# Patient Record
Sex: FEMALE | Race: WHITE | Employment: OTHER | URBAN - METROPOLITAN AREA
[De-identification: names, ages, dates, MRNs, and addresses within clinical notes are randomized per-mention and may not be internally consistent; named-entity substitution may affect disease eponyms.]

---

## 2019-01-21 ENCOUNTER — HOSPITAL ENCOUNTER (OUTPATIENT)
Dept: SURGERY | Age: 79
Discharge: HOME | End: 2019-01-21
Payer: MEDICARE

## 2019-01-21 ENCOUNTER — HOSPITAL ENCOUNTER (OUTPATIENT)
Dept: PHYSICAL THERAPY | Age: 79
Discharge: HOME | End: 2019-01-21
Payer: MEDICARE

## 2019-01-21 VITALS
SYSTOLIC BLOOD PRESSURE: 151 MMHG | TEMPERATURE: 95.7 F | WEIGHT: 158.3 LBS | BODY MASS INDEX: 27.02 KG/M2 | HEART RATE: 73 BPM | DIASTOLIC BLOOD PRESSURE: 70 MMHG | OXYGEN SATURATION: 100 % | HEIGHT: 64 IN | RESPIRATION RATE: 16 BRPM

## 2019-01-21 LAB
ANION GAP SERPL CALC-SCNC: 4 MMOL/L
APPEARANCE UR: CLEAR
APTT PPP: 29.4 SEC (ref 24.7–39.8)
BACTERIA SPEC CULT: ABNORMAL
BACTERIA URNS QL MICRO: ABNORMAL /HPF
BASOPHILS # BLD: 0.1 K/UL (ref 0–0.2)
BASOPHILS NFR BLD: 1 % (ref 0–2)
BILIRUB UR QL: NEGATIVE
BUN SERPL-MCNC: 21 MG/DL (ref 8–23)
CALCIUM SERPL-MCNC: 9.6 MG/DL (ref 8.3–10.4)
CASTS URNS QL MICRO: ABNORMAL /LPF
CHLORIDE SERPL-SCNC: 103 MMOL/L (ref 98–107)
CO2 SERPL-SCNC: 29 MMOL/L (ref 21–32)
COLOR UR: YELLOW
CREAT SERPL-MCNC: 0.75 MG/DL (ref 0.6–1)
DIFFERENTIAL METHOD BLD: NORMAL
EOSINOPHIL # BLD: 0.2 K/UL (ref 0–0.8)
EOSINOPHIL NFR BLD: 2 % (ref 0.5–7.8)
EPI CELLS #/AREA URNS HPF: ABNORMAL /HPF
ERYTHROCYTE [DISTWIDTH] IN BLOOD BY AUTOMATED COUNT: 13.5 % (ref 11.9–14.6)
GLUCOSE SERPL-MCNC: 86 MG/DL (ref 65–100)
GLUCOSE UR STRIP.AUTO-MCNC: NEGATIVE MG/DL
HCT VFR BLD AUTO: 42.8 % (ref 35.8–46.3)
HGB BLD-MCNC: 13.8 G/DL (ref 11.7–15.4)
HGB UR QL STRIP: ABNORMAL
IMM GRANULOCYTES # BLD AUTO: 0 K/UL (ref 0–0.5)
IMM GRANULOCYTES NFR BLD AUTO: 0 % (ref 0–5)
INR PPP: 0.9
KETONES UR QL STRIP.AUTO: ABNORMAL MG/DL
LEUKOCYTE ESTERASE UR QL STRIP.AUTO: ABNORMAL
LYMPHOCYTES # BLD: 1.8 K/UL (ref 0.5–4.6)
LYMPHOCYTES NFR BLD: 19 % (ref 13–44)
MCH RBC QN AUTO: 31.2 PG (ref 26.1–32.9)
MCHC RBC AUTO-ENTMCNC: 32.2 G/DL (ref 31.4–35)
MCV RBC AUTO: 96.6 FL (ref 79.6–97.8)
MONOCYTES # BLD: 0.8 K/UL (ref 0.1–1.3)
MONOCYTES NFR BLD: 9 % (ref 4–12)
NEUTS SEG # BLD: 6.6 K/UL (ref 1.7–8.2)
NEUTS SEG NFR BLD: 70 % (ref 43–78)
NITRITE UR QL STRIP.AUTO: NEGATIVE
NRBC # BLD: 0 K/UL (ref 0–0.2)
OTHER OBSERVATIONS,UCOM: ABNORMAL
PH UR STRIP: 5.5 [PH] (ref 5–9)
PLATELET # BLD AUTO: 329 K/UL (ref 150–450)
PMV BLD AUTO: 10.1 FL (ref 9.4–12.3)
POTASSIUM SERPL-SCNC: 4.5 MMOL/L (ref 3.5–5.1)
PROT UR STRIP-MCNC: 30 MG/DL
PROTHROMBIN TIME: 12.6 SEC (ref 11.7–14.5)
RBC # BLD AUTO: 4.43 M/UL (ref 4.05–5.2)
SERVICE CMNT-IMP: ABNORMAL
SODIUM SERPL-SCNC: 136 MMOL/L (ref 136–145)
SP GR UR REFRACTOMETRY: 1.02 (ref 1–1.02)
UROBILINOGEN UR QL STRIP.AUTO: 0.2 EU/DL (ref 0.2–1)
WBC # BLD AUTO: 9.4 K/UL (ref 4.3–11.1)
WBC URNS QL MICRO: ABNORMAL /HPF

## 2019-01-21 PROCEDURE — 85610 PROTHROMBIN TIME: CPT

## 2019-01-21 PROCEDURE — 85025 COMPLETE CBC W/AUTO DIFF WBC: CPT

## 2019-01-21 PROCEDURE — 97161 PT EVAL LOW COMPLEX 20 MIN: CPT

## 2019-01-21 PROCEDURE — 85730 THROMBOPLASTIN TIME PARTIAL: CPT

## 2019-01-21 PROCEDURE — 36415 COLL VENOUS BLD VENIPUNCTURE: CPT

## 2019-01-21 PROCEDURE — 80048 BASIC METABOLIC PNL TOTAL CA: CPT

## 2019-01-21 PROCEDURE — 87641 MR-STAPH DNA AMP PROBE: CPT

## 2019-01-21 PROCEDURE — 81003 URINALYSIS AUTO W/O SCOPE: CPT

## 2019-01-21 PROCEDURE — 77030027138 HC INCENT SPIROMETER -A

## 2019-01-21 RX ORDER — IBUPROFEN 200 MG
400 TABLET ORAL 2 TIMES DAILY
COMMUNITY
End: 2019-02-09

## 2019-01-21 RX ORDER — GLUCOSAMINE/CHONDROITIN/C/MANG 500-400 MG
1 CAPSULE ORAL DAILY
COMMUNITY
End: 2019-02-09

## 2019-01-21 RX ORDER — BISMUTH SUBSALICYLATE 262 MG
1 TABLET,CHEWABLE ORAL DAILY
COMMUNITY
End: 2019-02-09

## 2019-01-21 RX ORDER — KETOCONAZOLE 20 MG/G
CREAM TOPICAL
COMMUNITY

## 2019-01-21 NOTE — PERIOP NOTES
Patient verified name and . Order for consent was found in EHR and matches case posting; patient verified. Left total knee arthroplasty    Type 3 surgery, PAT Joint assessment complete. Labs per surgeon: cbc, bmp, UA, PT, PTT, MRSA nasal swab; results pending. Labs per anesthesia protocol: no additional lab work needed. EKG:Donr 18- within anesthesia guidelines. MRSA/MSSA swab collected; pharmacy to review and dose antibiotic as appropriate. Hibiclens and instructions to return bottle on DOS given per hospital policy. Patient provided with handouts including Guide to Surgery, Pain Management, Hand Hygiene, Blood Transfusion Education, and [facility] Brochure. Patient answered medical/surgical history questions at their best of ability. All prior to admission medications documented in [facility]. Original medication prescription bottle was visualized during patient appointment. Patient instructed to hold all vitamins 7 days prior to surgery and NSAIDS 5 days prior to surgery. Prescription medications to hold: vitamins, supplements and ibuprofen. Patient instructed to continue previous medications as prescribed prior to surgery and to take the following medications the day of surgery according to anesthesia guidelines with a small sip of water: zyrtec, synthroid, pred forte eye drops and tramadol if needed. .      Patient teach back successful and patient demonstrates knowledge of instruction.

## 2019-01-21 NOTE — PROGRESS NOTES
Teo Hurtado  : [birth year](51 y.o.) [address] at [address], [place] [address].  [phone]       Physical Therapy Prehab Plan of Treatment and Evaluation Summary:2019    ICD-10: Treatment Diagnosis:   · Pain in Left Knee (M25.562)  · Stiffness of Left Knee, Not elsewhere classified (X13.827)  Precautions/Allergies:   Pollen extracts and Ace inhibitors  MEDICAL/REFERRING DIAGNOSIS:  Unilateral primary osteoarthritis, left knee [M17.12]  REFERRING PHYSICIAN: Rizwana Florez,*  DATE OF SURGERY: 18   Assessment:   Comments:  Scheduled for L TKA. Plans to later have R TKA. Reports pain is worse at night and with extended walking. [place] -uses B hearing aides. Plans to discharge home with support of spouse. PROBLEM LIST (Impacting functional limitations):  Ms. Keyana Lopez presents with the following left lower extremity(s) problems:  1. Transfers  2. Gait  3. Range of Motion  4. Home Exercise Program  5. Pain   INTERVENTIONS PLANNED:  1. Home Exercise Program  2. Educational Discussion     TREATMENT PLAN: Effective Dates: 2019 TO 2019. Frequency/Duration: Patient to continue to perform home exercise program at least twice per day up until her surgery. GOALS: (Goals have been discussed and agreed upon with patient.)  Discharge Goals: Time Frame: 1 Day  1. Patient will demonstrate independence with a home exercise program designed to increase functional technique and pain control to minimize functional deficits and optimize patient for total joint replacement. Rehabilitation Potential For Stated Goals: Good  Regarding Geovanna Somers's therapy, I certify that the treatment plan above will be carried out by a therapist or under their direction.   Thank you for this referral,  Cece Chavez, PT               HISTORY:   Present Symptoms:  Pain Intensity 1: 0(0 currently; high of 8)   History of Present Injury/Illness (Reason for Referral):  Medical/Referring Diagnosis: Unilateral primary osteoarthritis, left knee [M17.12]   Past Medical History/Comorbidities:   Ms. Nilda Zepeda  has a past medical history of Allergic rhinitis due to pollen, Arthritis, Asthma, Fibrocystic breast, GERD (gastroesophageal reflux disease), H/O colonoscopy (2009), Hashimoto's thyroiditis, Hypertension, Macular degeneration, Mixed hyperlipidemia, Osteoporosis (1/2013), Unspecified hypothyroidism, and Varicella. Ms. Nilda Zepeda  has a past surgical history that includes hx gyn (1973); hx appendectomy (1973); hx tonsillectomy (as a child); hx adenoidectomy (as a child); hx cataract removal (Bilateral, 2007); hx colonoscopy; and hx hernia repair (06/16/2018). Social History/Living Environment:   Home Environment: Private residence  # Steps to Enter: 1  Rails to Enter: No  One/Two Story Residence: One story  Living Alone: No  Support Systems: Spouse/Significant Other/Partner  Patient Expects to be Discharged to[de-identified] Private residence  Current DME Used/Available at Home: None(plans to borrow RW)  Tub or Shower Type: Shower  Work/Activity:  retired  Dominant Side:  LEFT  Current Medications:  See [address] note   Number of Personal Factors/Comorbidities that affect the Plan of Care: 0: LOW COMPLEXITY   EXAMINATION:   ADLs (Current Functional Status):   Ambulation:  [x] Independent  [] Walk Indoors Only  [] Walk Outdoors  [] Use Assistive Device  [] Use Wheelchair Only Dressing:  [x] Independent  Requires Assistance from Someone for:  [] Sock/Shoes  [] Pants  [] Everything   Bathing/Showering:   [x] Independent  [] Requires Assistance from Someone  [] Shaan Bhakta Dr:  [x] Routine house and yard work  [] Light Housework Only  [] None   Observation/Orthostatic Postural Assessment: Forward head, Rounded shoulders, Genu valgus left, Genu valgus right  ROM/Flexibility:   AROM: Generally decreased, functional(B knees 0-115)                           Strength:   Strength:  Within functional limits                  Functional Mobility:         Stand to Sit: Modified independent, Additional time  Sit to Stand: Additional time, Modified independent  Distance (ft): 450 Feet (ft)  Ambulation - Level of Assistance: Independent; Modified independent; Additional time(held onto 's arm)  Speed/Mishel: Slow  Gait Abnormalities: Antalgic;Trunk sway increased          Balance:    Sitting: Intact  Standing: Intact   Body Structures Involved:  1. Bones  2. Joints  3. Muscles Body Functions Affected:  1. Neuromusculoskeletal  2. Movement Related Activities and Participation Affected:  1. General Tasks and Demands  2. Mobility   Number of elements that affect the Plan of Care: 3: MODERATE COMPLEXITY   CLINICAL PRESENTATION:   Presentation: Stable and uncomplicated: LOW COMPLEXITY   CLINICAL DECISION MAKING:   Outcome Measure: Tool Used: Lower Extremity Functional Scale (LEFS)  Score:  Initial: 31/80 Most Recent: X/80 (Date: -- )   Interpretation of Score: 20 questions each scored on a 5 point scale with 0 representing \"extreme difficulty or unable to perform\" and 4 representing \"no difficulty\". The lower the score, the greater the functional disability. 80/80 represents no disability. Minimal detectable change is 9 points. Score 80 79-65 64-49 48-33 32-17 16-1 0   Modifier CH CI CJ CK CL CM CN     ? Mobility - Walking and Moving Around:     - CURRENT STATUS: CL - 60%-79% impaired, limited or restricted    - GOAL STATUS: CL - 60%-79% impaired, limited or restricted    - D/C STATUS:  CL - 60%-79% impaired, limited or restricted  Medical Necessity:   · Ms. Abigail Nolasco is expected to optimize her lower extremity strength and ROM in preparation for joint replacement surgery. Reason for Services/Other Comments:  · Achieve baseline assesment of musculoskeletal system, functional mobility and home environment. , educate in PT HEP in preparation for surgery, educate in hospital plan of care.    Use of outcome tool(s) and clinical judgement create a POC that gives a: Clear prediction of patient's progress: LOW COMPLEXITY   TREATMENT:   Treatment/Session Assessment:  Patient was instructed in PT- HEP to increase strength and ROM in LEs. Answered all questions. · Post session pain:  0  · Compliance with Program/Exercises: anticipate compliance.   Total Treatment Duration:  PT Patient Time In/Time Out  Time In: 1230  Time Out: [address]

## 2019-01-21 NOTE — PROGRESS NOTES
01/21/19 1200   Oxygen Therapy   O2 Sat (%) 98 %   Pulse via Oximetry 78 beats per minute   O2 Device Room air   Pre-Treatment   Breathing Pattern Regular   Breath Sounds Bilateral Clear   Pre FEV1 (liters) 2.6 liters   % Predicted 100   Incentive Spirometry Treatment   Actual Volume (ml) 1500 ml     Initial respiratory Assessment completed with pt. Pt was interviewed and evaluated in Joint camp prior to surgery. Patient ID:  Sharan Sommers  [id number]  51 y.o.  1940  Surgeon: Dr. Tova Vanessa  Date of Surgery: 2/7/2019  Procedure: Total Left Knee Arthroplasty  Primary Care Physician: Malka Ball -[phone]  Specialists:                                  Pt instructed in the use of Incentive Spirometry. Pt instructed to bring Incentive Spirometer back on date of surgery & to start using Is upon return to pt room.     Pt taught proper cough technique    History of smoking:   former                                                      Quit date:     15 yrs ago      Secondhand smoke: spouse      Past procedures with Oxygen desaturation:DENIES    Past Medical History:   Diagnosis Date    Allergic rhinitis due to pollen     Arthritis     Asthma     As a child     Fibrocystic breast     GERD (gastroesophageal reflux disease)     Managed with meds     H/O colonoscopy 2009    Normal ([facility])    Hashimoto's thyroiditis     Hypertension     Managed with meds     Macular degeneration     Mixed hyperlipidemia     Daily meds     Osteoporosis 1/2013    GYN Dr. Veronica Dial Unspecified hypothyroidism     Varicella                                                                                                                                                      Respiratory history:   Some SOB  ON EXERTION                                    Respiratory meds:                                         FAMILY PRESENT:            SPOUSE PAST SLEEP STUDY:                      DENIES  HX OF JENNYFER:                                      DENIES                                     JENNYFER assessment:                                               SLEEPS ON SIDE  and   BACK                                                                 PHYSICAL EXAM   Body mass index is 27.17 kg/m².    Visit Vitals  /70 (BP 1 Location: Right arm, BP Patient Position: At rest;Sitting)   Pulse 73   Temp 95.7 °F (35.4 °C)   Resp 16   Ht 5' 4\" (1.626 m)   Wt 71.8 kg (158 lb 4.8 oz)   SpO2 100%   BMI 27.17 kg/m²     Neck circumference:  37    cm    Loud snoring:        YES                                 Witnessed apnea or wakening gasping or choking:,             DENIES,                                                                                                    Awakens with headaches:                                                  DENIES    Morning or daytime tiredness/ sleepiness:                                                                                                            TIRED - some  Dry mouth or sore throat in morning:                YES                                                                            Freire stage:  4    SACS score: 16    STOP/BANG: 3                              CPAP:                       NONE                                           CONT SAT HS            Referrals:    Pt. Phone Number:

## 2019-01-21 NOTE — PERIOP NOTES
Lab results within anesthesia guidelines. Lab results sent to PCP per surgeon's request. Lab results faxed to Dr. Catarino Henry. Recent Results (from the past 12 hour(s))   CBC WITH AUTOMATED DIFF    Collection Time: 01/21/19 11:55 AM   Result Value Ref Range    WBC 9.4 4.3 - 11.1 K/uL    RBC 4.43 4.05 - 5.2 M/uL    HGB 13.8 11.7 - 15.4 g/dL    HCT 42.8 35.8 - 46.3 %    MCV 96.6 79.6 - 97.8 FL    MCH 31.2 26.1 - 32.9 PG    MCHC 32.2 31.4 - 35.0 g/dL    RDW 13.5 11.9 - 14.6 %    PLATELET 238 579 - 387 K/uL    MPV 10.1 9.4 - 12.3 FL    ABSOLUTE NRBC 0.00 0.0 - 0.2 K/uL    DF AUTOMATED      NEUTROPHILS 70 43 - 78 %    LYMPHOCYTES 19 13 - 44 %    MONOCYTES 9 4.0 - 12.0 %    EOSINOPHILS 2 0.5 - 7.8 %    BASOPHILS 1 0.0 - 2.0 %    IMMATURE GRANULOCYTES 0 0.0 - 5.0 %    ABS. NEUTROPHILS 6.6 1.7 - 8.2 K/UL    ABS. LYMPHOCYTES 1.8 0.5 - 4.6 K/UL    ABS. MONOCYTES 0.8 0.1 - 1.3 K/UL    ABS. EOSINOPHILS 0.2 0.0 - 0.8 K/UL    ABS. BASOPHILS 0.1 0.0 - 0.2 K/UL    ABS. IMM.  GRANS. 0.0 0.0 - 0.5 K/UL   METABOLIC PANEL, BASIC    Collection Time: 01/21/19 11:55 AM   Result Value Ref Range    Sodium 136 136 - 145 mmol/L    Potassium 4.5 3.5 - 5.1 mmol/L    Chloride 103 98 - 107 mmol/L    CO2 29 21 - 32 mmol/L    Anion gap 4 mmol/L    Glucose 86 65 - 100 mg/dL    BUN 21 8 - 23 MG/DL    Creatinine 0.75 0.6 - 1.0 MG/DL    GFR est AA >60 >60 ml/min/1.73m2    GFR est non-AA >60 ml/min/1.73m2    Calcium 9.6 8.3 - 10.4 MG/DL   PROTHROMBIN TIME + INR    Collection Time: 01/21/19 11:55 AM   Result Value Ref Range    Prothrombin time 12.6 11.7 - 14.5 sec    INR 0.9     PTT    Collection Time: 01/21/19 11:55 AM   Result Value Ref Range    aPTT 29.4 24.7 - 39.8 SEC   URINALYSIS W/ RFLX MICROSCOPIC    Collection Time: 01/21/19 11:55 AM   Result Value Ref Range    Color YELLOW      Appearance CLEAR      Specific gravity 1.020 1.001 - 1.023      pH (UA) 5.5 5.0 - 9.0      Protein 30 (A) NEG mg/dL    Glucose NEGATIVE  NEG mg/dL    Ketone TRACE (A) NEG mg/dL    Bilirubin NEGATIVE  NEG      Blood SMALL (A) NEG      Urobilinogen 0.2 0.2 - 1.0 EU/dL    Nitrites NEGATIVE  NEG      Leukocyte Esterase LARGE (A) NEG      WBC 20-50 0 /hpf    Epithelial cells 0-3 0 /hpf    Bacteria TRACE 0 /hpf    Casts 0-3 0 /lpf    Other observations RESULTS VERIFIED MANUALLY

## 2019-01-22 NOTE — ADVANCED PRACTICE NURSE
Total Joint Surgery Preoperative Chart Review      Patient ID:  Akila Silver  [id number]  67 y.o.  1940  Surgeon: Dr. Gabbi Madison  Date of Surgery: 2019  Procedure: Total Left Knee Arthroplasty  Primary Care Physician: Sanjuanita Piña -[phone]  Specialty Physician(s):      Subjective:   Akila Silver is a 70 y.o. WHITE OR  female who presents for preoperative evaluation for Total Left Knee arthroplasty. This is a preoperative chart review note based on data collected by the nurse at the surgical Pre-Assessment visit. Past Medical History:   Diagnosis Date    Allergic rhinitis due to pollen     Arthritis     Asthma     As a child     Fibrocystic breast     GERD (gastroesophageal reflux disease)     Managed with meds     H/O colonoscopy     Normal ([facility])    Hashimoto's thyroiditis     Hypertension     Managed with meds     Macular degeneration     Mixed hyperlipidemia     Daily meds     Osteoporosis 2013    GYN Dr. Juanpablo Alvarado Unspecified hypothyroidism     Varicella       Past Surgical History:   Procedure Laterality Date    HX ADENOIDECTOMY  as a child    HX APPENDECTOMY  1973    HX CATARACT REMOVAL Bilateral     cataract repair   [facility] COLONOSCOPY      Dr. Nathan Srivastava     HX GYN  1973    ovarian cyst    HX HERNIA REPAIR  2018    HX TONSILLECTOMY  as a child     Family History   Problem Relation Age of Onset    Breast Cancer Mother     Cancer Mother         breast, lung    Coronary Artery Disease Mother     Heart Disease Mother     COPD Father     Other Father         smoker      Social History     Tobacco Use    Smoking status: Former Smoker     Packs/day: 0.50     Years: 12.00     Pack years: 6.00     Last attempt to quit: 1975     Years since quittin.0    Smokeless tobacco: Never Used    Tobacco comment: quit age of 30   Substance Use Topics    Alcohol use:  Yes     Alcohol/week: 0.6 oz     Types: 1 Glasses of wine per week     Comment: socially       Prior to Admission medications    Medication Sig Start Date End Date Taking? Authorizing Provider   ketoconazole (NIZORAL) 2 % topical cream Apply  to affected area two (2) times daily as needed for Skin Irritation. Yes Provider, Historical   glucosamine-chondroit-vit c-mn (GLUCOSAMINE 1500 COMPLEX) 500-400 mg capsule Take 1 Cap by mouth daily. Yes Provider, Historical   ibuprofen (MOTRIN) 200 mg tablet Take 400 mg by mouth two (2) times a day. Yes Provider, Historical   multivitamin (ONE A DAY) tablet Take 1 Tab by mouth daily. Yes Provider, Historical   traMADol (ULTRAM) 50 mg tablet Take 1 Tab by mouth every six (6) hours as needed for Pain. Max Daily Amount: 200 mg. 1/10/19  Yes Rona Pacheco MD   varicella-zoster recombinant, PF, (SHINGRIX, PF,) 50 mcg/0.5 mL susr injection 0.5mL by IntraMUSCular route once now and then repeat in 2-6 months 8/8/18  Yes Rona Pacheco MD   hydroCHLOROthiazide (HYDRODIURIL) 12.5 mg tablet Take 1 Tab by mouth daily. 5/9/18  Yes Rona Pacheco MD   levothyroxine (SYNTHROID) 75 mcg tablet TAKE 1 TABLET BY MOUTH  DAILY BEFORE BREAKFAST 4/24/18  Yes Rona Pacheco MD   irbesartan (AVAPRO) 300 mg tablet TAKE 1 TABLET BY MOUTH  NIGHTLY 4/24/18  Yes Rona Pacheco MD   carbidopa-levodopa (SINEMET)  mg per tablet Take 1 Tab by mouth nightly. Patient taking differently: Take 1 Tab by mouth nightly. 4/24/18  Yes Rona Pacheco MD   pantoprazole (PROTONIX) 40 mg tablet Take 1 Tab by mouth daily. Patient taking differently: Take 40 mg by mouth Daily (before dinner). 4/24/18  Yes Rona Pacheco MD   pravastatin (PRAVACHOL) 40 mg tablet Take 1 Tab by mouth daily. Patient taking differently: Take 40 mg by mouth nightly. 4/24/18  Yes Rona Pacheco MD   triamcinolone acetonide (KENALOG) 0.1 % topical cream Apply  to affected area two (2) times daily as needed.  2/9/18  Yes Provider, Historical   clotrimazole-betamethasone (Zaira Leonard) topical cream Apply  to affected area two (2) times a day. Patient taking differently: Apply  to affected area two (2) times daily as needed. 3/28/18  Yes Reji Tobias MD   clobetasol (TEMOVATE) 0.05 % topical cream Apply  to affected area two (2) times daily as needed. 8/25/16  Yes Provider, Historical   cetirizine (ZYRTEC) 10 mg tablet Take 10 mg by mouth daily. Yes Provider, Historical   VIT C/MADALYN AC/LUT/COPPER/ZNOX (PRESERVISION LUTEIN PO) Take 1 Tab by mouth daily. Yes Provider, Historical   Omega-3-DHA-EPA-Fish Oil 1,200 (144-216) mg cap Take 1 Tab by mouth daily. Yes Provider, Historical   coenzyme q10 (CO Q-10) 10 mg cap Take 1 Tab by mouth daily. Yes Provider, Historical   calcium carbonate-vitamin d2 1,200-400 mg-unit cap Take 1 Tab by mouth two (2) times a day. Yes Provider, Historical   PSYLLIUM SEED, WITH DEXTROSE, (FIBER PO) Take 1 Tab by mouth daily. Yes Provider, Historical   prednisoLONE acetate (PRED FORTE) 1 % ophthalmic suspension Administer 1 Drop to both eyes two (2) times a week. Monday and Thursday   Yes Provider, Historical   aspirin 81 mg tablet Take 81 mg by mouth nightly. Yes Provider, Historical   ciprofloxacin HCl (CIPRO) 500 mg tablet Take 1 Tab by mouth two (2) times a day. 1/21/19   Demario Samano MD   cyclobenzaprine (FLEXERIL) 10 mg tablet Take 1 Tab by mouth three (3) times daily as needed for Muscle Spasm(s). 10/8/18   Demario Samano MD   fluticasone (FLONASE) 50 mcg/actuation nasal spray 2 Sprays by Both Nostrils route daily. 4/24/18   Demario Samano MD   MULTIVITAMINS WITH FLUORIDE (MULTI-VITAMIN PO) Take  by mouth.       Provider, Historical     Allergies   Allergen Reactions    Pollen Extracts Runny Nose    Ace Inhibitors Swelling and Angioedema     Pt stated \"ace blockers\" can't remember name for htn  Facial swelling          Objective:     Physical Exam:   Patient Vitals for the past 24 hrs:   Temp Pulse Resp BP SpO2   01/21/19 1333 95.7 °F (35.4 °C) 73 16 151/70 100 %   01/21/19 1200 -- -- -- -- 98 %       ECG:    EKG Results     None          Data Review:   Labs:   Recent Results (from the past 24 hour(s))   CBC WITH AUTOMATED DIFF    Collection Time: 01/21/19 11:55 AM   Result Value Ref Range    WBC 9.4 4.3 - 11.1 K/uL    RBC 4.43 4.05 - 5.2 M/uL    HGB 13.8 11.7 - 15.4 g/dL    HCT 42.8 35.8 - 46.3 %    MCV 96.6 79.6 - 97.8 FL    MCH 31.2 26.1 - 32.9 PG    MCHC 32.2 31.4 - 35.0 g/dL    RDW 13.5 11.9 - 14.6 %    PLATELET 419 822 - 747 K/uL    MPV 10.1 9.4 - 12.3 FL    ABSOLUTE NRBC 0.00 0.0 - 0.2 K/uL    DF AUTOMATED      NEUTROPHILS 70 43 - 78 %    LYMPHOCYTES 19 13 - 44 %    MONOCYTES 9 4.0 - 12.0 %    EOSINOPHILS 2 0.5 - 7.8 %    BASOPHILS 1 0.0 - 2.0 %    IMMATURE GRANULOCYTES 0 0.0 - 5.0 %    ABS. NEUTROPHILS 6.6 1.7 - 8.2 K/UL    ABS. LYMPHOCYTES 1.8 0.5 - 4.6 K/UL    ABS. MONOCYTES 0.8 0.1 - 1.3 K/UL    ABS. EOSINOPHILS 0.2 0.0 - 0.8 K/UL    ABS. BASOPHILS 0.1 0.0 - 0.2 K/UL    ABS. IMM.  GRANS. 0.0 0.0 - 0.5 K/UL   METABOLIC PANEL, BASIC    Collection Time: 01/21/19 11:55 AM   Result Value Ref Range    Sodium 136 136 - 145 mmol/L    Potassium 4.5 3.5 - 5.1 mmol/L    Chloride 103 98 - 107 mmol/L    CO2 29 21 - 32 mmol/L    Anion gap 4 mmol/L    Glucose 86 65 - 100 mg/dL    BUN 21 8 - 23 MG/DL    Creatinine 0.75 0.6 - 1.0 MG/DL    GFR est AA >60 >60 ml/min/1.73m2    GFR est non-AA >60 ml/min/1.73m2    Calcium 9.6 8.3 - 10.4 MG/DL   PROTHROMBIN TIME + INR    Collection Time: 01/21/19 11:55 AM   Result Value Ref Range    Prothrombin time 12.6 11.7 - 14.5 sec    INR 0.9     PTT    Collection Time: 01/21/19 11:55 AM   Result Value Ref Range    aPTT 29.4 24.7 - 39.8 SEC   URINALYSIS W/ RFLX MICROSCOPIC    Collection Time: 01/21/19 11:55 AM   Result Value Ref Range    Color YELLOW      Appearance CLEAR      Specific gravity 1.020 1.001 - 1.023      pH (UA) 5.5 5.0 - 9.0      Protein 30 (A) NEG mg/dL    Glucose NEGATIVE  NEG mg/dL    Ketone TRACE (A) NEG mg/dL    Bilirubin NEGATIVE  NEG      Blood SMALL (A) NEG      Urobilinogen 0.2 0.2 - 1.0 EU/dL    Nitrites NEGATIVE  NEG      Leukocyte Esterase LARGE (A) NEG      WBC 20-50 0 /hpf    Epithelial cells 0-3 0 /hpf    Bacteria TRACE 0 /hpf    Casts 0-3 0 /lpf    Other observations RESULTS VERIFIED MANUALLY     MSSA/MRSA SC BY PCR, NASAL SWAB    Collection Time: 01/21/19  2:10 PM   Result Value Ref Range    Special Requests: NO SPECIAL REQUESTS      Culture result: (A)       MRSA target DNA not detected, SA target DNA detected. A MRSA negative, SA positive test result does not preclude MRSA nasal colonization. Problem List:  )  Patient Active Problem List   Diagnosis Code    Abdominal pain R10.9    Sigmoid diverticulitis K57.32    Hypercholesterolemia E78.00    Thyroid disease E07.9    Allergic rhinitis due to pollen J30.1    Essential hypertension, benign I10    GERD (gastroesophageal reflux disease) K21.9    Acquired hypothyroidism E03.9    Gastroesophageal reflux disease with esophagitis K21.0       Total Joint Surgery Pre-Assessment Recommendations:           Recommend continuous saturation monitoring hours of sleep, during hospitalization.       Signed By: MYRA Ross    January 22, 2019

## 2019-02-01 NOTE — H&P
[facility] [employer] History and Physical Exam 
 
Patient ID: 
Yue Barnes [id number] 
 
27 y.o. 
1940 Today: February 1, 2019 Vitals Signs: Reviewed as noted in medical record. Allergies: Allergies Allergen Reactions  Pollen Extracts Runny Nose  Ace Inhibitors Swelling and Angioedema Pt stated \"ace blockers\" can't remember name for htn Facial swelling CC: Left knee pain HPI:  Pt complains of left knee pain and difficulty ambulating. Relevant PMH:  
Past Medical History:  
Diagnosis Date  Allergic rhinitis due to pollen  Arthritis  Asthma As a child  Fibrocystic breast   
 GERD (gastroesophageal reflux disease) Managed with meds  H/O colonoscopy 2009 Normal ([facility])  Hashimoto's thyroiditis  Hypertension Managed with meds  Macular degeneration  Mixed hyperlipidemia Daily meds  Osteoporosis 1/2013 GYN Dr. Florentin Martin  Unspecified hypothyroidism  Varicella Objective:  
                 HEENT: NC/AT Lungs:  clear Heart:   rrr Abdomen: soft Extremities:  Pain with rom of the left knee joint Radiographs: reveal osteoarthritis with loss of joint space and bone spurs. Assessment: Primary osteoarthritis of left knee [M17.12] Plan:  Proceed with scheduled Procedure(s) (LRB): LEFT KNEE ARTHROPLASTY TOTAL / YULIANA (Left) . The patient has failed conservative treatment including NSAIDS, and injections. Due to the amount of pain the patient is experiencing we will proceed with scheduled procedure. It is also felt that the patient is high risk for postoperative complications due to her advanced age and history of multiple chronic medical problems. Signed By: KELLIE Tinsley February 1, 2019

## 2019-02-06 ENCOUNTER — ANESTHESIA EVENT (OUTPATIENT)
Dept: SURGERY | Age: 79
DRG: 470 | End: 2019-02-06
Payer: MEDICARE

## 2019-02-07 ENCOUNTER — HOSPITAL ENCOUNTER (INPATIENT)
Age: 79
LOS: 2 days | Discharge: HOME HEALTH | DRG: 470 | End: 2019-02-09
Attending: ORTHOPAEDIC SURGERY | Admitting: ORTHOPAEDIC SURGERY
Payer: MEDICARE

## 2019-02-07 ENCOUNTER — HOME HEALTH ADMISSION (OUTPATIENT)
Dept: HOME HEALTH SERVICES | Facility: HOME HEALTH | Age: 79
End: 2019-02-07
Payer: MEDICARE

## 2019-02-07 ENCOUNTER — ANESTHESIA (OUTPATIENT)
Dept: SURGERY | Age: 79
DRG: 470 | End: 2019-02-07
Payer: MEDICARE

## 2019-02-07 DIAGNOSIS — M17.12 ARTHRITIS OF LEFT KNEE: Primary | ICD-10-CM

## 2019-02-07 LAB
ABO + RH BLD: NORMAL
BLOOD GROUP ANTIBODIES SERPL: NORMAL
GLUCOSE BLD STRIP.AUTO-MCNC: 85 MG/DL (ref 65–100)
HGB BLD-MCNC: 10.9 G/DL (ref 11.7–15.4)
SPECIMEN EXP DATE BLD: NORMAL

## 2019-02-07 PROCEDURE — 74011000258 HC RX REV CODE- 258: Performed by: ORTHOPAEDIC SURGERY

## 2019-02-07 PROCEDURE — 77030020782 HC GWN BAIR PAWS FLX 3M -B: Performed by: ANESTHESIOLOGY

## 2019-02-07 PROCEDURE — 85018 HEMOGLOBIN: CPT

## 2019-02-07 PROCEDURE — 74011250636 HC RX REV CODE- 250/636: Performed by: ANESTHESIOLOGY

## 2019-02-07 PROCEDURE — 74011000302 HC RX REV CODE- 302: Performed by: ORTHOPAEDIC SURGERY

## 2019-02-07 PROCEDURE — 77030031139 HC SUT VCRL2 J&J -A: Performed by: ORTHOPAEDIC SURGERY

## 2019-02-07 PROCEDURE — 77030011208: Performed by: ORTHOPAEDIC SURGERY

## 2019-02-07 PROCEDURE — 0SRD0J9 REPLACEMENT OF LEFT KNEE JOINT WITH SYNTHETIC SUBSTITUTE, CEMENTED, OPEN APPROACH: ICD-10-PCS | Performed by: ORTHOPAEDIC SURGERY

## 2019-02-07 PROCEDURE — 77030019557 HC ELECTRD VES SEAL MEDT -F: Performed by: ORTHOPAEDIC SURGERY

## 2019-02-07 PROCEDURE — 74011250636 HC RX REV CODE- 250/636

## 2019-02-07 PROCEDURE — 74011000250 HC RX REV CODE- 250: Performed by: ORTHOPAEDIC SURGERY

## 2019-02-07 PROCEDURE — 77030007880 HC KT SPN EPDRL BBMI -B: Performed by: ANESTHESIOLOGY

## 2019-02-07 PROCEDURE — 65270000029 HC RM PRIVATE

## 2019-02-07 PROCEDURE — 74011250637 HC RX REV CODE- 250/637: Performed by: PHYSICIAN ASSISTANT

## 2019-02-07 PROCEDURE — 77030003665 HC NDL SPN BBMI -A: Performed by: ANESTHESIOLOGY

## 2019-02-07 PROCEDURE — 97165 OT EVAL LOW COMPLEX 30 MIN: CPT

## 2019-02-07 PROCEDURE — 77030012935 HC DRSG AQUACEL BMS -B: Performed by: ORTHOPAEDIC SURGERY

## 2019-02-07 PROCEDURE — 74011250636 HC RX REV CODE- 250/636: Performed by: ORTHOPAEDIC SURGERY

## 2019-02-07 PROCEDURE — 77030002933 HC SUT MCRYL J&J -A: Performed by: ORTHOPAEDIC SURGERY

## 2019-02-07 PROCEDURE — 77030006835 HC BLD SAW SAG STRY -B: Performed by: ORTHOPAEDIC SURGERY

## 2019-02-07 PROCEDURE — 74011250637 HC RX REV CODE- 250/637: Performed by: ANESTHESIOLOGY

## 2019-02-07 PROCEDURE — 97110 THERAPEUTIC EXERCISES: CPT

## 2019-02-07 PROCEDURE — 94760 N-INVAS EAR/PLS OXIMETRY 1: CPT

## 2019-02-07 PROCEDURE — C1713 ANCHOR/SCREW BN/BN,TIS/BN: HCPCS | Performed by: ORTHOPAEDIC SURGERY

## 2019-02-07 PROCEDURE — 76942 ECHO GUIDE FOR BIOPSY: CPT | Performed by: ORTHOPAEDIC SURGERY

## 2019-02-07 PROCEDURE — 74011250636 HC RX REV CODE- 250/636: Performed by: PHYSICIAN ASSISTANT

## 2019-02-07 PROCEDURE — 36415 COLL VENOUS BLD VENIPUNCTURE: CPT

## 2019-02-07 PROCEDURE — 76010000171 HC OR TIME 2 TO 2.5 HR INTENSV-TIER 1: Performed by: ORTHOPAEDIC SURGERY

## 2019-02-07 PROCEDURE — 77030008467 HC STPLR SKN COVD -B: Performed by: ORTHOPAEDIC SURGERY

## 2019-02-07 PROCEDURE — 74011250637 HC RX REV CODE- 250/637: Performed by: ORTHOPAEDIC SURGERY

## 2019-02-07 PROCEDURE — 77030002966 HC SUT PDS J&J -A: Performed by: ORTHOPAEDIC SURGERY

## 2019-02-07 PROCEDURE — 86900 BLOOD TYPING SEROLOGIC ABO: CPT

## 2019-02-07 PROCEDURE — 77030013708 HC HNDPC SUC IRR PULS STRY –B: Performed by: ORTHOPAEDIC SURGERY

## 2019-02-07 PROCEDURE — 77030003602 HC NDL NRV BLK BBMI -B: Performed by: ANESTHESIOLOGY

## 2019-02-07 PROCEDURE — 77030020256 HC SOL INJ NACL 0.9%  500ML: Performed by: ORTHOPAEDIC SURGERY

## 2019-02-07 PROCEDURE — 76010010054 HC POST OP PAIN BLOCK: Performed by: ORTHOPAEDIC SURGERY

## 2019-02-07 PROCEDURE — 97161 PT EVAL LOW COMPLEX 20 MIN: CPT

## 2019-02-07 PROCEDURE — 76060000035 HC ANESTHESIA 2 TO 2.5 HR: Performed by: ORTHOPAEDIC SURGERY

## 2019-02-07 PROCEDURE — 77030034849: Performed by: ORTHOPAEDIC SURGERY

## 2019-02-07 PROCEDURE — 86580 TB INTRADERMAL TEST: CPT | Performed by: ORTHOPAEDIC SURGERY

## 2019-02-07 PROCEDURE — 74011000250 HC RX REV CODE- 250

## 2019-02-07 PROCEDURE — 87086 URINE CULTURE/COLONY COUNT: CPT

## 2019-02-07 PROCEDURE — 76210000016 HC OR PH I REC 1 TO 1.5 HR: Performed by: ORTHOPAEDIC SURGERY

## 2019-02-07 PROCEDURE — 77030018836 HC SOL IRR NACL ICUM -A: Performed by: ORTHOPAEDIC SURGERY

## 2019-02-07 PROCEDURE — C1776 JOINT DEVICE (IMPLANTABLE): HCPCS | Performed by: ORTHOPAEDIC SURGERY

## 2019-02-07 PROCEDURE — 82962 GLUCOSE BLOOD TEST: CPT

## 2019-02-07 DEVICE — INSERT TIB SZ 3 THK13MM UNIV KNEE CONVENTIONAL POLYETH POST: Type: IMPLANTABLE DEVICE | Site: KNEE | Status: FUNCTIONAL

## 2019-02-07 DEVICE — COMPONENT PAT DIA27MM THK8MM KNEE SYMMETRIC NP PRI CEM W/O: Type: IMPLANTABLE DEVICE | Site: KNEE | Status: FUNCTIONAL

## 2019-02-07 DEVICE — (D)CEMENT BNE HV R 40GM -- DUPE USE ITEM 353850: Type: IMPLANTABLE DEVICE | Site: KNEE | Status: FUNCTIONAL

## 2019-02-07 DEVICE — COMPNT FEM PS CEM TRIATHLN 3 L -- TRIATHLON: Type: IMPLANTABLE DEVICE | Site: KNEE | Status: FUNCTIONAL

## 2019-02-07 DEVICE — BASEPLT TIB UNIV TRIATHLN 3 --: Type: IMPLANTABLE DEVICE | Site: KNEE | Status: FUNCTIONAL

## 2019-02-07 RX ORDER — ACETAMINOPHEN 10 MG/ML
1000 INJECTION, SOLUTION INTRAVENOUS ONCE
Status: COMPLETED | OUTPATIENT
Start: 2019-02-07 | End: 2019-02-07

## 2019-02-07 RX ORDER — PANTOPRAZOLE SODIUM 40 MG/1
40 TABLET, DELAYED RELEASE ORAL
Status: DISCONTINUED | OUTPATIENT
Start: 2019-02-07 | End: 2019-02-09 | Stop reason: HOSPADM

## 2019-02-07 RX ORDER — LEVOTHYROXINE SODIUM 75 UG/1
75 TABLET ORAL
Status: DISCONTINUED | OUTPATIENT
Start: 2019-02-08 | End: 2019-02-09 | Stop reason: HOSPADM

## 2019-02-07 RX ORDER — CEFAZOLIN SODIUM/WATER 2 G/20 ML
2 SYRINGE (ML) INTRAVENOUS EVERY 8 HOURS
Status: COMPLETED | OUTPATIENT
Start: 2019-02-07 | End: 2019-02-08

## 2019-02-07 RX ORDER — SODIUM CHLORIDE 0.9 % (FLUSH) 0.9 %
5-40 SYRINGE (ML) INJECTION EVERY 8 HOURS
Status: CANCELLED | OUTPATIENT
Start: 2019-02-07

## 2019-02-07 RX ORDER — HYDROMORPHONE HYDROCHLORIDE 2 MG/ML
1 INJECTION, SOLUTION INTRAMUSCULAR; INTRAVENOUS; SUBCUTANEOUS
Status: DISCONTINUED | OUTPATIENT
Start: 2019-02-07 | End: 2019-02-09 | Stop reason: HOSPADM

## 2019-02-07 RX ORDER — AMOXICILLIN 250 MG
2 CAPSULE ORAL DAILY
Status: DISCONTINUED | OUTPATIENT
Start: 2019-02-08 | End: 2019-02-09 | Stop reason: HOSPADM

## 2019-02-07 RX ORDER — OXYCODONE HYDROCHLORIDE 5 MG/1
10 TABLET ORAL
Status: DISCONTINUED | OUTPATIENT
Start: 2019-02-07 | End: 2019-02-09 | Stop reason: HOSPADM

## 2019-02-07 RX ORDER — FLUMAZENIL 0.1 MG/ML
0.2 INJECTION INTRAVENOUS AS NEEDED
Status: DISCONTINUED | OUTPATIENT
Start: 2019-02-07 | End: 2019-02-07 | Stop reason: HOSPADM

## 2019-02-07 RX ORDER — PROPOFOL 10 MG/ML
INJECTION, EMULSION INTRAVENOUS
Status: DISCONTINUED | OUTPATIENT
Start: 2019-02-07 | End: 2019-02-07 | Stop reason: HOSPADM

## 2019-02-07 RX ORDER — CELECOXIB 200 MG/1
200 CAPSULE ORAL EVERY 12 HOURS
Status: DISCONTINUED | OUTPATIENT
Start: 2019-02-07 | End: 2019-02-09 | Stop reason: HOSPADM

## 2019-02-07 RX ORDER — CEFAZOLIN SODIUM/WATER 2 G/20 ML
2 SYRINGE (ML) INTRAVENOUS ONCE
Status: COMPLETED | OUTPATIENT
Start: 2019-02-07 | End: 2019-02-07

## 2019-02-07 RX ORDER — CELECOXIB 200 MG/1
200 CAPSULE ORAL ONCE
Status: COMPLETED | OUTPATIENT
Start: 2019-02-07 | End: 2019-02-07

## 2019-02-07 RX ORDER — LIDOCAINE HYDROCHLORIDE 10 MG/ML
0.1 INJECTION INFILTRATION; PERINEURAL AS NEEDED
Status: DISCONTINUED | OUTPATIENT
Start: 2019-02-07 | End: 2019-02-07 | Stop reason: HOSPADM

## 2019-02-07 RX ORDER — NEOMYCIN AND POLYMYXIN B SULFATES 40; 200000 MG/ML; [USP'U]/ML
SOLUTION IRRIGATION AS NEEDED
Status: DISCONTINUED | OUTPATIENT
Start: 2019-02-07 | End: 2019-02-07 | Stop reason: HOSPADM

## 2019-02-07 RX ORDER — OXYCODONE HYDROCHLORIDE 5 MG/1
10 TABLET ORAL
Status: DISCONTINUED | OUTPATIENT
Start: 2019-02-07 | End: 2019-02-07 | Stop reason: HOSPADM

## 2019-02-07 RX ORDER — DEXAMETHASONE SODIUM PHOSPHATE 4 MG/ML
INJECTION, SOLUTION INTRA-ARTICULAR; INTRALESIONAL; INTRAMUSCULAR; INTRAVENOUS; SOFT TISSUE AS NEEDED
Status: DISCONTINUED | OUTPATIENT
Start: 2019-02-07 | End: 2019-02-07 | Stop reason: HOSPADM

## 2019-02-07 RX ORDER — MIDAZOLAM HYDROCHLORIDE 1 MG/ML
INJECTION, SOLUTION INTRAMUSCULAR; INTRAVENOUS AS NEEDED
Status: DISCONTINUED | OUTPATIENT
Start: 2019-02-07 | End: 2019-02-07 | Stop reason: HOSPADM

## 2019-02-07 RX ORDER — VALSARTAN 320 MG/1
320 TABLET ORAL DAILY
Status: DISCONTINUED | OUTPATIENT
Start: 2019-02-08 | End: 2019-02-09 | Stop reason: HOSPADM

## 2019-02-07 RX ORDER — VANCOMYCIN HYDROCHLORIDE 1 G/20ML
INJECTION, POWDER, LYOPHILIZED, FOR SOLUTION INTRAVENOUS AS NEEDED
Status: DISCONTINUED | OUTPATIENT
Start: 2019-02-07 | End: 2019-02-07 | Stop reason: HOSPADM

## 2019-02-07 RX ORDER — ZOLPIDEM TARTRATE 5 MG/1
5 TABLET ORAL
Status: DISCONTINUED | OUTPATIENT
Start: 2019-02-07 | End: 2019-02-09 | Stop reason: HOSPADM

## 2019-02-07 RX ORDER — HYDROMORPHONE HYDROCHLORIDE 2 MG/ML
0.5 INJECTION, SOLUTION INTRAMUSCULAR; INTRAVENOUS; SUBCUTANEOUS
Status: DISCONTINUED | OUTPATIENT
Start: 2019-02-07 | End: 2019-02-07 | Stop reason: HOSPADM

## 2019-02-07 RX ORDER — CARBIDOPA AND LEVODOPA 25; 100 MG/1; MG/1
1 TABLET ORAL
Status: DISCONTINUED | OUTPATIENT
Start: 2019-02-07 | End: 2019-02-09 | Stop reason: HOSPADM

## 2019-02-07 RX ORDER — ACETAMINOPHEN 500 MG
1000 TABLET ORAL EVERY 6 HOURS
Status: DISCONTINUED | OUTPATIENT
Start: 2019-02-08 | End: 2019-02-09 | Stop reason: HOSPADM

## 2019-02-07 RX ORDER — DEXAMETHASONE SODIUM PHOSPHATE 100 MG/10ML
10 INJECTION INTRAMUSCULAR; INTRAVENOUS ONCE
Status: ACTIVE | OUTPATIENT
Start: 2019-02-08 | End: 2019-02-09

## 2019-02-07 RX ORDER — NALOXONE HYDROCHLORIDE 0.4 MG/ML
0.1 INJECTION, SOLUTION INTRAMUSCULAR; INTRAVENOUS; SUBCUTANEOUS
Status: DISCONTINUED | OUTPATIENT
Start: 2019-02-07 | End: 2019-02-07 | Stop reason: HOSPADM

## 2019-02-07 RX ORDER — SODIUM CHLORIDE 9 MG/ML
100 INJECTION, SOLUTION INTRAVENOUS CONTINUOUS
Status: DISPENSED | OUTPATIENT
Start: 2019-02-07 | End: 2019-02-09

## 2019-02-07 RX ORDER — ROPIVACAINE HYDROCHLORIDE 2 MG/ML
INJECTION, SOLUTION EPIDURAL; INFILTRATION; PERINEURAL AS NEEDED
Status: DISCONTINUED | OUTPATIENT
Start: 2019-02-07 | End: 2019-02-07 | Stop reason: HOSPADM

## 2019-02-07 RX ORDER — HYDROCHLOROTHIAZIDE 25 MG/1
12.5 TABLET ORAL DAILY
Status: DISCONTINUED | OUTPATIENT
Start: 2019-02-08 | End: 2019-02-09 | Stop reason: HOSPADM

## 2019-02-07 RX ORDER — TRANEXAMIC ACID 100 MG/ML
INJECTION, SOLUTION INTRAVENOUS AS NEEDED
Status: DISCONTINUED | OUTPATIENT
Start: 2019-02-07 | End: 2019-02-07 | Stop reason: HOSPADM

## 2019-02-07 RX ORDER — FENTANYL CITRATE 50 UG/ML
100 INJECTION, SOLUTION INTRAMUSCULAR; INTRAVENOUS ONCE
Status: COMPLETED | OUTPATIENT
Start: 2019-02-07 | End: 2019-02-07

## 2019-02-07 RX ORDER — NALOXONE HYDROCHLORIDE 0.4 MG/ML
.2-.4 INJECTION, SOLUTION INTRAMUSCULAR; INTRAVENOUS; SUBCUTANEOUS
Status: DISCONTINUED | OUTPATIENT
Start: 2019-02-07 | End: 2019-02-09 | Stop reason: HOSPADM

## 2019-02-07 RX ORDER — SODIUM CHLORIDE 0.9 % (FLUSH) 0.9 %
5-40 SYRINGE (ML) INJECTION AS NEEDED
Status: CANCELLED | OUTPATIENT
Start: 2019-02-07

## 2019-02-07 RX ORDER — KETOROLAC TROMETHAMINE 30 MG/ML
INJECTION, SOLUTION INTRAMUSCULAR; INTRAVENOUS AS NEEDED
Status: DISCONTINUED | OUTPATIENT
Start: 2019-02-07 | End: 2019-02-07 | Stop reason: HOSPADM

## 2019-02-07 RX ORDER — SODIUM CHLORIDE, SODIUM LACTATE, POTASSIUM CHLORIDE, CALCIUM CHLORIDE 600; 310; 30; 20 MG/100ML; MG/100ML; MG/100ML; MG/100ML
75 INJECTION, SOLUTION INTRAVENOUS CONTINUOUS
Status: DISCONTINUED | OUTPATIENT
Start: 2019-02-07 | End: 2019-02-07 | Stop reason: HOSPADM

## 2019-02-07 RX ORDER — ASPIRIN 81 MG/1
81 TABLET ORAL EVERY 12 HOURS
Status: DISCONTINUED | OUTPATIENT
Start: 2019-02-07 | End: 2019-02-09 | Stop reason: HOSPADM

## 2019-02-07 RX ORDER — ONDANSETRON 2 MG/ML
INJECTION INTRAMUSCULAR; INTRAVENOUS AS NEEDED
Status: DISCONTINUED | OUTPATIENT
Start: 2019-02-07 | End: 2019-02-07 | Stop reason: HOSPADM

## 2019-02-07 RX ORDER — ONDANSETRON 4 MG/1
4 TABLET, ORALLY DISINTEGRATING ORAL
Status: DISCONTINUED | OUTPATIENT
Start: 2019-02-07 | End: 2019-02-09 | Stop reason: HOSPADM

## 2019-02-07 RX ORDER — BUPIVACAINE HYDROCHLORIDE 7.5 MG/ML
INJECTION, SOLUTION INTRASPINAL
Status: COMPLETED | OUTPATIENT
Start: 2019-02-07 | End: 2019-02-07

## 2019-02-07 RX ORDER — DIPHENHYDRAMINE HYDROCHLORIDE 50 MG/ML
12.5 INJECTION, SOLUTION INTRAMUSCULAR; INTRAVENOUS
Status: DISCONTINUED | OUTPATIENT
Start: 2019-02-07 | End: 2019-02-07 | Stop reason: HOSPADM

## 2019-02-07 RX ORDER — DIPHENHYDRAMINE HCL 25 MG
25 CAPSULE ORAL
Status: DISCONTINUED | OUTPATIENT
Start: 2019-02-07 | End: 2019-02-09 | Stop reason: HOSPADM

## 2019-02-07 RX ORDER — MIDAZOLAM HYDROCHLORIDE 1 MG/ML
2 INJECTION, SOLUTION INTRAMUSCULAR; INTRAVENOUS ONCE
Status: COMPLETED | OUTPATIENT
Start: 2019-02-07 | End: 2019-02-07

## 2019-02-07 RX ORDER — OXYCODONE HYDROCHLORIDE 5 MG/1
5 TABLET ORAL
Status: DISCONTINUED | OUTPATIENT
Start: 2019-02-07 | End: 2019-02-07 | Stop reason: HOSPADM

## 2019-02-07 RX ORDER — MIDAZOLAM HYDROCHLORIDE 1 MG/ML
2 INJECTION, SOLUTION INTRAMUSCULAR; INTRAVENOUS
Status: DISCONTINUED | OUTPATIENT
Start: 2019-02-07 | End: 2019-02-07 | Stop reason: HOSPADM

## 2019-02-07 RX ADMIN — Medication 3 AMPULE: at 06:56

## 2019-02-07 RX ADMIN — SODIUM CHLORIDE 100 ML/HR: 900 INJECTION, SOLUTION INTRAVENOUS at 17:00

## 2019-02-07 RX ADMIN — CARBIDOPA AND LEVODOPA 1 TABLET: 25; 100 TABLET ORAL at 21:25

## 2019-02-07 RX ADMIN — CELECOXIB 200 MG: 200 CAPSULE ORAL at 21:25

## 2019-02-07 RX ADMIN — SODIUM CHLORIDE, SODIUM LACTATE, POTASSIUM CHLORIDE, AND CALCIUM CHLORIDE: 600; 310; 30; 20 INJECTION, SOLUTION INTRAVENOUS at 09:31

## 2019-02-07 RX ADMIN — SODIUM CHLORIDE, SODIUM LACTATE, POTASSIUM CHLORIDE, AND CALCIUM CHLORIDE 1000 ML: 600; 310; 30; 20 INJECTION, SOLUTION INTRAVENOUS at 06:55

## 2019-02-07 RX ADMIN — MIDAZOLAM 1 MG: 1 INJECTION INTRAMUSCULAR; INTRAVENOUS at 08:27

## 2019-02-07 RX ADMIN — ASPIRIN 81 MG: 81 TABLET, COATED ORAL at 21:25

## 2019-02-07 RX ADMIN — OXYCODONE HYDROCHLORIDE 10 MG: 5 TABLET ORAL at 21:37

## 2019-02-07 RX ADMIN — TRANEXAMIC ACID 1 G: 100 INJECTION, SOLUTION INTRAVENOUS at 09:46

## 2019-02-07 RX ADMIN — OXYCODONE HYDROCHLORIDE 10 MG: 5 TABLET ORAL at 16:59

## 2019-02-07 RX ADMIN — Medication 1 AMPULE: at 21:24

## 2019-02-07 RX ADMIN — ACETAMINOPHEN 1000 MG: 10 INJECTION, SOLUTION INTRAVENOUS at 17:00

## 2019-02-07 RX ADMIN — PANTOPRAZOLE SODIUM 40 MG: 40 TABLET, DELAYED RELEASE ORAL at 16:59

## 2019-02-07 RX ADMIN — Medication 2 G: at 17:00

## 2019-02-07 RX ADMIN — BUPIVACAINE HYDROCHLORIDE 14.25 MG: 7.5 INJECTION, SOLUTION INTRASPINAL at 09:49

## 2019-02-07 RX ADMIN — PROPOFOL 50 MCG/KG/MIN: 10 INJECTION, EMULSION INTRAVENOUS at 09:52

## 2019-02-07 RX ADMIN — DEXAMETHASONE SODIUM PHOSPHATE 10 MG: 4 INJECTION, SOLUTION INTRA-ARTICULAR; INTRALESIONAL; INTRAMUSCULAR; INTRAVENOUS; SOFT TISSUE at 09:59

## 2019-02-07 RX ADMIN — SODIUM CHLORIDE, SODIUM LACTATE, POTASSIUM CHLORIDE, AND CALCIUM CHLORIDE: 600; 310; 30; 20 INJECTION, SOLUTION INTRAVENOUS at 10:05

## 2019-02-07 RX ADMIN — MIDAZOLAM HYDROCHLORIDE 1 MG: 1 INJECTION, SOLUTION INTRAMUSCULAR; INTRAVENOUS at 09:45

## 2019-02-07 RX ADMIN — ONDANSETRON 4 MG: 2 INJECTION INTRAMUSCULAR; INTRAVENOUS at 09:46

## 2019-02-07 RX ADMIN — TUBERCULIN PURIFIED PROTEIN DERIVATIVE 5 UNITS: 5 INJECTION, SOLUTION INTRADERMAL at 06:58

## 2019-02-07 RX ADMIN — FENTANYL CITRATE 50 MCG: 50 INJECTION INTRAMUSCULAR; INTRAVENOUS at 08:27

## 2019-02-07 RX ADMIN — CELECOXIB 200 MG: 200 CAPSULE ORAL at 06:57

## 2019-02-07 RX ADMIN — Medication 2 G: at 09:46

## 2019-02-07 NOTE — PERIOP NOTES
TRANSFER - OUT REPORT: 
 
Verbal report given to Yg Augustine RN (name) on Scooter Luz  being transferred to room 318 (unit) for routine post - op Report consisted of patients Situation, Background, Assessment and  
Recommendations(SBAR). Information from the following report(s) SBAR, Kardex, OR Summary, Intake/Output and MAR was reviewed with the receiving nurse. Lines:  
Peripheral IV 02/07/19 (Active) Site Assessment Clean, dry, & intact 2/7/2019 12:42 PM  
Phlebitis Assessment 0 2/7/2019 12:42 PM  
Infiltration Assessment 0 2/7/2019 12:42 PM  
Dressing Status Clean, dry, & intact 2/7/2019 12:42 PM  
Dressing Type Tape;Transparent 2/7/2019 12:42 PM  
Hub Color/Line Status Infusing;Patent 2/7/2019 12:42 PM  
Action Taken Blood drawn 2/7/2019  6:53 AM  
  
 
Opportunity for questions and clarification was provided. Patient transported with: 
 O2 @ 2 liters Tech

## 2019-02-07 NOTE — PROGRESS NOTES
Problem: Self Care Deficits Care Plan (Adult) Goal: *Acute Goals and Plan of Care (Insert Text) GOALS:  
DISCHARGE GOALS (in preparation for going home/rehab):  3 days 1. Ms. Kirstin Arias will perform one lower body dressing activity with minimal assistance required to demonstrate improved functional mobility and safety. 2.  Ms. Kirstin Arias will perform one lower body bathing activity with minimal assistance required to demonstrate improved functional mobility and safety. 3.  Ms. Kirstin Arias will perform toileting/toilet transfer with contact guard assistance to demonstrate improved functional mobility and safety. 4.  Ms. Kirstin Arias will perform shower transfer with contact guard assistance to demonstrate improved functional mobility and safety. JOINT CAMP OCCUPATIONAL THERAPY TKA: Initial Assessment 2/7/2019INPATIENT: Hospital Day: 1 Payor: Quan Sahu / Plan: [address] / Product Type: [employer] Medicare /  
  
NAME/AGE/GENDER: Carson Lewis is a 70 y.o. female PRIMARY DIAGNOSIS:  Primary osteoarthritis of left knee [M17.12] Procedure(s) and Anesthesia Type: 
   * LEFT KNEE ARTHROPLASTY TOTAL - Spinal (Left) ICD-10: Treatment Diagnosis:  
 · Pain in Left Knee (M25.562) · Stiffness of Left Knee, Not elsewhere classified (M25.662) ASSESSMENT:  
Ms. Kirstin Arias is s/p left TKA and presents with decreased weight bearing on left LE and decreased independence with functional mobility and activities of daily living as compared to baseline level of function and safety. Patient would benefit from skilled Occupational Therapy to maximize independence and safety with self-care task and functional mobility. Pt would also benefit from education on adaptive equipment and safety precautions in preparation for going home. This section established at most recent assessment PROBLEM LIST (Impairments causing functional limitations): 1. Decreased Strength 2. Decreased ADL/Functional Activities 3. Decreased Transfer Abilities 4. Increased Pain 5. Increased Fatigue 6. Decreased Flexibility/Joint Mobility 7. Decreased Knowledge of Precautions INTERVENTIONS PLANNED: (Benefits and precautions of occupational therapy have been discussed with the patient.) 1. Activities of daily living training 2. Adaptive equipment training 3. Balance training 4. Clothing management 5. Donning&doffing training 6. Theraputic activity TREATMENT PLAN: Frequency/Duration: Follow patient 1-2 times to address above goals. Rehabilitation Potential For Stated Goals: Good RECOMMENDED REHABILITATION/EQUIPMENT: (at time of discharge pending progress): Continue Skilled Therapy and Home Health: Physical Therapy. OCCUPATIONAL PROFILE AND HISTORY:  
History of Present Injury/Illness (Reason for Referral): Pt presents this date s/p (left) TKA. Past Medical History/Comorbidities: Ms. Kourtney Baird  has a past medical history of Allergic rhinitis due to pollen, Arthritis, Asthma, Fibrocystic breast, GERD (gastroesophageal reflux disease), H/O colonoscopy (2009), Hashimoto's thyroiditis, Hypertension, Macular degeneration, Mixed hyperlipidemia, Osteoporosis (1/2013), Unspecified hypothyroidism, and Varicella. Ms. Kourtney Baird  has a past surgical history that includes hx gyn (1973); hx appendectomy (1973); hx tonsillectomy (as a child); hx adenoidectomy (as a child); hx cataract removal (Bilateral, 2007); hx colonoscopy; and hx hernia repair (06/16/2018). Social History/Living Environment:  
Home Environment: Private residence One/Two Story Residence: One story Living Alone: No 
Support Systems: Spouse/Significant Other/Partner Patient Expects to be Discharged to[de-identified] Other (comment)(to surg) Current DME Used/Available at Home: Francine Velarde Prior Level of Function/Work/Activity: 
Independent Number of Personal Factors/Comorbidities that affect the Plan of Care: Brief history (0):  LOW COMPLEXITY ASSESSMENT OF OCCUPATIONAL PERFORMANCE[de-identified]  
Most Recent Physical Functioning:  
Balance Sitting: Intact Standing: Pull to stand; With support Gross Assessment AROM: Within functional limits(right LE) Strength: Generally decreased, functional(right LE) LLE Assessment LLE Assessment (WDL): Exception to WDL 
LLE AROM 
L Knee Flexion: 60 L Knee Extension: 10 Coordination Fine Motor Skills-Upper: Left Intact; Right Intact Gross Motor Skills-Upper: Left Intact; Right Intact Mental Status Neurologic State: Alert Orientation Level: Oriented X4 Cognition: Appropriate decision making Perception: Appears intact Perseveration: No perseveration noted Safety/Judgement: Awareness of environment Basic ADLs (From Assessment) Complex ADLs (From Assessment) Basic ADL Feeding: Independent Oral Facial Hygiene/Grooming: Supervision Bathing: Moderate assistance Upper Body Dressing: Supervision Lower Body Dressing: Moderate assistance Toileting: Moderate assistance Grooming/Bathing/Dressing Activities of Daily Living Cognitive Retraining Safety/Judgement: Awareness of environment Functional Transfers Bathroom Mobility: Minimum assistance Toilet Transfer : Minimum assistance Shower Transfer: Minimum assistance Bed/Mat Mobility Supine to Sit: Minimum assistance Sit to Stand: Minimum assistance Bed to Chair: Minimum assistance Physical Skills Involved: 1. Range of Motion 2. Balance 3. Strength Cognitive Skills Affected (resulting in the inability to perform in a timely and safe manner): 1. none Psychosocial Skills Affected: 1. Environmental Adaptation Number of elements that affect the Plan of Care: 3-5:  MODERATE COMPLEXITY CLINICAL DECISION MAKING:  
[employer] AM-PAC 6 Clicks Daily Activity Inpatient Short Form How much help from another person does the patient currently need. ..  Total A Lot A Little None 1. Putting on and taking off regular lower body clothing? [] 1   [x] 2   [] 3   [] 4  
2. Bathing (including washing, rinsing, drying)? [] 1   [] 2   [x] 3   [] 4  
3. Toileting, which includes using toilet, bedpan or urinal?   [] 1   [] 2   [x] 3   [] 4  
4. Putting on and taking off regular upper body clothing? [] 1   [] 2   [] 3   [x] 4  
5. Taking care of personal grooming such as brushing teeth? [] 1   [] 2   [] 3   [x] 4  
6. Eating meals? [] 1   [] 2   [] 3   [x] 4  
© 2007, Trustees of [facility] [employer], under license to [employer]. All rights reserved Score:  Initial: 20 Most Recent: X (Date: -- ) Interpretation of Tool:  Represents activities that are increasingly more difficult (i.e. Bed mobility, Transfers, Gait). Medical Necessity:    
· Patient is expected to demonstrate progress in range of motion to increase independence with self care. Reason for Services/Other Comments: 
· Patient  would benefit from skilled Occupational Therapy to maximize independence and safety with self-care task and functional mobility. .  
Use of outcome tool(s) and clinical judgement create a POC that gives a: LOW COMPLEXITY  
  
 
 
 
TREATMENT:  
(In addition to Assessment/Re-Assessment sessions the following treatments were rendered) Pre-treatment Symptoms/Complaints:  Pt without complaint of pain 
Pain: Initial:  
  0 Post Session:  0 Assessment/Reassessment only, no treatment provided today Treatment/Session Assessment:   
 Response to Treatment:  Pt up to edge bed tolerated well. Education: 
[] Home Exercises [x] Fall Precautions [] Hip Precautions [] Going Home Video [x] Knee/Hip Prosthesis Review [x] Walker Management/Safety [x] Adaptive Equipment as Needed Interdisciplinary Collaboration:  
o Physical Therapist 
o Occupational Therapist 
o Registered Nurse After treatment position/precautions:  
o Up in chair 
o Bed/Chair-wheels locked o Call light within reach 
o RN notified Compliance with Program/Exercises: Compliant all of the time, Will assess as treatment progresses. Recommendations/Intent for next treatment session:  Treatment next visit will focus on increasing Ms. Somers's independence with bed mobility, transfers, self care, functional mobility, modalities for pain, and patient education. Total Treatment Duration: OT Patient Time In/Time Out Time In: 0144 Time Out: 1540 Oumar Portillo OT

## 2019-02-07 NOTE — ANESTHESIA PROCEDURE NOTES
Spinal Block Start time: 2/7/2019 9:45 AM 
End time: 2/7/2019 9:49 AM 
Performed by: Igor Acosta MD 
Authorized by: Igor Acosta MD  
 
Pre-procedure: Indications: primary anesthetic  Preanesthetic Checklist: patient identified, risks and benefits discussed, anesthesia consent, site marked, patient being monitored and timeout performed Timeout Time: 09:44 Spinal Block:  
Patient Position:  Seated Prep Region:  Lumbar Prep: chlorhexidine Location:  L3-4 Technique:  Single shot Local Dose (mL):  3 Needle:  
Needle Type:  Pencil-tip Needle Gauge:  25 G Attempts:  1 Events: CSF confirmed, no blood with aspiration and no paresthesia Assessment: 
Insertion:  Uncomplicated Patient tolerance:  Patient tolerated the procedure well with no immediate complications

## 2019-02-07 NOTE — ANESTHESIA PROCEDURE NOTES
Peripheral Block Start time: 2/7/2019 8:28 AM 
End time: 2/7/2019 8:30 AM 
Performed by: Olya Lin MD 
Authorized by: Olya Lin MD  
 
 
Pre-procedure: Indications: at surgeon's request and post-op pain management Preanesthetic Checklist: patient identified, risks and benefits discussed, site marked, timeout performed, anesthesia consent given and patient being monitored Timeout Time: 08:27 Block Type:  
Block Type: Adductor canal 
Laterality:  Left Monitoring:  Standard ASA monitoring, continuous pulse ox, frequent vital sign checks, heart rate, oxygen and responsive to questions Injection Technique:  Single shot Procedures: ultrasound guided Patient Position: supine Prep: chlorhexidine Location:  Mid thigh Needle Type:  Stimuplex Needle Gauge:  22 G Needle Localization:  Ultrasound guidance Assessment: 
 
Injection Assessment:  Incremental injection every 5 mL, local visualized surrounding nerve on ultrasound, negative aspiration for blood, no intravascular symptoms, no paresthesia and ultrasound image on chart Patient tolerance:  Patient tolerated the procedure well with no immediate complications

## 2019-02-07 NOTE — ANESTHESIA POSTPROCEDURE EVALUATION
Procedure(s): LEFT KNEE ARTHROPLASTY TOTAL. Anesthesia Post Evaluation Multimodal analgesia: multimodal analgesia used between 6 hours prior to anesthesia start to PACU discharge Patient location during evaluation: PACU Patient participation: complete - patient participated Level of consciousness: awake Pain management: adequate Airway patency: patent Anesthetic complications: no 
Cardiovascular status: acceptable and hemodynamically stable Respiratory status: acceptable Hydration status: acceptable Comments: Acceptable for discharge from PACU. Visit Vitals BP 97/50 Pulse 71 Temp 37.1 °C (98.8 °F) Resp 16 Ht 5' 4\" (1.626 m) Wt 71.8 kg (158 lb 4.8 oz) SpO2 99% BMI 27.17 kg/m²

## 2019-02-07 NOTE — PERIOP NOTES
Betadine lavage:  17.5cc of betadine lot # [id number]  , exp. Date 04/2020  , 
in 500cc of . 9NS Lot # [id number] , exp. Date : 04-

## 2019-02-07 NOTE — OP NOTES
[address] [place] [address]  Total Knee Arthroplasty  Patient:Geovanna Vargas   : 1940  Medical Record Number:[id number]      Pre-operative Diagnosis:  Primary osteoarthritis of left knee [M17.12]  Post-operative Diagnosis: Primary osteoarthritis of left knee [M17.12]  Location: [place] [address]    Date of Procedure: 2019  Surgeon: Andreea Martinez MD  Assistant:  KELLIE Newsome    Anesthesia: Spinal and  nerve block    Procedure:  Left Posterior Stabilized Total Knee Arthroplasty with use of Bone Cement    Tourniquet Time: none    EBL:  300 cc     The complexity of the total joint surgery requires the use of a first assistant for positioning, retraction and assistance in closure. Jose Baez was brought to the operating room, positioned on the operating room table, and after appropriate identification  was anethestized. A palomares catheter was placed preoperatively and IV antibiotics were administered, along with IV transexamic acid. The limb was prepped and draped in the usual sterile fashion. Prior to the incision being made a timeout was called identifying the patient, procedure ,operative side and surgeon. An anterior longitudinal incision was accomplished just medial to the tibial tubercle and extending approximal 6 centimeters proximal to the superior pole of the patella. A medial parapatellar capsular incision was performed. The medial capsular flap was elevated around to the insertion of the semimembranous tendon. The patella was everted and the knee flexed and externally rotated. The articular surface revealed cartilage loss with exposed bone and bone spurs throughout all three compartments. The medial and lateral menisci were excised. The lateral half of the fat pad excised and the patella femoral ligament was released. The anterior cruciate ligament and the posterior cruciate ligament were resected.   Using extramedullary instrumentation, the tibial cut was accomplished with appropriate posterior slope. Approximately 6 mm of bone was removed from the high side of the tibia. The distal femur was addressed next. A drill hole was made above the intracondylar notch. Using appropriate intramedullary instrumentation, an appropriate valgus distal cut was accomplished. The femur was sized to a 3 component. The anterior and posterior cuts and anterior and posterior chamfer cuts were then made about the distal femur. Osteophytes were removed from the tibial and femoral surfaces. The appropriate cutting blocks was then utilized to perform the notch cut, with appropriate lateral translation accomplished for the patellofemoral groove. The tibia was sized to a 3 component. The tibial base plate was pinned into place with  appropriate external rotation and the stem site prepared. A preliminary range of motion was accomplished with the above size trial components. A 13 millimeter polyethylene insert allowed the patient to obtain full extension as well as appropriate flexion. Additional surgical releases were none. .  The patient's ligaments were stable in flexion and extension to medial and lateral stressing and alignment was through the appropriate mechanical axis. The patella was then everted. The bone was resected to accomodate a size 27 patella button. A trial reduction revealed appropriate tracking through the patellofemoral groove with no lateral retinacular release accomplished. All trial components were removed and the surfaces were prepared for cementing with irrigation and debridement of the bone interstices. The posterior capsule and periosteum and soft tissues were injected for postop pain management. One package of cement  was mixed and the permanent components cemented into place. The femoral and tibial components were pressurized in full extension as well as 70 degrees of flexion. The patella component was pressurized using the patella clamp.   Excess cement was removed using a curette. Once the cement was hardened, the patella clamp was removed and the knee was copiously irrigated. A lavage of diluted betadine solution of 17.5 ml Betadine in 500 of 0.9% Normal Saline was allowed to soak in the wound for 3 minutes after implanting of the prosthesis. The wound was irrigated with Saline again before closure. Prior to the final skin closure, full strength betadine was applied to the skin surrounding the skin incision. After fascial closure the knee was injected with a solution of 1 gram of TXA and 1 gram of Vancomycin powder. Uzma Somers's knee was placed through a range of motion and noted to be stable as mentioned above with the trial components. The operative knee was injected prior to closure for post op pain management. The capsular layer was closed using a #1 PDS suture, while the subcutaneous layers were closed using a 2-0 Monocryl interrupted suture. The skin was closed using staples and a sterile bandage was applied. A cryo pad was applied on the operative leg. The sponge count and needle counts were correct. Implants:   Implant Name Type Inv.  Item Serial No.  Lot No. LRB No. Used   CEMENT BNE HV R 40GM -- PALACOS R [id number] - [id number]  CEMENT BNE HV R 40GM -- PALACOS R [id number] [facility] [id number] Left 1   COMPNT FEM PS CHAPIN TRIATHLN 3 L -- TRIATHLON - [id number]  COMPNT FEM PS CHAPIN TRIATHLN 3 L -- TRIATHLON [id number] [employer] HOW [id number] Left 1   BASEPLT TIB UNIV TRIATHLN 3 --  - [id number]  BASEPLT TIB UNIV TRIATHLN 3 --  [id number] [employer] HOW [id number] Left 1   INSERT TIB PS TRIATHLN 3 13MM --  - [id number]  INSERT TIB PS TRIATHLN 3 13MM --  [id number] [employer] HOW [id number] Left 1   COMPNT PAT SYM TRIATHLN 27X8MM --  - [id number]  COMPNT PAT SYM TRIATHLN 27X8MM --  [id number] [employer] HOW [id number] Left 1     Signed By: Julisa Delgado MD

## 2019-02-07 NOTE — PERIOP NOTES
TRANSFER - IN REPORT: 
 
Verbal report received from Jonathon Mock RN (name) on [facility] Payer  being received from [facility] (unit) for routine progression of care Report consisted of patients Situation, Background, Assessment and  
Recommendations(SBAR). Information from the following report(s) SBAR, Kardex, Intake/Output, MAR and Recent Results was reviewed with the receiving nurse. Opportunity for questions and clarification was provided.

## 2019-02-07 NOTE — PERIOP NOTES
TRANSFER - OUT REPORT: 
 
Verbal report given to TIARRA MENDOZA(name) on Shayan Ugarte  being transferred to PRE-OP(unit) for routine progression of care Report consisted of patients Situation, Background, Assessment and  
Recommendations(SBAR). Information from the following report(s) SBAR, MAR and Recent Results was reviewed with the receiving nurse. Lines:  
Peripheral IV 02/07/19 (Active) Site Assessment Clean, dry, & intact 2/7/2019  6:53 AM  
Phlebitis Assessment 0 2/7/2019  6:53 AM  
Infiltration Assessment 0 2/7/2019  6:53 AM  
Dressing Status Clean, dry, & intact 2/7/2019  6:53 AM  
Dressing Type Tape;Transparent 2/7/2019  6:53 AM  
Hub Color/Line Status Pink; Infusing 2/7/2019  6:53 AM  
Action Taken Blood drawn 2/7/2019  6:53 AM  
  
 
Opportunity for questions and clarification was provided. Patient transported with: 
 [employer]

## 2019-02-07 NOTE — PROGRESS NOTES
[facility] Face to Face Encounter Patients Name: Magnolia Hagen    YOB: 1940 Ordering Physician: Guillermo Jung Primary Diagnosis: Primary osteoarthritis of left knee [M17.12] Date of Face to Face:   2/7/2019 Face to Face Encounter findings are related to primary reason for home care:   yes. 1. I certify that the patient needs intermittent care as follows: physical therapy: stretching/ROM 2. I certify that this patient is homebound, that is: 1) patient requires the use of a walker device, special transportation, or assistance of another to leave the home; or 2) patient's condition makes leaving the home medically contraindicated; and 3) patient has a normal inability to leave the home and leaving the home requires considerable and taxing effort. Patient may leave the home for infrequent and short duration for medical reasons, and occasional absences for non-medical reasons. Homebound status is due to the following functional limitations: Patient currently under activity restrictions secondary to recent surgical procedure, this hinders their ability to safely leave the home. 3. I certify that this patient is under my care and that I, or a nurse practitioner or  [id number], or clinical nurse specialist, or certified nurse midwife, working with me, had a Face-to-Face Encounter that meets the physician Face-to-Face Encounter requirements. The following are the clinical findings from the [address] [place] [address] encounter that support the need for skilled services and is a summary of the encounter: progress notes See attached progess note Willie Vickers, BSW 
2/7/2019 THE FOLLOWING TO BE COMPLETED BY THE COMMUNITY PHYSICIAN: 
 
I concur with the findings described above from the [facility] encounter that this patient is homebound and in need of a skilled service. Certifying Physician: _____________________________________ Printed Certifying Physician Name: _____________________________________ Date: _________________

## 2019-02-07 NOTE — PROGRESS NOTES
Care Management Interventions Transition of Care Consult (CM Consult): Home Health [address]: Yes Physical Therapy Consult: Yes Current Support Network: Lives with Spouse Confirm Follow Up Transport: Family Plan discussed with Pt/Family/Caregiver: Yes Discharge Location Discharge Placement: Home with home health Patient is a 70y.o. year old female admitted for Left TKA . Patient lives with Her spouse and plans to return home on discharge. Order received to arrange home health. Patient without preference towards agency. Referral sent to [facility]. Patient denies any equipment needs as she has a walker and bedside commode. Will follow until discharge. No additional needs identified at this time. China Allen

## 2019-02-07 NOTE — ANESTHESIA PREPROCEDURE EVALUATION
Anesthetic History No history of anesthetic complications Review of Systems / Medical History Patient summary reviewed and pertinent labs reviewed Pulmonary Asthma : well controlled Neuro/Psych Comments: Sinemet for restless legs Cardiovascular Hypertension: well controlled Hyperlipidemia Exercise tolerance: >4 METS 
  
GI/Hepatic/Renal 
  
GERD: well controlled Endo/Other Hypothyroidism: well controlled Arthritis Other Findings Physical Exam 
 
Airway Mallampati: II 
TM Distance: 4 - 6 cm Neck ROM: normal range of motion Mouth opening: Normal 
 
 Cardiovascular Rhythm: regular Rate: normal 
 
 
 
 Dental 
 
Dentition: Full upper dentures Pulmonary Breath sounds clear to auscultation Abdominal 
 
 
 
 Other Findings Anesthetic Plan ASA: 2 Anesthesia type: spinal 
 
 
Post-op pain plan if not by surgeon: peripheral nerve block single Anesthetic plan and risks discussed with: Patient

## 2019-02-07 NOTE — PROGRESS NOTES
Problem: Mobility Impaired (Adult and Pediatric) Goal: *Acute Goals and Plan of Care (Insert Text) GOALS (1-4 days): 
(1.)Ms. Giselle Swann will move from supine to sit and sit to supine  in bed with STAND BY ASSIST. 
(2.)Ms. Giselle Swann will transfer from bed to chair and chair to bed with STAND BY ASSIST using the least restrictive device. (3.)Ms. Giselle Swann will ambulate with STAND BY ASSIST for 150 feet with the least restrictive device. (4.)Ms. Giselle Swann will ambulate up/down 1 steps with left railing with MINIMAL ASSIST or with device as needed. (5.)Ms. Giselle Swann will increase left knee ROM to 5°-80°. 
________________________________________________________________________________________________ PHYSICAL THERAPY [facility] tKa: Initial Assessment, PM 2/7/2019INPATIENT: Hospital Day: 1 Payor: Shantell Simon / Plan: [address] / Product Type: Managed Care Medicare /  
  
NAME/AGE/GENDER: Adrien Gannon is a 70 y.o. female PRIMARY DIAGNOSIS:  Primary osteoarthritis of left knee [M17.12] Procedure(s) and Anesthesia Type: 
   * LEFT KNEE ARTHROPLASTY TOTAL - Spinal (Left) ICD-10: Treatment Diagnosis:  
 · Pain in Left Knee (M25.562) · Stiffness of Left Knee, Not elsewhere classified (M25.662) · Difficulty in walking, Not elsewhere classified (R26.2) ASSESSMENT:  
Ms. Giselle Swann presents with decreased functional mobility and gait as well as decreased rom and strength of left LE s/p left tka. She plans to go home with HHPT. This section established at most recent assessment PROBLEM LIST (Impairments causing functional limitations): 1. Decreased Strength 2. Decreased ADL/Functional Activities 3. Decreased Transfer Abilities 4. Decreased Ambulation Ability/Technique 5. Decreased Balance 6. Increased Pain 7. Decreased Activity Tolerance 8. Decreased Flexibility/Joint Mobility 9.  Decreased [place] with Home Exercise Program 
 INTERVENTIONS PLANNED: (Benefits and precautions of physical therapy have been discussed with the patient.) 1. Bed Mobility 2. Gait Training 3. Home Exercise Program (HEP) 4. Therapeutic Exercise/Strengthening 5. Transfer Training 6. Range of Motion: active/assisted/passive 7. Therapeutic Activities 8. Group Therapy TREATMENT PLAN: Frequency/Duration: Follow patient BID for duration of hospital stay to address above goals. Rehabilitation Potential For Stated Goals: Good RECOMMENDED REHABILITATION/EQUIPMENT: (at time of discharge pending progress): Continue Skilled Therapy and Home Health: Physical Therapy. HISTORY:  
History of Present Injury/Illness (Reason for Referral): S/p left tka Past Medical History/Comorbidities: Ms. Maria Elena Espinoza  has a past medical history of Allergic rhinitis due to pollen, Arthritis, Asthma, Fibrocystic breast, GERD (gastroesophageal reflux disease), H/O colonoscopy (2009), Hashimoto's thyroiditis, Hypertension, Macular degeneration, Mixed hyperlipidemia, Osteoporosis (1/2013), Unspecified hypothyroidism, and Varicella. Ms. Maria Elena Espinoza  has a past surgical history that includes hx gyn (1973); hx appendectomy (1973); hx tonsillectomy (as a child); hx adenoidectomy (as a child); hx cataract removal (Bilateral, 2007); hx colonoscopy; and hx hernia repair (06/16/2018). Social History/Living Environment:  
Home Environment: Private residence One/Two Story Residence: One story Living Alone: No 
Support Systems: Spouse/Significant Other/Partner Patient Expects to be Discharged to[de-identified] Other (comment)(to surg) Current DME Used/Available at Home: Davy Cullen Prior Level of Function/Work/Activity: 
independent Number of Personal Factors/Comorbidities that affect the Plan of Care: 1-2: MODERATE COMPLEXITY EXAMINATION:  
Most Recent Physical Functioning:  
  
Gross Assessment AROM: Within functional limits(right LE) Strength: Generally decreased, functional(right LE) LLE AROM 
L Knee Flexion: 60 L Knee Extension: 10 Bed Mobility Supine to Sit: Minimum assistance Transfers Sit to Stand: Minimum assistance Stand to Sit: Minimum assistance Bed to Chair: Minimum assistance Balance Sitting: Intact Standing: Pull to stand; With support Weight Bearing Status Left Side Weight Bearing: As tolerated Distance (ft): 3 Feet (ft) Ambulation - Level of Assistance: Minimal assistance Assistive Device: Walker, rolling Speed/Mishel: Delayed Step Length: Left shortened;Right shortened Stance: Left decreased Gait Abnormalities: Antalgic;Decreased step clearance Interventions: Safety awareness training;Verbal cues Braces/Orthotics:  
 
Left Knee Cold Type: [place] Body Structures Involved: 1. Bones 2. Joints 3. Muscles 4. Ligaments Body Functions Affected: 1. Movement Related Activities and Participation Affected: 1. Mobility Number of elements that affect the Plan of Care: 3: MODERATE COMPLEXITY CLINICAL PRESENTATION:  
Presentation: Stable and uncomplicated: LOW COMPLEXITY CLINICAL DECISION MAKIN [facility] [place] [id number] AM-PAC 6 Clicks Basic Mobility Inpatient Short Form How much difficulty does the patient currently have. .. Unable A Lot A Little None 1. Turning over in bed (including adjusting bedclothes, sheets and blankets)? [] 1   [] 2   [x] 3   [] 4  
2. Sitting down on and standing up from a chair with arms ( e.g., wheelchair, bedside commode, etc.)   [] 1   [] 2   [x] 3   [] 4  
3. Moving from lying on back to sitting on the side of the bed? [] 1   [] 2   [x] 3   [] 4 How much help from another person does the patient currently need. .. Total A Lot A Little None 4. Moving to and from a bed to a chair (including a wheelchair)? [] 1   [] 2   [x] 3   [] 4  
5. Need to walk in hospital room? [] 1   [] 2   [x] 3   [] 4  
6. Climbing 3-5 steps with a railing?    [] 1   [x] 2   [] 3   [] 4  
 © 2007, Trustees of [address] [place] [address] [address] [address], under license to [employer]. All rights reserved Score:  Initial: 17 Most Recent: X (Date: -- ) Interpretation of Tool:  Represents activities that are increasingly more difficult (i.e. Bed mobility, Transfers, Gait). Medical Necessity:    
· Patient is expected to demonstrate progress in strength, range of motion and balance to decrease assistance required with theraputic exercises and functional mobility. Reason for Services/Other Comments: 
· Patient continues to require present interventions due to patient's inability to perform theraputic exercises and functional mobility independently. Use of outcome tool(s) and clinical judgement create a POC that gives a: Clear prediction of patient's progress: LOW COMPLEXITY  
  
 
 
 
TREATMENT:  
(In addition to Assessment/Re-Assessment sessions the following treatments were rendered) Pre-treatment Symptoms/Complaints:  knee Pain: Initial:  
   Post Session:  No complaints Therapeutic Exercise: (10 Minutes):  Exercises per grid below to improve mobility and strength. Required minimal visual, verbal and manual cues to promote proper body alignment, promote proper body posture and promote proper body mechanics. Progressed range and repetitions as indicated. Date: 
2/7 Date: 
 Date: 
  
ACTIVITY/EXERCISE AM PM AM PM AM PM  
GROUP THERAPY  []  []  []  []  []  [] Ankle Pumps  10a Quad Sets  10a Gluteal Sets  10a Hip ABd/ADduction  10a Straight Leg Raises Knee Slides  10a Short Arc The [address] Chair Slides B = bilateral; AA = active assistive; A = active; P = passive Treatment/Session Assessment:   
 Response to Treatment:  Pt. Did well. Education: 
[x] Home Exercises [x] Fall Precautions [] Hip Precautions [] D/C Instruction Review [x] Knee/Hip Prosthesis Review [x] Walker Management/Safety [] Adaptive Equipment as Needed Interdisciplinary Collaboration:  
o Occupational Therapist 
o Registered Nurse After treatment position/precautions:  
o Up in chair 
o Bed/Chair-wheels locked 
o Bed in low position 
o Caregiver at bedside 
o Call light within reach 
o Family at bedside Compliance with Program/Exercises: Will assess as treatment progresses. No questions. Recommendations/Intent for next treatment session:  Treatment next visit will focus on increasing Ms. Somers's independence with bed mobility, transfers, gait training, strength/ROM exercises, modalities for pain, and patient education. Total Treatment Duration: PT Patient Time In/Time Out Time In: 1540 Time Out: 1600 Pretty Soares PT

## 2019-02-07 NOTE — H&P
The patient has end stage arthritis of the left knee. The patient was seen and examined and there are no changes to the patient's orthopedic condition. They have tried conservative treatment for this condition; including antiinflammatories and lifestyle modifications and have failed. The necessity for the joint replacement is still present, and the H&P from the office is still current. The patient will be admitted today forProcedure(s) (LRB): LEFT KNEE ARTHROPLASTY TOTAL / YULIANA (Left) .

## 2019-02-07 NOTE — PERIOP NOTES
BEFORE SCHWARTZ INSERTION , IT WAS NOTED THAT PATIENTS GENITAL AREA WAS REDDENED AND THAT LABIA WAS NOT ABLE TO BE  . SMALL AMOUNT OF BLEEDING NOTICED SUPERFICIALLY AFTER INSERTION. SCHWARTZ WAS INSERTED WITHOUT DIFFICULTY.

## 2019-02-07 NOTE — PROGRESS NOTES
02/07/19 1528 Oxygen Therapy O2 Sat (%) 96 % Pulse via Oximetry 66 beats per minute O2 Device Nasal cannula O2 Flow Rate (L/min) 1 l/min Pre-Treatment Breath Sounds Bilateral Clear Incentive Spirometry Treatment Actual Volume (ml) 2000 ml Number of Attempts Greater than 3 Patient achieved    2000  Ml/sec on IS. Patient encouraged to do 10 breaths every hour while awake-patient agreed and demonstrated. No shortness of breath or distress noted. BS are clear b/l. [facility] notes reviewed- Sat monitor #14 placed at bedside.

## 2019-02-08 LAB — HGB BLD-MCNC: 9.9 G/DL (ref 11.7–15.4)

## 2019-02-08 PROCEDURE — 77030032490 HC SLV COMPR SCD KNE COVD -B

## 2019-02-08 PROCEDURE — 85018 HEMOGLOBIN: CPT

## 2019-02-08 PROCEDURE — 97116 GAIT TRAINING THERAPY: CPT

## 2019-02-08 PROCEDURE — 97535 SELF CARE MNGMENT TRAINING: CPT

## 2019-02-08 PROCEDURE — 74011250637 HC RX REV CODE- 250/637: Performed by: ORTHOPAEDIC SURGERY

## 2019-02-08 PROCEDURE — 77030020263 HC SOL INJ SOD CL0.9% LFCR 1000ML

## 2019-02-08 PROCEDURE — 94762 N-INVAS EAR/PLS OXIMTRY CONT: CPT

## 2019-02-08 PROCEDURE — 97110 THERAPEUTIC EXERCISES: CPT

## 2019-02-08 PROCEDURE — 74011250637 HC RX REV CODE- 250/637: Performed by: PHYSICIAN ASSISTANT

## 2019-02-08 PROCEDURE — 77030036688 HC BLNKT CLD THER S2SG -B

## 2019-02-08 PROCEDURE — 74011250636 HC RX REV CODE- 250/636: Performed by: PHYSICIAN ASSISTANT

## 2019-02-08 PROCEDURE — 36415 COLL VENOUS BLD VENIPUNCTURE: CPT

## 2019-02-08 PROCEDURE — 97150 GROUP THERAPEUTIC PROCEDURES: CPT

## 2019-02-08 PROCEDURE — 65270000029 HC RM PRIVATE

## 2019-02-08 RX ORDER — ASPIRIN 81 MG/1
81 TABLET ORAL EVERY 12 HOURS
Qty: 60 TAB | Refills: 0 | Status: SHIPPED | OUTPATIENT
Start: 2019-02-08 | End: 2019-12-11

## 2019-02-08 RX ORDER — OXYCODONE HYDROCHLORIDE 10 MG/1
10 TABLET ORAL
Qty: 50 TAB | Refills: 0 | Status: SHIPPED | OUTPATIENT
Start: 2019-02-08 | End: 2019-03-22

## 2019-02-08 RX ADMIN — Medication 2 G: at 01:11

## 2019-02-08 RX ADMIN — OXYCODONE HYDROCHLORIDE 10 MG: 5 TABLET ORAL at 20:21

## 2019-02-08 RX ADMIN — OXYCODONE HYDROCHLORIDE 10 MG: 5 TABLET ORAL at 07:17

## 2019-02-08 RX ADMIN — LEVOTHYROXINE SODIUM 75 MCG: 75 TABLET ORAL at 05:02

## 2019-02-08 RX ADMIN — ACETAMINOPHEN 1000 MG: 500 TABLET, FILM COATED ORAL at 12:20

## 2019-02-08 RX ADMIN — PANTOPRAZOLE SODIUM 40 MG: 40 TABLET, DELAYED RELEASE ORAL at 16:01

## 2019-02-08 RX ADMIN — Medication 1 AMPULE: at 20:23

## 2019-02-08 RX ADMIN — ACETAMINOPHEN 1000 MG: 500 TABLET, FILM COATED ORAL at 17:36

## 2019-02-08 RX ADMIN — CELECOXIB 200 MG: 200 CAPSULE ORAL at 07:17

## 2019-02-08 RX ADMIN — ACETAMINOPHEN 1000 MG: 500 TABLET, FILM COATED ORAL at 05:02

## 2019-02-08 RX ADMIN — HYDROMORPHONE HYDROCHLORIDE 1 MG: 2 INJECTION, SOLUTION INTRAMUSCULAR; INTRAVENOUS; SUBCUTANEOUS at 23:44

## 2019-02-08 RX ADMIN — CELECOXIB 200 MG: 200 CAPSULE ORAL at 20:21

## 2019-02-08 RX ADMIN — OXYCODONE HYDROCHLORIDE 10 MG: 5 TABLET ORAL at 12:20

## 2019-02-08 RX ADMIN — Medication 1 AMPULE: at 07:17

## 2019-02-08 RX ADMIN — SENNOSIDES AND DOCUSATE SODIUM 2 TABLET: 8.6; 5 TABLET ORAL at 07:17

## 2019-02-08 RX ADMIN — CARBIDOPA AND LEVODOPA 1 TABLET: 25; 100 TABLET ORAL at 20:21

## 2019-02-08 RX ADMIN — ASPIRIN 81 MG: 81 TABLET, COATED ORAL at 07:17

## 2019-02-08 RX ADMIN — OXYCODONE HYDROCHLORIDE 10 MG: 5 TABLET ORAL at 16:01

## 2019-02-08 RX ADMIN — ACETAMINOPHEN 1000 MG: 500 TABLET, FILM COATED ORAL at 23:44

## 2019-02-08 RX ADMIN — ASPIRIN 81 MG: 81 TABLET, COATED ORAL at 20:21

## 2019-02-08 NOTE — PROGRESS NOTES
Problem: Self Care Deficits Care Plan (Adult) Goal: *Acute Goals and Plan of Care (Insert Text) GOALS: GOAL MET 2/8/2019 DISCHARGE GOALS (in preparation for going home/rehab):  3 days 1. Ms. Kirstin Arias will perform one lower body dressing activity with minimal assistance required to demonstrate improved functional mobility and safety. 2.  Ms. Kirstin Arias will perform one lower body bathing activity with minimal assistance required to demonstrate improved functional mobility and safety. 3.  Ms. Kirstin Arias will perform toileting/toilet transfer with contact guard assistance to demonstrate improved functional mobility and safety. 4.  Ms. Kirstin Arias will perform shower transfer with contact guard assistance to demonstrate improved functional mobility and safety. [facility] OCCUPATIONAL THERAPY TKA: Daily Note, Discharge, Treatment Day: 1st and AM 2/8/2019INPATIENT: Hospital Day: 2 Payor: Quan Sahu / Plan: [employer] / Product Type: Managed Care Medicare /  
  
NAME/AGE/GENDER: Carson Lewis is a 70 y.o. female PRIMARY DIAGNOSIS:  Primary osteoarthritis of left knee [M17.12] Procedure(s) and Anesthesia Type: 
   * LEFT KNEE ARTHROPLASTY TOTAL - Spinal (Left) ICD-10: Treatment Diagnosis:  
 · Pain in Left Knee (M25.562) · Stiffness of Left Knee, Not elsewhere classified (M25.662) ASSESSMENT:  
Ms. Kirstin Arias is s/p left TKA and presents with decreased weight bearing on left LE and decreased independence with functional mobility and activities of daily living as compared to baseline level of function and safety. Patient would benefit from skilled Occupational Therapy to maximize independence and safety with self-care task and functional mobility. Pt would also benefit from education on adaptive equipment and safety precautions in preparation for going home. 2/8/2019 Ms. Kirstin Arias is s/p left TKA and presents with decreased weight bearing on left LE and decreased independence with functional mobility and activities of daily living. Patient completed shower and dressing as charter below in ADL grid and is ambulating with rolling walker and stand by assist.  Patient has met 4/4 goals and plans to return home with good family support. Family able to provide patient with appropriate level of assistance at this time. OT reviewed safety precautions throughout session and therapy schedule for the remainder of today. Patient instructed to call for assistance when needing to get up from recliner and all needs in reach. Patient verbalized understanding of call light. This section established at most recent assessment PROBLEM LIST (Impairments causing functional limitations): 1. Decreased Strength 2. Decreased ADL/Functional Activities 3. Decreased Transfer Abilities 4. Increased Pain 5. Increased Fatigue 6. Decreased Flexibility/Joint Mobility 7. Decreased Knowledge of Precautions INTERVENTIONS PLANNED: (Benefits and precautions of occupational therapy have been discussed with the patient.) 1. Activities of daily living training 2. Adaptive equipment training 3. Balance training 4. Clothing management 5. Donning&doffing training 6. Theraputic activity TREATMENT PLAN: Frequency/Duration: Follow patient 1-2 times to address above goals. Rehabilitation Potential For Stated Goals: Good RECOMMENDED REHABILITATION/EQUIPMENT: (at time of discharge pending progress): Continue Skilled Therapy and Home Health: Physical Therapy. OCCUPATIONAL PROFILE AND HISTORY:  
History of Present Injury/Illness (Reason for Referral): Pt presents this date s/p (left) TKA. Past Medical History/Comorbidities: Ms. Pat Boyd  has a past medical history of Allergic rhinitis due to pollen, Arthritis, Asthma, Fibrocystic breast, GERD (gastroesophageal reflux disease), H/O colonoscopy (2009), Hashimoto's thyroiditis, Hypertension, Macular degeneration, Mixed hyperlipidemia, Osteoporosis (1/2013), Unspecified hypothyroidism, and Varicella. Ms. Katlin Campbell  has a past surgical history that includes hx gyn (1973); hx appendectomy (1973); hx tonsillectomy (as a child); hx adenoidectomy (as a child); hx cataract removal (Bilateral, 2007); hx colonoscopy; and hx hernia repair (06/16/2018). Social History/Living Environment:  
Home Environment: Private residence One/Two Story Residence: One story Living Alone: No 
Support Systems: Spouse/Significant Other/Partner Patient Expects to be Discharged to[de-identified] Other (comment)(to surg) Current DME Used/Available at Home: Meng Pollard Prior Level of Function/Work/Activity: 
Independent Number of Personal Factors/Comorbidities that affect the Plan of Care: Brief history (0):  LOW COMPLEXITY ASSESSMENT OF OCCUPATIONAL PERFORMANCE[de-identified]  
Most Recent Physical Functioning:  
Balance Sitting: Intact Standing: Intact LLE AROM 
L Knee Flexion: 87 L Knee Extension: 6 Mental Status Neurologic State: Alert Orientation Level: Oriented X4 Basic ADLs (From Assessment) Complex ADLs (From Assessment) Basic ADL Feeding: Independent Oral Facial Hygiene/Grooming: Supervision Bathing: Moderate assistance Type of Bath: Chlorhexidine (CHG), Full, Shower Upper Body Dressing: Supervision Lower Body Dressing: Moderate assistance Toileting: Moderate assistance Grooming/Bathing/Dressing Activities of Daily Living Grooming Grooming Assistance: Supervision/set up(standing at sink) Upper Body Bathing Bathing Assistance: Supervision/set-up(seated on shower chair) Lower Body Bathing Bathing Assistance: Supervision/set-up(seated on shower chair standing as needed with grab bars) Toileting Toileting Assistance: Supervision/set up(elevated seat and walker) Upper Body Dressing Assistance Dressing Assistance: Supervision/set-up Functional Transfers Bathroom Mobility: Supervision/set up Toilet Transfer : Stand-by assistance Shower Transfer: Stand-by assistance Cues: Verbal cues provided Adaptive Equipment: Shower chair with back;Grab bars; [address] (comment) Lower Body Dressing Assistance Dressing Assistance: Minimum assistance(pants and underpants verbal cues) Bed/Mat Mobility Supine to Sit: Stand-by assistance Sit to Stand: Stand-by assistance Bed to Chair: Stand-by assistance Physical Skills Involved: 1. Range of Motion 2. Balance 3. Strength Cognitive Skills Affected (resulting in the inability to perform in a timely and safe manner): 1. none Psychosocial Skills Affected: 1. Environmental Adaptation Number of elements that affect the Plan of Care: 3-5:  MODERATE COMPLEXITY CLINICAL DECISION MAKIN[address] [place] [address] AM-PAC 6 Clicks Daily Activity Inpatient Short Form How much help from another person does the patient currently need. .. Total A Lot A Little None 1. Putting on and taking off regular lower body clothing? [] 1   [x] 2   [] 3   [] 4  
2. Bathing (including washing, rinsing, drying)? [] 1   [] 2   [x] 3   [] 4  
3. Toileting, which includes using toilet, bedpan or urinal?   [] 1   [] 2   [x] 3   [] 4  
4. Putting on and taking off regular upper body clothing? [] 1   [] 2   [] 3   [x] 4  
5. Taking care of personal grooming such as brushing teeth? [] 1   [] 2   [] 3   [x] 4  
6. Eating meals? [] 1   [] 2   [] 3   [x] 4  
© , Trustees of [address] [place], [place] [id number], under license to [employer]. All rights reserved Score:  Initial: 20 Most Recent: X (Date: -- ) Interpretation of Tool:  Represents activities that are increasingly more difficult (i.e. Bed mobility, Transfers, Gait). Medical Necessity:    
· Patient is expected to demonstrate progress in range of motion to increase independence with self care. Reason for Services/Other Comments: · Patient  would benefit from skilled Occupational Therapy to maximize independence and safety with self-care task and functional mobility. .  
Use of outcome tool(s) and clinical judgement create a POC that gives a: LOW COMPLEXITY  
  
 
 
 
TREATMENT:  
(In addition to Assessment/Re-Assessment sessions the following treatments were rendered) Pre-treatment Symptoms/Complaints:  Pt without complaint of pain during self care Pain: Initial:  
  0 Post Session:  0 Self Care: (40min): Procedure(s) (per grid) utilized to improve and/or restore self-care/home management as related to dressing, bathing, toileting and grooming. Required minimal verbal and   cueing to facilitate activities of daily living skills and compensatory activities. Treatment/Session Assessment:   
 Response to Treatment:  Pt up to shower tolerated well. Education: 
[] Home Exercises [x] Fall Precautions [] Hip Precautions [] Going Home Video [x] Knee/Hip Prosthesis Review [x] Walker Management/Safety [x] Adaptive Equipment as Needed Interdisciplinary Collaboration:  
o Physical Therapist 
o Occupational Therapist 
o Registered Nurse After treatment position/precautions:  
o Up in chair 
o Bed/Chair-wheels locked 
o Call light within reach 
o RN notified Compliance with Program/Exercises: Compliant all of the time, Will assess as treatment progresses. Recommendations/Intent for next treatment session: Pt doing well all goals met and will do well at home with support from . Patient will be discharged home with home health PT. No further Occupational Therapy warranted, will discharge Occupational Therapy services. Total Treatment Duration: OT Patient Time In/Time Out Time In: 1120 Time Out: 1200 Lewis Blizzard, OT

## 2019-02-08 NOTE — PROGRESS NOTES
Medicated for pain with oxycodone 10 mg po again. Dressed and in recliner. Has been up to BR voiding. NV checks WDL.

## 2019-02-08 NOTE — PROGRESS NOTES
Problem: Mobility Impaired (Adult and Pediatric) Goal: *Acute Goals and Plan of Care (Insert Text) GOALS (1-4 days): 
(1.)Ms. Andre Strong will move from supine to sit and sit to supine  in bed with STAND BY ASSIST. 
(2.)Ms. Andre Strong will transfer from bed to chair and chair to bed with STAND BY ASSIST using the least restrictive device. (3.)Ms. Andre Strong will ambulate with STAND BY ASSIST for 150 feet with the least restrictive device. (4.)Ms. Andre Strong will ambulate up/down 1 steps with left railing with MINIMAL ASSIST or with device as needed. (5.)Ms. Andre Strong will increase left knee ROM to 5°-80°. 
________________________________________________________________________________________________ PHYSICAL THERAPY Joint camp tKa: Daily Note, AM 2/8/2019INPATIENT: Hospital Day: 2 Payor: Tonya Rodríguez / Plan: [address] / Product Type: Managed Care Medicare /  
  
NAME/AGE/GENDER: Channing Irizarry is a 70 y.o. female PRIMARY DIAGNOSIS:  Primary osteoarthritis of left knee [M17.12] Procedure(s) and Anesthesia Type: 
   * LEFT KNEE ARTHROPLASTY TOTAL - Spinal (Left) ICD-10: Treatment Diagnosis:  
 · Pain in Left Knee (M25.562) · Stiffness of Left Knee, Not elsewhere classified (M25.662) · Difficulty in walking, Not elsewhere classified (R26.2) ASSESSMENT:  
Ms. Andre Strong is supine in bed on arrival.  She is agreeable to PT and plans to go home today or tomorrow with HHPT. She is doing well with gait and mobility. Pt left sitting up in chair with needs in reach. This section established at most recent assessment PROBLEM LIST (Impairments causing functional limitations): 1. Decreased Strength 2. Decreased ADL/Functional Activities 3. Decreased Transfer Abilities 4. Decreased Ambulation Ability/Technique 5. Decreased Balance 6. Increased Pain 7. Decreased Activity Tolerance 8. Decreased Flexibility/Joint Mobility 9.  Decreased [place] with Home Exercise Program 
 INTERVENTIONS PLANNED: (Benefits and precautions of physical therapy have been discussed with the patient.) 1. Bed Mobility 2. Gait Training 3. Home Exercise Program (HEP) 4. Therapeutic Exercise/Strengthening 5. Transfer Training 6. Range of Motion: active/assisted/passive 7. Therapeutic Activities 8. Group Therapy TREATMENT PLAN: Frequency/Duration: Follow patient BID for duration of hospital stay to address above goals. Rehabilitation Potential For Stated Goals: Good RECOMMENDED REHABILITATION/EQUIPMENT: (at time of discharge pending progress): Continue Skilled Therapy and Home Health: Physical Therapy. HISTORY:  
History of Present Injury/Illness (Reason for Referral): S/p left tka Past Medical History/Comorbidities: Ms. Jessica Varela  has a past medical history of Allergic rhinitis due to pollen, Arthritis, Asthma, Fibrocystic breast, GERD (gastroesophageal reflux disease), H/O colonoscopy (2009), Hashimoto's thyroiditis, Hypertension, Macular degeneration, Mixed hyperlipidemia, Osteoporosis (1/2013), Unspecified hypothyroidism, and Varicella. Ms. Jessica Varela  has a past surgical history that includes hx gyn (1973); hx appendectomy (1973); hx tonsillectomy (as a child); hx adenoidectomy (as a child); hx cataract removal (Bilateral, 2007); hx colonoscopy; and hx hernia repair (06/16/2018). Social History/Living Environment:  
Home Environment: Private residence One/Two Story Residence: One story Living Alone: No 
Support Systems: Spouse/Significant Other/Partner Patient Expects to be Discharged to[de-identified] Other (comment)(to surg) Current DME Used/Available at Home: Yolanda Huitron Prior Level of Function/Work/Activity: 
independent Number of Personal Factors/Comorbidities that affect the Plan of Care: 1-2: MODERATE COMPLEXITY EXAMINATION:  
Most Recent Physical Functioning:  
  
  
 
         
 
  
 
Bed Mobility Supine to Sit: Stand-by assistance Transfers Sit to Stand: Contact guard assistance Stand to Sit: Contact guard assistance Bed to Chair: Contact guard assistance Weight Bearing Status Left Side Weight Bearing: As tolerated Distance (ft): 50 Feet (ft) Ambulation - Level of Assistance: Contact guard assistance Assistive Device: Walker, rolling Speed/Mishel: Delayed;Pace decreased (<100 feet/min) Step Length: Left shortened;Right shortened Stance: Left decreased Gait Abnormalities: Antalgic;Decreased step clearance Interventions: Safety awareness training;Verbal cues Braces/Orthotics:  
 
Left Knee Cold Type: Cryocuff Body Structures Involved: 1. Bones 2. Joints 3. Muscles 4. Ligaments Body Functions Affected: 1. Movement Related Activities and Participation Affected: 1. Mobility Number of elements that affect the Plan of Care: 3: MODERATE COMPLEXITY CLINICAL PRESENTATION:  
Presentation: Stable and uncomplicated: LOW COMPLEXITY CLINICAL DECISION MAKIN[address] [place] [id number] AM-PAC 6 Clicks Basic Mobility Inpatient Short Form How much difficulty does the patient currently have. .. Unable A Lot A Little None 1. Turning over in bed (including adjusting bedclothes, sheets and blankets)? [] 1   [] 2   [x] 3   [] 4  
2. Sitting down on and standing up from a chair with arms ( e.g., wheelchair, bedside commode, etc.)   [] 1   [] 2   [x] 3   [] 4  
3. Moving from lying on back to sitting on the side of the bed? [] 1   [] 2   [x] 3   [] 4 How much help from another person does the patient currently need. .. Total A Lot A Little None 4. Moving to and from a bed to a chair (including a wheelchair)? [] 1   [] 2   [x] 3   [] 4  
5. Need to walk in hospital room? [] 1   [] 2   [x] 3   [] 4  
6. Climbing 3-5 steps with a railing? [] 1   [x] 2   [] 3   [] 4  
© , Trustees of [address] [place] [id number], under license to [employer]. All rights reserved Score:  Initial: 17 Most Recent: X (Date: -- ) Interpretation of Tool:  Represents activities that are increasingly more difficult (i.e. Bed mobility, Transfers, Gait). Medical Necessity:    
· Patient is expected to demonstrate progress in strength, range of motion and balance to decrease assistance required with theraputic exercises and functional mobility. Reason for Services/Other Comments: 
· Patient continues to require present interventions due to patient's inability to perform theraputic exercises and functional mobility independently. Use of outcome tool(s) and clinical judgement create a POC that gives a: Clear prediction of patient's progress: LOW COMPLEXITY  
  
 
 
 
TREATMENT:  
(In addition to Assessment/Re-Assessment sessions the following treatments were rendered) Pre-treatment Symptoms/Complaints:  Minimal complaints of pain 
Pain: Initial:  
   Post Session:  Not rated Therapeutic Exercise: (15 Minutes):  Exercises per grid below to improve mobility and strength. Required minimal visual, verbal and manual cues to promote proper body alignment, promote proper body posture and promote proper body mechanics. Progressed range and repetitions as indicated. Gait Training (10 Minutes):  Gait training to improve and/or restore physical functioning as related to mobility, strength and balance. Ambulated 50 Feet (ft) with Contact guard assistance using a Walker, rolling and minimal Safety awareness training;Verbal cues related to their stance phase and stride length to promote proper body alignment, promote proper body posture and promote proper body mechanics. Date: 
2/7 Date: 
2/8/19 Date: 
  
ACTIVITY/EXERCISE AM PM AM PM AM PM  
GROUP THERAPY  []  []  [x]  []  []  [] Ankle Pumps  10a 10 Quad Sets  10a 10 Gluteal Sets  10a 10 Hip ABd/ADduction  10a 10 Straight Leg Raises   10 Knee Slides  10a 10 Short Arc The [address] Chair Slides B = bilateral; AA = active assistive; A = active; P = passive Treatment/Session Assessment:   
 Response to Treatment:  Continues to do well. Education: 
[x] Home Exercises [x] Fall Precautions [] Hip Precautions [] D/C Instruction Review [x] Knee/Hip Prosthesis Review [x] Walker Management/Safety [] Adaptive Equipment as Needed Interdisciplinary Collaboration:  
o Registered Nurse After treatment position/precautions:  
o Up in chair 
o Bed/Chair-wheels locked 
o Bed in low position 
o Caregiver at bedside 
o Call light within reach Compliance with Program/Exercises: Will assess as treatment progresses. No questions. Recommendations/Intent for next treatment session:  Treatment next visit will focus on increasing Ms. Somers's independence with bed mobility, transfers, gait training, strength/ROM exercises, modalities for pain, and patient education. Total Treatment Duration: PT Patient Time In/Time Out Time In: 3266 Time Out: 1040 Imelda Segura, PTA

## 2019-02-08 NOTE — PROGRESS NOTES
Problem: Mobility Impaired (Adult and Pediatric) Goal: *Acute Goals and Plan of Care (Insert Text) GOALS (1-4 days): 
(1.)Ms. Tony Briseno will move from supine to sit and sit to supine  in bed with STAND BY ASSIST. 
(2.)Ms. Tony Briseno will transfer from bed to chair and chair to bed with STAND BY ASSIST using the least restrictive device. (3.)Ms. Tony Briseno will ambulate with STAND BY ASSIST for 150 feet with the least restrictive device. (4.)Ms. Tony Briseno will ambulate up/down 1 steps with left railing with MINIMAL ASSIST or with device as needed. (5.)Ms. Tony Briseno will increase left knee ROM to 5°-80°. 
________________________________________________________________________________________________ PHYSICAL THERAPY Joint camp tKa: Daily Note, PM 2/8/2019INPATIENT: Hospital Day: 2 Payor: [address] [place] [address],9D / Plan: [address] / Product Type: [employer] Medicare /  
  
NAME/AGE/GENDER: Iris Dumont is a 70 y.o. female PRIMARY DIAGNOSIS:  Primary osteoarthritis of left knee [M17.12] Procedure(s) and Anesthesia Type: 
   * LEFT KNEE ARTHROPLASTY TOTAL - Spinal (Left) ICD-10: Treatment Diagnosis:  
 · Pain in Left Knee (M25.562) · Stiffness of Left Knee, Not elsewhere classified (M25.662) · Difficulty in walking, Not elsewhere classified (R26.2) ASSESSMENT:  
Ms. Tony Briseno is sitting up in chair on arrival.  She looks a little flush in her face and she states that she took pain medicine earlier. Pt ambulated to gym and about half way back from gym with SBA. She has a slow, steady gait with no LOB. Pt left supine in bed with needs in reach. This section established at most recent assessment PROBLEM LIST (Impairments causing functional limitations): 1. Decreased Strength 2. Decreased ADL/Functional Activities 3. Decreased Transfer Abilities 4. Decreased Ambulation Ability/Technique 5. Decreased Balance 6. Increased Pain 7. Decreased Activity Tolerance 8. Decreased Flexibility/Joint Mobility 9. Decreased [place] with Home Exercise Program 
 INTERVENTIONS PLANNED: (Benefits and precautions of physical therapy have been discussed with the patient.) 1. Bed Mobility 2. Gait Training 3. Home Exercise Program (HEP) 4. Therapeutic Exercise/Strengthening 5. Transfer Training 6. Range of Motion: active/assisted/passive 7. Therapeutic Activities 8. Group Therapy TREATMENT PLAN: Frequency/Duration: Follow patient BID for duration of hospital stay to address above goals. Rehabilitation Potential For Stated Goals: Good RECOMMENDED REHABILITATION/EQUIPMENT: (at time of discharge pending progress): Continue Skilled Therapy and Home Health: Physical Therapy. HISTORY:  
History of Present Injury/Illness (Reason for Referral): S/p left tka Past Medical History/Comorbidities: Ms. Maria Elena Espinoza  has a past medical history of Allergic rhinitis due to pollen, Arthritis, Asthma, Fibrocystic breast, GERD (gastroesophageal reflux disease), H/O colonoscopy (2009), Hashimoto's thyroiditis, Hypertension, Macular degeneration, Mixed hyperlipidemia, Osteoporosis (1/2013), Unspecified hypothyroidism, and Varicella. Ms. Maria Elena Espinoza  has a past surgical history that includes hx gyn (1973); hx appendectomy (1973); hx tonsillectomy (as a child); hx adenoidectomy (as a child); hx cataract removal (Bilateral, 2007); hx colonoscopy; and hx hernia repair (06/16/2018). Social History/Living Environment:  
Home Environment: Private residence One/Two Story Residence: One story Living Alone: No 
Support Systems: Spouse/Significant Other/Partner Patient Expects to be Discharged to[de-identified] Other (comment)(to surg) Current DME Used/Available at Home: Davy Cullen Prior Level of Function/Work/Activity: 
independent Number of Personal Factors/Comorbidities that affect the Plan of Care: 1-2: MODERATE COMPLEXITY EXAMINATION:  
Most Recent Physical Functioning: LLE AROM L Knee Flexion: 87 L Knee Extension: 6 Bed Mobility Supine to Sit: Stand-by assistance Transfers Sit to Stand: Stand-by assistance Stand to Sit: Stand-by assistance Bed to Chair: Stand-by assistance Weight Bearing Status Left Side Weight Bearing: As tolerated Distance (ft): 240 Feet (ft)(additional 120') Ambulation - Level of Assistance: Stand-by assistance Assistive Device: Walker, rolling Speed/Mishel: Delayed;Pace decreased (<100 feet/min) Step Length: Left shortened;Right shortened Stance: Left decreased Gait Abnormalities: Antalgic;Decreased step clearance Interventions: Safety awareness training;Verbal cues Braces/Orthotics:  
 
Left Knee Cold Type: Cryocuff Body Structures Involved: 1. Bones 2. Joints 3. Muscles 4. Ligaments Body Functions Affected: 1. Movement Related Activities and Participation Affected: 1. Mobility Number of elements that affect the Plan of Care: 3: MODERATE COMPLEXITY CLINICAL PRESENTATION:  
Presentation: Stable and uncomplicated: LOW COMPLEXITY CLINICAL DECISION MAKING:  
[employer] AM-PAC 6 Clicks Basic Mobility Inpatient Short Form How much difficulty does the patient currently have. .. Unable A Lot A Little None 1. Turning over in bed (including adjusting bedclothes, sheets and blankets)? [] 1   [] 2   [x] 3   [] 4  
2. Sitting down on and standing up from a chair with arms ( e.g., wheelchair, bedside commode, etc.)   [] 1   [] 2   [x] 3   [] 4  
3. Moving from lying on back to sitting on the side of the bed? [] 1   [] 2   [x] 3   [] 4 How much help from another person does the patient currently need. .. Total A Lot A Little None 4. Moving to and from a bed to a chair (including a wheelchair)? [] 1   [] 2   [x] 3   [] 4  
5. Need to walk in hospital room? [] 1   [] 2   [x] 3   [] 4  
6. Climbing 3-5 steps with a railing?    [] 1   [x] 2   [] 3   [] 4  
 © 2007, Trustees of [facility] [employer], under license to [employer]. All rights reserved Score:  Initial: 17 Most Recent: X (Date: -- ) Interpretation of Tool:  Represents activities that are increasingly more difficult (i.e. Bed mobility, Transfers, Gait). Medical Necessity:    
· Patient is expected to demonstrate progress in strength, range of motion and balance to decrease assistance required with theraputic exercises and functional mobility. Reason for Services/Other Comments: 
· Patient continues to require present interventions due to patient's inability to perform theraputic exercises and functional mobility independently. Use of outcome tool(s) and clinical judgement create a POC that gives a: Clear prediction of patient's progress: LOW COMPLEXITY  
  
 
 
 
TREATMENT:  
(In addition to Assessment/Re-Assessment sessions the following treatments were rendered) Pre-treatment Symptoms/Complaints:  Minimal complaints of pain 
Pain: Initial:  
   Post Session:  Not rated Therapeutic Exercise: (45 Minutes(group)):  Exercises per grid below to improve mobility and strength. Required minimal visual, verbal and manual cues to promote proper body alignment, promote proper body posture and promote proper body mechanics. Progressed range and repetitions as indicated. Gait Training (15 Minutes):  Gait training to improve and/or restore physical functioning as related to mobility, strength and balance. Ambulated 240 Feet (ft)(additional 120') with Stand-by assistance using a Walker, rolling and minimal Safety awareness training;Verbal cues related to their stance phase and stride length to promote proper body alignment, promote proper body posture and promote proper body mechanics. Date: 
2/7 Date: 
2/8/19 Date: 
  
ACTIVITY/EXERCISE AM PM AM PM AM PM  
GROUP THERAPY  []  []  []  [x]  []  [] Ankle Pumps  10a 10 15 Quad Sets  10a 10 15 Gluteal Sets  10a 10 15    
 Hip ABd/ADduction  10a 10 15 Straight Leg Raises   10 15 Knee Slides  10a 10 15 Short Arc Quads    15 [facility] [facility] Chair Slides    15    
        
B = bilateral; AA = active assistive; A = active; P = passive Treatment/Session Assessment:   
 Response to Treatment:  Pt moving well this afternoon and able to increased gait distance. Education: 
[x] Home Exercises [x] Fall Precautions [] Hip Precautions [] D/C Instruction Review [x] Knee/Hip Prosthesis Review [x] Walker Management/Safety [] Adaptive Equipment as Needed Interdisciplinary Collaboration:  
o Registered Nurse After treatment position/precautions:  
o Supine in bed 
o Bed/Chair-wheels locked 
o Bed in low position 
o Caregiver at bedside 
o Call light within reach Compliance with Program/Exercises: Will assess as treatment progresses. No questions. Recommendations/Intent for next treatment session:  Treatment next visit will focus on increasing Ms. Somers's independence with bed mobility, transfers, gait training, strength/ROM exercises, modalities for pain, and patient education. Total Treatment Duration: PT Patient Time In/Time Out Time In: 1300 Time Out: 1400 Michaela Tubbs PTA

## 2019-02-08 NOTE — PROGRESS NOTES
2019 Post Op day: 1 Day Post-Op Admit Diagnosis: Primary osteoarthritis of left knee [M17.12] LAB:   
Recent Results (from the past 24 hour(s)) HEMOGLOBIN Collection Time: 19  7:32 PM  
Result Value Ref Range HGB 10.9 (L) 11.7 - 15.4 g/dL HEMOGLOBIN Collection Time: 19  5:09 AM  
Result Value Ref Range HGB 9.9 (L) 11.7 - 15.4 g/dL Vital Signs:   
Patient Vitals for the past 8 hrs: 
 BP Temp Pulse Resp SpO2  
19 0710 107/68 97.8 °F (36.6 °C) 71 20 99 % 19 0327 103/64 97.8 °F (36.6 °C) 67 16 97 % Temp (24hrs), Av.9 °F (36.6 °C), Min:97.4 °F (36.3 °C), Max:98.8 °F (37.1 °C) Pain Control:  
Pain Assessment Pain Scale 1: Numeric (0 - 10) Pain Intensity 1: 4 Subjective: Doing well, no complaints Objective:  No Acute Distress, Alert and Oriented, Neurovascular exam is normal 
  
 
Assessment:  
Patient Active Problem List  
Diagnosis Code  Abdominal pain R10.9  Sigmoid diverticulitis K57.32  
 Hypercholesterolemia E78.00  Thyroid disease E07.9  Allergic rhinitis due to pollen J30.1  Essential hypertension, benign I10  
 GERD (gastroesophageal reflux disease) K21.9  Acquired hypothyroidism E03.9  Gastroesophageal reflux disease with esophagitis K21.0  Arthritis of left knee M17.12 Status Post Procedure(s) (LRB): LEFT KNEE ARTHROPLASTY TOTAL (Left) Plan: Continue Physical Therapy, Home today if stable with PT.   
Signed By: Gloria Grimes MD

## 2019-02-08 NOTE — PROGRESS NOTES
Back in bed. Medicated for pain with oxycodone 10 mg again. NV checks remain WDL. Iceman to knee.  in room.

## 2019-02-08 NOTE — PROGRESS NOTES
Pt is alert and oriented x3. Pt in bed resting quietly No NV deficits noted. Pt is able to dorsi/ plantar flex and has +2 pedal pulses. Dressing to surgical incision is clean, dry, and intact. Ice man ice pack on left knee Pain is controlled at this time. Bed is low and locked, call light in reach. IS at bedside and pt demonstrated use. No needs stated.

## 2019-02-08 NOTE — PROGRESS NOTES
Medicated for pain with oxycodone 10 mg. Sitting up in bed. NV checks WDL. Aquacel dry and intact to L knee with iceman. SCDs on.

## 2019-02-09 VITALS
SYSTOLIC BLOOD PRESSURE: 113 MMHG | BODY MASS INDEX: 27.02 KG/M2 | TEMPERATURE: 97.7 F | HEIGHT: 64 IN | OXYGEN SATURATION: 99 % | WEIGHT: 158.3 LBS | DIASTOLIC BLOOD PRESSURE: 73 MMHG | HEART RATE: 75 BPM | RESPIRATION RATE: 17 BRPM

## 2019-02-09 LAB — HGB BLD-MCNC: 9.3 G/DL (ref 11.7–15.4)

## 2019-02-09 PROCEDURE — 74011250637 HC RX REV CODE- 250/637: Performed by: PHYSICIAN ASSISTANT

## 2019-02-09 PROCEDURE — 36415 COLL VENOUS BLD VENIPUNCTURE: CPT

## 2019-02-09 PROCEDURE — 85018 HEMOGLOBIN: CPT

## 2019-02-09 PROCEDURE — 97116 GAIT TRAINING THERAPY: CPT

## 2019-02-09 PROCEDURE — 97150 GROUP THERAPEUTIC PROCEDURES: CPT

## 2019-02-09 PROCEDURE — 74011250637 HC RX REV CODE- 250/637: Performed by: ORTHOPAEDIC SURGERY

## 2019-02-09 RX ORDER — PROMETHAZINE HYDROCHLORIDE 25 MG/1
25 TABLET ORAL
Qty: 40 TAB | Refills: 1 | Status: SHIPPED | OUTPATIENT
Start: 2019-02-09 | End: 2019-03-22

## 2019-02-09 RX ORDER — PROMETHAZINE HYDROCHLORIDE 25 MG/1
25 TABLET ORAL
Status: DISCONTINUED | OUTPATIENT
Start: 2019-02-09 | End: 2019-02-09 | Stop reason: HOSPADM

## 2019-02-09 RX ADMIN — ASPIRIN 81 MG: 81 TABLET, COATED ORAL at 09:00

## 2019-02-09 RX ADMIN — OXYCODONE HYDROCHLORIDE 10 MG: 5 TABLET ORAL at 02:34

## 2019-02-09 RX ADMIN — ONDANSETRON 4 MG: 4 TABLET, ORALLY DISINTEGRATING ORAL at 06:55

## 2019-02-09 RX ADMIN — ACETAMINOPHEN 1000 MG: 500 TABLET, FILM COATED ORAL at 06:49

## 2019-02-09 RX ADMIN — VALSARTAN 320 MG: 320 TABLET, FILM COATED ORAL at 09:00

## 2019-02-09 RX ADMIN — OXYCODONE HYDROCHLORIDE 10 MG: 5 TABLET ORAL at 06:49

## 2019-02-09 RX ADMIN — CELECOXIB 200 MG: 200 CAPSULE ORAL at 09:00

## 2019-02-09 RX ADMIN — Medication 1 AMPULE: at 09:01

## 2019-02-09 RX ADMIN — LEVOTHYROXINE SODIUM 75 MCG: 75 TABLET ORAL at 06:49

## 2019-02-09 RX ADMIN — OXYCODONE HYDROCHLORIDE 10 MG: 5 TABLET ORAL at 10:45

## 2019-02-09 RX ADMIN — SENNOSIDES AND DOCUSATE SODIUM 2 TABLET: 8.6; 5 TABLET ORAL at 08:59

## 2019-02-09 RX ADMIN — HYDROCHLOROTHIAZIDE 12.5 MG: 25 TABLET ORAL at 08:59

## 2019-02-09 NOTE — PROGRESS NOTES
Shift Assessment Complete. Pt is post op day 2 from [facility] [address]. Pt is A&Ox 3.  +2 pedal pulses with purposeful movement in all four extremities. Dressing is clean, dry and intact. PIV capped. Pt denies any pain or need at this time. Bed low and locked. Side rails x3. Call light with in reach. Pt verbalizes understanding of call light.

## 2019-02-09 NOTE — PROGRESS NOTES
2019 Post Op day: 2 Days Post-Op Admit Date: 2019 Admit Diagnosis: Primary osteoarthritis of left knee [M17.12] Subjective: having some nausea. otherwsie Doing well, No complaints, No SOB, No Chest Pain,   
 
Objective:  
Vital Signs are Stable, No Acute Distress, Alert and Oriented, Dressing is Dry,  Neurovascular exam is normal.  
 
Assessment / Plan : 
Patient Active Problem List  
Diagnosis Code  Abdominal pain R10.9  Sigmoid diverticulitis K57.32  
 Hypercholesterolemia E78.00  Thyroid disease E07.9  Allergic rhinitis due to pollen J30.1  Essential hypertension, benign I10  
 GERD (gastroesophageal reflux disease) K21.9  Acquired hypothyroidism E03.9  Gastroesophageal reflux disease with esophagitis K21.0  Arthritis of left knee M17.12 Patient Vitals for the past 8 hrs: 
 BP Temp Pulse Resp SpO2  
19 0324 101/62 97.4 °F (36.3 °C) 72 16 99 % Temp (24hrs), Av.6 °F (36.4 °C), Min:97.4 °F (36.3 °C), Max:98 °F (36.7 °C) Body mass index is 27.17 kg/m². Lab Results Component Value Date/Time HGB 9.3 (L) 2019 04:43 AM  
  
Pt seen by and discussed with Supervising Physician Continue PT, current pain meds Home today Signed By: KELLIE Madden

## 2019-02-09 NOTE — PROGRESS NOTES
Problem: Mobility Impaired (Adult and Pediatric) Goal: *Acute Goals and Plan of Care (Insert Text) GOALS (1-4 days): 
(1.)Ms. Steve Godinez will move from supine to sit and sit to supine  in bed with STAND BY ASSIST. Met 2/9 
(2.)Ms. Steve Godinez will transfer from bed to chair and chair to bed with STAND BY ASSIST using the least restrictive device. (3.)Ms. Steve Godinez will ambulate with STAND BY ASSIST for 150 feet with the least restrictive device. Met 2/9 
(4.)Ms. Steve Godinez will ambulate up/down 1 steps with left railing with MINIMAL ASSIST or with device as needed. - met 2/9 
(5.)Ms. Steve Godinez will increase left knee ROM to 5°-80°. Progressing 2/9 
________________________________________________________________________________________________ PHYSICAL THERAPY [facility]: Daily Note, AM 2/9/2019INPATIENT: Hospital Day: 3 Payor: Ellie Vides / Plan: [address] / Product Type: [employer] Medicare /  
  
NAME/AGE/GENDER: Shayan Ugarte is a 70 y.o. female PRIMARY DIAGNOSIS:  Primary osteoarthritis of left knee [M17.12] Procedure(s) and Anesthesia Type: 
   * LEFT KNEE ARTHROPLASTY TOTAL - Spinal (Left) ICD-10: Treatment Diagnosis:  
 · Pain in Left Knee (M25.562) · Stiffness of Left Knee, Not elsewhere classified (M25.662) · Difficulty in walking, Not elsewhere classified (R26.2) ASSESSMENT:  
Ms. Steve Godinez presents sitting on recliner. She states knee is more sore today. Ambulates slowly but safely and does require cueing for improved form. Reviewed stepping over lip to get inside home at length with pt and her ; they demonstrate understanding. Discussed getting into shower as they still had questions about safety - due to their complex shower setup, I recommended they get HHPT to review this and practice at home. They indicated agreement. This section established at most recent assessment PROBLEM LIST (Impairments causing functional limitations): 1. Decreased Strength 2. Decreased ADL/Functional Activities 3. Decreased Transfer Abilities 4. Decreased Ambulation Ability/Technique 5. Decreased Balance 6. Increased Pain 7. Decreased Activity Tolerance 8. Decreased Flexibility/Joint Mobility 9. Decreased [place] with Home Exercise Program 
 INTERVENTIONS PLANNED: (Benefits and precautions of physical therapy have been discussed with the patient.) 1. Bed Mobility 2. Gait Training 3. Home Exercise Program (HEP) 4. Therapeutic Exercise/Strengthening 5. Transfer Training 6. Range of Motion: active/assisted/passive 7. Therapeutic Activities 8. Group Therapy TREATMENT PLAN: Frequency/Duration: Follow patient BID for duration of hospital stay to address above goals. Rehabilitation Potential For Stated Goals: Good RECOMMENDED REHABILITATION/EQUIPMENT: (at time of discharge pending progress): Continue Skilled Therapy and Home Health: Physical Therapy. HISTORY:  
History of Present Injury/Illness (Reason for Referral): S/p left tka Past Medical History/Comorbidities: Ms. Giselle Swann  has a past medical history of Allergic rhinitis due to pollen, Arthritis, Asthma, Fibrocystic breast, GERD (gastroesophageal reflux disease), H/O colonoscopy (2009), Hashimoto's thyroiditis, Hypertension, Macular degeneration, Mixed hyperlipidemia, Osteoporosis (1/2013), Unspecified hypothyroidism, and Varicella. Ms. Giselle Swann  has a past surgical history that includes hx gyn (1973); hx appendectomy (1973); hx tonsillectomy (as a child); hx adenoidectomy (as a child); hx cataract removal (Bilateral, 2007); hx colonoscopy; and hx hernia repair (06/16/2018). Social History/Living Environment:  
Home Environment: Private residence One/Two Story Residence: One story Living Alone: No 
Support Systems: Spouse/Significant Other/Partner Patient Expects to be Discharged to[de-identified] Other (comment)(to surg) Current DME Used/Available at Home: Ana Rosa Mccormick Prior Level of Function/Work/Activity: independent Number of Personal Factors/Comorbidities that affect the Plan of Care: 1-2: MODERATE COMPLEXITY EXAMINATION:  
Most Recent Physical Functioning:  
  
Gross Assessment AROM: Within functional limits(B UEs and R LE) Strength: Generally decreased, functional(B UEs and R LE) LLE AROM 
L Knee Flexion: 75 L Knee Extension: 3 Bed Mobility Rolling: Stand-by assistance Supine to Sit: Stand-by assistance Scooting: Stand-by assistance Transfers Sit to Stand: Contact guard assistance Stand to Sit: Stand-by assistance Balance Sitting: Intact Standing: Intact;Pull to stand; With support Weight Bearing Status Left Side Weight Bearing: As tolerated Distance (ft): 240 Feet (ft)(+164) Ambulation - Level of Assistance: Supervision Assistive Device: Walker, rolling Speed/Mishel: Slow;Pace decreased (<100 feet/min) Step Length: Left shortened;Right shortened Stance: Left decreased Gait Abnormalities: Antalgic Interventions: Safety awareness training;Verbal cues; Tactile cues; Visual/Demos Braces/Orthotics:  
 
Left Knee Cold Type: Cryocuff Body Structures Involved: 1. Bones 2. Joints 3. Muscles 4. Ligaments Body Functions Affected: 1. Movement Related Activities and Participation Affected: 1. Mobility Number of elements that affect the Plan of Care: 3: MODERATE COMPLEXITY CLINICAL PRESENTATION:  
Presentation: Stable and uncomplicated: LOW COMPLEXITY CLINICAL DECISION MAKIN [facility] [address] [id number] AM-PAC 6 Clicks Basic Mobility Inpatient Short Form How much difficulty does the patient currently have. .. Unable A Lot A Little None 1. Turning over in bed (including adjusting bedclothes, sheets and blankets)? [] 1   [] 2   [x] 3   [] 4  
2. Sitting down on and standing up from a chair with arms ( e.g., wheelchair, bedside commode, etc.)   [] 1   [] 2   [x] 3   [] 4  
3. Moving from lying on back to sitting on the side of the bed?    [] 1 [] 2   [x] 3   [] 4 How much help from another person does the patient currently need. .. Total A Lot A Little None 4. Moving to and from a bed to a chair (including a wheelchair)? [] 1   [] 2   [x] 3   [] 4  
5. Need to walk in hospital room? [] 1   [] 2   [x] 3   [] 4  
6. Climbing 3-5 steps with a railing? [] 1   [x] 2   [] 3   [] 4  
© 2007, Trustees of [facility] [employer], under license to [employer]. All rights reserved Score:  Initial: 17 Most Recent: X (Date: -- ) Interpretation of Tool:  Represents activities that are increasingly more difficult (i.e. Bed mobility, Transfers, Gait). Medical Necessity:    
· Patient is expected to demonstrate progress in strength, range of motion and balance to decrease assistance required with theraputic exercises and functional mobility. Reason for Services/Other Comments: 
· Patient continues to require present interventions due to patient's inability to perform theraputic exercises and functional mobility independently. Use of outcome tool(s) and clinical judgement create a POC that gives a: Clear prediction of patient's progress: LOW COMPLEXITY  
  
 
 
 
TREATMENT:  
(In addition to Assessment/Re-Assessment sessions the following treatments were rendered) Pre-treatment Symptoms/Complaints:  Very stiff and sore this AM 
Pain: Initial:  
Pain Intensity 1: 5  Post Session:  5 Therapeutic Exercise: (45 Minutes(group)):  Exercises per grid below to improve mobility and strength. Required minimal visual, verbal and manual cues to promote proper body alignment, promote proper body posture and promote proper body mechanics. Progressed range and repetitions as indicated. Gait Training (15 Minutes):  Gait training to improve and/or restore physical functioning as related to mobility, strength and balance.   Ambulated 240 Feet (ft)(+164) with Supervision using a Walker, rolling and minimal Safety awareness training;Verbal cues; Tactile cues; Visual/Demos related to their stance phase and stride length to promote proper body alignment, promote proper body posture and promote proper body mechanics. Date: 
2/7 Date: 
2/8/19 Date: 
2/9 ACTIVITY/EXERCISE AM PM AM PM AM PM  
GROUP THERAPY  []  []  []  [x]  [x]  [] Ankle Pumps  10a 10 15 20 Quad Sets  10a 10 15 20 Gluteal Sets  10a 10 15 20 Hip ABd/ADduction  10a 10 15 20 Straight Leg Raises   10 15 20 Knee Slides  10a 10 15 20 Short Arc Quads    15 20 [address]12 [facility] Chair Slides    15 20 B = bilateral; AA = active assistive; A = active; P = passive Treatment/Session Assessment:   
 Response to Treatment:  Improving gait, mild decreases in flexion ROM Education: 
[x] Home Exercises [x] Fall Precautions [] Hip Precautions [] D/C Instruction Review [x] Knee/Hip Prosthesis Review [x] Walker Management/Safety [] Adaptive Equipment as Needed Interdisciplinary Collaboration:  
o Physical Therapist 
o Physical Therapy Assistant 
o Rehabilitation Attendant After treatment position/precautions:  
o Up in chair 
o Bed/Chair-wheels locked 
o Caregiver at bedside 
o Call light within reach Compliance with Program/Exercises: Will assess as treatment progresses. No questions. Recommendations/Intent for next treatment session:  Treatment next visit will focus on increasing Ms. Somers's independence with bed mobility, transfers, gait training, strength/ROM exercises, modalities for pain, and patient education. Total Treatment Duration: PT Patient Time In/Time Out Time In: 0930 Time Out: 1030 Cayden Sep, PT

## 2019-02-09 NOTE — DISCHARGE INSTRUCTIONS
[address] [facility]   Patient Discharge Instructions    Nataliia Bourgeois / [id number] : 1940    Admitted 2019 Discharged: 2019     IF YOU HAVE ANY PROBLEMS ONCE YOU ARE AT HOME CALL THE FOLLOWING NUMBERS:   Main office number: [phone]    Take Home Medications     {Medication reconciliation information is now added to the patient's AVS automatically when it is printed. There is no need to use this SmartLink in discharge instructions. Highlight this text and delete it to clear this message}    · It is important that you take the medication exactly as they are prescribed. · Keep your medication in the bottles provided by the pharmacist and keep a list of the medication names, dosages, and times to be taken in your wallet. · Do not take other medications without consulting your doctor. What to do at [address] [place] [address] Ave your prehospital diet. If you have excessive nausea or vomitting call your doctor's office     Home Physical Therapy is arranged. Use rolling walker when walking. Patients who have had a joint replacement should not drive until you are seen for your follow up appointment by Dr. Ashly Santana. When to Call    - Call if you have a temperature greater then 101  - Unable to keep food down  - Loose control of your bladder or bowel function  - Are unable to bear any weight   - Need a pain medication refill     Information obtained by :  I understand that if any problems occur once I am at home I am to contact my physician. I understand and acknowledge receipt of the instructions indicated above.                                                                                                                                            Physician's or R.N.'s Signature                                                                  Date/Time Patient or Representative Signature                                                          Date/Time    Patient Education     DISCHARGE SUMMARY from Nurse    PATIENT INSTRUCTIONS:    After general anesthesia or intravenous sedation, for 24 hours or while taking prescription Narcotics:  · Limit your activities  · Do not drive and operate hazardous machinery  · Do not make important personal or business decisions  · Do  not drink alcoholic beverages  · If you have not urinated within 8 hours after discharge, please contact your surgeon on call. Report the following to your surgeon:  · Excessive pain, swelling, redness or odor of or around the surgical area  · Temperature over 100.5  · Nausea and vomiting lasting longer than 4 hours or if unable to take medications  · Any signs of decreased circulation or nerve impairment to extremity: change in color, persistent  numbness, tingling, coldness or increase pain  · Any questions    What to do at Home:  Recommended activity: {discharge activity:44471}, ***    If you experience any of the following symptoms ***, please follow up with ***. *  Please give a list of your current medications to your Primary Care Provider. *  Please update this list whenever your medications are discontinued, doses are      changed, or new medications (including over-the-counter products) are added. *  Please carry medication information at all times in case of emergency situations. These are general instructions for a healthy lifestyle:    No smoking/ No tobacco products/ Avoid exposure to second hand smoke  Surgeon General's Warning:  Quitting smoking now greatly reduces serious risk to your health.     Obesity, smoking, and sedentary lifestyle greatly increases your risk for illness    A healthy diet, regular physical exercise & weight monitoring are important for maintaining a healthy lifestyle    You may be retaining fluid if you have a history of heart failure or if you experience any of the following symptoms:  Weight gain of 3 pounds or more overnight or 5 pounds in a week, increased swelling in our hands or feet or shortness of breath while lying flat in bed. Please call your doctor as soon as you notice any of these symptoms; do not wait until your next office visit. Recognize signs and symptoms of STROKE:    F-face looks uneven    A-arms unable to move or move unevenly    S-speech slurred or non-existent    T-time-call 911 as soon as signs and symptoms begin-DO NOT go       Back to bed or wait to see if you get better-TIME IS BRAIN. Warning Signs of HEART ATTACK     Call 911 if you have these symptoms:   Chest discomfort. Most heart attacks involve discomfort in the center of the chest that lasts more than a few minutes, or that goes away and comes back. It can feel like uncomfortable pressure, squeezing, fullness, or pain.  Discomfort in other areas of the upper body. Symptoms can include pain or discomfort in one or both arms, the back, neck, jaw, or stomach.  Shortness of breath with or without chest discomfort.  Other signs may include breaking out in a cold sweat, nausea, or lightheadedness. Don't wait more than five minutes to call 911 - MINUTES MATTER! Fast action can save your life. Calling 911 is almost always the fastest way to get lifesaving treatment. Emergency Medical Services staff can begin treatment when they arrive -- up to an hour sooner than if someone gets to the hospital by car. The discharge information has been reviewed with the {PATIENT PARENT GUARDIAN:38227}. The {PATIENT PARENT GUARDIAN:30993} verbalized understanding. Discharge medications reviewed with the {Dishcarge meds reviewed [facility]:70914} and appropriate educational materials and side effects teaching were provided.   ___________________________________________________________________________________________________________________________________     Total Knee Replacement: What to Expect at Home  Your Recovery    When you leave the hospital, you should be able to move around with a walker or crutches. But you will need someone to help you at home for the next few weeks or until you have more energy and can move around better. If there is no one to help you at home, you may go to a rehabilitation center. You will go home with a bandage and stitches, staples, tissue glue, or tape strips. Change the bandage as your doctor tells you to. If you have stitches or staples, your doctor will remove them 10 to 21 days after your surgery. Glue or tape strips will fall off on their own over time. You may still have some mild pain, and the area may be swollen for 3 to 6 months after surgery. Your knee will continue to improve for 6 to 12 months. You will probably use a walker for 1 to 3 weeks and then use crutches. When you are ready, you can use a cane. You will probably be able to walk on your own in 4 to 8 weeks. You will need to do months of physical rehabilitation (rehab) after a knee replacement. Rehab will help you strengthen the muscles of the knee and help you regain movement. After you recover, your artificial knee will allow you to do normal daily activities with less pain or no pain at all. You may be able to hike, dance, ride a bike, and play golf. Talk to your doctor about whether you can do more strenuous activities. Always tell your caregivers that you have an artificial knee. How long it will take to walk on your own, return to normal activities, and go back to work depends on your health and how well your rehabilitation (rehab) program goes. The better you do with your rehab exercises, the quicker you will get your strength and movement back. This care sheet gives you a general idea about how long it will take for you to recover. But each person recovers at a different pace. Follow the steps below to get better as quickly as possible.   How can you care for yourself at home?  Activity    · Rest when you feel tired. You may take a nap, but do not stay in bed all day. When you sit, use a chair with arms. You can use the arms to help you stand up.     · Work with your physical therapist to find the best way to exercise. You may be able to take frequent, short walks using crutches or a walker. What you can do as your knee heals will depend on whether your new knee is cemented or uncemented. You may not be able to do certain things for a while if your new knee is uncemented.     · After your knee has healed enough, you can do more strenuous activities with caution. ? You can golf, but use a golf cart, and do not wear shoes with spikes. ? You can bike on a flat road or on a stationary bike. Avoid biking up hills. ? Your doctor may suggest that you stay away from activities that put stress on your knee. These include tennis or badminton, squash or racquetball, contact sports like football, jumping (such as in basketball), jogging, or running. ? Avoid activities where you might fall. These include horseback riding, skiing, and mountain biking.     · Do not sit for more than 1 hour at a time. Get up and walk around for a while before you sit again. If you must sit for a long time, prop up your leg with a chair or footstool. This will help you avoid swelling.     · Ask your doctor when you can drive again. It may take up to 8 weeks after knee replacement surgery before it is safe for you to drive.     · When you get into a car, sit on the edge of the seat. Then pull in your legs, and turn to face the front.     · You should be able to do many everyday activities 3 to 6 weeks after your surgery. You will probably need to take 4 to 16 weeks off from work. When you can go back to work depends on the type of work you do and how you feel.     · Ask your doctor when it is okay for you to have sex.     · Do not lift anything heavier than 10 pounds and do not lift weights for 12 weeks. Diet    · By the time you leave the hospital, you should be eating your normal diet. If your stomach is upset, try bland, low-fat foods like plain rice, broiled chicken, toast, and yogurt. Your doctor may suggest that you take iron and vitamin supplements.     · Drink plenty of fluids (unless your doctor tells you not to).   · Eat healthy foods, and watch your portion sizes. Try to stay at your ideal weight. Too much weight puts more stress on your new knee.     · You may notice that your bowel movements are not regular right after your surgery. This is common. Try to avoid constipation and straining with bowel movements. You may want to take a fiber supplement every day. If you have not had a bowel movement after a couple of days, ask your doctor about taking a mild laxative. Medicines    · Your doctor will tell you if and when you can restart your medicines. He or she will also give you instructions about taking any new medicines.     · If you take blood thinners, such as warfarin (Coumadin), clopidogrel (Plavix), or aspirin, be sure to talk to your doctor. He or she will tell you if and when to start taking those medicines again. Make sure that you understand exactly what your doctor wants you to do.     · Your doctor may give you a blood-thinning medicine to prevent blood clots. If you take a blood thinner, be sure you get instructions about how to take your medicine safely. Blood thinners can cause serious bleeding problems. This medicine could be in pill form or as a shot (injection). If a shot is necessary, your doctor will tell you how to do this.     · Be safe with medicines. Take pain medicines exactly as directed. ? If the doctor gave you a prescription medicine for pain, take it as prescribed. ? If you are not taking a prescription pain medicine, ask your doctor if you can take an over-the-counter medicine. ? Plan to take your pain medicine 30 minutes before exercises.  It is easier to prevent pain before it starts than to stop it once it has started.     · If you think your pain medicine is making you sick to your stomach:  ? Take your medicine after meals (unless your doctor has told you not to). ? Ask your doctor for a different pain medicine.     · If your doctor prescribed antibiotics, take them as directed. Do not stop taking them just because you feel better. You need to take the full course of antibiotics. Incision care    · If your doctor told you how to care for your cut (incision), follow your doctor's instructions. You will have a dressing over the cut. A dressing helps the incision heal and protects it. Your doctor will tell you how to take care of this.     · If you did not get instructions, follow this general advice:  ? If you have strips of tape on the cut the doctor made, leave the tape on for a week or until it falls off.  ? If you have stitches or staples, your doctor will tell you when to come back to have them removed. ? If you have skin adhesive on the cut, leave it on until it falls off. Skin adhesive is also called glue or liquid stitches. ? Change the bandage every day. ? Wash the area daily with warm water, and pat it dry. Don't use hydrogen peroxide or alcohol. They can slow healing. ? You may cover the area with a gauze bandage if it oozes fluid or rubs against clothing. ? You may shower 24 to 48 hours after surgery. Pat the incision dry. Don't swim or take a bath for the first 2 weeks, or until your doctor tells you it is okay. Exercise    · Your rehab program will give you a number of exercises to do to help you get back your knee's range of motion and strength. Always do them as your therapist tells you. Ice and elevation    · For pain and swelling, put ice or a cold pack on the area for 10 to 20 minutes at a time. Put a thin cloth between the ice and your skin. Other instructions    · Continue to wear your support stockings as your doctor says.  These help to prevent blood clots. The length of time that you will have to wear them depends on your activity level and the amount of swelling.     · You have metal pieces in your knee. These may set off some airport metal detectors. Carry a medical alert card that says you have an artificial joint, just in case. Follow-up care is a key part of your treatment and safety. Be sure to make and go to all appointments, and call your doctor if you are having problems. It's also a good idea to know your test results and keep a list of the medicines you take. When should you call for help? Call 911 anytime you think you may need emergency care. For example, call if:    · You passed out (lost consciousness).     · You have severe trouble breathing.     · You have sudden chest pain and shortness of breath, or you cough up blood.    Call your doctor now or seek immediate medical care if:    · You have signs of infection, such as:  ? Increased pain, swelling, warmth, or redness. ? Red streaks leading from the incision. ? Pus draining from the incision. ? A fever.     · You have signs of a blood clot, such as:  ? Pain in your calf, back of the knee, thigh, or groin. ? Redness and swelling in your leg or groin.     · Your incision comes open and begins to bleed, or the bleeding increases.     · You have pain that does not get better after you take pain medicine.    Watch closely for changes in your health, and be sure to contact your doctor if:    · You do not have a bowel movement after taking a laxative. Where can you learn more? Go to http://bill-bishnu.info/. Enter L994 in the search box to learn more about \"Total Knee Replacement: What to Expect at Home. \"  Current as of: September 20, 2018  Content Version: 11.9  © 6847-0093 [employer]. Care instructions adapted under license by [employer] (which disclaims liability or warranty for this information).  If you have questions about a medical condition or this instruction, always ask your healthcare professional. [place] [address] any warranty or liability for your use of this information.

## 2019-02-09 NOTE — PROGRESS NOTES
Discharge instructions given to pt and . Voiced understanding. No questions or concerns at this time

## 2019-02-10 LAB
BACTERIA SPEC CULT: NORMAL
SERVICE CMNT-IMP: NORMAL

## 2019-02-11 ENCOUNTER — HOME CARE VISIT (OUTPATIENT)
Dept: SCHEDULING | Facility: HOME HEALTH | Age: 79
End: 2019-02-11
Payer: MEDICARE

## 2019-02-11 VITALS
SYSTOLIC BLOOD PRESSURE: 140 MMHG | RESPIRATION RATE: 18 BRPM | HEART RATE: 90 BPM | DIASTOLIC BLOOD PRESSURE: 80 MMHG | TEMPERATURE: 97.6 F

## 2019-02-11 PROCEDURE — 400013 HH SOC

## 2019-02-11 PROCEDURE — G0151 HHCP-SERV OF PT,EA 15 MIN: HCPCS

## 2019-02-12 ENCOUNTER — HOME CARE VISIT (OUTPATIENT)
Dept: SCHEDULING | Facility: HOME HEALTH | Age: 79
End: 2019-02-12
Payer: MEDICARE

## 2019-02-12 VITALS
HEART RATE: 74 BPM | TEMPERATURE: 98.1 F | DIASTOLIC BLOOD PRESSURE: 68 MMHG | SYSTOLIC BLOOD PRESSURE: 122 MMHG | RESPIRATION RATE: 16 BRPM

## 2019-02-12 PROCEDURE — G0157 HHC PT ASSISTANT EA 15: HCPCS

## 2019-02-15 ENCOUNTER — HOME CARE VISIT (OUTPATIENT)
Dept: SCHEDULING | Facility: HOME HEALTH | Age: 79
End: 2019-02-15
Payer: MEDICARE

## 2019-02-15 VITALS
RESPIRATION RATE: 16 BRPM | SYSTOLIC BLOOD PRESSURE: 128 MMHG | HEART RATE: 62 BPM | DIASTOLIC BLOOD PRESSURE: 70 MMHG | TEMPERATURE: 98.3 F

## 2019-02-15 PROCEDURE — G0157 HHC PT ASSISTANT EA 15: HCPCS

## 2019-02-18 ENCOUNTER — HOME CARE VISIT (OUTPATIENT)
Dept: SCHEDULING | Facility: HOME HEALTH | Age: 79
End: 2019-02-18
Payer: MEDICARE

## 2019-02-18 VITALS
DIASTOLIC BLOOD PRESSURE: 80 MMHG | TEMPERATURE: 98.5 F | HEART RATE: 80 BPM | SYSTOLIC BLOOD PRESSURE: 130 MMHG | RESPIRATION RATE: 16 BRPM

## 2019-02-18 PROCEDURE — G0157 HHC PT ASSISTANT EA 15: HCPCS

## 2019-02-20 ENCOUNTER — HOME CARE VISIT (OUTPATIENT)
Dept: SCHEDULING | Facility: HOME HEALTH | Age: 79
End: 2019-02-20
Payer: MEDICARE

## 2019-02-20 VITALS
SYSTOLIC BLOOD PRESSURE: 138 MMHG | TEMPERATURE: 98.2 F | DIASTOLIC BLOOD PRESSURE: 70 MMHG | HEART RATE: 80 BPM | RESPIRATION RATE: 16 BRPM

## 2019-02-20 PROCEDURE — A4649 SURGICAL SUPPLIES: HCPCS

## 2019-02-20 PROCEDURE — G0157 HHC PT ASSISTANT EA 15: HCPCS

## 2019-02-22 ENCOUNTER — HOME CARE VISIT (OUTPATIENT)
Dept: HOME HEALTH SERVICES | Facility: HOME HEALTH | Age: 79
End: 2019-02-22
Payer: MEDICARE

## 2019-02-22 ENCOUNTER — HOME CARE VISIT (OUTPATIENT)
Dept: SCHEDULING | Facility: HOME HEALTH | Age: 79
End: 2019-02-22
Payer: MEDICARE

## 2019-02-22 VITALS
RESPIRATION RATE: 16 BRPM | DIASTOLIC BLOOD PRESSURE: 70 MMHG | TEMPERATURE: 98.7 F | HEART RATE: 80 BPM | SYSTOLIC BLOOD PRESSURE: 122 MMHG

## 2019-02-22 PROCEDURE — G0157 HHC PT ASSISTANT EA 15: HCPCS

## 2019-02-25 ENCOUNTER — HOME CARE VISIT (OUTPATIENT)
Dept: SCHEDULING | Facility: HOME HEALTH | Age: 79
End: 2019-02-25
Payer: MEDICARE

## 2019-02-25 VITALS
TEMPERATURE: 98.3 F | HEART RATE: 100 BPM | DIASTOLIC BLOOD PRESSURE: 68 MMHG | RESPIRATION RATE: 16 BRPM | SYSTOLIC BLOOD PRESSURE: 140 MMHG

## 2019-02-25 PROCEDURE — G0157 HHC PT ASSISTANT EA 15: HCPCS

## 2019-03-01 ENCOUNTER — HOME CARE VISIT (OUTPATIENT)
Dept: SCHEDULING | Facility: HOME HEALTH | Age: 79
End: 2019-03-01
Payer: MEDICARE

## 2019-03-01 PROCEDURE — G0151 HHCP-SERV OF PT,EA 15 MIN: HCPCS

## 2019-03-02 VITALS
RESPIRATION RATE: 18 BRPM | SYSTOLIC BLOOD PRESSURE: 128 MMHG | DIASTOLIC BLOOD PRESSURE: 84 MMHG | TEMPERATURE: 98.9 F | HEART RATE: 81 BPM

## 2019-03-04 ENCOUNTER — APPOINTMENT (OUTPATIENT)
Dept: PHYSICAL THERAPY | Age: 79
End: 2019-03-04

## 2019-03-05 ENCOUNTER — HOSPITAL ENCOUNTER (OUTPATIENT)
Dept: PHYSICAL THERAPY | Age: 79
Discharge: HOME | End: 2019-03-05
Payer: MEDICARE

## 2019-03-05 PROCEDURE — 97016 VASOPNEUMATIC DEVICE THERAPY: CPT

## 2019-03-05 PROCEDURE — 97110 THERAPEUTIC EXERCISES: CPT

## 2019-03-05 PROCEDURE — 97162 PT EVAL MOD COMPLEX 30 MIN: CPT

## 2019-03-05 NOTE — PROGRESS NOTES
Epi Mayer  : 1940  Primary: [facility]  Secondary:  [address] at [address], [address]  [phone]   [phone]      OUTPATIENT PHYSICAL THERAPY: Daily Treatment Note 3/5/2019    Pre-treatment Symptoms/Complaints:  Patient reports increased sensitivity in her entire L knee. Trying to perform her exercises at home. Pain: Initial: Pain Intensity 1: 2  Pain Location 1: Knee  Pain Orientation 1: Left  Post Session:  1/10   Medications Last Reviewed:  3/5/2019  Precautions: hypertension, hard of hearing, macular degeneration  Date of Onset: patient underwent L posterior stabilized TKA with bone cement on 19  Updated Objective Findings:  See evaluation note from today   TREATMENT:   GAIT TRAINING: (0 minutes):  Gait training to improve and/or restore physical functioning as related to mobility, strength, balance and coordination. Ambulated  with rolling walker and moderate visual cueing and verbal cueing related to their stance phase, stride length, heel strike and knee position and motion to promote proper body alignment, promote proper body posture and promote proper body mechanics. .   THERAPEUTIC EXERCISE: (15 minutes):  Exercises per grid below to improve mobility, strength, balance and coordination. Required moderate visual, verbal and manual cues to promote proper body alignment, promote proper body posture and promote proper body mechanics. Progressed resistance, range, repetitions and complexity of movement as indicated. Date:  3/5/19 Date:   Date:     Activity/Exercise Parameters Parameters Parameters   Heel prop 2 x 30 seconds     Quad set 10 x 5 seconds     SAQ X 10     Hamstring stretch Strap, x 2     Calf stretch Strap, x 2     Heel slides Active x 10  Strap x 5             Time spent with patient reviewing proper muscle recruitment and technique with exercises.     MANUAL THERAPY: (0 minutes): Joint mobilization, Soft tissue mobilization and Manipulation was utilized and necessary because of the patient's restricted joint motion, painful spasm, loss of articular motion and restricted motion of soft tissue  ·     MODALITIES: (15 minutes):      Vasopneumatic compresssion to L knee with patient in supine to improve pain and edema     Treatment/Session Summary:    · Response to Treatment:  Patient with good performance of exercises and expressed understanding of HEP. Improved L knee flexion ROM to 87 degrees. Decreased edema by 1 cm after vassopneumatic compression. .  · Baseline vitals (3/5/2019): BP: 120/78, HR: 85, SpO: 97%  · Communication/Consultation:  Spoke at length with patient regarding importance of HEP and proper intensity of exercises  · Equipment provided today:  None today  · Recommendations/Intent for next treatment session: Next visit will focus on decreasing pain, edema, and adhesions, improving ROM and flexibility, and progressing strength/ functional activities.   Treatment Plan of Care Effective Dates: 3/5/19 to 4/5/19  Total Treatment Billable Duration:  60 minutes: 30 evaluation, 15 therapeutic exercise, 15 vasopneumatic compression  PT Patient Time In/Time Out  Time In: 1330  Time Out: [address], PT

## 2019-03-05 NOTE — THERAPY EVALUATION
Sae Vázquez  : 1940  Primary: [facility]  Secondary:  [facility] at [address], [place], [address] [place] [address] Street  [phone]   [phone]       OUTPATIENT PHYSICAL THERAPY:Initial Assessment 3/5/2019    ICD-10: Treatment Diagnosis: presence of left artificial knee joint (W34.511)                Treatment Diagnosis 2: pain in left knee (M25.562)                Treatment Diagnosis 3: other abnormalities of gait and mobility (R26.89)  Precautions: hypertension, hard of hearing, macular degeneration  Allergies: Pollen extracts and Ace inhibitors   MD Orders: evaluate and treat  MEDICAL/REFERRING DIAGNOSIS:  Presence of left artificial knee joint [Z96.652]   DATE OF ONSET: patient underwent L posterior stabilized TKA with bone cement on 19  REFERRING PHYSICIAN: Jorge Lazcano,*  RETURN PHYSICIAN APPOINTMENT: 19     INITIAL ASSESSMENT:  Ms. Pratibha Matthews is a 70 y.o. female presenting to physical therapy with complaints of L knee pain and stiffness s/p posterior stabilized TKA with use of bone cemenr on 19. She reports having some digestive issues with her medications which she feels set her back a little, but she is feeling better now and eager to progress her mobility. Patient ambulating with rolling walker. She reports her L knee pain ranging from 0/10 at best with ice and good positioning, 2/10 currently, and up to 6/10 with quick or wrong movements. She reports some difficulty sleeping or getting comfortable due to pain, wishing to walk without an assistive device, and being ready to get back to dancing with her . Spoke at length with patient regarding importance of full knee flexion and extension ROM, scar tissue/ adhesions, importance of HEP, and progression of therapy.  Patient presents with increased pain, decreased strength, decreased ROM, decreased flexibility, impaired gait, impaired transfer ability, decreased activity tolerance, and overall impaired functional mobility. Patient is a good candidate for skilled physical therapy interventions to include manual therapy, therapeutic exercise, balance training, gait training, transfer training, postural re-education, body mechanics training, and pain modalities as needed. PROBLEM LIST (Impacting functional limitations):  1. Decreased Strength  2. Decreased ADL/Functional Activities  3. Decreased Transfer Abilities  4. Decreased Ambulation Ability/Technique  5. Decreased Balance  6. Increased Pain  7. Decreased Activity Tolerance  8. Decreased Pacing Skills  9. Decreased Work Simplification/Energy Conservation Techniques  10. Increased Fatigue  11. Decreased Flexibility/Joint Mobility  12. Edema/Girth INTERVENTIONS PLANNED:  1. Balance Exercise  2. Bed Mobility  3. Cold  4. Family Education  5. Gait Training  6. Heat  7. Home Exercise Program (HEP)  8. Manual Therapy  9. Neuromuscular Re-education/Strengthening  10. Range of Motion (ROM)  11. Therapeutic Activites  12. Therapeutic Exercise/Strengthening  13. Transfer Training   TREATMENT PLAN:  Effective Dates: 3/5/2019 to 4/5/2019 (30 days). Frequency/Duration: 2 times a week for 30 Days  GOALS: (Goals have been discussed and agreed upon with patient.)  Short-Term Functional Goals: Time Frame: 3/5/19 to 3/21/19  1. Patient will be independent with HEP to improve LE ROM, strength, flexibility, and balance. 2. Patient will report no more than 2/10 L knee pain at rest in order to demonstrate improved self pain control and tolerance. 3. Patient will be educated in and demonstrate proper squat lift technique in order to reduce stress on bilateral LE, improve safety, and reduce risk of injury. 4. Patient will improve L knee ROM to 5-110 degrees in order to demonstrate progression towards full ROM. Discharge Goals: Time Frame: 3/5/19 to 4/5/19  1.  Patient will improve gross L LE strength, except hip abduction, to at least 4+/5 in order to improve safety with ambulation and transfers. 2. Patient will improve L LE hip abduction strength to 4/5 in order to improve gait and balance. 3. Patient will improve L knee ROM to 0-120 degrees in order to demonstrate full functional ROM. 4. Patient will be able to sleep through the night without waking due to L knee pain in order to return to prior sleeping pattern. 5. Patient will be able to go dancing with her  in order to return to prior recreational activities/ hobbies. 6. Patient will ambulation 250 ft with no assistive device, no loss of balance, and no need for supervision in order to return to prior independence level. 7. Patient will exhibit no more than 2 cm difference in circumferential knee joint line measurements in order to demonstrate improved edema. 8. Patient will improve Lower Extremity Functional Scale score to 42/80 from 18/80. Rehabilitation Potential For Stated Goals: Good  Regarding Geovanna Somers's therapy, I certify that the treatment plan above will be carried out by a therapist or under their direction. Thank you for this referral,  Tess Sharp, PT, DPT   Referring Physician Signature: Meliza Wright,*              Date                    The information in this section was collected on 3/5/19 (except where otherwise noted). HISTORY:   History of Present Injury/Illness (Reason for Referral):  Ms. Nilda Zepeda is a 70 y.o. female presenting to physical therapy with complaints of L knee pain and stiffness s/p posterior stabilized TKA with use of bone cemenr on 2/7/19. She reports having some digestive issues with her medications which she feels set her back a little, but she is feeling better now and eager to progress her mobility. Patient ambulating with rolling walker. She reports her L knee pain ranging from 0/10 at best with ice and good positioning, 2/10 currently, and up to 6/10 with quick or wrong movements.  She reports some difficulty sleeping or getting comfortable due to pain, wishing to walk without an assistive device, and being ready to get back to dancing with her . Spoke at length with patient regarding importance of full knee flexion and extension ROM, scar tissue/ adhesions, importance of HEP, and progression of therapy. Patient presents with increased pain, decreased strength, decreased ROM, decreased flexibility, impaired gait, impaired transfer ability, decreased activity tolerance, and overall impaired functional mobility. Past Medical History/Comorbidities:   Ms. Abigail Nolasco  has a past medical history of Allergic rhinitis due to pollen, Arthritis, Asthma, Fibrocystic breast, GERD (gastroesophageal reflux disease), H/O colonoscopy (2009), Hashimoto's thyroiditis, Hypertension, Macular degeneration, Mixed hyperlipidemia, Osteoporosis (1/2013), Unspecified hypothyroidism, and Varicella. Ms. Abigail Nolasco  has a past surgical history that includes hx gyn (1973); hx appendectomy (1973); hx tonsillectomy (as a child); hx adenoidectomy (as a child); hx cataract removal (Bilateral, 2007); hx colonoscopy; and hx hernia repair (06/16/2018). Social History/Living Environment:     Patient lives in a private, single story residence with her  who is very supportive and helpful at home. Prior Level of Function/Work/Activity:  Not working. Dominant Side:         RIGHT  Personal Factors:          Sex:  female        Age:  70 y.o. Ambulatory/Rehab Services H2 Model Falls Risk Assessment    Risk Factors:       No Risk Factors Identified Ability to Rise from Chair:       (1)  Pushes up, successful in one attempt    Falls Prevention Plan: Mobility Assistance Device (specify):  patient ambulating with rolling walker since L TKA   Total: (5 or greater = High Risk): 1    ©2010 [facility] [place] [address] [place] [address] Patent #[id number].  Federal Law prohibits the replication, distribution or use without written permission from [facility] [employer]       Current Medications:       Current Outpatient Medications:     hydrocortisone (ANUSOL-HC) 2.5 % rectal cream, Insert  into rectum four (4) times daily. , Disp: 30 g, Rfl: 0    promethazine (PHENERGAN) 25 mg tablet, Take 1 Tab by mouth every six (6) hours as needed. (Patient taking differently: Take 1 Tab by mouth every six (6) hours as needed for Nausea.), Disp: 40 Tab, Rfl: 1    aspirin delayed-release 81 mg tablet, Take 1 Tab by mouth every twelve (12) hours every twelve (12) hours. , Disp: 60 Tab, Rfl: 0    oxyCODONE IR (ROXICODONE) 10 mg tab immediate release tablet, Take 1 Tab by mouth every three (3) hours as needed. Max Daily Amount: 80 mg. (Patient taking differently: Take 10 mg by mouth every three (3) hours as needed for Pain.), Disp: 50 Tab, Rfl: 0    ketoconazole (NIZORAL) 2 % topical cream, Apply  to affected area two (2) times daily as needed for Skin Irritation. , Disp: , Rfl:     varicella-zoster recombinant, PF, (SHINGRIX, PF,) 50 mcg/0.5 mL susr injection, 0.5mL by IntraMUSCular route once now and then repeat in 2-6 months, Disp: 0.5 mL, Rfl: 1    hydroCHLOROthiazide (HYDRODIURIL) 12.5 mg tablet, Take 1 Tab by mouth daily. , Disp: 90 Tab, Rfl: 3    levothyroxine (SYNTHROID) 75 mcg tablet, TAKE 1 TABLET BY MOUTH  DAILY BEFORE BREAKFAST, Disp: 90 Tab, Rfl: 3    irbesartan (AVAPRO) 300 mg tablet, TAKE 1 TABLET BY MOUTH  NIGHTLY, Disp: 90 Tab, Rfl: 3    carbidopa-levodopa (SINEMET)  mg per tablet, Take 1 Tab by mouth nightly. (Patient taking differently: Take 1 Tab by mouth nightly.), Disp: 90 Tab, Rfl: 3    pantoprazole (PROTONIX) 40 mg tablet, Take 1 Tab by mouth daily. (Patient taking differently: Take 40 mg by mouth Daily (before dinner). ), Disp: 90 Tab, Rfl: 4    pravastatin (PRAVACHOL) 40 mg tablet, Take 1 Tab by mouth daily.  (Patient taking differently: Take 40 mg by mouth nightly.), Disp: 90 Tab, Rfl: 4    triamcinolone acetonide (KENALOG) 0.1 % topical cream, Apply  to affected area two (2) times daily as needed. , Disp: , Rfl:     clotrimazole-betamethasone (LOTRISONE) topical cream, Apply  to affected area two (2) times a day. (Patient taking differently: Apply 1 g to affected area two (2) times daily as needed for Skin Irritation (in skin folds). ), Disp: 45 g, Rfl: 0    clobetasol (TEMOVATE) 0.05 % topical cream, Apply  to affected area two (2) times daily as needed. , Disp: , Rfl:     cetirizine (ZYRTEC) 10 mg tablet, Take 10 mg by mouth daily. , Disp: , Rfl:     calcium carbonate-vitamin d2 1,200-400 mg-unit cap, Take 1 Tab by mouth two (2) times a day., Disp: , Rfl:     PSYLLIUM SEED, WITH DEXTROSE, (FIBER PO), Take 1 Tab by mouth daily. , Disp: , Rfl:     prednisoLONE acetate (PRED FORTE) 1 % ophthalmic suspension, Administer 1 Drop to both eyes two (2) times a week. Monday and Thursday, Disp: , Rfl:    Date Last Reviewed:  3/5/2019   Number of Personal Factors/Comorbidities that affect the Plan of Care:  (based on past medical history and co-morbidities) 3+: HIGH COMPLEXITY   EXAMINATION:   Observation/Orthostatic Postural Assessment:          Patient with forward flexed trunk posture , flattened lumbar lordosis, mild scoliosis, R knee valgus, decreased weight shift to L LE. Incision site healing well. No signs/ symptoms of infection or DVT noted. Palpation:          Significant tenderness to palpation of gross L knee. Spoke with patient regarding desensitization at home. Moderate to significant adhesions noted under incision site. Moderate warmth noted over L knee. Anthropometric Measurements (cm) Left Right   Knee joint line 43.5 40     ROM:          Moderate tightness noted bilateral hamstrings. AROM/ PROM Left (degrees) Right (degrees)   Knee Flexion 80 125   Knee Extension Lacking 12 2     Strength:     Motion Tested Left   (*/5) Right  (*/5)   Knee Extension 4- 4+   Hip Flexion 4 4+   Hip Abduction 3+ 4   Ankle DF 5 5     Special Tests:          Cesia's sign (deep vein thrombosis): negative bilaterally  Passive Accessory Motion:         Moderate limitations with patellar mobility L knee. Neurological Screen:              Myotomes: Key muscle strength testing through bilateral LE is [place]. Dermatomes: Sensation to light touch for bilateral LE is intact from L1 to S2, increased sensitivity noted over L knee. Reflexes: Patellar (L3/ L4): 1+ on R, not tested on L                 Achilles (S1/ S2): unable to elicit bilaterally          Functional Mobility:         Gait/Ambulation: Patient using rolling walker with tray, short step length bilaterally, decreased heel strike L LE, decreased knee extension during stance on L LE, decreased knee flexion during swing L LE, forward trunk posture, antalgic pattern. Transfers: Moderate use of bilateral UEs for sit to stand transfer with minimal weight shift to L LE. Use of UEs to move L LE for supine to/ from sit. Patient unable to return to dancing for hobby with her . Balance:          Sitting balance intact. Standing balance limited s/p L TKA. Body Structures Involved:  1. Bones  2. Joints  3. Muscles  4. Ligaments Body Functions Affected:  1. Sensory/Pain  2. Neuromusculoskeletal  3. Movement Related Activities and Participation Affected:  1. General Tasks and Demands  2. Mobility  3. Self Care  4. Domestic Life  5. Interpersonal Interactions and Relationships  6. Community, Social and [place]   Number of elements (examined above) that affect the Plan of Care: 3: MODERATE COMPLEXITY   CLINICAL PRESENTATION:   Presentation: Evolving clinical presentation with changing clinical characteristics: MODERATE COMPLEXITY   CLINICAL DECISION MAKING:   Outcome Measure:    Tool Used: Lower Extremity Functional Scale (LEFS)  Score:  Initial: 18/80 Most Recent: X/80 (Date: -- )   Interpretation of Score: 20 questions each scored on a 5 point scale with 0 representing \"extreme difficulty or unable to perform\" and 4 representing \"no difficulty\". The lower the score, the greater the functional disability. 80/80 represents no disability. Minimal detectable change is 9 points. Medical Necessity:   · Patient is expected to demonstrate progress in strength, range of motion, balance, coordination and functional technique to increase independence with ambulation, transfers, and ADLs and improve safety during ambulation, transfers, ADLs, and returning to prior activity level. · Skilled intervention continues to be required due to decreased ROM, strength, mobility, and balance. Reason for Services/Other Comments:  · Patient continues to require skilled intervention due to decreased strength, ROM, balance, and functional activities s/p L TKA.    Use of outcome tool(s) and clinical judgement create a POC that gives a:  (based on presentation, motivation, and function) Questionable prediction of patient's progress: MODERATE COMPLEXITY

## 2019-03-07 ENCOUNTER — HOSPITAL ENCOUNTER (OUTPATIENT)
Dept: PHYSICAL THERAPY | Age: 79
Discharge: HOME | End: 2019-03-07
Payer: MEDICARE

## 2019-03-07 PROCEDURE — 97140 MANUAL THERAPY 1/> REGIONS: CPT

## 2019-03-07 PROCEDURE — 97110 THERAPEUTIC EXERCISES: CPT

## 2019-03-07 NOTE — PROGRESS NOTES
Sharan Sommers  : 1940  Primary: [facility]  Secondary:  [address] at [address], [address]  [phone]   [phone]      OUTPATIENT PHYSICAL THERAPY: Daily Treatment Note 3/7/2019    Pre-treatment Symptoms/Complaints:  Patient reports increased sensitivity in her entire L knee. Trying to perform her exercises at home. Pain: Initial: Pain Intensity 1: 2  Pain Location 1: Knee  Pain Orientation 1: Left  Post Session:  1/10   Medications Last Reviewed:  3/7/2019  Precautions: hypertension, hard of hearing, macular degeneration  Date of Onset: patient underwent L posterior stabilized TKA with bone cement on 19  Updated Objective Findings:  None Today   TREATMENT:   GAIT TRAINING: (0 minutes):  Gait training to improve and/or restore physical functioning as related to mobility, strength, balance and coordination. Ambulated  with rolling walker and moderate visual cueing and verbal cueing related to their stance phase, stride length, heel strike and knee position and motion to promote proper body alignment, promote proper body posture and promote proper body mechanics. .   THERAPEUTIC EXERCISE: (40 minutes):  Exercises per grid below to improve mobility, strength, balance and coordination. Required moderate visual, verbal and manual cues to promote proper body alignment, promote proper body posture and promote proper body mechanics. Progressed resistance, range, repetitions and complexity of movement as indicated.      Date:  3/5/19 Date:  3-7-19 Date:     Activity/Exercise Parameters Parameters Parameters   Heel prop 2 x 30 seconds 5 minutes    Quad set 10 x 5 seconds 2 x 10    SAQ X 10 2 x 10    Hamstring stretch Strap, x 2 Strap x 3    Calf stretch Strap, x 2 Strap x 3    Heel slides Active x 10  Strap x 5 Active 2 x 10  Strap 1 x 10    SLR  2 x 10    LAQ  2 x 10    Hamstring curls  Red 2 x 10    Sit to stand  3 x 5 Time spent with patient reviewing proper muscle recruitment and technique with exercises. MANUAL THERAPY: (15 minutes): Joint mobilization, Soft tissue mobilization and Manipulation was utilized and necessary because of the patient's restricted joint motion, painful spasm, loss of articular motion and restricted motion of soft tissue  ·     MODALITIES: (10 minutes): Moist heat to left knee in supine for pain and tightness    Treatment/Session Summary:    · Response to Treatment:Improved ROM and decreased pain after session  · Baseline vitals (3/5/2019): BP: 120/78, HR: 85, SpO: 97%  · Communication/Consultation:  None today  · Equipment provided today:  None today  · Recommendations/Intent for next treatment session: Next visit will focus on decreasing pain, edema, and adhesions, improving ROM and flexibility, and progressing strength/ functional activities.   Treatment Plan of Care Effective Dates: 3/5/19 to 4/5/19  Total Treatment Billable Duration:  65 minutes  PT Patient Time In/Time Out  Time In: 1330  Time Out: 1440  Caitlin Clarity, PTA

## 2019-03-11 ENCOUNTER — HOSPITAL ENCOUNTER (OUTPATIENT)
Dept: PHYSICAL THERAPY | Age: 79
Discharge: HOME | End: 2019-03-11
Payer: MEDICARE

## 2019-03-11 PROCEDURE — 97110 THERAPEUTIC EXERCISES: CPT

## 2019-03-11 PROCEDURE — 97140 MANUAL THERAPY 1/> REGIONS: CPT

## 2019-03-11 NOTE — PROGRESS NOTES
Mikael Shi  : 1940  [facility]: [facility]  Secondary:  [address] at [address], [address]  [phone]   [phone]      OUTPATIENT PHYSICAL THERAPY: Daily Treatment Note 3/11/2019    Pre-treatment Symptoms/Complaints:  Patient reports doing her exercises at home with the help of her . States her knee feels like she is getting better with walking and motion  Pain: Initial: Pain Intensity 1: 3  Pain Location 1: Knee  Pain Orientation 1: Left  Post Session:  1/10   Medications Last Reviewed:  3/11/2019  Precautions: hypertension, hard of hearing, macular degeneration  Date of Onset: patient underwent L posterior stabilized TKA with bone cement on 19  Updated Objective Findings:  None Today   TREATMENT:   GAIT TRAINING: (0 minutes):  Gait training to improve and/or restore physical functioning as related to mobility, strength, balance and coordination. Ambulated  with rolling walker and moderate visual cueing and verbal cueing related to their stance phase, stride length, heel strike and knee position and motion to promote proper body alignment, promote proper body posture and promote proper body mechanics. .   THERAPEUTIC EXERCISE: (40 minutes):  Exercises per grid below to improve mobility, strength, balance and coordination. Required moderate visual, verbal and manual cues to promote proper body alignment, promote proper body posture and promote proper body mechanics. Progressed resistance, range, repetitions and complexity of movement as indicated.      Date:  3/5/19 Date:  3/11/19 Date:     Activity/Exercise Parameters Parameters Parameters   Heel prop 2 x 30 seconds Off and on x 8 minutes with heat    Quad set 10 x 5 seconds 10 x 10 seconds    SAQ X 10 2 x 10    Hamstring stretch Strap, x 2 Strap, x 2    Calf stretch Strap, x 2 ---    Heel slides Active x 10  Strap x 5 Strap, 2 x 10    LAQ --- 2 x 10    Ambulation --- Straight cane with cuing for proper sequencing and technique x 5 minutes            Time spent with patient reviewing proper muscle recruitment and technique with exercises. MANUAL THERAPY: (15 minutes): Joint mobilization, Soft tissue mobilization and Manipulation was utilized and necessary because of the patient's restricted joint motion, painful spasm, loss of articular motion and restricted motion of soft tissue  · Soft tissue mobilization with deep pressure to anterior and posterior L knee to decrease pain, tightness, and adhesions. · Suction cup massage over incision site to decreased scar tissues and adhesions    MODALITIES: (0 minutes): None today    Treatment/Session Summary:    · Response to Treatment: Patient tolerated all exercises and manual therapy well with decrease pain and improved ROM noted at end of session. Good technique with gait with straight cane today. Advised patient to bring her cane next session for adjustment and practice. · Baseline vitals (3/5/2019): BP: 120/78, HR: 85, SpO: 97%  · Communication/Consultation:  Spoke with patient regarding proper intensity of HEP for knee flexion and extension and safety at home  · Equipment provided today:  None today  · Recommendations/Intent for next treatment session: Next visit will focus on decreasing pain, edema, and adhesions, improving ROM and flexibility, and progressing strength/ functional activities.   Treatment Plan of Care Effective Dates: 3/5/19 to 4/5/19  Total Treatment Billable Duration:  55 minutes  PT Patient Time In/Time Out  Time In: 1005  Time Out: 5564 Sabrina Lopez, PT

## 2019-03-14 ENCOUNTER — HOSPITAL ENCOUNTER (OUTPATIENT)
Dept: PHYSICAL THERAPY | Age: 79
Discharge: HOME | End: 2019-03-14
Payer: MEDICARE

## 2019-03-14 PROCEDURE — 97110 THERAPEUTIC EXERCISES: CPT

## 2019-03-14 PROCEDURE — 97140 MANUAL THERAPY 1/> REGIONS: CPT

## 2019-03-14 NOTE — PROGRESS NOTES
Alison Burkett  : 1940  Primary: [facility]  Secondary:  [address] at [facility]  [address] [place] [address], [place], [address] [place] [address]  [phone]   [phone]      OUTPATIENT PHYSICAL THERAPY: Daily Treatment Note 3/14/2019    Pre-treatment Symptoms/Complaints:  Patient reports feeling better overall and thinking she is moving around easier. Pain: Initial: Pain Intensity 1: 3  Pain Location 1: Knee  Pain Orientation 1: Left  Post Session:  1/10   Medications Last Reviewed:  3/14/2019  Precautions: hypertension, hard of hearing, macular degeneration  Date of Onset: patient underwent L posterior stabilized TKA with bone cement on 19  Updated Objective Findings:  None Today   TREATMENT:   GAIT TRAINING: (0 minutes):  Gait training to improve and/or restore physical functioning as related to mobility, strength, balance and coordination. Ambulated  with rolling walker and moderate visual cueing and verbal cueing related to their stance phase, stride length, heel strike and knee position and motion to promote proper body alignment, promote proper body posture and promote proper body mechanics. .   THERAPEUTIC EXERCISE: (40 minutes):  Exercises per grid below to improve mobility, strength, balance and coordination. Required moderate visual, verbal and manual cues to promote proper body alignment, promote proper body posture and promote proper body mechanics. Progressed resistance, range, repetitions and complexity of movement as indicated.      Date:  3/5/19 Date:  3/11/19 Date:  3/14/19   Activity/Exercise Parameters Parameters Parameters   Heel prop 2 x 30 seconds Off and on x 8 minutes with heat Off and on x 8 minutes with heat   Quad set 10 x 5 seconds 10 x 10 seconds ---   SAQ X 10 2 x 10 2 x 10   Hamstring stretch Strap, x 2 Strap, x 2 Strap, x 3   Calf stretch Strap, x 2 --- Slant board, x 3   Heel slides Active x 10  Strap x 5 Strap, 2 x 10 Strap, x 10   LAQ --- 2 x 10 2 x 10   Hamstring curls --- --- Red, 2 x 10   Ambulation --- Straight cane with cuing for proper sequencing and technique x 5 minutes Straight cane with cuing for proper sequencing and technique x 5 minutes   NU step --- --- Level 1, x 7 minutes           Time spent with patient reviewing proper muscle recruitment and technique with exercises. MANUAL THERAPY: (15 minutes): Joint mobilization, Soft tissue mobilization and Manipulation was utilized and necessary because of the patient's restricted joint motion, painful spasm, loss of articular motion and restricted motion of soft tissue  · Soft tissue mobilization with deep pressure to anterior and posterior L knee to decrease pain, tightness, and adhesions. · Suction cup massage over incision site to decreased scar tissues and adhesions    MODALITIES: (0 minutes): None today    Treatment/Session Summary:    · Response to Treatment: Patient with improving ROM, strength, and mobility. Patient's cane she brought today was too high, but educated her and her  in the correct height in order to get a different cane. · Baseline vitals (3/5/2019): BP: 120/78, HR: 85, SpO: 97%  · Communication/Consultation:  Patient educated in proper cane height, senquencing, and safety  · Equipment provided today:  None today  · Recommendations/Intent for next treatment session: Next visit will focus on decreasing pain, edema, and adhesions, improving ROM and flexibility, and progressing strength/ functional activities.   Treatment Plan of Care Effective Dates: 3/5/19 to 4/5/19  Total Treatment Billable Duration:  55 minutes  PT Patient Time In/Time Out  Time In: 1005  Time Out: 2600 Sabrina Lopez, PT

## 2019-03-19 ENCOUNTER — HOSPITAL ENCOUNTER (OUTPATIENT)
Dept: PHYSICAL THERAPY | Age: 79
Discharge: HOME | End: 2019-03-19
Payer: MEDICARE

## 2019-03-19 PROCEDURE — 97110 THERAPEUTIC EXERCISES: CPT

## 2019-03-19 PROCEDURE — 97140 MANUAL THERAPY 1/> REGIONS: CPT

## 2019-03-19 NOTE — PROGRESS NOTES
Alka Flight  : 1940  Primary: [facility]  Secondary:  [facility] at [address], [address]  [phone]   [phone]      OUTPATIENT PHYSICAL THERAPY: Daily Treatment Note 3/19/2019    Pre-treatment Symptoms/Complaints:  Patient reports steady improvement   Pain: Initial: Pain Intensity 1: 2  Pain Location 1: Knee  Pain Orientation 1: Left  Post Session:  1/10   Medications Last Reviewed:  3/19/2019  Precautions: hypertension, hard of hearing, macular degeneration  Date of Onset: patient underwent L posterior stabilized TKA with bone cement on 19  Updated Objective Findings:  None Today   TREATMENT:   GAIT TRAINING: (0 minutes):  Gait training to improve and/or restore physical functioning as related to mobility, strength, balance and coordination. Ambulated  with rolling walker and moderate visual cueing and verbal cueing related to their stance phase, stride length, heel strike and knee position and motion to promote proper body alignment, promote proper body posture and promote proper body mechanics. .   THERAPEUTIC EXERCISE: (40 minutes):  Exercises per grid below to improve mobility, strength, balance and coordination. Required moderate visual, verbal and manual cues to promote proper body alignment, promote proper body posture and promote proper body mechanics. Progressed resistance, range, repetitions and complexity of movement as indicated.      Date:  3/19/19 Date:  3/11/19 Date:  3/14/19   Activity/Exercise Parameters Parameters Parameters   Heel prop  Off and on x 8 minutes with heat Off and on x 8 minutes with heat   Quad set 3 x 10 with heat 10 x 10 seconds ---   SAQ 2 x 10 2 x 10 2 x 10   Hamstring stretch Strap x 3 Strap, x 2 Strap, x 3   Calf stretch Incline x 3 --- Slant board, x 3   Heel slides Active 2 x 10  Strap 1 x 10 Strap, 2 x 10 Strap, x 10   LAQ 1.5 lbs 2 x 10 2 x 10 2 x 10   Hamstring curls Red 2 x 10 --- Red, 2 x 10   Ambulation  Straight cane with cuing for proper sequencing and technique x 5 minutes Straight cane with cuing for proper sequencing and technique x 5 minutes   NU step Level 3 10 minutes --- Level 1, x 7 minutes   SLR 2 x 10     Sit to stand 2 x 5                               Time spent with patient reviewing proper muscle recruitment and technique with exercises. MANUAL THERAPY: (15 minutes): Joint mobilization, Soft tissue mobilization and Manipulation was utilized and necessary because of the patient's restricted joint motion, painful spasm, loss of articular motion and restricted motion of soft tissue  · Soft tissue mobilization with deep pressure to anterior and posterior L knee to decrease pain, tightness, and adhesions. · Suction cup massage over incision site to decreased scar tissues and adhesions    MODALITIES: (0 minutes): None today    Treatment/Session Summary:    · Response to Treatment:improving ROM and mobility. Decreased tightness after session  · Baseline vitals (3/5/2019): BP: 120/78, HR: 85, SpO: 97%  · Communication/Consultation:  Patient educated in proper cane height, senquencing, and safety  · Equipment provided today:  None today  · Recommendations/Intent for next treatment session: Next visit will focus on decreasing pain, edema, and adhesions, improving ROM and flexibility, and progressing strength/ functional activities.   Treatment Plan of Care Effective Dates: 3/5/19 to 4/5/19  Total Treatment Billable Duration:  55 minutes  PT Patient Time In/Time Out  Time In: 1000  Time Out: [id number] Karolina Smith, PTA

## 2019-03-21 ENCOUNTER — HOSPITAL ENCOUNTER (OUTPATIENT)
Dept: PHYSICAL THERAPY | Age: 79
Discharge: HOME | End: 2019-03-21
Payer: MEDICARE

## 2019-03-21 PROCEDURE — 97110 THERAPEUTIC EXERCISES: CPT

## 2019-03-21 PROCEDURE — 97140 MANUAL THERAPY 1/> REGIONS: CPT

## 2019-03-21 NOTE — PROGRESS NOTES
Chiara Latham  : 1940  Primary: [facility]  Secondary:  [address] at [address], [address]  [phone]   [phone]      OUTPATIENT PHYSICAL THERAPY: Daily Treatment Note 3/21/2019    Pre-treatment Symptoms/Complaints:  Patient reports some stiffness in the mornings, but feeling like she is doing well overall. Pain: Initial: Pain Intensity 1: 2  Pain Location 1: Knee  Pain Orientation 1: Left  Post Session:  1/10   Medications Last Reviewed:  3/21/2019  Precautions: hypertension, hard of hearing, macular degeneration  Date of Onset: patient underwent L posterior stabilized TKA with bone cement on 19  Updated Objective Findings:  None Today   TREATMENT:   GAIT TRAINING: (0 minutes):  Gait training to improve and/or restore physical functioning as related to mobility, strength, balance and coordination. Ambulated  with rolling walker and moderate visual cueing and verbal cueing related to their stance phase, stride length, heel strike and knee position and motion to promote proper body alignment, promote proper body posture and promote proper body mechanics. .   THERAPEUTIC EXERCISE: (40 minutes):  Exercises per grid below to improve mobility, strength, balance and coordination. Required moderate visual, verbal and manual cues to promote proper body alignment, promote proper body posture and promote proper body mechanics. Progressed resistance, range, repetitions and complexity of movement as indicated.      Date:  3/19/19 Date:  3/21/19 Date:  3/14/19   Activity/Exercise Parameters Parameters Parameters   Heel prop  Off and on x 8 minutes with heat Off and on x 8 minutes with heat   Quad set 3 x 10 with heat --- ---   SAQ 2 x 10 --- 2 x 10   Hamstring stretch Strap x 3 Strap, x 4 Strap, x 3   Calf stretch Incline x 3 Slant board, x 3 Slant board, x 3   Heel slides Active 2 x 10  Strap 1 x 10 Strap, 2 x 10 Strap, x 10   LAQ 1.5 lbs 2 x 10 2 lbs, 2 x 10 2 x 10   Hamstring curls Red 2 x 10 --- Red, 2 x 10   Ambulation  --- Straight cane with cuing for proper sequencing and technique x 5 minutes   NU step Level 3 10 minutes Level 3, x 10 minutes Level 1, x 7 minutes   SLR 2 x 10 ---    Sit to stand 2 x 5 Chair + airex, x 10    Standing marching --- 2 lbs, 2 x 10    Calf raises --- 2 lbs, 2 x 10    Step ups --- 6 inch, forward and lateral x 10            Time spent with patient reviewing proper muscle recruitment and technique with exercises. MANUAL THERAPY: (15 minutes): Joint mobilization, Soft tissue mobilization and Manipulation was utilized and necessary because of the patient's restricted joint motion, painful spasm, loss of articular motion and restricted motion of soft tissue  · Soft tissue mobilization with deep pressure to anterior and posterior L knee to decrease pain, tightness, and adhesions. · Suction cup massage over incision site to decreased scar tissues and adhesions    MODALITIES: (0 minutes): Patient stated she would ice at home. Treatment/Session Summary:    · Response to Treatment: Patient progressing well with ROM and activity tolerance. 105 degrees of flexion today. Continue to emphasize full ROM and progress functional strengthening. · Baseline vitals (3/5/2019): BP: 120/78, HR: 85, SpO: 97%  · Communication/Consultation:  Spoke with patient regarding ROM and continuing with stretches at home  · Equipment provided today:  None today  · Recommendations/Intent for next treatment session: Next visit will focus on decreasing pain, edema, and adhesions, improving ROM and flexibility, and progressing strength/ functional activities.   Treatment Plan of Care Effective Dates: 3/5/19 to 4/5/19  Total Treatment Billable Duration:  55 minutes  PT Patient Time In/Time Out  Time In: 1000  Time Out: 201 [facility], PT

## 2019-03-25 ENCOUNTER — HOSPITAL ENCOUNTER (OUTPATIENT)
Dept: PHYSICAL THERAPY | Age: 79
Discharge: HOME | End: 2019-03-25
Payer: MEDICARE

## 2019-03-25 PROCEDURE — 97110 THERAPEUTIC EXERCISES: CPT

## 2019-03-25 PROCEDURE — 97140 MANUAL THERAPY 1/> REGIONS: CPT

## 2019-03-25 NOTE — PROGRESS NOTES
Cy Shall  : 1940  Primary: [facility]  Secondary:  [address] at [address], [address] [place] [address] Street  [phone]   [phone]      OUTPATIENT PHYSICAL THERAPY: Daily Treatment Note 3/25/2019    Pre-treatment Symptoms/Complaints:  Patient reports starting to feel more like herself over the last week. Pain: Initial: Pain Intensity 1: 2  Pain Location 1: Knee  Pain Orientation 1: Left  Post Session:  1/10   Medications Last Reviewed:  3/25/2019  Precautions: hypertension, hard of hearing, macular degeneration  Date of Onset: patient underwent L posterior stabilized TKA with bone cement on 19  Updated Objective Findings:  None Today   TREATMENT:   GAIT TRAINING: (0 minutes):  Gait training to improve and/or restore physical functioning as related to mobility, strength, balance and coordination. Ambulated  with rolling walker and moderate visual cueing and verbal cueing related to their stance phase, stride length, heel strike and knee position and motion to promote proper body alignment, promote proper body posture and promote proper body mechanics. .   THERAPEUTIC EXERCISE: (40 minutes):  Exercises per grid below to improve mobility, strength, balance and coordination. Required moderate visual, verbal and manual cues to promote proper body alignment, promote proper body posture and promote proper body mechanics. Progressed resistance, range, repetitions and complexity of movement as indicated.      Date:  3/19/19 Date:  3/21/19 Date:  3/25/19   Activity/Exercise Parameters Parameters Parameters   Heel prop  Off and on x 8 minutes with heat Off and on x 8 minutes with heat   Quad set 3 x 10 with heat --- ---   SAQ 2 x 10 --- ---   Hamstring stretch Strap x 3 Strap, x 4 Strap, x 3   Calf stretch Incline x 3 Slant board, x 3 Slant board, x 4   Heel slides Active 2 x 10  Strap 1 x 10 Strap, 2 x 10 Strap, x 10   LAQ 1.5 lbs 2 x 10 2 lbs, 2 x 10 2.5 lbs, 2 x 10   Hamstring curls Red 2 x 10 --- 2.5 lbs, 2 x 10   NU step Level 3 10 minutes Level 3, x 10 minutes Level 3, x 10 minutes   SLR 2 x 10 --- ---   Sit to stand 2 x 5 Chair + airex, x 10 Chair + airex, 2 x 10   Standing marching --- 2 lbs, 2 x 10 2.5 lbs, 2 x 10   Calf raises --- 2 lbs, 2 x 10 2 x 10   Step ups --- 6 inch, forward and lateral x 10 6 inch, forward, 2 x 10                             Time spent with patient reviewing proper muscle recruitment and technique with exercises. MANUAL THERAPY: (15 minutes): Joint mobilization, Soft tissue mobilization and Manipulation was utilized and necessary because of the patient's restricted joint motion, painful spasm, loss of articular motion and restricted motion of soft tissue  · Soft tissue mobilization with deep pressure to anterior and posterior L knee to decrease pain, tightness, and adhesions. · Suction cup massage over incision site to decreased scar tissues and adhesions    MODALITIES: (0 minutes): Patient stated she would ice at home. Treatment/Session Summary:    · Response to Treatment: Good progress with activity tolerance and gait. · Baseline vitals (3/5/2019): BP: 120/78, HR: 85, SpO: 97%  · Communication/Consultation:  Advised patient about continuing exercises even after being done with therapy session  · Equipment provided today:  None today  · Recommendations/Intent for next treatment session: Next visit will focus on decreasing pain, edema, and adhesions, improving ROM and flexibility, and progressing strength/ functional activities.   Treatment Plan of Care Effective Dates: 3/5/19 to 4/5/19  Total Treatment Billable Duration:  55 minutes  PT Patient Time In/Time Out  Time In: 1000  Time Out: 201 [facility], PT

## 2019-03-28 ENCOUNTER — HOSPITAL ENCOUNTER (OUTPATIENT)
Dept: PHYSICAL THERAPY | Age: 79
Discharge: HOME | End: 2019-03-28
Payer: MEDICARE

## 2019-03-28 PROCEDURE — 97110 THERAPEUTIC EXERCISES: CPT

## 2019-03-28 PROCEDURE — 97140 MANUAL THERAPY 1/> REGIONS: CPT

## 2019-03-28 NOTE — PROGRESS NOTES
Ruby Toya  : 1940  Primary: [facility]  Secondary:  [address] at [address], [place], [address] [place] [address]  [phone]   [phone]      OUTPATIENT PHYSICAL THERAPY: Daily Treatment Note 3/28/2019    Pre-treatment Symptoms/Complaints:  Patient reports feeling like she is progressing well and doing much better. Some aching in her knee, but not much this morning. Pain: Initial: Pain Intensity 1: 2  Pain Location 1: Knee  Pain Orientation 1: Left  Post Session:  1/10   Medications Last Reviewed:  3/28/2019  Precautions: hypertension, hard of hearing, macular degeneration  Date of Onset: patient underwent L posterior stabilized TKA with bone cement on 19  Updated Objective Findings:  Improvements noted in gait mechanics and ROM. TREATMENT:   GAIT TRAINING: (0 minutes):  Gait training to improve and/or restore physical functioning as related to mobility, strength, balance and coordination. Ambulated  with rolling walker and moderate visual cueing and verbal cueing related to their stance phase, stride length, heel strike and knee position and motion to promote proper body alignment, promote proper body posture and promote proper body mechanics. .   THERAPEUTIC EXERCISE: (40 minutes):  Exercises per grid below to improve mobility, strength, balance and coordination. Required moderate visual, verbal and manual cues to promote proper body alignment, promote proper body posture and promote proper body mechanics. Progressed resistance, range, repetitions and complexity of movement as indicated.      Date:  3/28/19 Date:  3/21/19 Date:  3/25/19   Activity/Exercise Parameters Parameters Parameters   Heel prop X 8 minutes with heat Off and on x 8 minutes with heat Off and on x 8 minutes with heat   Hamstring stretch Strap x 3 Strap, x 4 Strap, x 3   Calf stretch Slant board, x 3 Slant board, x 3 Slant board, x 4   Heel slides Strap, x 15 Strap, 2 x 10 Strap, x 10   LAQ --- 2 lbs, 2 x 10 2.5 lbs, 2 x 10   Hamstring curls --- --- 2.5 lbs, 2 x 10   NU step Level 3 x 5 minutes Level 3, x 10 minutes Level 3, x 10 minutes   Sit to stand Chair + airex, 2 x 10 Chair + airex, x 10 Chair + airex, 2 x 10   Standing marching --- 2 lbs, 2 x 10 2.5 lbs, 2 x 10   Calf raises --- 2 lbs, 2 x 10 2 x 10   Step ups 6 inch, forward and lateral 2 x 10 6 inch, forward and lateral x 10 6 inch, forward, 2 x 10   Airdyne Seat 3 x 5 minutes                         Time spent with patient reviewing proper muscle recruitment and technique with exercises. MANUAL THERAPY: (15 minutes): Joint mobilization, Soft tissue mobilization and Manipulation was utilized and necessary because of the patient's restricted joint motion, painful spasm, loss of articular motion and restricted motion of soft tissue  · Soft tissue mobilization with deep pressure to anterior and posterior L knee to decrease pain, tightness, and adhesions. · Suction cup massage over incision site to decreased scar tissues and adhesions    MODALITIES: (0 minutes): Patient stated she would ice at home. Treatment/Session Summary:    · Response to Treatment: Patient tolerated airdyne bike well today with good revolutions made. May be able to lower seat next session. · Baseline vitals (3/5/2019): BP: 120/78, HR: 85, SpO: 97%  · Communication/Consultation:  Spoke with patient regarding activities and gradual progression back to her prior level of activity  · Equipment provided today:  None today  · Recommendations/Intent for next treatment session: Next visit will focus on decreasing pain, edema, and adhesions, improving ROM and flexibility, and progressing strength/ functional activities.   Treatment Plan of Care Effective Dates: 3/5/19 to 4/5/19  Total Treatment Billable Duration:  55 minutes  PT Patient Time In/Time Out  Time In: 1005  Time Out: 0250 Sabrina Lopez, PT

## 2019-04-01 ENCOUNTER — HOSPITAL ENCOUNTER (OUTPATIENT)
Dept: PHYSICAL THERAPY | Age: 79
Discharge: HOME | End: 2019-04-01
Payer: MEDICARE

## 2019-04-01 PROCEDURE — 97110 THERAPEUTIC EXERCISES: CPT

## 2019-04-01 PROCEDURE — 97140 MANUAL THERAPY 1/> REGIONS: CPT

## 2019-04-01 NOTE — PROGRESS NOTES
Trisha Jenkins  : 1940  Primary: [facility]  Secondary:  [address] at [facility]  [address] [place] [address], [place], [address] [place] [address]  [phone]   [phone]      OUTPATIENT PHYSICAL THERAPY: Daily Treatment Note 2019    Pre-treatment Symptoms/Complaints:  Patient reports being a little sore yesterday, but being able to go to [employer] and walk around stores like she did before. Pain: Initial: Pain Intensity 1: 1  Pain Location 1: Knee  Pain Orientation 1: Left  Post Session:  0/10   Medications Last Reviewed:  2019  Precautions: hypertension, hard of hearing, macular degeneration  Date of Onset: patient underwent L posterior stabilized TKA with bone cement on 19  Updated Objective Findings:  Improved gait mechanics with no assistive device. TREATMENT:   GAIT TRAINING: (0 minutes):  Gait training to improve and/or restore physical functioning as related to mobility, strength, balance and coordination. Ambulated  with rolling walker and moderate visual cueing and verbal cueing related to their stance phase, stride length, heel strike and knee position and motion to promote proper body alignment, promote proper body posture and promote proper body mechanics. .   THERAPEUTIC EXERCISE: (40 minutes):  Exercises per grid below to improve mobility, strength, balance and coordination. Required moderate visual, verbal and manual cues to promote proper body alignment, promote proper body posture and promote proper body mechanics. Progressed resistance, range, repetitions and complexity of movement as indicated.      Date:  3/28/19 Date:  19 Date:  3/25/19   Activity/Exercise Parameters Parameters Parameters   Heel prop X 8 minutes with heat X 8 minutes with heat Off and on x 8 minutes with heat   Hamstring stretch Strap x 3 Strap, x 4 Strap, x 3   Calf stretch Slant board, x 3 Slant board, x 3 Slant board, x 4   Heel slides Strap, x 15 Strap, 2 x 10 Strap, x 10   LAQ --- 3 lbs, 2 x 10 2.5 lbs, 2 x 10   Hamstring curls --- --- 2.5 lbs, 2 x 10   NU step Level 3 x 5 minutes Level 4, x 10 minutes Level 3, x 10 minutes   Sit to stand Chair + airex, 2 x 10 Chair + airex, 2 x 10 Chair + airex, 2 x 10   Standing marching --- 3 lbs, 2 x 10 2.5 lbs, 2 x 10   Calf raises --- 3 lbs, 2 x 10 2 x 10   Step ups 6 inch, forward and lateral 2 x 10 6 inch, forward and lateral x 10 6 inch, forward, 2 x 10   Airdyne Seat 3 x 5 minutes ---                        Time spent with patient reviewing proper muscle recruitment and technique with exercises. MANUAL THERAPY: (15 minutes): Joint mobilization, Soft tissue mobilization and Manipulation was utilized and necessary because of the patient's restricted joint motion, painful spasm, loss of articular motion and restricted motion of soft tissue  · Soft tissue mobilization with deep pressure to anterior and posterior L knee to decrease pain, tightness, and adhesions. · Suction cup massage over incision site to decreased scar tissues and adhesions    MODALITIES: (0 minutes): Patient stated she would ice at home. Treatment/Session Summary:    · Response to Treatment: Patient tolerated all exercises well with no complaints of pain. Plan tomorrow to discuss continuation of therapy after her trip. · Baseline vitals (3/5/2019): BP: 120/78, HR: 85, SpO: 97%  · Communication/Consultation:  Advised patient to ambulate without assistive device as able, but OK to take cane for safety if going long distances  · Equipment provided today:  None today  · Recommendations/Intent for next treatment session: Next visit will focus on decreasing pain, edema, and adhesions, improving ROM and flexibility, and progressing strength/ functional activities.   Treatment Plan of Care Effective Dates: 3/5/19 to 4/5/19  Total Treatment Billable Duration:  55 minutes  PT Patient Time In/Time Out  Time In: 1000  Time Out: 201 [facility], PT

## 2019-04-02 ENCOUNTER — HOSPITAL ENCOUNTER (OUTPATIENT)
Dept: PHYSICAL THERAPY | Age: 79
Discharge: HOME | End: 2019-04-02
Payer: MEDICARE

## 2019-04-02 PROCEDURE — 97110 THERAPEUTIC EXERCISES: CPT

## 2019-04-02 NOTE — PROGRESS NOTES
Peg Tello  : 1940  Primary: [facility]  Secondary:  [address] at [address], [place], [address] [place] [address]  [phone]   [phone]      OUTPATIENT PHYSICAL THERAPY: Daily Treatment Note 2019    Pre-treatment Symptoms/Complaints:  Patient reports being a little tired from doing a lot yesterday with her friends. Minimal swelling and soreness. Pain: Initial: Pain Intensity 1: 1  Pain Location 1: Knee  Pain Orientation 1: Left  Post Session:  0/10   Medications Last Reviewed:  2019  Precautions: hypertension, hard of hearing, macular degeneration  Date of Onset: patient underwent L posterior stabilized TKA with bone cement on 19  Updated Objective Findings:  See Progress Note from today   TREATMENT:   THERAPEUTIC EXERCISE: (55 minutes):  Exercises per grid below to improve mobility, strength, balance and coordination. Required moderate visual, verbal and manual cues to promote proper body alignment, promote proper body posture and promote proper body mechanics. Progressed resistance, range, repetitions and complexity of movement as indicated.      Date:  3/28/19 Date:  19 Date:  19   Activity/Exercise Parameters Parameters Parameters   Heel prop X 8 minutes with heat X 8 minutes with heat X 10 minutes with heat   Hamstring stretch Strap x 3 Strap, x 4 Strap, x 4   Calf stretch Slant board, x 3 Slant board, x 3 Slant board, x 4   Heel slides Strap, x 15 Strap, 2 x 10 Strap, x 115   LAQ --- 3 lbs, 2 x 10 2.5 lbs, 2 x 10   Hamstring curls --- --- ---   NU step Level 3 x 5 minutes Level 4, x 10 minutes Level 5 x 5 minutes, level 4 x 5 minutes   Sit to stand Chair + airex, 2 x 10 Chair + airex, 2 x 10 Chair + airex, 2 x 10   Standing marching --- 3 lbs, 2 x 10 ---   Calf raises --- 3 lbs, 2 x 10 2 x 10   Step ups 6 inch, forward and lateral 2 x 10 6 inch, forward and lateral x 10 ---   Airdyne Seat 3 x 5 minutes --- ---   Standing hip abduction --- --- Yellow loop, 2 x 10   Lateral walks --- --- Yellow loop, hand support, 2 x 10 ft each side           Time spent with patient reviewing proper muscle recruitment and technique with exercises. MANUAL THERAPY: (0 minutes): Joint mobilization, Soft tissue mobilization and Manipulation was utilized and necessary because of the patient's restricted joint motion, painful spasm, loss of articular motion and restricted motion of soft tissue  · Soft tissue mobilization with deep pressure to anterior and posterior L knee to decrease pain, tightness, and adhesions. · Suction cup massage over incision site to decreased scar tissues and adhesions    MODALITIES: (0 minutes): Patient stated she would ice at home. Treatment/Session Summary:    · Response to Treatment: Patient with significant improvements in ROM and activity tolerance. Good for 2 week hold to ensure confidence with HEP. · Baseline vitals (3/5/2019): BP: 120/78, HR: 85, SpO: 97%  · Communication/Consultation:  Advised patient to call by 4/16/19 if wishing to continue with more therapy appointments or let us know she is comfortable with her home program  · Equipment provided today:  Patient issued yellow theraband and new HEP printout  · Recommendations/Intent for next treatment session: Next visit will focus on decreasing pain, edema, and adhesions, improving ROM and flexibility, and progressing strength/ functional activities.   Treatment Plan of Care Effective Dates: 3/5/19 to 4/5/19  Total Treatment Billable Duration:  55 minutes  PT Patient Time In/Time Out  Time In: 1005  Time Out: [address], PT

## 2019-04-02 NOTE — PROGRESS NOTES
Demario Gonzalez  : 1940  Primary: [facility]  Secondary:  [facility] at [address], [place], [address] [place] [address] Street  [phone]   [phone]       OUTPATIENT PHYSICAL THERAPY:Progress Report and 2 Week Hold 2019    ICD-10: Treatment Diagnosis: presence of left artificial knee joint (U87.316)                Treatment Diagnosis 2: pain in left knee (M25.562)                Treatment Diagnosis 3: other abnormalities of gait and mobility (R26.89)  Precautions: hypertension, hard of hearing, macular degeneration  Allergies: Pollen extracts and Ace inhibitors   MD Orders: evaluate and treat  MEDICAL/REFERRING DIAGNOSIS:  Presence of left artificial knee joint [Z96.652]   DATE OF ONSET: patient underwent L posterior stabilized TKA with bone cement on 19  REFERRING PHYSICIAN: Min Bal,*  RETURN PHYSICIAN APPOINTMENT: 19     INITIAL ASSESSMENT:  Ms. Gio Shepard is a 70 y.o. female presenting to physical therapy with complaints of L knee pain and stiffness s/p posterior stabilized TKA with use of bone cemenr on 19. She reports having some digestive issues with her medications which she feels set her back a little, but she is feeling better now and eager to progress her mobility. Patient ambulating with rolling walker. She reports her L knee pain ranging from 0/10 at best with ice and good positioning, 2/10 currently, and up to 6/10 with quick or wrong movements. She reports some difficulty sleeping or getting comfortable due to pain, wishing to walk without an assistive device, and being ready to get back to dancing with her . Spoke at length with patient regarding importance of full knee flexion and extension ROM, scar tissue/ adhesions, importance of HEP, and progression of therapy.  Patient presents with increased pain, decreased strength, decreased ROM, decreased flexibility, impaired gait, impaired transfer ability, decreased activity tolerance, and overall impaired functional mobility. Patient is a good candidate for skilled physical therapy interventions to include manual therapy, therapeutic exercise, balance training, gait training, transfer training, postural re-education, body mechanics training, and pain modalities as needed. PROGRESS NOTE/ 2 WEEK HOLD 4/2/19: Patient has been seen for 10 sessions of physical therapy from 3/5/19 to 4/2/19. She reports feeling much better and starting to get back to her normal routine. Patient needs to continue with strengthening, gait, and overall activity tolerance. Her ROM was good today 0-125 degrees. She has met some of her goals and is progressing well. Patient going on vacation next week and would like to be on hold in order to ensure confidence with HEP and overall progress. Patient to call by 4/16/19 if wishing to continue with more therapy. PROBLEM LIST (Impacting functional limitations):  1. Decreased Strength  2. Decreased ADL/Functional Activities  3. Decreased Transfer Abilities  4. Decreased Ambulation Ability/Technique  5. Decreased Balance  6. Increased Pain  7. Decreased Activity Tolerance  8. Decreased Pacing Skills  9. Decreased Work Simplification/Energy Conservation Techniques  10. Increased Fatigue  11. Decreased Flexibility/Joint Mobility  12. Edema/Girth INTERVENTIONS PLANNED:  1. Balance Exercise  2. Bed Mobility  3. Cold  4. Family Education  5. Gait Training  6. Heat  7. Home Exercise Program (HEP)  8. Manual Therapy  9. Neuromuscular Re-education/Strengthening  10. Range of Motion (ROM)  11. Therapeutic Activites  12. Therapeutic Exercise/Strengthening  13. Transfer Training   TREATMENT PLAN:  Effective Dates: 3/5/2019 to 4/5/2019 (30 days). Frequency/Duration: 2 week hold  GOALS: (Goals have been discussed and agreed upon with patient.)  Short-Term Functional Goals: Time Frame: 3/5/19 to 3/21/19  1.  Patient will be independent with HEP to improve LE ROM, strength, flexibility, and balance. -GOAL MET   2. Patient will report no more than 2/10 L knee pain at rest in order to demonstrate improved self pain control and tolerance. -GOAL MET  3. Patient will be educated in and demonstrate proper squat lift technique in order to reduce stress on bilateral LE, improve safety, and reduce risk of injury. -GOAL MET  4. Patient will improve L knee ROM to 5-110 degrees in order to demonstrate progression towards full ROM. -GOAL MET  Discharge Goals: Time Frame: 3/5/19 to 4/5/19  1. Patient will improve gross L LE strength, except hip abduction, to at least 4+/5 in order to improve safety with ambulation and transfers. -ONGOING  2. Patient will improve L LE hip abduction strength to 4/5 in order to improve gait and balance. -ONGOING  3. Patient will improve L knee ROM to 0-120 degrees in order to demonstrate full functional ROM. -GOAL MET  4. Patient will be able to sleep through the night without waking due to L knee pain in order to return to prior sleeping pattern. -GOAL MET  5. Patient will be able to go dancing with her  in order to return to prior recreational activities/ hobbies. -ONGOING  6. Patient will ambulation 250 ft with no assistive device, no loss of balance, and no need for supervision in order to return to prior independence level. -GOAL MET  7. Patient will exhibit no more than 2 cm difference in circumferential knee joint line measurements in order to demonstrate improved edema. -GOAL MET  8. Patient will improve Lower Extremity Functional Scale score to 42/80 from 18/80. -GOAL MET (scored 46/80 on 4/2/19)    Outcome Measure: Tool Used: Lower Extremity Functional Scale (LEFS)  Score:  Initial: 18/80 Most Recent: 46/80 (Date: 4/2/19)   Interpretation of Score: 20 questions each scored on a 5 point scale with 0 representing \"extreme difficulty or unable to perform\" and 4 representing \"no difficulty\". The lower the score, the greater the functional disability. 80/80 represents no disability. Minimal detectable change is 9 points. UPDATED OBJECTIVE FINDINGS:    Palpation:          Minimal to moderate (from significant) tenderness to palpation of gross L knee. Spoke with patient regarding desensitization at home. Minimal (from moderate to significant) adhesions noted under incision site. Minimal (from moderate )warmth noted over L knee. Anthropometric Measurements (cm) Left Right   Knee joint line 42 (from 43.5) 40     ROM:          Moderate tightness noted bilateral hamstrings. AROM/ PROM Left (degrees) Right (degrees)   Knee Flexion 125 (from 80) 125   Knee Extension 0  (from lacking 12) 2     Strength: Motion Tested Left   (*/5) Right  (*/5)   Knee Extension 4+ (from 4-) 4+   Hip Flexion 4+ (from 4) 5 (from 4+)   Hip Abduction 4- (from 3+) 4   Ankle DF 5 5          Functional Mobility:         Gait/Ambulation: Patient using rolling walker with tray, short step length bilaterally, decreased heel strike L LE, decreased knee extension during stance on L LE, decreased knee flexion during swing L LE, forward trunk posture, antalgic pattern. 4/2/19: patient ambulating with no assistive device and minimal gait deviation. Using cane for longer distances. Transfers:  Minimal (from moderate) use of bilateral UEs for sit to stand transfer with minimal weight shift to L LE. Use of UEs to move L LE for supine to/ from sit. Medical Necessity:   · Patient is expected to demonstrate progress in strength, range of motion, balance, coordination and functional technique to increase independence with ambulation, transfers, and ADLs and improve safety during ambulation, transfers, ADLs, and returning to prior activity level. · Skilled intervention continues to be required due to decreased ROM, strength, mobility, and balance.   Reason for Services/Other Comments:  · Patient continues to require skilled intervention due to decreased strength, ROM, balance, and functional activities s/p L TKA. Rehabilitation Potential For Stated Goals: Good  Regarding Geovanna Somers's therapy, I certify that the treatment plan above will be carried out by a therapist or under their direction.   Thank you for this referral,  Court Ebonie, PT, DPT   Referring Physician Signature: Jennifer Gibbs,*              Date

## 2019-04-19 NOTE — THERAPY DISCHARGE
Trisha Jenkins  : 1940  Primary: [facility]  Secondary:  [facility] at [address], [place], [address] [place] [address]  [phone]   [phone]       OUTPATIENT PHYSICAL THERAPY:Discontinuation Summary 2019    ICD-10: Treatment Diagnosis: presence of left artificial knee joint (O63.471)                Treatment Diagnosis 2: pain in left knee (M25.562)                Treatment Diagnosis 3: other abnormalities of gait and mobility (R26.89)  Precautions: hypertension, hard of hearing, macular degeneration  Allergies: Pollen extracts and Ace inhibitors   MD Orders: evaluate and treat  MEDICAL/REFERRING DIAGNOSIS:  Presence of left artificial knee joint [Z96.652]   DATE OF ONSET: patient underwent L posterior stabilized TKA with bone cement on 19  REFERRING PHYSICIAN: Robert Martínez,*  RETURN PHYSICIAN APPOINTMENT: 19     INITIAL ASSESSMENT:  Ms. Francis Peters is a 70 y.o. female presenting to physical therapy with complaints of L knee pain and stiffness s/p posterior stabilized TKA with use of bone cemenr on 19. She reports having some digestive issues with her medications which she feels set her back a little, but she is feeling better now and eager to progress her mobility. Patient ambulating with rolling walker. She reports her L knee pain ranging from 0/10 at best with ice and good positioning, 2/10 currently, and up to 6/10 with quick or wrong movements. She reports some difficulty sleeping or getting comfortable due to pain, wishing to walk without an assistive device, and being ready to get back to dancing with her . Spoke at length with patient regarding importance of full knee flexion and extension ROM, scar tissue/ adhesions, importance of HEP, and progression of therapy.  Patient presents with increased pain, decreased strength, decreased ROM, decreased flexibility, impaired gait, impaired transfer ability, decreased activity tolerance, and overall impaired functional mobility. Patient is a good candidate for skilled physical therapy interventions to include manual therapy, therapeutic exercise, balance training, gait training, transfer training, postural re-education, body mechanics training, and pain modalities as needed. DISCONTINUATION 4/19/19: Patient was seen for 10 sessions of physical therapy from 3/5/19 to 4/2/19. She reported feeling much better and starting to get back to her normal routine. Patient needed to continue with strengthening, gait, and overall activity tolerance. Her ROM was measured 0-125 degrees. She met some of her goals and was progressing well. Patient went on vacation and wished to be on hold in order to ensure confidence with HEP and overall progress. Patient called after her trip to state she was feeling good and was ready for discharge. Patient discharged at this time. PROBLEM LIST (Impacting functional limitations):  1. Decreased Strength  2. Decreased ADL/Functional Activities  3. Decreased Transfer Abilities  4. Decreased Ambulation Ability/Technique  5. Decreased Balance  6. Increased Pain  7. Decreased Activity Tolerance  8. Decreased Pacing Skills  9. Decreased Work Simplification/Energy Conservation Techniques  10. Increased Fatigue  11. Decreased Flexibility/Joint Mobility  12. Edema/Girth INTERVENTIONS PLANNED:  1. Balance Exercise  2. Bed Mobility  3. Cold  4. Family Education  5. Gait Training  6. Heat  7. Home Exercise Program (HEP)  8. Manual Therapy  9. Neuromuscular Re-education/Strengthening  10. Range of Motion (ROM)  11. Therapeutic Activites  12. Therapeutic Exercise/Strengthening  13. Transfer Training   TREATMENT PLAN:  Effective Dates: 3/5/2019 to 4/5/2019 (30 days). Frequency/Duration: Patient discharged. GOALS: (Goals have been discussed and agreed upon with patient.)  Short-Term Functional Goals: Time Frame: 3/5/19 to 3/21/19  1.  Patient will be independent with HEP to improve LE ROM, strength, flexibility, and balance. -GOAL MET   2. Patient will report no more than 2/10 L knee pain at rest in order to demonstrate improved self pain control and tolerance. -GOAL MET  3. Patient will be educated in and demonstrate proper squat lift technique in order to reduce stress on bilateral LE, improve safety, and reduce risk of injury. -GOAL MET  4. Patient will improve L knee ROM to 5-110 degrees in order to demonstrate progression towards full ROM. -GOAL MET  Discharge Goals: Time Frame: 3/5/19 to 4/5/19  1. Patient will improve gross L LE strength, except hip abduction, to at least 4+/5 in order to improve safety with ambulation and transfers. -NOT MET  2. Patient will improve L LE hip abduction strength to 4/5 in order to improve gait and balance. -NOT MET  3. Patient will improve L knee ROM to 0-120 degrees in order to demonstrate full functional ROM. -GOAL MET  4. Patient will be able to sleep through the night without waking due to L knee pain in order to return to prior sleeping pattern. -GOAL MET  5. Patient will be able to go dancing with her  in order to return to prior recreational activities/ hobbies. -NOT MET  6. Patient will ambulation 250 ft with no assistive device, no loss of balance, and no need for supervision in order to return to prior independence level. -GOAL MET  7. Patient will exhibit no more than 2 cm difference in circumferential knee joint line measurements in order to demonstrate improved edema. -GOAL MET  8. Patient will improve Lower Extremity Functional Scale score to 42/80 from 18/80. -GOAL MET (scored 46/80 on 4/2/19)    Outcome Measure: Tool Used: Lower Extremity Functional Scale (LEFS)  Score:  Initial: 18/80 Most Recent: 46/80 (Date: 4/2/19)   Interpretation of Score: 20 questions each scored on a 5 point scale with 0 representing \"extreme difficulty or unable to perform\" and 4 representing \"no difficulty\".   The lower the score, the greater the functional disability. 80/80 represents no disability. Minimal detectable change is 9 points. UPDATED OBJECTIVE FINDINGS:    Palpation:          Minimal to moderate (from significant) tenderness to palpation of gross L knee. Spoke with patient regarding desensitization at home. Minimal (from moderate to significant) adhesions noted under incision site. Minimal (from moderate )warmth noted over L knee. Anthropometric Measurements (cm) Left Right   Knee joint line 42 (from 43.5) 40     ROM:          Moderate tightness noted bilateral hamstrings. AROM/ PROM Left (degrees) Right (degrees)   Knee Flexion 125 (from 80) 125   Knee Extension 0  (from lacking 12) 2     Strength: Motion Tested Left   (*/5) Right  (*/5)   Knee Extension 4+ (from 4-) 4+   Hip Flexion 4+ (from 4) 5 (from 4+)   Hip Abduction 4- (from 3+) 4   Ankle DF 5 5          Functional Mobility:         Gait/Ambulation: Patient using rolling walker with tray, short step length bilaterally, decreased heel strike L LE, decreased knee extension during stance on L LE, decreased knee flexion during swing L LE, forward trunk posture, antalgic pattern. 4/2/19: patient ambulating with no assistive device and minimal gait deviation. Using cane for longer distances. Transfers:  Minimal (from moderate) use of bilateral UEs for sit to stand transfer with minimal weight shift to L LE. Use of UEs to move L LE for supine to/ from sit. Reason for Services/Other Comments:  · Patient discharged to [facility]    Rehabilitation Potential For Stated Goals: Catarino Somers's therapy, I certify that the treatment plan above will be carried out by a therapist or under their direction.   Thank you for this referral,  Sol Gonzalez, PT, DPT

## 2019-10-01 PROBLEM — M19.90 OSTEOARTHRITIS: Status: ACTIVE | Noted: 2019-10-01

## 2019-11-19 ENCOUNTER — HOSPITAL ENCOUNTER (OUTPATIENT)
Dept: SURGERY | Age: 79
Discharge: HOME | End: 2019-11-19
Payer: MEDICARE

## 2019-11-19 ENCOUNTER — HOME HEALTH ADMISSION (OUTPATIENT)
Dept: HOME HEALTH SERVICES | Facility: HOME HEALTH | Age: 79
End: 2019-11-19
Payer: MEDICARE

## 2019-11-19 ENCOUNTER — HOSPITAL ENCOUNTER (OUTPATIENT)
Dept: PHYSICAL THERAPY | Age: 79
Discharge: HOME | End: 2019-11-19
Payer: MEDICARE

## 2019-11-19 LAB
ANION GAP SERPL CALC-SCNC: 7 MMOL/L (ref 7–16)
APTT PPP: 30.8 SEC (ref 24.7–39.8)
ATRIAL RATE: 70 BPM
BACTERIA SPEC CULT: NORMAL
BASOPHILS # BLD: 0 K/UL (ref 0–0.2)
BASOPHILS NFR BLD: 0 % (ref 0–2)
BUN SERPL-MCNC: 25 MG/DL (ref 8–23)
CALCIUM SERPL-MCNC: 9.4 MG/DL (ref 8.3–10.4)
CALCULATED P AXIS, ECG09: 34 DEGREES
CALCULATED R AXIS, ECG10: 66 DEGREES
CALCULATED T AXIS, ECG11: 40 DEGREES
CHLORIDE SERPL-SCNC: 99 MMOL/L (ref 98–107)
CO2 SERPL-SCNC: 28 MMOL/L (ref 21–32)
CREAT SERPL-MCNC: 0.75 MG/DL (ref 0.6–1)
DIAGNOSIS, 93000: NORMAL
DIFFERENTIAL METHOD BLD: NORMAL
EOSINOPHIL # BLD: 0.2 K/UL (ref 0–0.8)
EOSINOPHIL NFR BLD: 2 % (ref 0.5–7.8)
ERYTHROCYTE [DISTWIDTH] IN BLOOD BY AUTOMATED COUNT: 13.4 % (ref 11.9–14.6)
EST. AVERAGE GLUCOSE BLD GHB EST-MCNC: 100 MG/DL
GLUCOSE SERPL-MCNC: 69 MG/DL (ref 65–100)
HBA1C MFR BLD: 5.1 %
HCT VFR BLD AUTO: 43 % (ref 35.8–46.3)
HGB BLD-MCNC: 13.8 G/DL (ref 11.7–15.4)
IMM GRANULOCYTES # BLD AUTO: 0 K/UL (ref 0–0.5)
IMM GRANULOCYTES NFR BLD AUTO: 0 % (ref 0–5)
INR PPP: 0.9
LYMPHOCYTES # BLD: 1.5 K/UL (ref 0.5–4.6)
LYMPHOCYTES NFR BLD: 20 % (ref 13–44)
MCH RBC QN AUTO: 31.1 PG (ref 26.1–32.9)
MCHC RBC AUTO-ENTMCNC: 32.1 G/DL (ref 31.4–35)
MCV RBC AUTO: 96.8 FL (ref 79.6–97.8)
MONOCYTES # BLD: 0.8 K/UL (ref 0.1–1.3)
MONOCYTES NFR BLD: 11 % (ref 4–12)
NEUTS SEG # BLD: 4.8 K/UL (ref 1.7–8.2)
NEUTS SEG NFR BLD: 66 % (ref 43–78)
NRBC # BLD: 0 K/UL (ref 0–0.2)
P-R INTERVAL, ECG05: 182 MS
PLATELET # BLD AUTO: 326 K/UL (ref 150–450)
PMV BLD AUTO: 10 FL (ref 9.4–12.3)
POTASSIUM SERPL-SCNC: 4.1 MMOL/L (ref 3.5–5.1)
PROTHROMBIN TIME: 12.6 SEC (ref 11.7–14.5)
Q-T INTERVAL, ECG07: 384 MS
QRS DURATION, ECG06: 84 MS
QTC CALCULATION (BEZET), ECG08: 414 MS
RBC # BLD AUTO: 4.44 M/UL (ref 4.05–5.2)
SERVICE CMNT-IMP: NORMAL
SODIUM SERPL-SCNC: 134 MMOL/L (ref 136–145)
VENTRICULAR RATE, ECG03: 70 BPM
WBC # BLD AUTO: 7.3 K/UL (ref 4.3–11.1)

## 2019-11-19 PROCEDURE — 83036 HEMOGLOBIN GLYCOSYLATED A1C: CPT

## 2019-11-19 PROCEDURE — 85730 THROMBOPLASTIN TIME PARTIAL: CPT

## 2019-11-19 PROCEDURE — 97161 PT EVAL LOW COMPLEX 20 MIN: CPT

## 2019-11-19 PROCEDURE — 80048 BASIC METABOLIC PNL TOTAL CA: CPT

## 2019-11-19 PROCEDURE — 85610 PROTHROMBIN TIME: CPT

## 2019-11-19 PROCEDURE — 36415 COLL VENOUS BLD VENIPUNCTURE: CPT

## 2019-11-19 PROCEDURE — 85025 COMPLETE CBC W/AUTO DIFF WBC: CPT

## 2019-11-19 PROCEDURE — 87641 MR-STAPH DNA AMP PROBE: CPT

## 2019-11-19 PROCEDURE — 77030027138 HC INCENT SPIROMETER -A

## 2019-11-19 RX ORDER — TRAMADOL HYDROCHLORIDE 50 MG/1
50 TABLET ORAL
COMMUNITY
End: 2019-12-11

## 2019-11-19 RX ORDER — PETROLATUM,WHITE/LANOLIN
500 OINTMENT (GRAM) TOPICAL DAILY
COMMUNITY

## 2019-11-19 RX ORDER — BISMUTH SUBSALICYLATE 262 MG
1 TABLET,CHEWABLE ORAL DAILY
COMMUNITY
End: 2019-12-11

## 2019-11-19 RX ORDER — ACETAMINOPHEN 325 MG/1
650 TABLET ORAL
COMMUNITY

## 2019-11-19 NOTE — PERIOP NOTES
PLEASE CONTINUE TAKING ALL PRESCRIPTION MEDICATIONS UP TO THE DAY OF SURGERY. DISCONTINUE all vitamins and supplements 21 days prior to surgery. Home Medications to take  the day of surgery    tylenol, zyrtec, levothyroxine, tramadol            Home Medications   to Hold   Vitamins, Supplements, and Herbals x 3 weeks prior to surgery. Non-Steriodal Anti-Inflammatory (NSAIDS) such as Advil and Aleve- ok to remain taking low-dose aspirin and tylenol. Comments   Bring: zyrtec, topical creams, eye drops, Irbesartan in original Rx bottles             Please do not bring home medications with you on the day of surgery unless otherwise directed by your nurse. If you are instructed to bring home medications, please give them to your nurse as they will be administered by the nursing staff. If you have any questions, please call [facility] [phone] or [facility] [phone].

## 2019-11-19 NOTE — PROGRESS NOTES
Ana Arana  : [birth year](67 y.o.) [address] at [place] [address], [address] [facility]  [phone]       Physical Therapy Prehab Plan of Treatment and Evaluation Summary:2019    ICD-10: Treatment Diagnosis:   · Pain in Right Knee (M25.561)  · Stiffness of Right Knee, Not elsewhere classified (M25.661)  · Difficulty in walking, Not elsewhere classified (R26.2)  · Other abnormalities of gait and mobility (R26.89)  Precautions/Allergies:   Pollen extracts and Ace inhibitors  MEDICAL/REFERRING DIAGNOSIS:  Unilateral primary osteoarthritis, right knee [M17.11]  REFERRING PHYSICIAN: Marion Haney,*  DATE OF SURGERY: 12/10/19    Assessment:   Comments:  Pian in right knee joint with decreased independence with functional mobility. Pt plans to return home following hospital stay. Pt motivated and prepared for surgery. Pt had left tka. Pt's  present and supportive. PROBLEM LIST (Impacting functional limitations):  Ms. Ayaka Holloway presents with the following right lower extremity(s) problems:  1. Transfers  2. Gait  3. Strength  4. Range of Motion  5. Pain   INTERVENTIONS PLANNED:  1. Home Exercise Program  2. Educational Discussion      TREATMENT PLAN: Effective Dates: 2019 TO 2019. Frequency/Duration: Patient to continue to perform home exercise program at least twice per day up until her surgery. GOALS: (Goals have been discussed and agreed upon with patient.)  Discharge Goals: Time Frame: 1 Day  1. Patient will demonstrate independence with a home exercise program designed to increase strength, range of motion and pain control to minimize functional deficits and optimize patient for total joint replacement. Rehabilitation Potential For Stated Goals: Good  Regarding Geovanna Somers's therapy, I certify that the treatment plan above will be carried out by a therapist or under their direction.   Thank you for this referral,  Arturo Gibbs, PT HISTORY:   Present Symptoms:  Pain Intensity 1: 9  Pain Location 1: Knee  Pain Orientation 1: Right   History of Present Injury/Illness (Reason for Referral):  Medical/Referring Diagnosis: Unilateral primary osteoarthritis, right knee [M17.11]   Past Medical History/Comorbidities:   Ms. Efren Spear  has a past medical history of Allergic rhinitis due to pollen, Arthritis, Asthma (as a child), Constipation (02/2019), Fibrocystic breast, GERD (gastroesophageal reflux disease), H/O colonoscopy (2009), Hashimoto's thyroiditis, Hypertension, Macular degeneration, Mixed hyperlipidemia, Osteoporosis (1/2013), Restless leg, Unspecified hypothyroidism, and Varicella. Ms. Efren Spear  has a past surgical history that includes hx appendectomy (1973); hx tonsillectomy (as a child); hx adenoidectomy (as a child); hx cataract removal (Bilateral, 2007); hx colonoscopy; hx hernia repair (06/16/2018); hx knee replacement (Left, 02/07/2019); and hx ovarian cyst removal (1973).   Social History/Living Environment:   Home Environment: Private residence  # Steps to Enter: 0  One/Two Story Residence: One story  Living Alone: No  Support Systems: Spouse/Significant Other/Partner  Patient Expects to be Discharged to[de-identified] Private residence  Current DME Used/Available at Home: Daylene Iker, rolling, Commode, bedside, Cane, straight, Shower chair  Tub or Shower Type: Shower  Work/Activity:  retired  Dominant Side:  RIGHT  Current Medications:  See Pre-assessment nursing note   Number of Personal Factors/Comorbidities that affect the Plan of Care: 0: LOW COMPLEXITY   EXAMINATION:   ADLs (Current Functional Status):   Ambulation:  [x] Independent  [] Walk Indoors Only  [] Walk Outdoors  [] Use Assistive Device  [] Use Wheelchair Only Dressing:  [x] Independent  Requires Assistance from Someone for:  [] Sock/Shoes  [] Pants  [] Everything   Bathing/Showering:   [x] Independent  [] Requires Assistance from Someone  [] [address]:  [x] Routine house and yard work  [] Light Housework Only  [] None   Observation/Orthostatic Postural Assessment:   Within defined limitsGenu valgus right  ROM/Flexibility:   AROM: Generally decreased, functional                       RLE Assessment  RLE Assessment (WDL): Within defined limits  RLE AROM  R Knee Flexion: 110  R Knee Extension: 10   Strength:   Strength: Generally decreased, functional                  Functional Mobility:    Coordination: Generally decreased, functional    Gait Description (WDL): Within defined limits  Stand to Sit: Independent  Sit to Stand: Independent  Distance (ft): 1000 Feet (ft)  Ambulation - Level of Assistance: Independent  Gait Abnormalities: Antalgic          Balance:    Sitting: Intact  Standing: Intact   Body Structures Involved:  1. Bones  2. Joints  3. Muscles  4. Ligaments Body Functions Affected:  1. Neuromusculoskeletal  2. Movement Related Activities and Participation Affected:  1. Mobility   Number of elements that affect the Plan of Care: 4+: HIGH COMPLEXITY   CLINICAL PRESENTATION:   Presentation: Stable and uncomplicated: LOW COMPLEXITY   CLINICAL DECISION MAKING:   Outcome Measure: Tool Used: Lower Extremity Functional Scale (LEFS)  Score:  Initial: 29/80 Most Recent: X/80 (Date: -- )   Interpretation of Score: 20 questions each scored on a 5 point scale with 0 representing \"extreme difficulty or unable to perform\" and 4 representing \"no difficulty\". The lower the score, the greater the functional disability. 80/80 represents no disability. Minimal detectable change is 9 points. Medical Necessity:   · Ms. Marisela Butts is expected to optimize her lower extremity strength and ROM in preparation for joint replacement surgery. Reason for Services/Other Comments:  · Achieve baseline assesment of musculoskeletal system, functional mobility and home environment. , educate in PT HEP in preparation for surgery, educate in hospital plan of care.    Use of outcome tool(s) and clinical judgement create a POC that gives a: Clear prediction of patient's progress: LOW COMPLEXITY   TREATMENT:   Treatment/Session Assessment:  Patient was instructed in PT- HEP to increase strength and ROM in LEs. Answered all questions. · Post session pain:  9  · Compliance with Program/Exercises: compliant all of the time.   Total Treatment Duration:  PT Patient Time In/Time Out  Time In: 0930  Time Out: 63 Viviana Moser PT

## 2019-11-19 NOTE — PERIOP NOTES
Lab results within anesthesia guidelines. MRSA swab result pending. All results routed/faxed to pt's PCP, Leonid Castorena MD, per surgeon's order. Recent Results (from the past 12 hour(s))   CBC WITH AUTOMATED DIFF    Collection Time: 11/19/19  8:55 AM   Result Value Ref Range    WBC 7.3 4.3 - 11.1 K/uL    RBC 4.44 4.05 - 5.2 M/uL    HGB 13.8 11.7 - 15.4 g/dL    HCT 43.0 35.8 - 46.3 %    MCV 96.8 79.6 - 97.8 FL    MCH 31.1 26.1 - 32.9 PG    MCHC 32.1 31.4 - 35.0 g/dL    RDW 13.4 11.9 - 14.6 %    PLATELET 149 345 - 771 K/uL    MPV 10.0 9.4 - 12.3 FL    ABSOLUTE NRBC 0.00 0.0 - 0.2 K/uL    DF AUTOMATED      NEUTROPHILS 66 43 - 78 %    LYMPHOCYTES 20 13 - 44 %    MONOCYTES 11 4.0 - 12.0 %    EOSINOPHILS 2 0.5 - 7.8 %    BASOPHILS 0 0.0 - 2.0 %    IMMATURE GRANULOCYTES 0 0.0 - 5.0 %    ABS. NEUTROPHILS 4.8 1.7 - 8.2 K/UL    ABS. LYMPHOCYTES 1.5 0.5 - 4.6 K/UL    ABS. MONOCYTES 0.8 0.1 - 1.3 K/UL    ABS. EOSINOPHILS 0.2 0.0 - 0.8 K/UL    ABS. BASOPHILS 0.0 0.0 - 0.2 K/UL    ABS. IMM.  GRANS. 0.0 0.0 - 0.5 K/UL   HEMOGLOBIN A1C WITH EAG    Collection Time: 11/19/19  8:55 AM   Result Value Ref Range    Hemoglobin A1c 5.1 %    Est. average glucose 432 mg/dL   METABOLIC PANEL, BASIC    Collection Time: 11/19/19  8:55 AM   Result Value Ref Range    Sodium 134 (L) 136 - 145 mmol/L    Potassium 4.1 3.5 - 5.1 mmol/L    Chloride 99 98 - 107 mmol/L    CO2 28 21 - 32 mmol/L    Anion gap 7 7 - 16 mmol/L    Glucose 69 65 - 100 mg/dL    BUN 25 (H) 8 - 23 MG/DL    Creatinine 0.75 0.6 - 1.0 MG/DL    GFR est AA >60 >60 ml/min/1.73m2    GFR est non-AA >60 >60 ml/min/1.73m2    Calcium 9.4 8.3 - 10.4 MG/DL   PROTHROMBIN TIME + INR    Collection Time: 11/19/19  8:55 AM   Result Value Ref Range    Prothrombin time 12.6 11.7 - 14.5 sec    INR 0.9     PTT    Collection Time: 11/19/19  8:55 AM   Result Value Ref Range    aPTT 30.8 24.7 - 39.8 SEC   EKG, 12 LEAD, INITIAL    Collection Time: 11/19/19 10:53 AM   Result Value Ref Range Ventricular Rate 70 BPM    Atrial Rate 70 BPM    P-R Interval 182 ms    QRS Duration 84 ms    Q-T Interval 384 ms    QTC Calculation (Bezet) 414 ms    Calculated P Axis 34 degrees    Calculated R Axis 66 degrees    Calculated T Axis 40 degrees    Diagnosis       Normal sinus rhythm  Normal ECG  No previous ECGs available  Confirmed by LEXUS FAN (), Kiana Gaston ([id number]) on 11/19/2019 12:41:19 PM

## 2019-11-19 NOTE — PERIOP NOTES
Patient verified name and . Order for consent found in EHR and matches case posting; patient verified. Type 3 surgery, joint PAT assessment complete. Labs per surgeon: CBC, BMP, PT/PTT, Hgba1c, MRSA swab collected- results pending  Labs per anesthesia protocol: no additional labs needed  EKG: done today- result NSR and within anesthesia guidelines    Pt had stress test 18- normal result- found in Care Everywhere for anesthesia reference. MRSA/MSSA swab collected; pharmacy to review and dose antibiotic as appropriate. Hospital approved surgical skin cleanser and instructions to return bottle on DOS given per hospital policy. Patient provided with handouts including Guide to Surgery, Pain Management, Hand Hygiene, Blood Transfusion Education, and [facility] Brochure. Patient answered medical/surgical history questions at their best of ability. All prior to admission medications documented in [facility]. Original medication prescription bottles NOT visualized during patient appointment. Patient instructed to hold all vitamins 21 days prior to surgery and NSAIDS 5 days prior to surgery. Verbal and written medication instructions given to pt per anesthesia guidelines. Patient teach back successful and patient demonstrates knowledge of instruction.

## 2019-11-20 VITALS
OXYGEN SATURATION: 95 % | DIASTOLIC BLOOD PRESSURE: 85 MMHG | TEMPERATURE: 95.3 F | SYSTOLIC BLOOD PRESSURE: 153 MMHG | HEIGHT: 64 IN | RESPIRATION RATE: 12 BRPM | WEIGHT: 153 LBS | BODY MASS INDEX: 26.12 KG/M2 | HEART RATE: 75 BPM

## 2019-11-20 NOTE — PROGRESS NOTES
11/19/19 0900   Oxygen Therapy   O2 Sat (%) 100 %   Pulse via Oximetry 77 beats per minute   O2 Device Room air   Pre-Treatment   Breath Sounds Bilateral Clear   Pre FEV1 (liters) 2.6 liters   % Predicted 126   Incentive Spirometry Treatment   Actual Volume (ml) 2500 ml     Initial respiratory Assessment completed with pt. Pt was interviewed and evaluated in Joint camp prior to surgery. Patient ID:  Rajwinder Villareal  [id number]  14 y.o.  1940  Surgeon: Dr. Latanya Camilo  Date of Surgery: 12/10/2019  Procedure: Total Right Knee Arthroplasty  Primary Care Physician: Cristina Sewell -[phone]  Specialists:                                  Pt instructed in the use of Incentive Spirometry. Pt instructed to bring Incentive Spirometer back on date of surgery & to start using Is upon return to pt room.     Pt taught proper cough technique    History of smoking:   FORMER  1 PACK/2.5 DAYS FOR 12-14 YEARS                                                      Quit date:       13 YEARS AGO    Secondhand smoke:PARENTS & SPOUSE      Past procedures with Oxygen desaturation:DENIES    Past Medical History:   Diagnosis Date    Allergic rhinitis due to pollen     Arthritis     Asthma as a child    Constipation 02/2019    post-op after TKA    Fibrocystic breast     GERD (gastroesophageal reflux disease)     Managed with meds     H/O colonoscopy 2009    Normal (Muna)    Hashimoto's thyroiditis     Hypertension     Managed with meds     Macular degeneration     Mixed hyperlipidemia     Daily meds     Osteoporosis 1/2013    GYN Dr. Ivania Barber    Restless leg     managed with medication    Unspecified hypothyroidism     Varicella                                                           ASTHMA AS A CHILD   - NO ISSUES NOW                                                                                        Respiratory history:DENIES SOB                                                                 Respiratory meds: DENIES                                       FAMILY PRESENT:                                                              NO                                        PAST SLEEP STUDY:                          DENIES  HX OF JENNYFER:                                       DENIES                                     JENNYFER assessment:                                               SLEEPS ON SIDE       &      BACK                                                         PHYSICAL EXAM   Body mass index is 26.26 kg/m².    Visit Vitals  /85 (BP 1 Location: Left arm, BP Patient Position: At rest;Sitting)   Pulse 75   Temp 95.3 °F (35.2 °C)   Resp 12   Ht 5' 4\" (1.626 m)   Wt 69.4 kg (153 lb)   SpO2 95%   BMI 26.26 kg/m²     Neck circumference:   35   cm    Loud snoring:        YES                                 Witnessed apnea or wakening gasping or choking:,             DENIES,                                                                                                   Awakens with headaches:                                                  DENIES    Morning or daytime tiredness/ sleepiness:                   SOME                                                                                        TIRED   Dry mouth or sore throat in morning:                YES                                                                           Freire stage:  4    SACS score:16      Stop Bang   STOP-BANG  Does the patient snore loudly (louder than talking or loud enough to be heard through closed doors)?: Yes  Does the patient often feel tired, fatigued, or sleepy during the daytime, even after a \"good\" night's sleep?: Yes  Has anyone ever observed the patient stop breathing during their sleep? : No  Does the patient have or are they being treated for high blood pressure?: Yes  Is the patient's BMI greater than 35?: No  Is your neck circumference greater than 17 inches (Male) or 16 inches (Female)?: No  Is the patient older than 48?: Yes  Is the patient male?: No  JENNYFER Score: 4  Has the patient been referred to Sleep Medicine?: No  Has the patient previously been diagnosed with Obstructive Sleep Apnea?: No                            CPAP:                       NONE                                                 CONT SAT HS            Referrals:    Pt. Phone Number:

## 2019-12-03 NOTE — ADVANCED PRACTICE NURSE
Total Joint Surgery Preoperative Chart Review      Patient ID:  Chanel Wright  [id number]  43 y.o.  1940  Surgeon: Dr. Joel Osorio  Date of Surgery: 12/10/2019  Procedure: Total Right Knee Arthroplasty  Primary Care Physician: Awilda Goodrich [phone]  Specialty Physician(s):      Subjective:   Chanel Wright is a 70 y.o. WHITE OR  female who presents for preoperative evaluation for Total Right Knee arthroplasty. This is a preoperative chart review note based on data collected by the nurse at the surgical Pre-Assessment visit.     Past Medical History:   Diagnosis Date    Allergic rhinitis due to pollen     Arthritis     Asthma as a child    Constipation 2019    post-op after TKA    Fibrocystic breast     GERD (gastroesophageal reflux disease)     Managed with meds     H/O colonoscopy     Normal (Muna)    Hashimoto's thyroiditis     Hypertension     Managed with meds     Macular degeneration     Mixed hyperlipidemia     Daily meds     Osteoporosis 2013    GYN Dr. Parris Land Restless leg     managed with medication    Unspecified hypothyroidism     Varicella       Past Surgical History:   Procedure Laterality Date    HX ADENOIDECTOMY  as a child    HX APPENDECTOMY  1973    [facility] B[facility] Jose Alejandro Ren[place]     HX HERNIA REPAIR  2018    HX KNEE REPLACEMENT Left 2019    HX OVARIAN CYST REMOVAL  82    HX TONSILLECTOMY  as a child     Family History   Problem Relation Age of Onset    Breast Cancer Mother     Cancer Mother         breast, lung    Coronary Artery Disease Mother     Heart Disease Mother     COPD Father     Other Father         smoker      Social History     Tobacco Use    Smoking status: Former Smoker     Packs/day: 0.50     Years: 12.00     Pack years: 6.00     Last attempt to quit: 1975     Years since quittin.8    Smokeless tobacco: Never Used    Tobacco comment: quit age of 30   Substance Use Topics    Alcohol use: Not Currently     Alcohol/week: 1.0 standard drinks     Types: 1 Glasses of wine per week       Prior to Admission medications    Medication Sig Start Date End Date Taking? Authorizing Provider   traMADol (ULTRAM) 50 mg tablet Take 50 mg by mouth every six (6) hours as needed for Pain. Yes Provider, Historical   ubidecarenone/vitamin E mixed (COQ10  PO) Take  by mouth. Yes Provider, Historical   Omega-3 Fatty Acids (FISH OIL) 500 mg cap Take  by mouth. Yes Provider, Historical   glucosamine sulfate 500 mg capsule Take  by mouth daily. Yes Provider, Historical   multivitamin (ONE A DAY) tablet Take 1 Tab by mouth daily. Yes Provider, Historical   vit A/vit C/vit E/zinc/copper (PRESERVISION AREDS PO) Take  by mouth. Yes Provider, Historical   acetaminophen (TYLENOL) 325 mg tablet Take  by mouth every four (4) hours as needed for Pain. Yes Provider, Historical   levothyroxine (SYNTHROID) 75 mcg tablet Take 1 Tab by mouth Daily (before breakfast). 4/15/19  Yes Stann Schwab, MD   carbidopa-levodopa (SINEMET)  mg per tablet TAKE 1 TABLET BY MOUTH  NIGHTLY 4/15/19  Yes Stann Schwab, MD   irbesartan (AVAPRO) 300 mg tablet Take 1 Tab by mouth daily. 4/15/19  Yes Stann Schwab, MD   pravastatin (PRAVACHOL) 40 mg tablet Take 1 Tab by mouth nightly. 3/22/19  Yes Stann Schwab, MD   pantoprazole (PROTONIX) 40 mg tablet Take 1 Tab by mouth Daily (before dinner). 3/22/19  Yes Stann Schwab, MD   hydroCHLOROthiazide (HYDRODIURIL) 12.5 mg tablet Take 1 Tab by mouth daily. 3/22/19  Yes Stann Schwab, MD   aspirin delayed-release 81 mg tablet Take 1 Tab by mouth every twelve (12) hours every twelve (12) hours. Patient taking differently: Take 81 mg by mouth daily. 2/8/19  Yes Ivelisse Coker PA   ketoconazole (NIZORAL) 2 % topical cream Apply  to affected area two (2) times daily as needed for Skin Irritation.    Yes Provider, Historical   triamcinolone acetonide (KENALOG) 0.1 % topical cream Apply  to affected area two (2) times daily as needed. 2/9/18  Yes Provider, Historical   cetirizine (ZYRTEC) 10 mg tablet Take 10 mg by mouth daily. Yes Provider, Historical   calcium carbonate-vitamin d2 1,200-400 mg-unit cap Take 1 Tab by mouth two (2) times a day. Yes Provider, Historical   PSYLLIUM SEED, WITH DEXTROSE, (FIBER PO) Take 1 Tab by mouth daily. Yes Provider, Historical   prednisoLONE acetate (PRED FORTE) 1 % ophthalmic suspension Administer 1 Drop to both eyes two (2) times a week. Monday and Thursday   Yes Provider, Historical   varicella-zoster recombinant, PF, (SHINGRIX, PF,) 50 mcg/0.5 mL susr injection 0.5mL by IntraMUSCular route once now and then repeat in 2-6 months 10/1/19   Elsi Frazier MD   hydrocortisone (ANUSOL-HC) 2.5 % rectal cream Insert  into rectum four (4) times daily. 2/15/19   Elsi Frazier MD   clotrimazole-betamethasone (LOTRISONE) topical cream Apply  to affected area two (2) times a day. Patient taking differently: Apply 1 g to affected area two (2) times daily as needed for Skin Irritation (in skin folds). 3/28/18   Elsi Frazier MD   clobetasol (TEMOVATE) 0.05 % topical cream Apply  to affected area two (2) times daily as needed. 8/25/16   Provider, Historical     Allergies   Allergen Reactions    Pollen Extracts Runny Nose    Ace Inhibitors Swelling and Angioedema     Pt stated \"ace blockers\" can't remember name for htn  Facial swelling          Objective:     Physical Exam:   No data found.     ECG:    EKG Results     Procedure 720 Value Units Date/Time    EKG, 12 LEAD, INITIAL [[id number]] Collected:  11/19/19 1053    Order Status:  Completed Updated:  11/19/19 1241     Ventricular Rate 70 BPM      Atrial Rate 70 BPM      P-R Interval 182 ms      QRS Duration 84 ms      Q-T Interval 384 ms      QTC Calculation (Bezet) 414 ms      Calculated P Axis 34 degrees      Calculated R Axis 66 degrees      Calculated T Axis 40 degrees      Diagnosis --     Normal sinus rhythm  Normal ECG  No previous ECGs available  Confirmed by LEXUS FAN (), Jewelssa Toth ([id number]) on 11/19/2019 12:41:19 PM            Data Review:   Labs:   Results for Dayo Lanier (MRN [id number]) as of 12/3/2019 15:05   Ref. Range 11/19/2019 08:55   Sodium Latest Ref Range: 136 - 145 mmol/L 134 (L)   Potassium Latest Ref Range: 3.5 - 5.1 mmol/L 4.1   Chloride Latest Ref Range: 98 - 107 mmol/L 99   CO2 Latest Ref Range: 21 - 32 mmol/L 28   Anion gap Latest Ref Range: 7 - 16 mmol/L 7   Glucose Latest Ref Range: 65 - 100 mg/dL 69   BUN Latest Ref Range: 8 - 23 MG/DL 25 (H)   Creatinine Latest Ref Range: 0.6 - 1.0 MG/DL 0.75   Calcium Latest Ref Range: 8.3 - 10.4 MG/DL 9.4   GFR est non-AA Latest Ref Range: >60 ml/min/1.73m2 >60   GFR est AA Latest Ref Range: >60 ml/min/1.73m2 >60   Hemoglobin A1c, (calculated) Latest Units: % 5.1   Est. average glucose Latest Units: mg/dL 100       Problem List:  )  Patient Active Problem List   Diagnosis Code    Abdominal pain R10.9    Sigmoid diverticulitis K57.32    Hypercholesterolemia E78.00    Thyroid disease E07.9    Allergic rhinitis due to pollen J30.1    Essential hypertension, benign I10    GERD (gastroesophageal reflux disease) K21.9    Acquired hypothyroidism E03.9    Gastroesophageal reflux disease with esophagitis K21.0    Arthritis of left knee M17.12    Osteoarthritis M19.90       Total Joint Surgery Pre-Assessment Recommendations:           Recommend continuous saturation monitoring hours of sleep, during hospitalization. Albuterol every 6 hours as need during hospitalization.    Signed By: MYRA Hoang    December 3, 2019

## 2019-12-06 NOTE — H&P
[facility]  History and Physical Exam    Patient ID:  Rama Do  [id number]    4 y.o.  1940    Today: December 6, 2019    Vitals Signs: Reviewed as noted in medical record. Allergies: Allergies   Allergen Reactions    Pollen Extracts Runny Nose    Ace Inhibitors Swelling and Angioedema     Pt stated \"ace blockers\" can't remember name for htn  Facial swelling       CC: Right knee pain    HPI:  Pt complains of right knee pain with difficulty ambulating. Relevant PMH:   Past Medical History:   Diagnosis Date    Allergic rhinitis due to pollen     Arthritis     Asthma as a child    Constipation 02/2019    post-op after TKA    Fibrocystic breast     GERD (gastroesophageal reflux disease)     Managed with meds     H/O colonoscopy 2009    Normal ([facility])    Hashimoto's thyroiditis     Hypertension     Managed with meds     Macular degeneration     Mixed hyperlipidemia     Daily meds     Osteoporosis 1/2013    GYN Dr. Orourke Cuff Restless leg     managed with medication    Unspecified hypothyroidism     Varicella        Objective:                    HEENT: NC/AT                   Lungs:  clear                   Heart:   rrr                   Abdomen: soft                   Extremities:  Pain with rom of the right knee joint    Radiographs: reveal osteoarthritis with loss of joint space and bone spurs. Assessment: Unilateral primary osteoarthritis, right knee [M17.11]    Plan:  Proceed with scheduled Procedure(s) (LRB):  RIGHT KNEE ARTHROPLASTY TOTAL / YULIANA (Right) . The patient has failed conservative treatment including NSAIDS, and injections. Due to the amount of pain the patient is experiencing we will proceed with scheduled procedure. It is also felt that the patient is high risk for postoperative complications due to her advanced age and history of multiple chronic medical problems.  The patient may potentially spend 2 nights in the hospital.        Signed By: Edgar Couch [place]  December 6, 2019

## 2019-12-09 ENCOUNTER — ANESTHESIA EVENT (OUTPATIENT)
Dept: SURGERY | Age: 79
DRG: 470 | End: 2019-12-09
Payer: MEDICARE

## 2019-12-10 ENCOUNTER — ANESTHESIA (OUTPATIENT)
Dept: SURGERY | Age: 79
DRG: 470 | End: 2019-12-10
Payer: MEDICARE

## 2019-12-10 ENCOUNTER — HOSPITAL ENCOUNTER (INPATIENT)
Age: 79
LOS: 1 days | Discharge: HOME HEALTH | DRG: 470 | End: 2019-12-11
Attending: ORTHOPAEDIC SURGERY | Admitting: ORTHOPAEDIC SURGERY
Payer: MEDICARE

## 2019-12-10 DIAGNOSIS — M17.11 ARTHRITIS OF RIGHT KNEE: Primary | ICD-10-CM

## 2019-12-10 LAB
GLUCOSE BLD STRIP.AUTO-MCNC: 77 MG/DL (ref 65–100)
HGB BLD-MCNC: 11 G/DL (ref 11.7–15.4)

## 2019-12-10 PROCEDURE — 77030007880 HC KT SPN EPDRL BBMI -B: Performed by: ANESTHESIOLOGY

## 2019-12-10 PROCEDURE — 74011250637 HC RX REV CODE- 250/637: Performed by: PHYSICIAN ASSISTANT

## 2019-12-10 PROCEDURE — 77030003665 HC NDL SPN BBMI -A: Performed by: ANESTHESIOLOGY

## 2019-12-10 PROCEDURE — 74011250636 HC RX REV CODE- 250/636: Performed by: ANESTHESIOLOGY

## 2019-12-10 PROCEDURE — 74011000250 HC RX REV CODE- 250: Performed by: NURSE ANESTHETIST, CERTIFIED REGISTERED

## 2019-12-10 PROCEDURE — 0SRC0J9 REPLACEMENT OF RIGHT KNEE JOINT WITH SYNTHETIC SUBSTITUTE, CEMENTED, OPEN APPROACH: ICD-10-PCS | Performed by: ORTHOPAEDIC SURGERY

## 2019-12-10 PROCEDURE — 74011000250 HC RX REV CODE- 250: Performed by: ANESTHESIOLOGY

## 2019-12-10 PROCEDURE — 74011250636 HC RX REV CODE- 250/636: Performed by: NURSE ANESTHETIST, CERTIFIED REGISTERED

## 2019-12-10 PROCEDURE — 36415 COLL VENOUS BLD VENIPUNCTURE: CPT

## 2019-12-10 PROCEDURE — 77030040361 HC SLV COMPR DVT MDII -B

## 2019-12-10 PROCEDURE — 97165 OT EVAL LOW COMPLEX 30 MIN: CPT

## 2019-12-10 PROCEDURE — C1776 JOINT DEVICE (IMPLANTABLE): HCPCS | Performed by: ORTHOPAEDIC SURGERY

## 2019-12-10 PROCEDURE — 94762 N-INVAS EAR/PLS OXIMTRY CONT: CPT

## 2019-12-10 PROCEDURE — 76210000016 HC OR PH I REC 1 TO 1.5 HR: Performed by: ORTHOPAEDIC SURGERY

## 2019-12-10 PROCEDURE — 77030002933 HC SUT MCRYL J&J -A: Performed by: ORTHOPAEDIC SURGERY

## 2019-12-10 PROCEDURE — 77030031139 HC SUT VCRL2 J&J -A: Performed by: ORTHOPAEDIC SURGERY

## 2019-12-10 PROCEDURE — 76060000034 HC ANESTHESIA 1.5 TO 2 HR: Performed by: ORTHOPAEDIC SURGERY

## 2019-12-10 PROCEDURE — 74011250637 HC RX REV CODE- 250/637: Performed by: ORTHOPAEDIC SURGERY

## 2019-12-10 PROCEDURE — 76010000162 HC OR TIME 1.5 TO 2 HR INTENSV-TIER 1: Performed by: ORTHOPAEDIC SURGERY

## 2019-12-10 PROCEDURE — 97535 SELF CARE MNGMENT TRAINING: CPT

## 2019-12-10 PROCEDURE — 77030002966 HC SUT PDS J&J -A: Performed by: ORTHOPAEDIC SURGERY

## 2019-12-10 PROCEDURE — C1713 ANCHOR/SCREW BN/BN,TIS/BN: HCPCS | Performed by: ORTHOPAEDIC SURGERY

## 2019-12-10 PROCEDURE — 77030012935 HC DRSG AQUACEL BMS -B: Performed by: ORTHOPAEDIC SURGERY

## 2019-12-10 PROCEDURE — 74011000250 HC RX REV CODE- 250: Performed by: ORTHOPAEDIC SURGERY

## 2019-12-10 PROCEDURE — 77030013708 HC HNDPC SUC IRR PULS STRY –B: Performed by: ORTHOPAEDIC SURGERY

## 2019-12-10 PROCEDURE — 77030006835 HC BLD SAW SAG STRY -B: Performed by: ORTHOPAEDIC SURGERY

## 2019-12-10 PROCEDURE — 77030011208: Performed by: ORTHOPAEDIC SURGERY

## 2019-12-10 PROCEDURE — 74011250637 HC RX REV CODE- 250/637: Performed by: ANESTHESIOLOGY

## 2019-12-10 PROCEDURE — 77030019557 HC ELECTRD VES SEAL MEDT -F: Performed by: ORTHOPAEDIC SURGERY

## 2019-12-10 PROCEDURE — 77030018836 HC SOL IRR NACL ICUM -A: Performed by: ORTHOPAEDIC SURGERY

## 2019-12-10 PROCEDURE — 97110 THERAPEUTIC EXERCISES: CPT

## 2019-12-10 PROCEDURE — 74011250636 HC RX REV CODE- 250/636: Performed by: ORTHOPAEDIC SURGERY

## 2019-12-10 PROCEDURE — 76942 ECHO GUIDE FOR BIOPSY: CPT | Performed by: ORTHOPAEDIC SURGERY

## 2019-12-10 PROCEDURE — 82962 GLUCOSE BLOOD TEST: CPT

## 2019-12-10 PROCEDURE — 97161 PT EVAL LOW COMPLEX 20 MIN: CPT

## 2019-12-10 PROCEDURE — 76010010054 HC POST OP PAIN BLOCK: Performed by: ORTHOPAEDIC SURGERY

## 2019-12-10 PROCEDURE — 85018 HEMOGLOBIN: CPT

## 2019-12-10 PROCEDURE — 94760 N-INVAS EAR/PLS OXIMETRY 1: CPT

## 2019-12-10 PROCEDURE — 77030040922 HC BLNKT HYPOTHRM STRY -A: Performed by: ANESTHESIOLOGY

## 2019-12-10 PROCEDURE — 65270000029 HC RM PRIVATE

## 2019-12-10 PROCEDURE — 77030020256 HC SOL INJ NACL 0.9%  500ML: Performed by: ORTHOPAEDIC SURGERY

## 2019-12-10 PROCEDURE — 74011250636 HC RX REV CODE- 250/636: Performed by: PHYSICIAN ASSISTANT

## 2019-12-10 PROCEDURE — 77030008462 HC STPLR SKN PROX J&J -A: Performed by: ORTHOPAEDIC SURGERY

## 2019-12-10 DEVICE — INSERT TIB SZ 3 THK13MM UNIV KNEE CONVENTIONAL POLYETH POST: Type: IMPLANTABLE DEVICE | Site: KNEE | Status: FUNCTIONAL

## 2019-12-10 DEVICE — CEMENT BNE SIMPLEX W/O GENT -- PK/10 ONLY: Type: IMPLANTABLE DEVICE | Site: KNEE | Status: FUNCTIONAL

## 2019-12-10 DEVICE — COMPNT FEM PS CEM TRIATHLN 3 R --: Type: IMPLANTABLE DEVICE | Site: KNEE | Status: FUNCTIONAL

## 2019-12-10 DEVICE — COMPONENT PAT DIA27MM THK8MM KNEE SYMMETRIC NP PRI CEM W/O: Type: IMPLANTABLE DEVICE | Site: KNEE | Status: FUNCTIONAL

## 2019-12-10 DEVICE — BASEPLT TIB UNIV TRIATHLN 3 --: Type: IMPLANTABLE DEVICE | Site: KNEE | Status: FUNCTIONAL

## 2019-12-10 RX ORDER — LEVOTHYROXINE SODIUM 75 UG/1
75 TABLET ORAL
Status: DISCONTINUED | OUTPATIENT
Start: 2019-12-11 | End: 2019-12-11 | Stop reason: HOSPADM

## 2019-12-10 RX ORDER — DEXAMETHASONE SODIUM PHOSPHATE 100 MG/10ML
10 INJECTION INTRAMUSCULAR; INTRAVENOUS ONCE
Status: DISCONTINUED | OUTPATIENT
Start: 2019-12-11 | End: 2019-12-11 | Stop reason: HOSPADM

## 2019-12-10 RX ORDER — CEFAZOLIN SODIUM/WATER 2 G/20 ML
2 SYRINGE (ML) INTRAVENOUS EVERY 8 HOURS
Status: COMPLETED | OUTPATIENT
Start: 2019-12-10 | End: 2019-12-11

## 2019-12-10 RX ORDER — TRANEXAMIC ACID 100 MG/ML
INJECTION, SOLUTION INTRAVENOUS AS NEEDED
Status: DISCONTINUED | OUTPATIENT
Start: 2019-12-10 | End: 2019-12-10 | Stop reason: HOSPADM

## 2019-12-10 RX ORDER — ROPIVACAINE HYDROCHLORIDE 2 MG/ML
INJECTION, SOLUTION EPIDURAL; INFILTRATION; PERINEURAL AS NEEDED
Status: DISCONTINUED | OUTPATIENT
Start: 2019-12-10 | End: 2019-12-10 | Stop reason: HOSPADM

## 2019-12-10 RX ORDER — DIPHENHYDRAMINE HYDROCHLORIDE 50 MG/ML
12.5 INJECTION, SOLUTION INTRAMUSCULAR; INTRAVENOUS
Status: DISCONTINUED | OUTPATIENT
Start: 2019-12-10 | End: 2019-12-10 | Stop reason: HOSPADM

## 2019-12-10 RX ORDER — HYDROMORPHONE HYDROCHLORIDE 2 MG/ML
0.5 INJECTION, SOLUTION INTRAMUSCULAR; INTRAVENOUS; SUBCUTANEOUS
Status: DISCONTINUED | OUTPATIENT
Start: 2019-12-10 | End: 2019-12-10 | Stop reason: HOSPADM

## 2019-12-10 RX ORDER — MIDAZOLAM HYDROCHLORIDE 1 MG/ML
2 INJECTION, SOLUTION INTRAMUSCULAR; INTRAVENOUS
Status: DISCONTINUED | OUTPATIENT
Start: 2019-12-10 | End: 2019-12-10 | Stop reason: HOSPADM

## 2019-12-10 RX ORDER — DEXAMETHASONE SODIUM PHOSPHATE 4 MG/ML
INJECTION, SOLUTION INTRA-ARTICULAR; INTRALESIONAL; INTRAMUSCULAR; INTRAVENOUS; SOFT TISSUE
Status: COMPLETED | OUTPATIENT
Start: 2019-12-10 | End: 2019-12-10

## 2019-12-10 RX ORDER — LIDOCAINE HYDROCHLORIDE 10 MG/ML
0.1 INJECTION INFILTRATION; PERINEURAL AS NEEDED
Status: DISCONTINUED | OUTPATIENT
Start: 2019-12-10 | End: 2019-12-10 | Stop reason: HOSPADM

## 2019-12-10 RX ORDER — MIDAZOLAM HYDROCHLORIDE 1 MG/ML
2 INJECTION, SOLUTION INTRAMUSCULAR; INTRAVENOUS
Status: COMPLETED | OUTPATIENT
Start: 2019-12-10 | End: 2019-12-10

## 2019-12-10 RX ORDER — CARBIDOPA AND LEVODOPA 25; 100 MG/1; MG/1
1 TABLET ORAL
Status: DISCONTINUED | OUTPATIENT
Start: 2019-12-10 | End: 2019-12-11 | Stop reason: HOSPADM

## 2019-12-10 RX ORDER — CEFAZOLIN SODIUM/WATER 2 G/20 ML
2 SYRINGE (ML) INTRAVENOUS ONCE
Status: COMPLETED | OUTPATIENT
Start: 2019-12-10 | End: 2019-12-10

## 2019-12-10 RX ORDER — KETOROLAC TROMETHAMINE 30 MG/ML
INJECTION, SOLUTION INTRAMUSCULAR; INTRAVENOUS AS NEEDED
Status: DISCONTINUED | OUTPATIENT
Start: 2019-12-10 | End: 2019-12-10 | Stop reason: HOSPADM

## 2019-12-10 RX ORDER — CELECOXIB 200 MG/1
200 CAPSULE ORAL EVERY 12 HOURS
Status: DISCONTINUED | OUTPATIENT
Start: 2019-12-10 | End: 2019-12-11 | Stop reason: HOSPADM

## 2019-12-10 RX ORDER — ACETAMINOPHEN 500 MG
1000 TABLET ORAL EVERY 6 HOURS
Status: DISCONTINUED | OUTPATIENT
Start: 2019-12-11 | End: 2019-12-11 | Stop reason: HOSPADM

## 2019-12-10 RX ORDER — SODIUM CHLORIDE, SODIUM LACTATE, POTASSIUM CHLORIDE, CALCIUM CHLORIDE 600; 310; 30; 20 MG/100ML; MG/100ML; MG/100ML; MG/100ML
75 INJECTION, SOLUTION INTRAVENOUS CONTINUOUS
Status: DISCONTINUED | OUTPATIENT
Start: 2019-12-10 | End: 2019-12-10 | Stop reason: HOSPADM

## 2019-12-10 RX ORDER — ACETAMINOPHEN 10 MG/ML
1000 INJECTION, SOLUTION INTRAVENOUS ONCE
Status: COMPLETED | OUTPATIENT
Start: 2019-12-10 | End: 2019-12-10

## 2019-12-10 RX ORDER — HYDROMORPHONE HYDROCHLORIDE 1 MG/ML
1 INJECTION, SOLUTION INTRAMUSCULAR; INTRAVENOUS; SUBCUTANEOUS
Status: DISCONTINUED | OUTPATIENT
Start: 2019-12-10 | End: 2019-12-11 | Stop reason: HOSPADM

## 2019-12-10 RX ORDER — ONDANSETRON 2 MG/ML
INJECTION INTRAMUSCULAR; INTRAVENOUS AS NEEDED
Status: DISCONTINUED | OUTPATIENT
Start: 2019-12-10 | End: 2019-12-10 | Stop reason: HOSPADM

## 2019-12-10 RX ORDER — ALBUTEROL SULFATE 0.83 MG/ML
2.5 SOLUTION RESPIRATORY (INHALATION)
Status: DISCONTINUED | OUTPATIENT
Start: 2019-12-10 | End: 2019-12-11 | Stop reason: HOSPADM

## 2019-12-10 RX ORDER — ASPIRIN 81 MG/1
81 TABLET ORAL EVERY 12 HOURS
Status: DISCONTINUED | OUTPATIENT
Start: 2019-12-10 | End: 2019-12-11 | Stop reason: HOSPADM

## 2019-12-10 RX ORDER — HYDROCHLOROTHIAZIDE 25 MG/1
12.5 TABLET ORAL DAILY
Status: DISCONTINUED | OUTPATIENT
Start: 2019-12-11 | End: 2019-12-11 | Stop reason: HOSPADM

## 2019-12-10 RX ORDER — SODIUM CHLORIDE, SODIUM LACTATE, POTASSIUM CHLORIDE, CALCIUM CHLORIDE 600; 310; 30; 20 MG/100ML; MG/100ML; MG/100ML; MG/100ML
100 INJECTION, SOLUTION INTRAVENOUS CONTINUOUS
Status: DISCONTINUED | OUTPATIENT
Start: 2019-12-10 | End: 2019-12-10 | Stop reason: HOSPADM

## 2019-12-10 RX ORDER — BACITRACIN 50000 [IU]/1
INJECTION, POWDER, FOR SOLUTION INTRAMUSCULAR AS NEEDED
Status: DISCONTINUED | OUTPATIENT
Start: 2019-12-10 | End: 2019-12-10 | Stop reason: HOSPADM

## 2019-12-10 RX ORDER — SODIUM CHLORIDE 9 MG/ML
100 INJECTION, SOLUTION INTRAVENOUS CONTINUOUS
Status: DISCONTINUED | OUTPATIENT
Start: 2019-12-10 | End: 2019-12-11 | Stop reason: HOSPADM

## 2019-12-10 RX ORDER — OXYCODONE HYDROCHLORIDE 5 MG/1
10 TABLET ORAL
Status: DISCONTINUED | OUTPATIENT
Start: 2019-12-10 | End: 2019-12-11 | Stop reason: HOSPADM

## 2019-12-10 RX ORDER — SODIUM CHLORIDE 0.9 % (FLUSH) 0.9 %
5-40 SYRINGE (ML) INJECTION AS NEEDED
Status: DISCONTINUED | OUTPATIENT
Start: 2019-12-10 | End: 2019-12-11 | Stop reason: HOSPADM

## 2019-12-10 RX ORDER — AMOXICILLIN 250 MG
2 CAPSULE ORAL DAILY
Status: DISCONTINUED | OUTPATIENT
Start: 2019-12-11 | End: 2019-12-11 | Stop reason: HOSPADM

## 2019-12-10 RX ORDER — EPHEDRINE SULFATE/0.9% NACL/PF 50 MG/5 ML
SYRINGE (ML) INTRAVENOUS AS NEEDED
Status: DISCONTINUED | OUTPATIENT
Start: 2019-12-10 | End: 2019-12-10 | Stop reason: HOSPADM

## 2019-12-10 RX ORDER — PROPOFOL 10 MG/ML
INJECTION, EMULSION INTRAVENOUS
Status: DISCONTINUED | OUTPATIENT
Start: 2019-12-10 | End: 2019-12-10 | Stop reason: HOSPADM

## 2019-12-10 RX ORDER — CELECOXIB 200 MG/1
200 CAPSULE ORAL ONCE
Status: COMPLETED | OUTPATIENT
Start: 2019-12-10 | End: 2019-12-10

## 2019-12-10 RX ORDER — OXYCODONE HYDROCHLORIDE 5 MG/1
5 TABLET ORAL
Status: DISCONTINUED | OUTPATIENT
Start: 2019-12-10 | End: 2019-12-10 | Stop reason: HOSPADM

## 2019-12-10 RX ORDER — BUPIVACAINE HYDROCHLORIDE 7.5 MG/ML
INJECTION, SOLUTION INTRASPINAL
Status: COMPLETED | OUTPATIENT
Start: 2019-12-10 | End: 2019-12-10

## 2019-12-10 RX ORDER — NALOXONE HYDROCHLORIDE 0.4 MG/ML
0.1 INJECTION, SOLUTION INTRAMUSCULAR; INTRAVENOUS; SUBCUTANEOUS
Status: DISCONTINUED | OUTPATIENT
Start: 2019-12-10 | End: 2019-12-10 | Stop reason: HOSPADM

## 2019-12-10 RX ORDER — ONDANSETRON 4 MG/1
4 TABLET, ORALLY DISINTEGRATING ORAL
Status: DISCONTINUED | OUTPATIENT
Start: 2019-12-10 | End: 2019-12-11 | Stop reason: HOSPADM

## 2019-12-10 RX ORDER — DIPHENHYDRAMINE HCL 25 MG
25 CAPSULE ORAL
Status: DISCONTINUED | OUTPATIENT
Start: 2019-12-10 | End: 2019-12-11 | Stop reason: HOSPADM

## 2019-12-10 RX ORDER — FLUMAZENIL 0.1 MG/ML
0.2 INJECTION INTRAVENOUS
Status: DISCONTINUED | OUTPATIENT
Start: 2019-12-10 | End: 2019-12-10 | Stop reason: HOSPADM

## 2019-12-10 RX ORDER — FENTANYL CITRATE 50 UG/ML
100 INJECTION, SOLUTION INTRAMUSCULAR; INTRAVENOUS
Status: COMPLETED | OUTPATIENT
Start: 2019-12-10 | End: 2019-12-10

## 2019-12-10 RX ORDER — DEXAMETHASONE SODIUM PHOSPHATE 4 MG/ML
INJECTION, SOLUTION INTRA-ARTICULAR; INTRALESIONAL; INTRAMUSCULAR; INTRAVENOUS; SOFT TISSUE AS NEEDED
Status: DISCONTINUED | OUTPATIENT
Start: 2019-12-10 | End: 2019-12-10 | Stop reason: HOSPADM

## 2019-12-10 RX ORDER — SODIUM CHLORIDE 0.9 % (FLUSH) 0.9 %
5-40 SYRINGE (ML) INJECTION EVERY 8 HOURS
Status: DISCONTINUED | OUTPATIENT
Start: 2019-12-10 | End: 2019-12-11 | Stop reason: HOSPADM

## 2019-12-10 RX ORDER — PANTOPRAZOLE SODIUM 40 MG/1
40 TABLET, DELAYED RELEASE ORAL
Status: DISCONTINUED | OUTPATIENT
Start: 2019-12-10 | End: 2019-12-11 | Stop reason: HOSPADM

## 2019-12-10 RX ORDER — NALOXONE HYDROCHLORIDE 0.4 MG/ML
.2-.4 INJECTION, SOLUTION INTRAMUSCULAR; INTRAVENOUS; SUBCUTANEOUS
Status: DISCONTINUED | OUTPATIENT
Start: 2019-12-10 | End: 2019-12-11 | Stop reason: HOSPADM

## 2019-12-10 RX ORDER — PHENYLEPHRINE 40 MG/250 ML (160 MCG/ML) IN 0.9 % SODIUM CHLORIDE IV
SOLUTION
Status: DISCONTINUED | OUTPATIENT
Start: 2019-12-10 | End: 2019-12-10 | Stop reason: HOSPADM

## 2019-12-10 RX ORDER — VALSARTAN 320 MG/1
320 TABLET ORAL DAILY
Status: DISCONTINUED | OUTPATIENT
Start: 2019-12-11 | End: 2019-12-11 | Stop reason: HOSPADM

## 2019-12-10 RX ORDER — VANCOMYCIN HYDROCHLORIDE 1 G/20ML
INJECTION, POWDER, LYOPHILIZED, FOR SOLUTION INTRAVENOUS AS NEEDED
Status: DISCONTINUED | OUTPATIENT
Start: 2019-12-10 | End: 2019-12-10 | Stop reason: HOSPADM

## 2019-12-10 RX ADMIN — OXYCODONE HYDROCHLORIDE 10 MG: 5 TABLET ORAL at 23:13

## 2019-12-10 RX ADMIN — Medication 2 G: at 11:22

## 2019-12-10 RX ADMIN — ACETAMINOPHEN 1000 MG: 500 TABLET, FILM COATED ORAL at 23:10

## 2019-12-10 RX ADMIN — SODIUM CHLORIDE, SODIUM LACTATE, POTASSIUM CHLORIDE, AND CALCIUM CHLORIDE: 600; 310; 30; 20 INJECTION, SOLUTION INTRAVENOUS at 12:39

## 2019-12-10 RX ADMIN — PROPOFOL 75 MCG/KG/MIN: 10 INJECTION, EMULSION INTRAVENOUS at 11:29

## 2019-12-10 RX ADMIN — SODIUM CHLORIDE, SODIUM LACTATE, POTASSIUM CHLORIDE, AND CALCIUM CHLORIDE 100 ML/HR: 600; 310; 30; 20 INJECTION, SOLUTION INTRAVENOUS at 08:59

## 2019-12-10 RX ADMIN — Medication 10 ML: at 16:37

## 2019-12-10 RX ADMIN — OXYCODONE HYDROCHLORIDE 10 MG: 5 TABLET ORAL at 16:43

## 2019-12-10 RX ADMIN — Medication 15 MG: at 11:42

## 2019-12-10 RX ADMIN — FENTANYL CITRATE 50 MCG: 50 INJECTION INTRAMUSCULAR; INTRAVENOUS at 10:34

## 2019-12-10 RX ADMIN — CARBIDOPA AND LEVODOPA 1 TABLET: 25; 100 TABLET ORAL at 23:10

## 2019-12-10 RX ADMIN — Medication 2 G: at 20:06

## 2019-12-10 RX ADMIN — DEXAMETHASONE SODIUM PHOSPHATE 10 MG: 4 INJECTION, SOLUTION INTRAMUSCULAR; INTRAVENOUS at 12:38

## 2019-12-10 RX ADMIN — BUPIVACAINE HYDROCHLORIDE IN DEXTROSE 13.75 MG: 7.5 INJECTION, SOLUTION SUBARACHNOID at 11:23

## 2019-12-10 RX ADMIN — Medication 10 ML: at 20:12

## 2019-12-10 RX ADMIN — DEXAMETHASONE SODIUM PHOSPHATE 4 MG: 4 INJECTION, SOLUTION INTRAMUSCULAR; INTRAVENOUS at 10:38

## 2019-12-10 RX ADMIN — ACETAMINOPHEN 1000 MG: 10 INJECTION, SOLUTION INTRAVENOUS at 17:42

## 2019-12-10 RX ADMIN — LIDOCAINE HYDROCHLORIDE 0.1 ML: 10 INJECTION, SOLUTION INFILTRATION; PERINEURAL at 08:59

## 2019-12-10 RX ADMIN — PANTOPRAZOLE SODIUM 40 MG: 40 TABLET, DELAYED RELEASE ORAL at 16:36

## 2019-12-10 RX ADMIN — ASPIRIN 81 MG: 81 TABLET ORAL at 20:07

## 2019-12-10 RX ADMIN — ONDANSETRON 4 MG: 2 INJECTION INTRAMUSCULAR; INTRAVENOUS at 12:38

## 2019-12-10 RX ADMIN — ROPIVACAINE HYDROCHLORIDE 20 ML: 2 INJECTION, SOLUTION EPIDURAL; INFILTRATION at 10:38

## 2019-12-10 RX ADMIN — Medication 10 MG: at 12:01

## 2019-12-10 RX ADMIN — Medication 10 MCG/MIN: at 12:00

## 2019-12-10 RX ADMIN — TRANEXAMIC ACID 1000 MG: 100 INJECTION, SOLUTION INTRAVENOUS at 11:29

## 2019-12-10 RX ADMIN — Medication 3 AMPULE: at 08:59

## 2019-12-10 RX ADMIN — CELECOXIB 200 MG: 200 CAPSULE ORAL at 08:59

## 2019-12-10 RX ADMIN — Medication 15 MG: at 11:49

## 2019-12-10 RX ADMIN — MIDAZOLAM 1 MG: 1 INJECTION INTRAMUSCULAR; INTRAVENOUS at 10:34

## 2019-12-10 RX ADMIN — CELECOXIB 200 MG: 200 CAPSULE ORAL at 20:07

## 2019-12-10 RX ADMIN — Medication 1 AMPULE: at 20:07

## 2019-12-10 RX ADMIN — Medication 15 MG: at 11:33

## 2019-12-10 NOTE — PERIOP NOTES
TRANSFER - OUT REPORT:    Verbal report given to Tamika Whitley on [place]ina [place]  being transferred to [place] [address] for routine post - op       Report consisted of patients Situation, Background, Assessment and   Recommendations(SBAR). Information from the following report(s) SBAR, Kardex, Procedure Summary, Intake/Output and MAR was reviewed with the receiving nurse. Lines:   Peripheral IV 12/10/19 Left Wrist (Active)   Site Assessment Clean, dry, & intact 12/10/2019  1:45 PM   Phlebitis Assessment 0 12/10/2019  1:45 PM   Infiltration Assessment 0 12/10/2019  1:45 PM   Dressing Status Clean, dry, & intact 12/10/2019  1:45 PM   Dressing Type Transparent;Tape 12/10/2019  1:45 PM   Hub Color/Line Status Pink;Patent; Infusing 12/10/2019  1:45 PM   Action Taken Blood drawn 12/10/2019  9:15 AM        Opportunity for questions and clarification was provided.       Patient transported with:  chart

## 2019-12-10 NOTE — PROGRESS NOTES
Problem: Mobility Impaired (Adult and Pediatric)  Goal: *Acute Goals and Plan of Care (Insert Text)  Description  GOALS (1-4 days):  (1.)Ms. Nikole Gordon will move from supine to sit and sit to supine  in bed with STAND BY ASSIST.  (2.)Ms. Nikole Gordon will transfer from bed to chair and chair to bed with STAND BY ASSIST using the least restrictive device. (3.)Ms. Nikole Gordon will ambulate with STAND BY ASSIST for 150 feet with the least restrictive device. (4.)Ms. Nikole Gordon will ambulate up/down 2 steps with bilateral  railing with MINIMAL ASSIST with no device. (5.)Ms. Nikole Gordon will increase right knee ROM to 5°-80°.  ________________________________________________________________________________________________     Outcome: Progressing Towards Goal     PHYSICAL THERAPY JOINT CAMP TKA: Initial Assessment and PM 12/10/2019  INPATIENT: Hospital Day: 1  Payor: [employer] / Plan: [employer] [address] / Product Type: [email or web address] Care Medicare /      NAME/AGE/GENDER: Ashleigh Corona is a 70 y.o. female   PRIMARY DIAGNOSIS:  Unilateral primary osteoarthritis, right knee [M17.11]   Procedure(s) and Anesthesia Type:     * RIGHT KNEE ARTHROPLASTY TOTAL - Spinal (Right)  ICD-10: Treatment Diagnosis:    Pain in Right Knee (M25.561)  Stiffness of Right Knee, Not elsewhere classified (M25.661)  Difficulty in walking, Not elsewhere classified (R26.2)      ASSESSMENT:     Ms. Nikole Gordon presents with decreased rom and strength of right LE as well as decreased functional mobility and gait s/p right tka. She plans to go home with HHPT.     This section established at most recent assessment   PROBLEM LIST (Impairments causing functional limitations):  Decreased Strength  Decreased ADL/Functional Activities  Decreased Transfer Abilities  Decreased Ambulation Ability/Technique  Decreased Balance  Increased Pain  Decreased Activity Tolerance  Decreased Flexibility/Joint Mobility  Decreased [place] with Home Exercise Program INTERVENTIONS PLANNED: (Benefits and precautions of physical therapy have been discussed with the patient.)  bed mobility  gait training  home exercise program (HEP)  Range of Motion: active/assisted/passive  Therapeutic Activities  therapeutic exercise/strengthening  transfer training  Group Therapy     TREATMENT PLAN: Frequency/Duration: Follow patient BID for duration of hospital stay to address above goals. Rehabilitation Potential For Stated Goals: Good     RECOMMENDED REHABILITATION/EQUIPMENT: (at time of discharge pending progress): Continue Skilled Therapy and Home Health: Physical Therapy. HISTORY:   History of Present Injury/Illness (Reason for Referral):  S/p right tka  Past Medical History/Comorbidities:   Ms. Izabella Daly  has a past medical history of Allergic rhinitis due to pollen, Arthritis, Asthma (as a child), Constipation (02/2019), Fibrocystic breast, GERD (gastroesophageal reflux disease), H/O colonoscopy (2009), Hashimoto's thyroiditis, Hypertension, Macular degeneration, Mixed hyperlipidemia, Osteoporosis (1/2013), Restless leg, Unspecified hypothyroidism, and Varicella. Ms. Izabella Daly  has a past surgical history that includes hx appendectomy (1973); hx tonsillectomy (as a child); hx adenoidectomy (as a child); hx cataract removal (Bilateral, 2007); hx colonoscopy; hx hernia repair (06/16/2018); hx knee replacement (Left, 02/07/2019); and hx ovarian cyst removal (1973).   Social History/Living Environment:   Home Environment: Private residence  One/Two Story Residence: One story  Living Alone: No  Support Systems: Family member(s), Friends \ neighbors  Patient Expects to be Discharged to[de-identified] Unknown  Current DME Used/Available at Home: None  Prior Level of Function/Work/Activity:  independent   Number of Personal Factors/Comorbidities that affect the Plan of Care: 1-2: MODERATE COMPLEXITY   EXAMINATION:   Most Recent Physical Functioning:      Gross Assessment  AROM: Generally decreased, functional(left LE)  Strength: Generally decreased, functional(left LE)        RLE AROM  R Knee Flexion: 45  R Knee Extension: 10            Bed Mobility  Supine to Sit: Minimum assistance    Transfers  Sit to Stand: Minimum assistance  Stand to Sit: Minimum assistance  Bed to Chair: Minimum assistance    Balance  Sitting: Intact  Standing: Pull to stand; With support              Weight Bearing Status  Right Side Weight Bearing: As tolerated  Distance (ft): 3 Feet (ft)  Ambulation - Level of Assistance: Minimal assistance  Assistive Device: Walker, rolling  Speed/Mishel: Delayed  Step Length: Left shortened;Right shortened  Stance: Right decreased  Gait Abnormalities: Antalgic  Interventions: Safety awareness training;Verbal cues; Tactile cues;Manual cues     Braces/Orthotics:     Right Knee Cold  Type: Cryocuff      Body Structures Involved:  Bones  Joints  Muscles  Ligaments Body Functions Affected: Movement Related Activities and Participation Affected: Mobility   Number of elements that affect the Plan of Care: 3: MODERATE COMPLEXITY   CLINICAL PRESENTATION:   Presentation: Stable and uncomplicated: LOW COMPLEXITY   CLINICAL DECISION MAKIN [facility] [address] [id number] AM-PAC 6 Clicks   Basic Mobility Inpatient Short Form  How much difficulty does the patient currently have. .. Unable A Lot A Little None   1. Turning over in bed (including adjusting bedclothes, sheets and blankets)? [] 1   [] 2   [x] 3   [] 4   2. Sitting down on and standing up from a chair with arms ( e.g., wheelchair, bedside commode, etc.)   [] 1   [] 2   [x] 3   [] 4   3. Moving from lying on back to sitting on the side of the bed? [] 1   [] 2   [x] 3   [] 4   How much help from another person does the patient currently need. .. Total A Lot A Little None   4. Moving to and from a bed to a chair (including a wheelchair)? [] 1   [] 2   [x] 3   [] 4   5. Need to walk in hospital room? [] 1   [x] 2   [] 3   [] 4   6.   Climbing 3-5 steps with a railing? [] 1   [x] 2   [] 3   [] 4   © 2007, Trustees of [facility] [employer], under license to [employer]. All rights reserved     Score:  Initial: 16 Most Recent: X (Date: -- )    Interpretation of Tool:  Represents activities that are increasingly more difficult (i.e. Bed mobility, Transfers, Gait). Medical Necessity:     Patient is expected to demonstrate progress in   strength, range of motion, and balance   to   decrease assistance required with exercises and functional mobility    . Reason for Services/Other Comments:  Patient   continues to require present interventions due to patient's inability to perform exercises and functional mobility independently   . Use of outcome tool(s) and clinical judgement create a POC that gives a: Clear prediction of patient's progress: LOW COMPLEXITY            TREATMENT:   (In addition to Assessment/Re-Assessment sessions the following treatments were rendered)     Pre-treatment Symptoms/Complaints:  knee pain  Pain Initial:      Post Session:  4     Therapeutic Exercise: (10 Minutes):  Exercises per grid below to improve mobility and strength. Required minimal visual, verbal, and manual cues to promote proper body alignment, promote proper body posture, and promote proper body mechanics. Progressed range and repetitions as indicated.      Date:  12/10 Date:   Date:     ACTIVITY/EXERCISE AM PM AM PM AM PM   GROUP THERAPY  []  []  []  []  []  []   Ankle Pumps  10a       Quad Sets  10a       Gluteal Sets  10a       Hip ABd/ADduction  10aa       Straight Leg Raises         Knee Slides  10aa       Short Arc Quads         Long Arc Quads         Chair Slides                  B = bilateral; AA = active assistive; A = active; P = passive      Treatment/Session Assessment:     Response to Treatment:  Pt. Did fine, just wanted to get to chair    Education:  [x] Home Exercises  [x] Fall Precautions  []  [] D/C Instruction Review  [x] Knee Prosthesis Review  [x] Julisa Casper Management/Safety [] Adaptive Equipment as Needed       Interdisciplinary Collaboration:   Occupational Therapist  Registered Nurse    After treatment position/precautions:   Up in chair  Bed/Chair-wheels locked  Bed in low position  Caregiver at bedside  Call light within reach  Family at bedside    Compliance with Program/Exercises: Will assess as treatment progresses. Recommendations/Intent for next treatment session:  Treatment next visit will focus on increasing Ms. Somers's independence with bed mobility, transfers, gait training, strength/ROM exercises, modalities for pain, and patient education.       Total Treatment Duration:  PT Patient Time In/Time Out  Time In: 1550  Time Out: [address], PT

## 2019-12-10 NOTE — PROGRESS NOTES
Care Management Interventions  PCP Verified by CM: Yes  Mode of Transport at Discharge: Other (see comment)  Transition of Care Consult (CM Consult): Other  Current Support Network: Lives with Spouse  Confirm Follow Up Transport: Family  Plan discussed with Pt/Family/Caregiver: Yes  Freedom of Choice Offered: Yes  Discharge Location  Discharge Placement: Home with home health  Visited with pt regarding plans for discharge, pt plans to return home with spouse upon d/c / [facility] services, pt denies any needs for DME equipment. CM will continue to follow until d/c.

## 2019-12-10 NOTE — H&P
The patient has end stage arthritis of the right knee joint. The patient was seen and examined and there are no changes to the patient's orthopedic condition. They have tried conservative treatment for this condition; including antiinflammatories and lifestyle modifications and have failed. The necessity for the joint replacement is still present, and the H&P from the office is still current. The patient will be admitted today forProcedure(s) (LRB):  RIGHT KNEE ARTHROPLASTY TOTAL / [employer] (Right) .

## 2019-12-10 NOTE — PERIOP NOTES
TRANSFER - IN REPORT:    Verbal report received from Kelly Delgado 38 RN(name) on nAa Chan  being received from joint Clarence(unit) for routine progression of care      Report consisted of patients Situation, Background, Assessment and   Recommendations(SBAR). Information from the following report(s) SBAR, Kardex and MAR was reviewed with the receiving nurse. Opportunity for questions and clarification was provided. Assessment completed upon patients arrival to unit and care assumed.

## 2019-12-10 NOTE — ANESTHESIA PROCEDURE NOTES
Peripheral Block    Start time: 12/10/2019 10:36 AM  End time: 12/10/2019 10:38 AM  Performed by: Mumtaz Knowles MD  Authorized by: Mumtaz Knowles MD       Pre-procedure: Indications: at surgeon's request and post-op pain management    Preanesthetic Checklist: patient identified, risks and benefits discussed, site marked, timeout performed, anesthesia consent given and patient being monitored    Timeout Time: 10:34          Block Type:   Block Type:   Adductor canal  Laterality:  Right  Monitoring:  Standard ASA monitoring, continuous pulse ox, frequent vital sign checks, heart rate, responsive to questions and oxygen  Injection Technique:  Single shot  Procedures: ultrasound guided    Patient Position: supine  Prep: chlorhexidine    Location:  Mid thigh  Needle Type:  Stimuplex  Needle Gauge:  22 G  Needle Localization:  Ultrasound guidance    Assessment:  Number of attempts:  1  Injection Assessment:  Incremental injection every 5 mL, local visualized surrounding nerve on ultrasound, negative aspiration for CSF, negative aspiration for blood, no paresthesia, no intravascular symptoms and ultrasound image on chart  Patient tolerance:  Patient tolerated the procedure well with no immediate complications

## 2019-12-10 NOTE — PERIOP NOTES
TRANSFER - OUT REPORT:    Verbal report given to Chantell Hamilton on Kathryn Manning  being transferred to [facility] for routine progression of care       Report consisted of patients Situation, Background, Assessment and   Recommendations(SBAR). Information from the following report(s) Kardex, MAR and Recent Results was reviewed with the receiving nurse. Lines:   Peripheral IV 12/10/19 Left Wrist (Active)   Site Assessment Clean, dry, & intact 12/10/2019  9:15 AM   Phlebitis Assessment 0 12/10/2019  9:15 AM   Infiltration Assessment 0 12/10/2019  9:15 AM   Dressing Type Tape;Transparent 12/10/2019  9:15 AM   Hub Color/Line Status Infusing;Pink;Patent 12/10/2019  9:15 AM   Action Taken Blood drawn 12/10/2019  9:15 AM        Opportunity for questions and clarification was provided.       Patient transported with:   [employer]

## 2019-12-10 NOTE — OP NOTES
[address] [place] [address]  Total Knee Arthroplasty  Patient:Geovanna Fernandez   : 1940  Medical Record Number:[id number]      Pre-operative Diagnosis:  Unilateral primary osteoarthritis, right knee [M17.11]  Post-operative Diagnosis: Unilateral primary osteoarthritis, right knee [M17.11]  Location: [place] [address]    Date of Procedure: 12/10/2019  Surgeon: Jess Mccloud MD  Assistant:  KELLIE Meyers    Anesthesia: Spinal and  nerve block    Procedure:  Right Posterior Stabilized Total Knee Arthroplasty with use of Bone Cement    Tourniquet Time: none    EBL:  300 cc     The complexity of the total joint surgery requires the use of a first assistant for positioning, retraction and assistance in closure. Kathryn Manning was brought to the operating room, positioned on the operating room table, and after appropriate identification  was anethestized. A palomares catheter was placed preoperatively and IV antibiotics were administered, along with IV transexamic acid. The limb was prepped and draped in the usual sterile fashion. Prior to the incision being made a timeout was called identifying the patient, procedure ,operative side and surgeon. An anterior longitudinal incision was accomplished just medial to the tibial tubercle and extending approximal 6 centimeters proximal to the superior pole of the patella. A medial parapatellar capsular incision was performed. The medial capsular flap was elevated around to the insertion of the semimembranous tendon. The patella was everted and the knee flexed and externally rotated. The articular surface revealed cartilage loss with exposed bone and bone spurs throughout all three compartments. The medial and lateral menisci were excised. The lateral half of the fat pad excised and the patella femoral ligament was released. The anterior cruciate ligament and the posterior cruciate ligament were resected.   Using extramedullary instrumentation, the tibial cut was accomplished with appropriate posterior slope. Approximately 6 mm of bone was removed from the high side of the tibia. The distal femur was addressed next. A drill hole was made above the intracondylar notch. Using appropriate intramedullary instrumentation, an appropriate valgus distal cut was accomplished. The femur was sized to a 3 component. The anterior and posterior cuts and anterior and posterior chamfer cuts were then made about the distal femur. Osteophytes were removed from the tibial and femoral surfaces. The appropriate cutting blocks was then utilized to perform the notch cut, with appropriate lateral translation accomplished for the patellofemoral groove. The tibia was sized to a 3 component. The tibial base plate was pinned into place with  appropriate external rotation and the stem site prepared. A preliminary range of motion was accomplished with the above size trial components. A 13 millimeter polyethylene insert allowed the patient to obtain full extension as well as appropriate flexion. Additional surgical releases were none. .  The patient's ligaments were stable in flexion and extension to medial and lateral stressing and alignment was through the appropriate mechanical axis. The patella was then everted. The bone was resected to accomodate a size 27 patella button. A trial reduction revealed appropriate tracking through the patellofemoral groove with no lateral retinacular release accomplished. All trial components were removed and the surfaces were prepared for cementing with irrigation and debridement of the bone interstices. The posterior capsule and periosteum and soft tissues were injected for postop pain management. One package of cement  was mixed and the permanent components cemented into place. The femoral and tibial components were pressurized in full extension as well as 70 degrees of flexion.   The patella component was pressurized using the patella clamp. Excess cement was removed using a curette. Once the cement was hardened, the patella clamp was removed and the knee was copiously irrigated. A lavage of diluted betadine solution of 17.5 ml Betadine in 500 of 0.9% Normal Saline was allowed to soak in the wound for 3 minutes after implanting of the prosthesis. The wound was irrigated with Saline again before closure. Prior to the final skin closure, full strength betadine was applied to the skin surrounding the skin incision. After fascial closure the knee was injected with a solution of 1 gram of TXA and 1 gram of Vancomycin powder. Vel Somers's knee was placed through a range of motion and noted to be stable as mentioned above with the trial components. The operative knee was injected prior to closure for post op pain management. The capsular layer was closed using a #1 PDS suture, while the subcutaneous layers were closed using a 2-0 Monocryl interrupted suture. The skin was closed using staples and a sterile bandage was applied. A cryo pad was applied on the operative leg. The sponge count and needle counts were correct. Implants:   Implant Name Type Inv.  Item Serial No.  Lot No. LRB No. Used   CEMENT BNE SIMPLEX W/O GENT -- PK/10 ONLY - [id number]  CEMENT BNE SIMPLEX W/O GENT -- PK/10 ONLY [id number] [employer] HOW [id number] Right 1   COMPNT FEM PS CHAPIN TRIATHLN 3 R --  - [id number]  COMPNT FEM PS CHAPIN TRIATHLN 3 R --  [id number] [employer] HOW [id number] Right 1   BASEPLT TIB UNIV TRIATHLN 3 --  - [id number]  BASEPLT TIB UNIV TRIATHLN 3 --  [id number] [employer] HOW [id number] Right 1   INSERT TIB PS TRIATHLN 3 13MM --  - [id number]  INSERT TIB PS TRIATHLN 3 13MM --  [id number] [employer] HOW [id number] Right 1   COMPNT PAT SYM TRIATHLN 27X8MM --  - [id number]  COMPNT PAT SYM TRIATHLN 27X8MM --  [facility] [employer] HOW [id number] Right 1     Signed By: Trisha Conte MD

## 2019-12-10 NOTE — PROGRESS NOTES
[address]  Face to Face Encounter    Patients Name: Ashly Brandon    YOB: 1940    Ordering Physician: DR. Destiny Dumont     Primary Diagnosis: Unilateral primary osteoarthritis, right knee [M17.11]  Arthritis of right knee [M17.11]    Date of Face to Face:   12/10/2019                                  Face to Face Encounter findings are related to primary reason for home care:   yes. 1. I certify that the patient needs intermittent care as follows: physical therapy: gait/stair training    2. I certify that this patient is homebound, that is: 1) patient requires the use of a walker device, special transportation, or assistance of another to leave the home; or 2) patient's condition makes leaving the home medically contraindicated; and 3) patient has a normal inability to leave the home and leaving the home requires considerable and taxing effort. Patient may leave the home for infrequent and short duration for medical reasons, and occasional absences for non-medical reasons. Homebound status is due to the following functional limitations: Patient with strength deficits limiting the performance of all ADL's without caregiver assistance or the use of an assistive device. 3. I certify that this patient is under my care and that I, or a nurse practitioner or  [id number], or clinical nurse specialist, or certified nurse midwife, working with me, had a Face-to-Face Encounter that meets the physician Face-to-Face Encounter requirements. The following are the clinical findings from the [address] [place] [address] encounter that support the need for skilled services and is a summary of the encounter:  See hospital chart          Angel Luis Su RN  12/10/2019      THE FOLLOWING TO BE COMPLETED BY THE COMMUNITY PHYSICIAN:    I concur with the findings described above from the [facility] encounter that this patient is homebound and in need of a skilled service.     Certifying Physician: _____________________________________      Printed Certifying Physician Name: _____________________________________    Date: _________________

## 2019-12-10 NOTE — ROUTINE PROCESS
Teach back method used with patient concerning antiseptic wash, TB screening, incentive spirometer( 2000 demonstrated preop), and pain management goals.  Patient and family were provided with home discharge needs list.

## 2019-12-10 NOTE — PROGRESS NOTES
Problem: Self Care Deficits Care Plan (Adult)  Goal: *Acute Goals and Plan of Care (Insert Text)  Description  GOALS:   DISCHARGE GOALS (in preparation for going home/rehab):  3 days  1. Ms. Susana Nevarez will perform one lower body dressing activity with minimal assistance required to demonstrate improved functional mobility and safety. 2.  Ms. Susana Nevarez will perform one lower body bathing activity with minimal assistance required to demonstrate improved functional mobility and safety. 3.  Ms. Susana Nevarez will perform toileting/toilet transfer with contact guard assistance to demonstrate improved functional mobility and safety. 4.  Ms. Susana Nevarez will perform shower transfer with contact guard assistance to demonstrate improved functional mobility and safety. Outcome: Progressing Towards Goal     JOINT CAMP OCCUPATIONAL THERAPY TKA: Initial Assessment, Daily Note, Treatment Day: Day of Assessment, and PM 12/10/2019  INPATIENT: Hospital Day: 1  Payor: Shanel Cerda / Plan: [address] / Product Type: [employer] Care Medicare /      NAME/AGE/GENDER: Sonal Stoll is a 70 y.o. female   PRIMARY DIAGNOSIS:  Unilateral primary osteoarthritis, right knee [M17.11]   Procedure(s) and Anesthesia Type:     * RIGHT KNEE ARTHROPLASTY TOTAL - Spinal (Right)  ICD-10: Treatment Diagnosis:    Pain in Right Knee (M25.561)  Stiffness of Right Knee, Not elsewhere classified (W11.329)      ASSESSMENT:     Ms. Susana Nevarez is s/p right TKA and presents with decreased weight bearing on right LE and decreased independence with functional mobility and activities of daily living as compared to baseline level of function and safety. Patient would benefit from skilled Occupational Therapy to maximize independence and safety with self-care task and functional mobility. Pt would also benefit from education on adaptive equipment and safety precautions in preparation for going home. Pt up to edge of bed and donned gown.  Pt to chair and tolerated well. This section established at most recent assessment   PROBLEM LIST (Impairments causing functional limitations):  Decreased Strength  Decreased ADL/Functional Activities  Decreased Transfer Abilities  Increased Pain  Increased Fatigue  Decreased Flexibility/Joint Mobility  Decreased Knowledge of Precautions   INTERVENTIONS PLANNED: (Benefits and precautions of occupational therapy have been discussed with the patient.)  Activities of daily living training  Adaptive equipment training  Balance training  Clothing management  Donning&doffing training  Theraputic activity     TREATMENT PLAN: Frequency/Duration: Follow patient 1-2 times to address above goals. Rehabilitation Potential For Stated Goals: Good     RECOMMENDED REHABILITATION/EQUIPMENT: (at time of discharge pending progress): Continue Skilled Therapy. OCCUPATIONAL PROFILE AND HISTORY:   History of Present Injury/Illness (Reason for Referral): Pt presents this date s/p (right) TKA. Past Medical History/Comorbidities:   Ms. Sterling Abreu  has a past medical history of Allergic rhinitis due to pollen, Arthritis, Asthma (as a child), Constipation (02/2019), Fibrocystic breast, GERD (gastroesophageal reflux disease), H/O colonoscopy (2009), Hashimoto's thyroiditis, Hypertension, Macular degeneration, Mixed hyperlipidemia, Osteoporosis (1/2013), Restless leg, Unspecified hypothyroidism, and Varicella. Ms. Sterling Abreu  has a past surgical history that includes hx appendectomy (1973); hx tonsillectomy (as a child); hx adenoidectomy (as a child); hx cataract removal (Bilateral, 2007); hx colonoscopy; hx hernia repair (06/16/2018); hx knee replacement (Left, 02/07/2019); and hx ovarian cyst removal (1973).   Social History/Living Environment:   Home Environment: Private residence  One/Two Story Residence: One story  Living Alone: No  Support Systems: Family member(s), Friends \ neighbors  Patient Expects to be Discharged to[de-identified] Unknown  Current DME Used/Available at Home: None  Prior Level of Function/Work/Activity:  Independent      Number of Personal Factors/Comorbidities that affect the Plan of Care: Brief history (0):  LOW COMPLEXITY   ASSESSMENT OF OCCUPATIONAL PERFORMANCE[de-identified]   Most Recent Physical Functioning:   Balance  Sitting: Intact  Standing: Pull to stand; With support       Gross Assessment  AROM: Generally decreased, functional(left LE)  Strength: Generally decreased, functional(left LE)            Coordination  Fine Motor Skills-Upper: Left Intact; Right Intact  Gross Motor Skills-Upper: Left Intact; Right Intact         Mental Status  Neurologic State: Alert  Orientation Level: Oriented X4  Cognition: Appropriate decision making  Perception: Appears intact  Perseveration: No perseveration noted  Safety/Judgement: Awareness of environment          RLE Assessment  RLE Assessment (WDL): Exceptions to WDL  RLE AROM  R Knee Flexion: 45  R Knee Extension: 10     Basic ADLs (From Assessment) Complex ADLs (From Assessment)   Basic ADL  Feeding: Independent  Oral Facial Hygiene/Grooming: Supervision  Bathing: Moderate assistance  Upper Body Dressing: Supervision  Lower Body Dressing: Moderate assistance  Toileting:  Moderate assistance     Grooming/Bathing/Dressing Activities of Daily Living     Cognitive Retraining  Safety/Judgement: Awareness of environment                 Functional Transfers  Bathroom Mobility: Minimum assistance  Toilet Transfer : Minimum assistance  Shower Transfer: Minimum assistance     Bed/Mat Mobility  Supine to Sit: Minimum assistance  Sit to Stand: Minimum assistance  Stand to Sit: Minimum assistance  Bed to Chair: Minimum assistance         Physical Skills Involved:  Range of Motion  Balance  Strength Cognitive Skills Affected (resulting in the inability to perform in a timely and safe manner):  none  Psychosocial Skills Affected:  Environmental Adaptation   Number of elements that affect the Plan of Care: 3-5:  MODERATE COMPLEXITY   CLINICAL DECISION MAKIN[address] [place] [address] AM-PAC 6 Clicks   Daily Activity Inpatient Short Form  How much help from another person does the patient currently need. .. Total A Lot A Little None   1. Putting on and taking off regular lower body clothing? [] 1   [x] 2   [] 3   [] 4   2. Bathing (including washing, rinsing, drying)? [] 1   [x] 2   [] 3   [] 4   3. Toileting, which includes using toilet, bedpan or urinal?   [] 1   [] 2   [x] 3   [] 4   4. Putting on and taking off regular upper body clothing? [] 1   [] 2   [] 3   [x] 4   5. Taking care of personal grooming such as brushing teeth? [] 1   [] 2   [] 3   [x] 4   6. Eating meals? [] 1   [] 2   [] 3   [x] 4   © , Trustees of [address] [place] [address], under license to [employer]. All rights reserved     Score:  Initial: 19 Most Recent: X (Date: -- )    Interpretation of Tool:  Represents activities that are increasingly more difficult (i.e. Bed mobility, Transfers, Gait). Use of outcome tool(s) and clinical judgement create a POC that gives a: LOW COMPLEXITY            TREATMENT:   (In addition to Assessment/Re-Assessment sessions the following treatments were rendered)     Pre-treatment Symptoms/Complaints:  pt with complaint of just starting to feel knee  Pain: Initial:     0 Post Session:  0     Self Care: (10 min): Procedure(s) (per grid) utilized to improve and/or restore self-care/home management as related to dressing. Required minimal verbal, manual, and   cueing to facilitate activities of daily living skills and compensatory activities. OT evaluation completed   Treatment/Session Assessment:     Response to Treatment:  pt up to recliner tolerated well.     Education:  [] Home Exercises  [x] Fall Precautions  [] Hip Precautions [] Going Home Video  [x] Knee/Hip Prosthesis Review  [x] Walker Management/Safety [x] Adaptive Equipment as Needed       Interdisciplinary Collaboration:   Physical Therapist  Occupational Therapist  Registered Nurse    After treatment position/precautions:   Up in chair  Bed/Chair-wheels locked  Caregiver at bedside  Call light within reach  RN notified     Compliance with Program/Exercises: Compliant all of the time. Recommendations/Intent for next treatment session:  Treatment next visit will focus on increasing Ms. Somers's independence with bed mobility, transfers, self care, functional mobility, modalities for pain, and patient education.       Total Treatment Duration:  OT Patient Time In/Time Out  Time In: [phone]  Time Out: [address] [facility],

## 2019-12-10 NOTE — PROGRESS NOTES
TRANSFER - IN REPORT:    Verbal report received from PACU(name) on Ana Arana  being received from [facility]) for routine progression of care      Report consisted of patients Situation, Background, Assessment and   Recommendations(SBAR). Information from the following report(s) SBAR, Kardex, Procedure Summary, Intake/Output, MAR and Recent Results was reviewed with the receiving nurse. Opportunity for questions and clarification was provided. Assessment completed upon patients arrival to unit and care assumed.

## 2019-12-10 NOTE — PROGRESS NOTES
12/10/19 1626   Oxygen Therapy   O2 Sat (%) 99 %   Pulse via Oximetry 88 beats per minute   O2 Device Room air   Incentive Spirometry Treatment   Actual Volume (ml) 2250 ml   Number of Attempts 1   Patient achieved            2250    Ml/sec on IS. Patient encouraged to do every hour while awake-patient agreed and demonstrated. No shortness of breath or distress noted. BS are clear b/l.    [facility] notes reviewed- continuous sat #16 ordered HS

## 2019-12-10 NOTE — ANESTHESIA POSTPROCEDURE EVALUATION
Procedure(s):  RIGHT KNEE ARTHROPLASTY TOTAL.     spinal    Anesthesia Post Evaluation      Multimodal analgesia: multimodal analgesia used between 6 hours prior to anesthesia start to PACU discharge  Patient location during evaluation: PACU  Patient participation: complete - patient participated  Level of consciousness: awake  Pain management: adequate  Airway patency: patent  Anesthetic complications: no  Cardiovascular status: acceptable  Respiratory status: spontaneous ventilation and acceptable  Hydration status: acceptable  Post anesthesia nausea and vomiting:  none      Vitals Value Taken Time   /53 12/10/2019  1:21 PM   Temp     Pulse 84 12/10/2019  1:21 PM   Resp 14 12/10/2019  1:21 PM   SpO2

## 2019-12-10 NOTE — ANESTHESIA PROCEDURE NOTES
Spinal Block    Start time: 12/10/2019 11:21 AM  End time: 12/10/2019 11:23 AM  Performed by: Anastasia Kennedy MD  Authorized by: Anastasia Kennedy MD     Pre-procedure:   Indications: primary anesthetic  Preanesthetic Checklist: patient identified, risks and benefits discussed, anesthesia consent, patient being monitored and timeout performed    Timeout Time: 11:20          Spinal Block:   Patient Position:  Seated  Prep Region:  Lumbar  Prep: chlorhexidine and patient draped      Location:  L3-4  Technique:  Single shot    Local Dose (mL):  2    Needle:   Needle Type:  Pencan  Needle Gauge:  25 G  Attempts:  1      Events: CSF confirmed, no blood with aspiration and no paresthesia        Assessment:  Insertion:  Uncomplicated  Patient tolerance:  Patient tolerated the procedure well with no immediate complications

## 2019-12-11 VITALS
TEMPERATURE: 97.4 F | HEIGHT: 64 IN | HEART RATE: 77 BPM | WEIGHT: 153 LBS | RESPIRATION RATE: 16 BRPM | DIASTOLIC BLOOD PRESSURE: 55 MMHG | SYSTOLIC BLOOD PRESSURE: 100 MMHG | BODY MASS INDEX: 26.12 KG/M2 | OXYGEN SATURATION: 100 %

## 2019-12-11 LAB — HGB BLD-MCNC: 10.4 G/DL (ref 11.7–15.4)

## 2019-12-11 PROCEDURE — 74011250637 HC RX REV CODE- 250/637: Performed by: PHYSICIAN ASSISTANT

## 2019-12-11 PROCEDURE — 74011250636 HC RX REV CODE- 250/636: Performed by: PHYSICIAN ASSISTANT

## 2019-12-11 PROCEDURE — 97110 THERAPEUTIC EXERCISES: CPT

## 2019-12-11 PROCEDURE — 85018 HEMOGLOBIN: CPT

## 2019-12-11 PROCEDURE — 74011250637 HC RX REV CODE- 250/637: Performed by: ORTHOPAEDIC SURGERY

## 2019-12-11 PROCEDURE — 36415 COLL VENOUS BLD VENIPUNCTURE: CPT

## 2019-12-11 PROCEDURE — 94760 N-INVAS EAR/PLS OXIMETRY 1: CPT

## 2019-12-11 PROCEDURE — 97150 GROUP THERAPEUTIC PROCEDURES: CPT

## 2019-12-11 PROCEDURE — 97535 SELF CARE MNGMENT TRAINING: CPT

## 2019-12-11 PROCEDURE — 97116 GAIT TRAINING THERAPY: CPT

## 2019-12-11 RX ORDER — ASPIRIN 81 MG/1
81 TABLET ORAL EVERY 12 HOURS
Qty: 60 TAB | Refills: 0 | Status: SHIPPED | OUTPATIENT
Start: 2019-12-11

## 2019-12-11 RX ORDER — OXYCODONE HYDROCHLORIDE 10 MG/1
10 TABLET ORAL
Qty: 50 TAB | Refills: 0 | Status: SHIPPED | OUTPATIENT
Start: 2019-12-11 | End: 2019-12-14

## 2019-12-11 RX ADMIN — Medication 10 ML: at 05:39

## 2019-12-11 RX ADMIN — ACETAMINOPHEN 1000 MG: 500 TABLET, FILM COATED ORAL at 05:33

## 2019-12-11 RX ADMIN — SENNOSIDES AND DOCUSATE SODIUM 2 TABLET: 8.6; 5 TABLET ORAL at 09:15

## 2019-12-11 RX ADMIN — Medication 1 AMPULE: at 09:15

## 2019-12-11 RX ADMIN — OXYCODONE HYDROCHLORIDE 10 MG: 5 TABLET ORAL at 11:25

## 2019-12-11 RX ADMIN — ACETAMINOPHEN 1000 MG: 500 TABLET, FILM COATED ORAL at 11:24

## 2019-12-11 RX ADMIN — Medication 2 G: at 04:23

## 2019-12-11 RX ADMIN — LEVOTHYROXINE SODIUM 75 MCG: 75 TABLET ORAL at 05:34

## 2019-12-11 RX ADMIN — ASPIRIN 81 MG: 81 TABLET ORAL at 09:15

## 2019-12-11 RX ADMIN — CELECOXIB 200 MG: 200 CAPSULE ORAL at 09:15

## 2019-12-11 NOTE — PROGRESS NOTES
Pt discharge summary and home medication sheet reviewed and signed by pt. Copy given for take home use. RX for po oxycodone and asa given. All goals met. Assessment unchanged. Pt leaving hospital via w/c with staff member and . [place]

## 2019-12-11 NOTE — PROGRESS NOTES
Problem: Mobility Impaired (Adult and Pediatric)  Goal: *Acute Goals and Plan of Care (Insert Text)  Description  GOALS (1-4 days):  (1.)Ms. Gil Casillas will move from supine to sit and sit to supine  in bed with STAND BY ASSIST.  (2.)Ms. Gil Casillas will transfer from bed to chair and chair to bed with STAND BY ASSIST using the least restrictive device. (3.)Ms. Gil Casillas will ambulate with STAND BY ASSIST for 150 feet with the least restrictive device. (4.)Ms. Gil Casillas will ambulate up/down 2 steps with bilateral  railing with MINIMAL ASSIST with no device. (5.)Ms. Gil Casillas will increase right knee ROM to 5°-80°.  ________________________________________________________________________________________________     Outcome: Progressing Towards Goal     PHYSICAL THERAPY JOINT CAMP TKA: Daily Note and AM 12/11/2019  INPATIENT: Hospital Day: 2  Payor: George Appiah / Plan: [address] / Product Type: [employer] Medicare /      NAME/AGE/GENDER: Dagoberto Chung is a 70 y.o. female   PRIMARY DIAGNOSIS:  Unilateral primary osteoarthritis, right knee [M17.11]   Procedure(s) and Anesthesia Type:     * RIGHT KNEE ARTHROPLASTY TOTAL - Spinal (Right)  ICD-10: Treatment Diagnosis:    · Pain in Right Knee (M25.561)  · Stiffness of Right Knee, Not elsewhere classified (M25.661)  · Difficulty in walking, Not elsewhere classified (R26.2)      ASSESSMENT:     Ms. Gil Casillas presents with decreased rom and strength of right LE as well as decreased functional mobility and gait s/p right tka. She plans to go home with HHPT. Pt. Doing well this am with no complaints.  present. She plans to go home today. She ambulated in the room with walker CGA and did tka exercises with cues. No questions. This section established at most recent assessment   PROBLEM LIST (Impairments causing functional limitations):  1. Decreased Strength  2. Decreased ADL/Functional Activities  3. Decreased Transfer Abilities  4.  Decreased Ambulation Ability/Technique  5. Decreased Balance  6. Increased Pain  7. Decreased Activity Tolerance  8. Decreased Flexibility/Joint Mobility  9. Decreased [place] with Home Exercise Program   INTERVENTIONS PLANNED: (Benefits and precautions of physical therapy have been discussed with the patient.)  1. bed mobility  2. gait training  3. home exercise program (HEP)  4. Range of Motion: active/assisted/passive  5. Therapeutic Activities  6. therapeutic exercise/strengthening  7. transfer training  8. Group Therapy     TREATMENT PLAN: Frequency/Duration: Follow patient BID for duration of hospital stay to address above goals. Rehabilitation Potential For Stated Goals: Good     RECOMMENDED REHABILITATION/EQUIPMENT: (at time of discharge pending progress): Continue Skilled Therapy and Home Health: Physical Therapy. HISTORY:   History of Present Injury/Illness (Reason for Referral):  S/p right tka  Past Medical History/Comorbidities:   Ms. Taryn Prieto  has a past medical history of Allergic rhinitis due to pollen, Arthritis, Asthma (as a child), Constipation (02/2019), Fibrocystic breast, GERD (gastroesophageal reflux disease), H/O colonoscopy (2009), Hashimoto's thyroiditis, Hypertension, Macular degeneration, Mixed hyperlipidemia, Osteoporosis (1/2013), Restless leg, Unspecified hypothyroidism, and Varicella. Ms. Taryn Prieto  has a past surgical history that includes hx appendectomy (1973); hx tonsillectomy (as a child); hx adenoidectomy (as a child); hx cataract removal (Bilateral, 2007); hx colonoscopy; hx hernia repair (06/16/2018); hx knee replacement (Left, 02/07/2019); and hx ovarian cyst removal (1973).   Social History/Living Environment:   Home Environment: Private residence  One/Two Story Residence: One story  Living Alone: No  Support Systems: Family member(s), Friends \ neighbors  Patient Expects to be Discharged to[de-identified] Unknown  Current DME Used/Available at Home: None  Prior Level of Function/Work/Activity:  independent   Number of Personal Factors/Comorbidities that affect the Plan of Care: 1-2: MODERATE COMPLEXITY   EXAMINATION:   Most Recent Physical Functioning:               RLE AROM  R Knee Flexion: 60  R Knee Extension: 10            Bed Mobility  Supine to Sit: Contact guard assistance    Transfers  Sit to Stand: Contact guard assistance  Stand to Sit: Contact guard assistance  Bed to Chair: Contact guard assistance    Balance  Sitting: Intact  Standing: With support              Weight Bearing Status  Right Side Weight Bearing: As tolerated  Distance (ft): 20 Feet (ft)  Ambulation - Level of Assistance: Contact guard assistance  Assistive Device: Walker, rolling  Speed/Mishel: Delayed  Step Length: Left shortened;Right shortened  Stance: Right decreased  Gait Abnormalities: Antalgic  Interventions: Safety awareness training;Verbal cues     Braces/Orthotics:     Right Knee Cold  Type: Cryocuff      Body Structures Involved:  1. Bones  2. Joints  3. Muscles  4. Ligaments Body Functions Affected:  1. Movement Related Activities and Participation Affected:  1. Mobility   Number of elements that affect the Plan of Care: 3: MODERATE COMPLEXITY   CLINICAL PRESENTATION:   Presentation: Stable and uncomplicated: LOW COMPLEXITY   CLINICAL DECISION MAKIN[address] [place] [address] AM-PAC 6 Clicks   Basic Mobility Inpatient Short Form  How much difficulty does the patient currently have. .. Unable A Lot A Little None   1. Turning over in bed (including adjusting bedclothes, sheets and blankets)? [] 1   [] 2   [x] 3   [] 4   2. Sitting down on and standing up from a chair with arms ( e.g., wheelchair, bedside commode, etc.)   [] 1   [] 2   [x] 3   [] 4   3. Moving from lying on back to sitting on the side of the bed? [] 1   [] 2   [x] 3   [] 4   How much help from another person does the patient currently need. .. Total A Lot A Little None   4.   Moving to and from a bed to a chair (including a wheelchair)? [] 1   [] 2   [x] 3   [] 4   5. Need to walk in hospital room? [] 1   [x] 2   [] 3   [] 4   6. Climbing 3-5 steps with a railing? [] 1   [x] 2   [] 3   [] 4   © 2007, Trustees of [address] [place] [address] [address] [address], under license to [employer]. All rights reserved     Score:  Initial: 16 Most Recent: X (Date: -- )    Interpretation of Tool:  Represents activities that are increasingly more difficult (i.e. Bed mobility, Transfers, Gait). Medical Necessity:     · Patient is expected to demonstrate progress in   · strength, range of motion, and balance  ·  to   · decrease assistance required with exercises and functional mobility    · . Reason for Services/Other Comments:  · Patient   · continues to require present interventions due to patient's inability to perform exercises and functional mobility independently   · . Use of outcome tool(s) and clinical judgement create a POC that gives a: Clear prediction of patient's progress: LOW COMPLEXITY            TREATMENT:   (In addition to Assessment/Re-Assessment sessions the following treatments were rendered)     Pre-treatment Symptoms/Complaints:  knee pain  Pain Initial:      Post Session:  Did not rate     Therapeutic Exercise: (15 Minutes):  Exercises per grid below to improve mobility and strength. Required minimal visual, verbal, and manual cues to promote proper body alignment, promote proper body posture, and promote proper body mechanics. Progressed range and repetitions as indicated. Gait Training (15 Minutes):  Gait training to improve and/or restore physical functioning as related to mobility, strength and balance. Ambulated 20 Feet (ft) with Contact guard assistance using a Walker, rolling and minimal Safety awareness training;Verbal cues related to their stance phase to promote proper body alignment, promote proper body posture and promote proper body mechanics.        Date:  12/10 Date:  12/11 Date:     ACTIVITY/EXERCISE AM PM AM PM AM PM GROUP THERAPY  []  []  []  []  []  []   Ankle Pumps  10a 10a      Quad Sets  10a 10a      Gluteal Sets  10a 10a      Hip ABd/ADduction  10aa 10a      Straight Leg Raises   10a      Knee Slides  10aa 10aa      Short Arc Quads         Long Arc Quads         Chair Slides                  B = bilateral; AA = active assistive; A = active; P = passive      Treatment/Session Assessment:     Response to Treatment:  Pt. With some progress    Education:  [x] Home Exercises  [x] Fall Precautions  []  [] D/C Instruction Review  [x] Knee Prosthesis Review  [x] Walker Management/Safety [] Adaptive Equipment as Needed       Interdisciplinary Collaboration:   o Registered Nurse    After treatment position/precautions:   o Up in chair  o Bed/Chair-wheels locked  o Bed in low position  o Caregiver at bedside  o Call light within reach  o Family at bedside    Compliance with Program/Exercises: Will assess as treatment progresses. Recommendations/Intent for next treatment session:  Treatment next visit will focus on increasing Ms. Somers's independence with bed mobility, transfers, gait training, strength/ROM exercises, modalities for pain, and patient education.       Total Treatment Duration:  PT Patient Time In/Time Out  Time In: 0830  Time Out: [address] [facility], PT

## 2019-12-11 NOTE — PROGRESS NOTES
Problem: Self Care Deficits Care Plan (Adult)  Goal: *Acute Goals and Plan of Care (Insert Text)  Description  GOALS:   DISCHARGE GOALS (in preparation for going home/rehab):  3 days  1. Ms. Ren Aragon will perform one lower body dressing activity with minimal assistance required to demonstrate improved functional mobility and safety. GOAL MET 12/11/2019     2. Ms. Ren Aragon will perform one lower body bathing activity with minimal assistance required to demonstrate improved functional mobility and safety. GOAL MET 12/11/2019    3. Ms. Ren Aragon will perform toileting/toilet transfer with contact guard assistance to demonstrate improved functional mobility and safety. GOAL MET 12/11/2019    4. Ms. Ren Aragon will perform shower transfer with contact guard assistance to demonstrate improved functional mobility and safety. GOAL MET 12/11/2019    Outcome: Progressing Towards Goal     JOINT CAMP OCCUPATIONAL THERAPY TKA: Daily Note, Discharge, Treatment Day: 2nd and AM 12/11/2019  INPATIENT: Hospital Day: 2  Payor: Mirta Axon / Plan: [place] / Product Type: Managed Care Medicare /      NAME/AGE/GENDER: Amy Pa is a 70 y.o. female   PRIMARY DIAGNOSIS:  Unilateral primary osteoarthritis, right knee [M17.11]   Procedure(s) and Anesthesia Type:     * RIGHT KNEE ARTHROPLASTY TOTAL - Spinal (Right)  ICD-10: Treatment Diagnosis:    · Pain in Right Knee (M25.561)  · Stiffness of Right Knee, Not elsewhere classified (M48.494)      ASSESSMENT:       Ms. Ren Aragon is s/p right TKA and presents with decreased weight bearing on right LE and decreased independence with functional mobility and activities of daily living. Patient completed shower and dressing as charter below in ADL grid and is ambulating with rolling walker and CGA to stand by assist.  Patient has met 4/4 goals and plans to return home with good family support.   Family able to provide patient with appropriate level of assistance at this time. OT reviewed safety precautions throughout session and therapy schedule for the remainder of today. Patient instructed to call for assistance when needing to get up from recliner and all needs in reach. Patient verbalized understanding of call light. This section established at most recent assessment   PROBLEM LIST (Impairments causing functional limitations):  1. Decreased Strength  2. Decreased ADL/Functional Activities  3. Decreased Transfer Abilities  4. Increased Pain  5. Increased Fatigue  6. Decreased Flexibility/Joint Mobility  7. Decreased Knowledge of Precautions   INTERVENTIONS PLANNED: (Benefits and precautions of occupational therapy have been discussed with the patient.)  1. Activities of daily living training  2. Adaptive equipment training  3. Balance training  4. Clothing management  5. Donning&doffing training  6. Theraputic activity     TREATMENT PLAN: Frequency/Duration: Follow patient 1-2 times to address above goals. Rehabilitation Potential For Stated Goals: Good     RECOMMENDED REHABILITATION/EQUIPMENT: (at time of discharge pending progress): Continue Skilled Therapy. OCCUPATIONAL PROFILE AND HISTORY:   History of Present Injury/Illness (Reason for Referral): Pt presents this date s/p (right) TKA. Past Medical History/Comorbidities:   Ms. Marisela Butts  has a past medical history of Allergic rhinitis due to pollen, Arthritis, Asthma (as a child), Constipation (02/2019), Fibrocystic breast, GERD (gastroesophageal reflux disease), H/O colonoscopy (2009), Hashimoto's thyroiditis, Hypertension, Macular degeneration, Mixed hyperlipidemia, Osteoporosis (1/2013), Restless leg, Unspecified hypothyroidism, and Varicella.   Ms. Marisela Butts  has a past surgical history that includes hx appendectomy (1973); hx tonsillectomy (as a child); hx adenoidectomy (as a child); hx cataract removal (Bilateral, 2007); hx colonoscopy; hx hernia repair (06/16/2018); hx knee replacement (Left, 02/07/2019); and hx ovarian cyst removal (1973). Social History/Living Environment:   Home Environment: Private residence  One/Two Story Residence: One story  Living Alone: No  Support Systems: Family member(s), Friends \ neighbors  Patient Expects to be Discharged to[de-identified] Unknown  Current DME Used/Available at Home: None  Prior Level of Function/Work/Activity:  Independent      Number of Personal Factors/Comorbidities that affect the Plan of Care: Brief history (0):  LOW COMPLEXITY   ASSESSMENT OF OCCUPATIONAL PERFORMANCE[de-identified]   Most Recent Physical Functioning:   Balance  Sitting: Intact  Standing: With support                              Mental Status  Neurologic State: Alert; Appropriate for age  Orientation Level: Appropriate for age  Cognition: Appropriate decision making; Appropriate for age attention/concentration; Appropriate safety awareness; Follows commands  Perception: Appears intact  Perseveration: No perseveration noted  Safety/Judgement: Awareness of environment; Fall prevention                Basic ADLs (From Assessment) Complex ADLs (From Assessment)   Basic ADL  Feeding: Independent  Oral Facial Hygiene/Grooming: Supervision  Bathing: Moderate assistance  Type of Bath: Chlorhexidine (CHG), Shower  Upper Body Dressing: Supervision  Lower Body Dressing: Moderate assistance  Toileting: Moderate assistance     Grooming/Bathing/Dressing Activities of Daily Living   Grooming  Grooming Assistance: Supervision  Position Performed: Standing  Washing Face: Supervision  Washing Hands: Supervision  Brushing Teeth: Supervision  Brushing/Combing Hair: Supervision Cognitive Retraining  Safety/Judgement: Awareness of environment; Fall prevention   Upper Body Bathing  Bathing Assistance: Supervision  Position Performed: Seated in chair  Adaptive Equipment: Grab bar;Tub bench     Lower Body Bathing  Bathing Assistance: Minimum assistance  Perineal  : Contact guard assistance  Position Performed: Standing  Lower Body : Minimum assistance  Position Performed: Seated in chair;Standing  Adaptive Equipment: Grab bar;Tub bench     Upper Body Dressing Assistance  Dressing Assistance: Supervision  Bra: Raquel White 58: Supervision  Pullover Shirt: Supervision Functional Transfers  Bathroom Mobility: Stand-by assistance;Contact guard assistance  Toilet Transfer : Contact guard assistance  Shower Transfer: Contact guard assistance  Adaptive Equipment: Bedside commode;Grab bars; Tub transfer bench   Lower Body Dressing Assistance  Dressing Assistance: Minimum assistance  Underpants: Contact guard assistance;Minimum assistance  Pants With Elastic Waist: Contact guard assistance;Minimum assistance  Socks: Minimum assistance           Physical Skills Involved:  1. Range of Motion  2. Balance  3. Strength Cognitive Skills Affected (resulting in the inability to perform in a timely and safe manner): 1. none  Psychosocial Skills Affected:  1. Environmental Adaptation   Number of elements that affect the Plan of Care: 3-5:  MODERATE COMPLEXITY   CLINICAL DECISION MAKIN[address] [place] [address] AM-PAC 6 Clicks   Daily Activity Inpatient Short Form  How much help from another person does the patient currently need. .. Total A Lot A Little None   1. Putting on and taking off regular lower body clothing? [] 1   [x] 2   [] 3   [] 4   2. Bathing (including washing, rinsing, drying)? [] 1   [x] 2   [] 3   [] 4   3. Toileting, which includes using toilet, bedpan or urinal?   [] 1   [] 2   [x] 3   [] 4   4. Putting on and taking off regular upper body clothing? [] 1   [] 2   [] 3   [x] 4   5. Taking care of personal grooming such as brushing teeth? [] 1   [] 2   [] 3   [x] 4   6. Eating meals? [] 1   [] 2   [] 3   [x] 4   © , Trustees of [address] [place] [address], under license to [employer].  All rights reserved     Score:  Initial: 19 Most Recent: 21 discharge 19    Interpretation of Tool:  Represents activities that are increasingly more difficult (i.e. Bed mobility, Transfers, Gait). Use of outcome tool(s) and clinical judgement create a POC that gives a: LOW COMPLEXITY            TREATMENT:   (In addition to Assessment/Re-Assessment sessions the following treatments were rendered)     Pre-treatment Symptoms/Complaints:  pt with complaint of just starting to feel knee  Pain: Initial:   Pain Intensity 1: 0  Pain Location 1: Knee  Pain Orientation 1: Right 0 Post Session:  2/10 rest, iceman in place     Self Care: 25 min): Procedure(s) (per grid) utilized to improve and/or restore self-care/home management as related to dressing, bathing, toileting and grooming. Required minimal verbal, manual, and   cueing to facilitate activities of daily living skills and compensatory activities. Treatment/Session Assessment:     Response to Treatment:  pt up to recliner tolerated well wants to go home today    Education:  [] Home Exercises  [x] Fall Precautions  [] Hip Precautions [] Going Home Video  [x] Knee/Hip Prosthesis Review  [x] Walker Management/Safety [x] Adaptive Equipment as Needed       Interdisciplinary Collaboration:   o Physical Therapist  o Occupational Therapist  o Registered Nurse    After treatment position/precautions:   o Up in chair  o Bed/Chair-wheels locked  o Call light within reach  o RN notified     Compliance with Program/Exercises: Compliant all of the time. Recommendations/Intent for next treatment session:   Pt doing well all goals met and will do well at home with support from spouse. Patient will be discharged home with home health PT. No further Occupational Therapy warranted, will discharge Occupational Therapy services.         Total Treatment Duration:25  OT Patient Time In/Time Out  Time In: 0598  Time Out: 310 [facility]

## 2019-12-11 NOTE — PROGRESS NOTES
12/10/19 2041   Oxygen Therapy   O2 Sat (%) 98 %   Pulse via Oximetry 82 beats per minute   O2 Device Room air   Patient set up on continuous oximetry with no complications

## 2019-12-11 NOTE — PROGRESS NOTES
2019         Post Op day: 1 Day Post-Op   Admit Diagnosis: Unilateral primary osteoarthritis, right knee [M17.11]  Arthritis of right knee [M17.11]  LAB:    Recent Results (from the past 24 hour(s))   GLUCOSE, POC    Collection Time: 12/10/19  9:19 AM   Result Value Ref Range    Glucose (POC) 77 65 - 100 mg/dL   HEMOGLOBIN    Collection Time: 12/10/19  7:36 PM   Result Value Ref Range    HGB 11.0 (L) 11.7 - 15.4 g/dL   HEMOGLOBIN    Collection Time: 19  3:48 AM   Result Value Ref Range    HGB 10.4 (L) 11.7 - 15.4 g/dL     Vital Signs:    Patient Vitals for the past 8 hrs:   BP Temp Pulse Resp SpO2   19 0703 102/55 96.6 °F (35.9 °C) 73 16 98 %   19 0320 97/53 97.4 °F (36.3 °C) 73 16 93 %   19 0012 97/60 98 °F (36.7 °C) 73 16 96 %     Temp (24hrs), Av.5 °F (36.4 °C), Min:96.6 °F (35.9 °C), Max:98 °F (36.7 °C)    Pain Control:   Pain Assessment  Pain Scale 1: Numeric (0 - 10)  Pain Intensity 1: 6  Pain Onset 1: years but worse since 2019  Pain Location 1: Knee  Pain Orientation 1: Right, Posterior  Pain Description 1: Constant  Pain Intervention(s) 1: Medication (see MAR)  Subjective: Doing well, pain is well controlled, no complaints     Objective:  No Acute Distress, Alert and Oriented, Neurovascular exam is normal       Assessment:   Patient Active Problem List   Diagnosis Code    Abdominal pain R10.9    Sigmoid diverticulitis K57.32    Hypercholesterolemia E78.00    Thyroid disease E07.9    Allergic rhinitis due to pollen J30.1    Essential hypertension, benign I10    GERD (gastroesophageal reflux disease) K21.9    Acquired hypothyroidism E03.9    Gastroesophageal reflux disease with esophagitis K21.0    Arthritis of left knee M17.12    Osteoarthritis M19.90    Arthritis of right knee M17.11       Status Post Procedure(s) (LRB):  RIGHT KNEE ARTHROPLASTY TOTAL (Right)        Plan: Continue Physical Therapy, Monitor Hgb. ASA/SCDs for DVT prophylaxis. Anticipate d/c to home today.   Patient was seen by Dr. Meg Reardon this AM.     Signed By: Elian Arriola, PA

## 2019-12-11 NOTE — PROGRESS NOTES
Problem: Mobility Impaired (Adult and Pediatric)  Goal: *Acute Goals and Plan of Care (Insert Text)  Description  GOALS (1-4 days):  (1.)Ms. Rosy Pandey will move from supine to sit and sit to supine  in bed with STAND BY ASSIST.  (2.)Ms. Rosy Pandey will transfer from bed to chair and chair to bed with STAND BY ASSIST using the least restrictive device. goal met  (3.)Ms. Rosy Pandey will ambulate with STAND BY ASSIST for 150 feet with the least restrictive device. goal met  (4.)Ms. Rosy Pandey will ambulate up/down 2 steps with bilateral  railing with MINIMAL ASSIST with no device. (5.)Ms. Rosy Pandey will increase right knee ROM to 5°-80°.  ________________________________________________________________________________________________     Outcome: Progressing Towards Goal     PHYSICAL THERAPY JOINT CAMP TKA: Daily Note and PM 12/11/2019  INPATIENT: Hospital Day: 2  Payor: [address] [place] [address],9D / Plan: [employer] [address] / Product Type: Managed Care Medicare /      NAME/AGE/GENDER: Lobo Campbell is a 70 y.o. female   PRIMARY DIAGNOSIS:  Unilateral primary osteoarthritis, right knee [M17.11]   Procedure(s) and Anesthesia Type:     * RIGHT KNEE ARTHROPLASTY TOTAL - Spinal (Right)  ICD-10: Treatment Diagnosis:    · Pain in Right Knee (M25.561)  · Stiffness of Right Knee, Not elsewhere classified (M25.661)  · Difficulty in walking, Not elsewhere classified (R26.2)      ASSESSMENT:     Ms. Rosy Pandey presents with decreased rom and strength of right LE as well as decreased functional mobility and gait s/p right tka. She plans to go home with HHPT. Pt. Doing well and plans to go home. She increased her gait distance with walker. She did not want to try stairs as she reports she does not have any. She did tka exercises with cues, good rom. No questions or concerns from patient or spouse about going home today.   We discussed expected more pain and swelling as the block wears off and we discussed importance of rom, safety, and swelling control. This section established at most recent assessment   PROBLEM LIST (Impairments causing functional limitations):  1. Decreased Strength  2. Decreased ADL/Functional Activities  3. Decreased Transfer Abilities  4. Decreased Ambulation Ability/Technique  5. Decreased Balance  6. Increased Pain  7. Decreased Activity Tolerance  8. Decreased Flexibility/Joint Mobility  9. Decreased [place] with Home Exercise Program   INTERVENTIONS PLANNED: (Benefits and precautions of physical therapy have been discussed with the patient.)  1. bed mobility  2. gait training  3. home exercise program (HEP)  4. Range of Motion: active/assisted/passive  5. Therapeutic Activities  6. therapeutic exercise/strengthening  7. transfer training  8. Group Therapy     TREATMENT PLAN: Frequency/Duration: Follow patient BID for duration of hospital stay to address above goals. Rehabilitation Potential For Stated Goals: Good     RECOMMENDED REHABILITATION/EQUIPMENT: (at time of discharge pending progress): Continue Skilled Therapy and Home Health: Physical Therapy. HISTORY:   History of Present Injury/Illness (Reason for Referral):  S/p right tka  Past Medical History/Comorbidities:   Ms. Jenny Dong  has a past medical history of Allergic rhinitis due to pollen, Arthritis, Asthma (as a child), Constipation (02/2019), Fibrocystic breast, GERD (gastroesophageal reflux disease), H/O colonoscopy (2009), Hashimoto's thyroiditis, Hypertension, Macular degeneration, Mixed hyperlipidemia, Osteoporosis (1/2013), Restless leg, Unspecified hypothyroidism, and Varicella. Ms. Jenny Dong  has a past surgical history that includes hx appendectomy (1973); hx tonsillectomy (as a child); hx adenoidectomy (as a child); hx cataract removal (Bilateral, 2007); hx colonoscopy; hx hernia repair (06/16/2018); hx knee replacement (Left, 02/07/2019); and hx ovarian cyst removal (1973).   Social History/Living Environment:   Home Environment: Private residence  One/Two Story Residence: One story  Living Alone: No  Support Systems: Family member(s), Friends \ neighbors  Patient Expects to be Discharged to[de-identified] Unknown  Current DME Used/Available at Home: None  Prior Level of Function/Work/Activity:  independent   Number of Personal Factors/Comorbidities that affect the Plan of Care: 1-2: MODERATE COMPLEXITY   EXAMINATION:   Most Recent Physical Functioning:               RLE AROM  R Knee Flexion: 90  R Knee Extension: 7            Bed Mobility  Supine to Sit: Contact guard assistance    Transfers  Sit to Stand: Stand-by assistance  Stand to Sit: Stand-by assistance  Bed to Chair: Contact guard assistance    Balance  Sitting: Intact  Standing: With support              Weight Bearing Status  Right Side Weight Bearing: As tolerated  Distance (ft): 200 Feet (ft)(x 2)  Ambulation - Level of Assistance: Stand-by assistance  Assistive Device: Walker, rolling  Speed/Mishel: Delayed  Step Length: Left shortened;Right shortened  Stance: Right decreased  Gait Abnormalities: Antalgic  Interventions: Safety awareness training;Verbal cues     Braces/Orthotics:     Right Knee Cold  Type: Cryocuff      Body Structures Involved:  1. Bones  2. Joints  3. Muscles  4. Ligaments Body Functions Affected:  1. Movement Related Activities and Participation Affected:  1. Mobility   Number of elements that affect the Plan of Care: 3: MODERATE COMPLEXITY   CLINICAL PRESENTATION:   Presentation: Stable and uncomplicated: LOW COMPLEXITY   CLINICAL DECISION MAKING:   [employer] AM-PAC 6 Clicks   Basic Mobility Inpatient Short Form  How much difficulty does the patient currently have. .. Unable A Lot A Little None   1. Turning over in bed (including adjusting bedclothes, sheets and blankets)? [] 1   [] 2   [x] 3   [] 4   2. Sitting down on and standing up from a chair with arms ( e.g., wheelchair, bedside commode, etc.)   [] 1   [] 2   [x] 3   [] 4   3.   Moving from lying on back to sitting on the side of the bed? [] 1   [] 2   [x] 3   [] 4   How much help from another person does the patient currently need. .. Total A Lot A Little None   4. Moving to and from a bed to a chair (including a wheelchair)? [] 1   [] 2   [x] 3   [] 4   5. Need to walk in hospital room? [] 1   [x] 2   [] 3   [] 4   6. Climbing 3-5 steps with a railing? [] 1   [x] 2   [] 3   [] 4   © 2007, Trustees of [address] [place] [address] [address] [address], under license to [employer]. All rights reserved     Score:  Initial: 16 Most Recent: X (Date: -- )    Interpretation of Tool:  Represents activities that are increasingly more difficult (i.e. Bed mobility, Transfers, Gait). Medical Necessity:     · Patient is expected to demonstrate progress in   · strength, range of motion, and balance  ·  to   · decrease assistance required with exercises and functional mobility    · . Reason for Services/Other Comments:  · Patient   · continues to require present interventions due to patient's inability to perform exercises and functional mobility independently   · . Use of outcome tool(s) and clinical judgement create a POC that gives a: Clear prediction of patient's progress: LOW COMPLEXITY            TREATMENT:   (In addition to Assessment/Re-Assessment sessions the following treatments were rendered)     Pre-treatment Symptoms/Complaints:  knee pain  Pain Initial:      Post Session: 4     Therapeutic Exercise: (45 Minutes):  Exercises per grid below to improve mobility and strength. Required minimal visual, verbal, and manual cues to promote proper body alignment, promote proper body posture, and promote proper body mechanics. Progressed range and repetitions as indicated. Gait Training (15 Minutes):  Gait training to improve and/or restore physical functioning as related to mobility, strength and balance.   Ambulated 200 Feet (ft)(x 2) with Stand-by assistance using a Walker, rolling and minimal Safety awareness training;Verbal cues related to their stance phase to promote proper body alignment, promote proper body posture and promote proper body mechanics. Date:  12/10 Date:  12/11 Date:     ACTIVITY/EXERCISE AM PM AM PM AM PM   GROUP THERAPY  []  []  []  [x]  []  []   Ankle Pumps  10a 10a 15a     Quad Sets  10a 10a 15a     Gluteal Sets  10a 10a 15a     Hip ABd/ADduction  10aa 10a 15a     Straight Leg Raises   10a 15a     Knee Slides  10aa 10aa 15aa     Short Arc Quads    15a     Long Arc Quads         Chair Slides    15a              B = bilateral; AA = active assistive; A = active; P = passive      Treatment/Session Assessment:     Response to Treatment:  Pt. Did well this afternoon    Education:  [x] Home Exercises  [x] Fall Precautions  []  [x] D/C Instruction Review  [x] Knee Prosthesis Review  [x] Walker Management/Safety [] Adaptive Equipment as Needed       Interdisciplinary Collaboration:   o Physical Therapist  o Physical Therapy Assistant  o Rehabilitation Attendant    After treatment position/precautions:   o Up in chair  o Bed/Chair-wheels locked  o Bed in low position  o Caregiver at bedside  o Call light within reach  o Family at bedside    Compliance with Program/Exercises: Compliant most of the time. Recommendations/Intent for next treatment session:  Treatment next visit will focus on increasing Ms. Somers's independence with bed mobility, transfers, gait training, strength/ROM exercises, modalities for pain, and patient education.       Total Treatment Duration:  PT Patient Time In/Time Out  Time In: 1300  Time Out: 2202 Israel Serna PT

## 2019-12-11 NOTE — PROGRESS NOTES
Shift assessment completed. Pt is alert & oriented x4. Able to verbalize needs. Resting quietly with no distress noted. Dressing to right knee is clean, dry and intact. Will Deiters in place. Neurovascular and peripheral vascular checks WNL. Incentive Spirometry at bedside. Patient encouraged to use hourly x 10 repetitions. Patient voiding clear yellow urine without difficulty. Pain is being managed with Oxycodone with patient tolerating well. Bed low and locked. Call light within reach. Patient instructed to call for assistance. Pt verbalizes understanding. Will monitor.

## 2019-12-11 NOTE — DISCHARGE INSTRUCTIONS
[address] [facility]   Patient Discharge Instructions    Kaylen Marcelo / [id number] : 1940    Admitted 12/10/2019 Discharged: 2019     IF YOU HAVE ANY PROBLEMS ONCE YOU ARE AT HOME CALL THE FOLLOWING NUMBERS:   Main office number: [phone]    Take Home Medications    · It is important that you take the medication exactly as they are prescribed. · Keep your medication in the bottles provided by the pharmacist and keep a list of the medication names, dosages, and times to be taken in your wallet. · Do not take other medications without consulting your doctor. What to do at [address] your prehospital diet. If you have excessive nausea or vomitting call your doctor's office     Home Physical Therapy is arranged. Use rolling walker when walking. Patients who have had a joint replacement should not drive until you are seen for your follow up appointment by Dr. Josue Clark. When to Call    - Call if you have a temperature greater then 101  - Unable to keep food down  - Loose control of your bladder or bowel function  - Are unable to bear any weight   - Need a pain medication refill       DISCHARGE SUMMARY from Nurse    The following personal items collected during your admission are returned to you:   Dental Appliance: Dental Appliances: With patient, Uppers  Vision: Visual Aid: Glasses  Hearing Aid: Hearing Aid: Bilateral, With patient  Jewelry: Jewelry: None  Clothing: Clothing: At bedside  Other Valuables: Other Valuables: Cell Phone( to keep)  Valuables sent to safe:      PATIENT INSTRUCTIONS:    After general anesthesia or intravenous sedation, for 24 hours or while taking prescription Narcotics:  · Limit your activities  · Do not drive and operate hazardous machinery  · Do not make important personal or business decisions  · Do  not drink alcoholic beverages  · If you have not urinated within 8 hours after discharge, please contact your surgeon on call.     Report the following to your surgeon:  · Excessive pain, swelling, redness or odor of or around the surgical area  · Temperature over 101  · Nausea and vomiting lasting longer than 4 hours or if unable to take medications  · Any signs of decreased circulation or nerve impairment to extremity: change in color, persistent  numbness, tingling, coldness or increase pain  · Any questions, call office @ [phone]      Keep scheduled follow up appointment. If need to change, call office @ [phone]. *  Please give a list of your current medications to your Primary Care Provider. *  Please update this list whenever your medications are discontinued, doses are      changed, or new medications (including over-the-counter products) are added. *  Please carry medication information at all times in case of emergency situations. Patient Education        Total Knee Replacement: What to Expect at Home  Your Recovery    When you leave the hospital, you should be able to move around with a walker or crutches. But you will need someone to help you at home for the next few weeks or until you have more energy and can move around better. If you need more extensive rehab, you may go to a specialized rehab center for more treatment. You will go home with a bandage and stitches, staples, tissue glue, or tape strips. Change the bandage as your doctor tells you to. If you have stitches or staples, your doctor will remove them 10 to 21 days after your surgery. Glue or tape strips will fall off on their own over time. You may still have some mild pain, and the area may be swollen for 3 to 6 months after surgery. Your knee will continue to improve for 6 to 12 months. You will probably use a walker for 1 to 3 weeks and then use crutches. When you are ready, you can use a cane. You will probably be able to walk on your own in 4 to 8 weeks. You will need to do months of physical rehabilitation (rehab) after a knee replacement.  Rehab will help you strengthen the muscles of the knee and help you regain movement. After you recover, your artificial knee will allow you to do normal daily activities with less pain or no pain at all. You may be able to hike, dance, ride a bike, and play golf. Talk to your doctor about whether you can do more strenuous activities. Always tell your caregivers that you have an artificial knee. How long it will take to walk on your own, return to normal activities, and go back to work depends on your health and how well your rehabilitation (rehab) program goes. The better you do with your rehab exercises, the quicker you will get your strength and movement back. This care sheet gives you a general idea about how long it will take for you to recover. But each person recovers at a different pace. Follow the steps below to get better as quickly as possible. How can you care for yourself at home? Activity    · Rest when you feel tired. You may take a nap, but do not stay in bed all day. When you sit, use a chair with arms. You can use the arms to help you stand up.     · Work with your physical therapist to find the best way to exercise. What you can do as your knee heals will depend on whether your new knee is cemented or uncemented. You may not be able to do certain things for a while if your new knee is uncemented.     · After your knee has healed enough, you can do more strenuous activities with caution. ? You can golf, but use a golf cart, and do not wear shoes with spikes. ? You can bike on a flat road or on a stationary bike. Avoid biking up hills. ? Your doctor may suggest that you stay away from activities that put stress on your knee. These include tennis or badminton, squash or racquetball, contact sports like football, jumping (such as in basketball), jogging, or running. ? Avoid activities where you might fall.  These include horseback riding, skiing, and mountain biking.     · Do not sit for more than 1 hour at a time. Get up and walk around for a while before you sit again. If you must sit for a long time, prop up your leg with a chair or footstool. This will help you avoid swelling.     · Ask your doctor when you can drive again. It may take up to 8 weeks after knee replacement surgery before it is safe for you to drive.     · When you get into a car, sit on the edge of the seat. Then pull in your legs, and turn to face the front.     · You should be able to do many everyday activities 3 to 6 weeks after your surgery. You will probably need to take 4 to 16 weeks off from work. When you can go back to work depends on the type of work you do and how you feel.     · Ask your doctor when it is okay for you to have sex.     · Do not lift anything heavier than 10 pounds and do not lift weights for 12 weeks. Diet    · By the time you leave the hospital, you should be eating your normal diet. If your stomach is upset, try bland, low-fat foods like plain rice, broiled chicken, toast, and yogurt. Your doctor may suggest that you take iron and vitamin supplements.     · Drink plenty of fluids (unless your doctor tells you not to).   · Eat healthy foods, and watch your portion sizes. Try to stay at your ideal weight. Too much weight puts more stress on your new knee.     · You may notice that your bowel movements are not regular right after your surgery. This is common. Try to avoid constipation and straining with bowel movements. You may want to take a fiber supplement every day. If you have not had a bowel movement after a couple of days, ask your doctor about taking a mild laxative. Medicines    · Your doctor will tell you if and when you can restart your medicines. He or she will also give you instructions about taking any new medicines.     · If you take blood thinners, such as warfarin (Coumadin), clopidogrel (Plavix), or aspirin, be sure to talk to your doctor.  He or she will tell you if and when to start taking those medicines again. Make sure that you understand exactly what your doctor wants you to do.     · Your doctor may give you a blood-thinning medicine to prevent blood clots. If you take a blood thinner, be sure you get instructions about how to take your medicine safely. Blood thinners can cause serious bleeding problems. This medicine could be in pill form or as a shot (injection). If a shot is necessary, your doctor will tell you how to do this.     · Be safe with medicines. Take pain medicines exactly as directed. ? If the doctor gave you a prescription medicine for pain, take it as prescribed. ? If you are not taking a prescription pain medicine, ask your doctor if you can take an over-the-counter medicine. ? Plan to take your pain medicine 30 minutes before exercises. It is easier to prevent pain before it starts than to stop it once it has started.     · If you think your pain medicine is making you sick to your stomach:  ? Take your medicine after meals (unless your doctor has told you not to). ? Ask your doctor for a different pain medicine.     · If your doctor prescribed antibiotics, take them as directed. Do not stop taking them just because you feel better. You need to take the full course of antibiotics. Incision care    · If your doctor told you how to care for your cut (incision), follow your doctor's instructions. You will have a dressing over the cut. A dressing helps the incision heal and protects it. Your doctor will tell you how to take care of this.     · If you did not get instructions, follow this general advice:  ? If you have strips of tape on the cut the doctor made, leave the tape on for a week or until it falls off.  ? If you have stitches or staples, your doctor will tell you when to come back to have them removed. ? If you have skin adhesive on the cut, leave it on until it falls off. Skin adhesive is also called glue or liquid stitches. ? Change the bandage every day. ?  Wash the area daily with warm water, and pat it dry. Don't use hydrogen peroxide or alcohol. They can slow healing. ? You may cover the area with a gauze bandage if it oozes fluid or rubs against clothing. ? You may shower 24 to 48 hours after surgery. Pat the incision dry. Don't swim or take a bath for the first 2 weeks, or until your doctor tells you it is okay. Exercise    · Your rehab program will give you a number of exercises to do to help you get back your knee's range of motion and strength. Always do them as your therapist tells you. Ice and elevation    · For pain and swelling, put ice or a cold pack on the area for 10 to 20 minutes at a time. Put a thin cloth between the ice and your skin. Other instructions    · Continue to wear your support stockings as your doctor says. These help to prevent blood clots. The length of time that you will have to wear them depends on your activity level and the amount of swelling.     · You have metal pieces in your knee. These may set off some airport metal detectors. Carry a medical alert card that says you have an artificial joint, just in case. Follow-up care is a key part of your treatment and safety. Be sure to make and go to all appointments, and call your doctor if you are having problems. It's also a good idea to know your test results and keep a list of the medicines you take. When should you call for help? Call 911 anytime you think you may need emergency care. For example, call if:    · You passed out (lost consciousness).     · You have severe trouble breathing.     · You have sudden chest pain and shortness of breath, or you cough up blood.    Call your doctor now or seek immediate medical care if:    · You have signs of infection, such as:  ? Increased pain, swelling, warmth, or redness. ? Red streaks leading from the incision. ? Pus draining from the incision. ?  A fever.     · You have signs of a blood clot, such as:  ? Pain in your calf, back of the knee, thigh, or groin. ? Redness and swelling in your leg or groin.     · Your incision comes open and begins to bleed, or the bleeding increases.     · You have pain that does not get better after you take pain medicine.    Watch closely for changes in your health, and be sure to contact your doctor if:    · You do not have a bowel movement after taking a laxative. Where can you learn more? Go to http://bill-bishnu.info/. Enter W506 in the search box to learn more about \"Total Knee Replacement: What to Expect at Home. \"  Current as of: June 26, 2019  Content Version: 12.2  © 7966-1124 [employer]. Care instructions adapted under license by [employer] (which disclaims liability or warranty for this information). If you have questions about a medical condition or this instruction, always ask your healthcare professional. [place] [address] any warranty or liability for your use of this information. These are general instructions for a healthy lifestyle:    No smoking/ No tobacco products/ Avoid exposure to second hand smoke    Surgeon General's Warning:  Quitting smoking now greatly reduces serious risk to your health. Obesity, smoking, and sedentary lifestyle greatly increases your risk for illness    A healthy diet, regular physical exercise & weight monitoring are important for maintaining a healthy lifestyle    You may be retaining fluid if you have a history of heart failure or if you experience any of the following symptoms:  Weight gain of 3 pounds or more overnight or 5 pounds in a week, increased swelling in our hands or feet or shortness of breath while lying flat in bed. Please call your doctor as soon as you notice any of these symptoms; do not wait until your next office visit.     Recognize signs and symptoms of STROKE:    F-face looks uneven    A-arms unable to move or move even    S-speech slurred or non-existent    T-time-call 911 as soon as signs and symptoms begin-DO NOT go       Back to bed or wait to see if you get better-TIME IS BRAIN. The discharge information has been reviewed with the patient. The patient verbalized understanding. Information obtained by :  I understand that if any problems occur once I am at home I am to contact my physician. I understand and acknowledge receipt of the instructions indicated above.                                                                                                                                            Physician's or R.N.'s Signature                                                                  Date/Time                                                                                                                                              Patient or Representative Signature                                                          Date/Time

## 2019-12-12 ENCOUNTER — HOME CARE VISIT (OUTPATIENT)
Dept: SCHEDULING | Facility: HOME HEALTH | Age: 79
End: 2019-12-12
Payer: MEDICARE

## 2019-12-12 VITALS
HEART RATE: 72 BPM | TEMPERATURE: 97.4 F | RESPIRATION RATE: 18 BRPM | DIASTOLIC BLOOD PRESSURE: 78 MMHG | SYSTOLIC BLOOD PRESSURE: 120 MMHG

## 2019-12-12 PROCEDURE — 400013 HH SOC

## 2019-12-12 PROCEDURE — G0151 HHCP-SERV OF PT,EA 15 MIN: HCPCS

## 2019-12-16 ENCOUNTER — HOME CARE VISIT (OUTPATIENT)
Dept: SCHEDULING | Facility: HOME HEALTH | Age: 79
End: 2019-12-16
Payer: MEDICARE

## 2019-12-16 PROCEDURE — G0157 HHC PT ASSISTANT EA 15: HCPCS

## 2019-12-17 VITALS
RESPIRATION RATE: 18 BRPM | SYSTOLIC BLOOD PRESSURE: 128 MMHG | DIASTOLIC BLOOD PRESSURE: 70 MMHG | HEART RATE: 106 BPM | TEMPERATURE: 97.7 F

## 2019-12-18 ENCOUNTER — HOME CARE VISIT (OUTPATIENT)
Dept: SCHEDULING | Facility: HOME HEALTH | Age: 79
End: 2019-12-18
Payer: MEDICARE

## 2019-12-18 VITALS
HEART RATE: 74 BPM | SYSTOLIC BLOOD PRESSURE: 108 MMHG | DIASTOLIC BLOOD PRESSURE: 66 MMHG | RESPIRATION RATE: 18 BRPM | TEMPERATURE: 97.9 F

## 2019-12-18 PROCEDURE — G0151 HHCP-SERV OF PT,EA 15 MIN: HCPCS

## 2019-12-20 ENCOUNTER — HOME CARE VISIT (OUTPATIENT)
Dept: SCHEDULING | Facility: HOME HEALTH | Age: 79
End: 2019-12-20
Payer: MEDICARE

## 2019-12-20 VITALS
HEART RATE: 79 BPM | TEMPERATURE: 98.3 F | DIASTOLIC BLOOD PRESSURE: 68 MMHG | SYSTOLIC BLOOD PRESSURE: 116 MMHG | RESPIRATION RATE: 18 BRPM

## 2019-12-20 PROCEDURE — G0151 HHCP-SERV OF PT,EA 15 MIN: HCPCS

## 2019-12-23 ENCOUNTER — HOME CARE VISIT (OUTPATIENT)
Dept: SCHEDULING | Facility: HOME HEALTH | Age: 79
End: 2019-12-23
Payer: MEDICARE

## 2019-12-23 VITALS — SYSTOLIC BLOOD PRESSURE: 122 MMHG | DIASTOLIC BLOOD PRESSURE: 70 MMHG | HEART RATE: 70 BPM

## 2019-12-23 PROCEDURE — G0157 HHC PT ASSISTANT EA 15: HCPCS

## 2019-12-26 ENCOUNTER — HOME CARE VISIT (OUTPATIENT)
Dept: SCHEDULING | Facility: HOME HEALTH | Age: 79
End: 2019-12-26
Payer: MEDICARE

## 2019-12-26 PROCEDURE — G0157 HHC PT ASSISTANT EA 15: HCPCS

## 2019-12-27 ENCOUNTER — HOME CARE VISIT (OUTPATIENT)
Dept: SCHEDULING | Facility: HOME HEALTH | Age: 79
End: 2019-12-27
Payer: MEDICARE

## 2019-12-27 VITALS
DIASTOLIC BLOOD PRESSURE: 78 MMHG | RESPIRATION RATE: 18 BRPM | TEMPERATURE: 98 F | HEART RATE: 80 BPM | SYSTOLIC BLOOD PRESSURE: 150 MMHG

## 2019-12-27 PROCEDURE — G0157 HHC PT ASSISTANT EA 15: HCPCS

## 2019-12-30 ENCOUNTER — HOME CARE VISIT (OUTPATIENT)
Dept: HOME HEALTH SERVICES | Facility: HOME HEALTH | Age: 79
End: 2019-12-30
Payer: MEDICARE

## 2019-12-30 ENCOUNTER — HOME CARE VISIT (OUTPATIENT)
Dept: SCHEDULING | Facility: HOME HEALTH | Age: 79
End: 2019-12-30
Payer: MEDICARE

## 2019-12-30 VITALS
SYSTOLIC BLOOD PRESSURE: 102 MMHG | RESPIRATION RATE: 18 BRPM | HEART RATE: 70 BPM | DIASTOLIC BLOOD PRESSURE: 68 MMHG | TEMPERATURE: 97.5 F

## 2019-12-30 VITALS
HEART RATE: 76 BPM | DIASTOLIC BLOOD PRESSURE: 80 MMHG | SYSTOLIC BLOOD PRESSURE: 130 MMHG | TEMPERATURE: 97.8 F | RESPIRATION RATE: 18 BRPM

## 2019-12-30 PROCEDURE — G0157 HHC PT ASSISTANT EA 15: HCPCS

## 2020-01-02 ENCOUNTER — HOME CARE VISIT (OUTPATIENT)
Dept: SCHEDULING | Facility: HOME HEALTH | Age: 80
End: 2020-01-02
Payer: MEDICARE

## 2020-01-02 PROCEDURE — G0151 HHCP-SERV OF PT,EA 15 MIN: HCPCS

## 2020-01-03 VITALS
RESPIRATION RATE: 18 BRPM | TEMPERATURE: 98.3 F | SYSTOLIC BLOOD PRESSURE: 130 MMHG | HEART RATE: 83 BPM | DIASTOLIC BLOOD PRESSURE: 76 MMHG

## 2020-01-08 ENCOUNTER — HOSPITAL ENCOUNTER (OUTPATIENT)
Dept: PHYSICAL THERAPY | Age: 80
Discharge: HOME | End: 2020-01-08
Payer: MEDICARE

## 2020-01-08 PROCEDURE — 97162 PT EVAL MOD COMPLEX 30 MIN: CPT

## 2020-01-08 PROCEDURE — 97110 THERAPEUTIC EXERCISES: CPT

## 2020-01-08 PROCEDURE — 97140 MANUAL THERAPY 1/> REGIONS: CPT

## 2020-01-08 NOTE — THERAPY EVALUATION
Danna Raman  : 1940  Primary: [address] at [facility]  [facility], [facility], [address]  [phone]   [phone]       OUTPATIENT PHYSICAL THERAPY:Initial Assessment 2020    ICD-10: Treatment Diagnosis: M17.11 unilateral primary osteoarthritis , right knee                 Treatment Diagnosis 2:M25.561 pain in joint/ right knee                Treatment Diagnosis 3:R26.89 other abnomalities of gait and mobility   Precautions:weak R quads -falls precautions   Allergies: Pollen extracts and Ace inhibitors   TREATMENT PLAN:  Effective Dates: 2020 TO 2020 (45 days). Frequency/Duration: 2 times a week for 45 Day(s) MEDICAL/REFERRING DIAGNOSIS:  Unilateral primary osteoarthritis, right knee [M17.11]   DATE OF ONSET:12-10-19 -R TKA   REFERRING PHYSICIAN: Cesar Vital,*  MD Orders: Evaluate and Treat   Return MD Appointment: 2020     INITIAL ASSESSMENT:  Ms. Shiloh Uribe is a 70 y.o. female presenting to physical therapy with complaints of R knee pain and stiffness with abnormality of gait since receiving R TKA on 12-10-19 -tight distal quads and tight popliteal region -pain level is 1-2/10 with mild swelling in R knee -decreased R quad strength makes for increased falls precautions -patient ambulates with cane and moderate antalgic gait  -skin is warm but not hot or red -incision looks good -history of L TKA in -very good candidate for skilled physical therapy to include manual therapy and therapeutic exercises followed with cold pack    PROBLEM LIST (Impacting functional limitations):  1. Decreased Strength  2. Decreased ADL/Functional Activities  3. Decreased Transfer Abilities  4. Decreased Ambulation Ability/Technique  5. Decreased Balance  6. Increased Pain  7. Decreased Activity Tolerance  8. Decreased Flexibility/Joint Mobility  9.  Edema/Girth INTERVENTIONS PLANNED: (Treatment may consist of any combination of the following)  1. Cold  2. Gait Training  3. Heat  4. Home Exercise Program (HEP)  5. Manual Therapy  6. Range of Motion (ROM)  7. Therapeutic Exercise/Strengthening     GOALS: (Goals have been discussed and agreed upon with patient.)  Short-Term Functional Goals: Time Frame: 1/8/2020 to 1/22/2020  1. Patient demonstrates independence with home exercise program without verbal cueing provided by therapist.  2. Knee pain is less than 2/10 to progress to DC goal   3. Increased knee flexion by 10 degrees   4. Decreased muscle tightness in distal quads and popliteal region to allow increased mobility   5. Ambulate with cane and minimal antalgic gait   6. Sleep at least 4 hours without knee pain  7. Discharge Goals: Time Frame: 1/8/2020 to 2/22/2020  1. Knee pain is 01/ to allow most home ADLs including light lifting   2. Knee range of motion is wfl to allow full personal care including washing and dressing and putting on shoes and socks   3. R quad strength is at least 3+/5 and preferably 3+ to 4-/5 to allow decreased falls issues   4. Ambulate with cane and no antalgic gait or independent ambulation with minimal antalgic gait to allow walking community distances   5. Sleep at least 6-7 hours without knee pain   6. Able to walk/stand for more than 1-2 hours without knee pain   7. LEFS score is improved from 24/80 to 44/80     Outcome Measure: Tool Used: Lower Extremity Functional Scale (LEFS)  Score:  Initial: 24/80 Most Recent: X/80 (Date: -- )   Interpretation of Score: 20 questions each scored on a 5 point scale with 0 representing \"extreme difficulty or unable to perform\" and 4 representing \"no difficulty\". The lower the score, the greater the functional disability. 80/80 represents no disability. Minimal detectable change is 9 points.       Medical Necessity:   · Patient is expected to demonstrate progress in strength, range of motion, balance and coordination to increase independence with ADLs and improve safety during walking and transfers. · Skilled intervention continues to be required due to R knee pain and stiffness is affecting gait and function. Reason for Services/Other Comments:  · Patient continues to require modification of therapeutic interventions to increase complexity of exercises. Total Evaluation Duration: 25 minutes    Rehabilitation Potential For Stated Goals: Good  Regarding Geovanna Somers's therapy, I certify that the treatment plan above will be carried out by a therapist or under their direction. Thank you for this referral,  Cyrus Holloway PT     Referring Physician Signature: Cesar Vital,*              Date                     PAIN/SUBJECTIVE:    Initial: Pain Intensity 1: 2  Pain Location 1: Knee  Pain Orientation 1: Anterior, Posterior, Right  Pain Intervention(s) 1: Elevation, Exercise, Cold pack  Post Session:  1/10 -relief with manual therapy and cold pack     HISTORY:    History of Injury/Illness (Reason for Referral):  R TKA on 12-10-19 with knee pain and stiffness and abnormality of gait   Past Medical History/Comorbidities:   Ms. Shiloh Uribe  has a past medical history of Allergic rhinitis due to pollen, Arthritis, Asthma (as a child), Constipation (02/2019), Fibrocystic breast, GERD (gastroesophageal reflux disease), H/O colonoscopy (2009), Hashimoto's thyroiditis, Hypertension, Macular degeneration, Mixed hyperlipidemia, Osteoporosis (1/2013), Restless leg, Unspecified hypothyroidism, and Varicella. Ms. Shiloh Uribe  has a past surgical history that includes hx appendectomy (1973); hx tonsillectomy (as a child); hx adenoidectomy (as a child); hx cataract removal (Bilateral, 2007); hx colonoscopy; hx hernia repair (06/16/2018); hx knee replacement (Left, 02/07/2019); and hx ovarian cyst removal (1973).   Social History/Living Environment:   Home Environment: Private residence  # Steps to Enter: 0  Rails to Enter: No  Wheelchair Ramp: No  One/Two Story Residence: One story  Living Alone: No  Support Systems: Spouse/Significant Other/Partner  Patient Expects to be Discharged to[de-identified] Private residence  Current DME Used/Available at Home: Cane, straight  Tub or Shower Type: Tub/Shower combination     Prior Level of Function/Work/Activity:  Independent with home ADLs     Ambulatory/Rehab Services H2 Model Falls Risk Assessment    Risk Factors:       No Risk Factors Identified Ability to Rise from Chair:       (1)  Pushes up, successful in one attempt    Falls Prevention Plan:       Exercise/Equipment Adaptation (specify):  cane for walking     Total: (5 or greater = High Risk): 1    ©2010 [facility] . [facility] States Patent #[id number]. Federal Law prohibits the replication, distribution or use without written permission from [facility] [employer]    Current Medications:        Current Outpatient Medications:     diphenhydrAMINE-acetaminophen (TYLENOL PM EXTRA STRENGTH)  mg tab, Take 1-2 Tabs by mouth every four to six (4-6) hours as needed for Pain., Disp: , Rfl:     aspirin delayed-release 81 mg tablet, Take 1 Tab by mouth every twelve (12) hours every twelve (12) hours. , Disp: 60 Tab, Rfl: 0    glucosamine sulfate 500 mg capsule, Take 500 mg by mouth daily. , Disp: , Rfl:     acetaminophen (TYLENOL) 325 mg tablet, Take 650 mg by mouth every four (4) hours as needed for Pain., Disp: , Rfl:     varicella-zoster recombinant, PF, (SHINGRIX, PF,) 50 mcg/0.5 mL susr injection, 0.5mL by IntraMUSCular route once now and then repeat in 2-6 months, Disp: 0.5 mL, Rfl: 1    levothyroxine (SYNTHROID) 75 mcg tablet, Take 1 Tab by mouth Daily (before breakfast). , Disp: 90 Tab, Rfl: 3    carbidopa-levodopa (SINEMET)  mg per tablet, TAKE 1 TABLET BY MOUTH  NIGHTLY, Disp: 90 Tab, Rfl: 3    irbesartan (AVAPRO) 300 mg tablet, Take 1 Tab by mouth daily. , Disp: 90 Tab, Rfl: 3    pravastatin (PRAVACHOL) 40 mg tablet, Take 1 Tab by mouth nightly., Disp: 90 Tab, Rfl: 3    pantoprazole (PROTONIX) 40 mg tablet, Take 1 Tab by mouth Daily (before dinner). , Disp: 90 Tab, Rfl: 3    hydroCHLOROthiazide (HYDRODIURIL) 12.5 mg tablet, Take 1 Tab by mouth daily. , Disp: 90 Tab, Rfl: 3    hydrocortisone (ANUSOL-HC) 2.5 % rectal cream, Insert  into rectum four (4) times daily. , Disp: 30 g, Rfl: 0    ketoconazole (NIZORAL) 2 % topical cream, Apply  to affected area two (2) times daily as needed for Skin Irritation. , Disp: , Rfl:     triamcinolone acetonide (KENALOG) 0.1 % topical cream, Apply  to affected area two (2) times daily as needed. , Disp: , Rfl:     clotrimazole-betamethasone (LOTRISONE) topical cream, Apply  to affected area two (2) times a day. (Patient taking differently: Apply 1 g to affected area two (2) times daily as needed for Skin Irritation (in skin folds). ), Disp: 45 g, Rfl: 0    clobetasol (TEMOVATE) 0.05 % topical cream, Apply  to affected area two (2) times daily as needed for Itching. GROIN AREA, Disp: , Rfl:     cetirizine (ZYRTEC) 10 mg tablet, Take 10 mg by mouth daily. , Disp: , Rfl:     calcium carbonate-vitamin d2 1,200-400 mg-unit cap, Take 1 Tab by mouth two (2) times a day., Disp: , Rfl:     PSYLLIUM SEED, WITH DEXTROSE, (FIBER PO), Take 1 Tab by mouth daily. , Disp: , Rfl:     prednisoLONE acetate (PRED FORTE) 1 % ophthalmic suspension, Administer 1 Drop to both eyes two (2) times a week. Monday and Thursday, Disp: , Rfl:     Date Last Reviewed:  1/8/2020    Number of Personal Factors/Comorbidities that affect the Plan of Care-history of asthma, GERD, and HTN and osteoarthritis: 1-2: MODERATE COMPLEXITY    EXAMINATION:    Observation/Orthostatic Postural Assessment:          Rounded shoulders and forward head on neck posture   Palpation:         Tight and tender distal quads and popliteal region   ROM:         R knee range of motion is -6-104    L knee range of motion is 0-129    Strength:         Ankles are 3+ to 4-/5     R quads and hamstrings are 3/5     L quads and hamstrings are 3+/5     R hip flexor is 4-/5       L hip flexor is 4/5     Special Tests:          Negative homans sign  Neurological Screen:  No numbness or radiating pain or tingling into lower leg but sore in R lower leg   Mental Status:          alert and oriented-very motivated patient  Edema/Girth:     Left Right    Initial Most Recent Initial Most Recent   Upper  Extremity           Lower  Extremity  15.0 at mid patella    15.75 at mid patella        Sensation:       Intact to light touch     Body Structures Involved:  1. Bones  2. Joints  3. Muscles Body Functions Affected:  1. Sensory/Pain  2. Neuromusculoskeletal  3. Movement Related Activities and Participation Affected:  1. Learning and Applying Knowledge  2. General Tasks and Demands  3. Mobility  4. Self Care  5. Domestic Life    Number of elements (examined above) that affect the Plan of Care: 3: MODERATE COMPLEXITY    CLINICAL PRESENTATION:    Presentation: Evolving clinical presentation with changing clinical characteristics: MODERATE COMPLEXITY    CLINICAL DECISION MAKING:    Use of outcome tool(s) and clinical judgement create a POC that gives a-impairment of at least 60% but less than 80%:  Questionable prediction of patient's progress: MODERATE COMPLEXITY

## 2020-01-08 NOTE — PROGRESS NOTES
Cailin Welch  : 1940  Primary: [address] at [place] [address], Alonzoyosef betts, [address]  [phone]   [phone]      OUTPATIENT PHYSICAL THERAPY: Daily Treatment Note 2020    ICD-10: Treatment Diagnosis: M17.11 unilateral primary osteoarthritis, right knee                 Treatment Diagnosis 2: M25.561 pain in joint, right knee                Treatment Diagnosis 3: R26.89 other abnormalities of gait and mobility   Precautions: weak R quads -falls precautions  Allergies: Pollen extracts and Ace inhibitors   TREATMENT PLAN:  Effective Dates: 2020 TO 2020 (45 days). Frequency/Duration: 2 times a week for 45 Day(s) MEDICAL/REFERRING DIAGNOSIS:  Unilateral primary osteoarthritis, right knee [M17.11]   DATE OF ONSET:12-10-19-R TKA   REFERRING PHYSICIAN: Josafat Chan,*  MD Orders: Evaluate and Treat   Return MD Appointment: 2020     Pre-treatment Symptoms/Complaints:  I am tender and sore right above my knee and my lower leg was very bruised after the surgery     Pain: Initial: Pain Intensity 1: 2  Pain Location 1: Knee  Pain Orientation 1: Anterior, Posterior, Right  Pain Intervention(s) 1: Elevation, Exercise, Cold pack  Post Session:  1/10- relief with cold pack -motivated patient   Medications Last Reviewed:  2020  Updated Objective Findings:  See evaluation note from today   TREATMENT:   THERAPEUTIC EXERCISE: (25 minutes):  Exercises per grid below to improve mobility, strength, balance and coordination. Required minimal verbal cues to promote proper body alignment and promote proper body posture. Progressed resistance, range, repetitions and complexity of movement as indicated.      Date:  2020 Date:   Date:     Activity/Exercise Parameters Parameters Parameters   Calf stretch 2 x 30 seconds by therapist     Hamstring stretch 2 x 30 seconds with belt     Ankle pumps 2 X 20     Heel slides X 20 with over pressure from therapist     Quad sets 2 x 10 with 3 second hold      Gluteal sets 2 x 10 with 3 second hold     Straight leg raises  X 10     Long arc quads  X 20 with 1.5 lb     Standing hamstring curls  X 20 with 1.5 lb     Marching in place  X 10 with 1.5 lb     Sit to stand X 5             Time spent with patient reviewing proper muscle recruitment and technique with exercises. MANUAL THERAPY: (14 minutes): Soft tissue mobilization was utilized and necessary because of the patient's restricted joint motion and restricted motion of soft tissue   Soft tissue mobilization x 14 minutes to distal quads and popliteal region while supine with heel prop for tightness-cross friction work     MODALITIES: (13 minutes):      *  Cold Pack Therapy in order to provide analgesia and reduce inflammation and edema. *  Hot Pack Therapy in order to relieve muscle spasm. Hot pack x 4 minutes to R knee prior to manual therapy  and cold pack x 5 minutes after exercises     HEP: As above; handouts given to patient for all exercises. Treatment/Session Summary:    · Response to Treatment:  relief with manual therapy and cold pack . · Baseline vitals (1/8/2020): BP: 142/82, HR:79  · Communication/Consultation:  HEP  · Equipment provided today:  theraband and cold pack for home usage   · Recommendations/Intent for next treatment session: Next visit will focus on knee mobility and conditioning followed with cold pack .     Total Treatment Billable Duration:  Manual therapy x 14 minutes/exercises x 25 minutes/evaluation x 20 minutes =59 minutes  PT Patient Time In/Time Out  Time In: 1100  Time Out: [address], PT

## 2020-01-10 ENCOUNTER — HOSPITAL ENCOUNTER (OUTPATIENT)
Dept: PHYSICAL THERAPY | Age: 80
Discharge: HOME | End: 2020-01-10
Payer: MEDICARE

## 2020-01-10 PROCEDURE — 97110 THERAPEUTIC EXERCISES: CPT

## 2020-01-10 PROCEDURE — 97140 MANUAL THERAPY 1/> REGIONS: CPT

## 2020-01-10 NOTE — PROGRESS NOTES
Timmy Li  : 1940  Primary: [address] at [place] [address], Alonzo betts, [address]  [phone]   [phone]      OUTPATIENT PHYSICAL THERAPY: Daily Treatment Note 1/10/2020    ICD-10: Treatment Diagnosis: M17.11 unilateral primary osteoarthritis, right knee                 Treatment Diagnosis 2: M25.561 pain in joint, right knee                Treatment Diagnosis 3: R26.89 other abnormalities of gait and mobility   Precautions: weak R quads -falls precautions  Allergies: Pollen extracts and Ace inhibitors   TREATMENT PLAN:  Effective Dates: 2020 TO 2020 (45 days). Frequency/Duration: 2 times a week for 45 Day(s) MEDICAL/REFERRING DIAGNOSIS:  Unilateral primary osteoarthritis, right knee [M17.11]   DATE OF ONSET:12-10-19-R TKA   REFERRING PHYSICIAN: Shine Landaverde,*  MD Orders: Evaluate and Treat   Return MD Appointment: 2020     Pre-treatment Symptoms/Complaints: Pt. Reported getting around ok but relies on her  to help. Pt. Reported having trouble sleeping due to having difficulties getting comfortable. Pain: Initial: Pain Intensity 1: 0  Pain Location 1: Knee  Pain Orientation 1: Anterior, Medial, Posterior  Pain Intervention(s) 1: Cold pack, Exercise  Post Session:  010 stated she felt much better not as tight   Medications Last Reviewed:  1/10/2020  Updated Objective Findings:  Pt. entered clinic with cane and moderate antalgic gait. TREATMENT:   THERAPEUTIC EXERCISE: ( 40 minutes):  Exercises per grid below to improve mobility, strength, balance and coordination. Required minimal verbal cues to promote proper body alignment and promote proper body posture. Progressed resistance, range, repetitions and complexity of movement as indicated.      Date:  2020 Date:  1/10/20 Date:     Activity/Exercise Parameters Parameters Parameters   Nu step   Level 4 x 10 mins     Calf stretch 2 x 30 seconds by therapist 4x30 sec hold on incline    Hamstring stretch 2 x 30 seconds with belt 4x30 sec hold belt     Ankle pumps 2 X 20 X 30 reps     Heel slides X 20 with over pressure from therapist X 20 reps     Quad sets 2 x 10 with 3 second hold  X 20 reps 5 sec hold    Gluteal sets 2 x 10 with 3 second hold X 20 reps 5 sec hold    Straight leg raises  X 10 X 10 reps    Long arc quads  X 20 with 1.5 lb X 20 reps     Standing hamstring curls  X 20 with 1.5 lb X 20 reps     Marching in place  X 10 with 1.5 lb X 20 reps     Sit to stand X 5 X 10 reps     Chair slides  X 10 reps x 10 sec hold each     Standing hip abduction   2x10 reps BLE's     Seated hip adduction   Yellow ball x 10 reps x 10 sec hold       Time spent with patient reviewing proper muscle recruitment and technique with exercises. MANUAL THERAPY: (15 minutes): Soft tissue mobilization was utilized and necessary because of the patient's restricted joint motion and restricted motion of soft tissue   Pt. Received soft tissue mobilization to right distal quad and posterior knee to decrease pain and muscular tightness.  Pt. Also received suction cup to decrease adhesions and scar tissue to anterior knee and surrounding soft tissues. MODALITIES: (10  minutes):      *  Cold Pack Therapy in order to provide analgesia and reduce inflammation and edema. Pt. Received ice pack to right knee in supine with BLE's on incline to decrease pain and swelling in right knee. HEP: As above; handouts given to patient for all exercises. Treatment/Session Summary:    · Response to Treatment:  Pt. was compliant with all exercises and reported feeling much better after session. Pt. ambulated better and safer with rolling walker. Pt. was advised to use the cane at home and if she needed to go out shopping to take the rolling walker for stability. .  · Baseline vitals (1/8/2020): BP: 142/82, HR:79  · Communication/Consultation:  HEP  · Equipment provided today:  theraband and cold pack for home usage   · Recommendations/Intent for next treatment session: Next visit will focus on knee mobility and conditioning followed with cold pack .     Total Treatment Billable Duration:  55 mins   PT Patient Time In/Time Out  Time In: 1430  Time Out: [place], [place]

## 2020-01-14 ENCOUNTER — HOSPITAL ENCOUNTER (OUTPATIENT)
Dept: PHYSICAL THERAPY | Age: 80
Discharge: HOME | End: 2020-01-14
Payer: MEDICARE

## 2020-01-14 PROCEDURE — 97110 THERAPEUTIC EXERCISES: CPT

## 2020-01-14 PROCEDURE — 97140 MANUAL THERAPY 1/> REGIONS: CPT

## 2020-01-14 NOTE — PROGRESS NOTES
Timmy Li  : 1940  Primary: [address] at [place] [address], [place] betts, [address]  [phone]   [phone]      OUTPATIENT PHYSICAL THERAPY: Daily Treatment Note 2020    ICD-10: Treatment Diagnosis: M17.11 unilateral primary osteoarthritis, right knee                 Treatment Diagnosis 2: M25.561 pain in joint, right knee                Treatment Diagnosis 3: R26.89 other abnormalities of gait and mobility   Precautions: weak R quads -falls precautions  Allergies: Pollen extracts and Ace inhibitors   TREATMENT PLAN:  Effective Dates: 2020 TO 2020 (45 days). Frequency/Duration: 2 times a week for 45 Day(s) MEDICAL/REFERRING DIAGNOSIS:  Unilateral primary osteoarthritis, right knee [M17.11]   DATE OF ONSET:12-10-19-R TKA   REFERRING PHYSICIAN: Shine Landaverde,*  MD Orders: Evaluate and Treat   Return MD Appointment: 2020     Pre-treatment Symptoms/Complaints: Pt. Reported getting around ok but her knee will get sore at times . Pain: Initial: Pain Intensity 1: 2  Pain Location 1: Knee  Pain Orientation 1: Anterior, Right  Pain Intervention(s) 1: Cold pack, Exercise, Heat applied, Massage  Post Session:  0/10 stated she felt much better not as tight   Medications Last Reviewed:  2020  Updated Objective Findings:ambulated into dept with walker    TREATMENT:   THERAPEUTIC EXERCISE: ( 44 minutes):  Exercises per grid below to improve mobility, strength, balance and coordination. Required minimal verbal cues to promote proper body alignment and promote proper body posture. Progressed resistance, range, repetitions and complexity of movement as indicated.      Date:  2020 Date:  1/10/20 Date:  2020   Activity/Exercise Parameters Parameters Parameters   Nu step   Level 4 x 10 mins     Calf stretch 2 x 30 seconds by therapist 4x30 sec hold on incline 2 x 30 seconds by therapist Hamstring stretch 2 x 30 seconds with belt 4x30 sec hold belt  2 x 30 seconds with belt   Ankle pumps 2 X 20 X 30 reps  2 x 20    Heel slides X 20 with over pressure from therapist X 20 reps  X 12 with overpressure from therapist   Quad sets 2 x 10 with 3 second hold  X 20 reps 5 sec hold 2 x 10 with 3 second hold    Gluteal sets 2 x 10 with 3 second hold X 20 reps 5 sec hold 2 x 10 with 3 second hold   Straight leg raises  X 10 X 10 reps X 10   Long arc quads  X 20 with 1.5 lb X 20 reps  X 20 reps with 2.5 lbs   Standing hamstring curls  X 20 with 1.5 lb X 20 reps  2 x 10 reps with 2.5 lbs   Marching in place  X 10 with 1.5 lb X 20 reps  2 x 10 with 2.5 lb   Sit to stand X 5 X 10 reps  2 x 6   Chair slides  X 10 reps x 10 sec hold each     Weight shift   4 x 30 seconds    Standing hip extension   X 15 with 2.5 lb   Standing hip flexion   X 15 with 2.5 lb   Standing hip abduction   2x10 reps BLE's  X 15 with 2.5 lb   Seated hip adduction   Yellow ball x 10 reps x 10 sec hold  2 x 10 with theraball     Time spent with patient reviewing proper muscle recruitment and technique with exercises. MANUAL THERAPY: (12 minutes): Soft tissue mobilization was utilized and necessary because of the patient's restricted joint motion and restricted motion of soft tissue   Pt. Received soft tissue mobilization to right distal quad and posterior knee to decrease pain and muscular tightness. -cross friction for tightness   . MODALITIES: (8  minutes):      *  Cold Pack Therapy in order to provide analgesia and reduce inflammation and edema. Heat x 5 minutes to R knee in conjunction with ankle pumps/quad sets/gluteal sets      Pt. Received ice pack to right knee in sitting x 8 minutes after therapy for inflammation/swelling  . HEP: As above; handouts given to patient for all exercises.     Treatment/Session Summary:    · Response to Treatment:improved exercise endurance -patient does not like suction cup work with manual therapy   ·   ·   Pt. was compliant with all exercises and reported feeling much better after session. Pt. ambulated better and safer with rolling walker. Pt. was advised to use the cane at home and if she needed to go out shopping to take the rolling walker for stability. .  ·   · Communication/Consultation:  HEP  · Equipment provided today:  theraband and cold pack for home usage   · Recommendations/Intent for next treatment session: Next visit will focus on knee mobility and conditioning followed with cold pack .     Total Treatment Billable Duration:  56 minutes   PT Patient Time In/Time Out  Time In: 6460  Time Out: Kahlil Gutierres PT

## 2020-01-15 ENCOUNTER — HOSPITAL ENCOUNTER (OUTPATIENT)
Dept: PHYSICAL THERAPY | Age: 80
Discharge: HOME | End: 2020-01-15
Payer: MEDICARE

## 2020-01-15 PROCEDURE — 97110 THERAPEUTIC EXERCISES: CPT

## 2020-01-15 PROCEDURE — 97140 MANUAL THERAPY 1/> REGIONS: CPT

## 2020-01-15 NOTE — PROGRESS NOTES
Bakari Pelaez  : 1940  Primary: [address] at [place] [address], Alonzo betts, [address]  [phone]   [phone]      OUTPATIENT PHYSICAL THERAPY: Daily Treatment Note 1/15/2020    ICD-10: Treatment Diagnosis: M17.11 unilateral primary osteoarthritis, right knee                 Treatment Diagnosis 2: M25.561 pain in joint, right knee                Treatment Diagnosis 3: R26.89 other abnormalities of gait and mobility   Precautions: weak R quads -falls precautions  Allergies: Pollen extracts and Ace inhibitors   TREATMENT PLAN:  Effective Dates: 2020 TO 2020 (45 days). Frequency/Duration: 2 times a week for 45 Day(s) MEDICAL/REFERRING DIAGNOSIS:  Unilateral primary osteoarthritis, right knee [M17.11]   DATE OF ONSET:12-10-19-R TKA   REFERRING PHYSICIAN: Hillary Cartagena MD Orders: Evaluate and Treat   Return MD Appointment: 2020     Pre-treatment Symptoms/Complaints: Pt. Reports stiffness is main C/O today. Pain: Initial: Pain Intensity 1: 2  Pain Location 1: Knee  Pain Orientation 1: Right  Post Session:  0/10    Medications Last Reviewed:  1/15/2020  Updated Objective Findings:Improved mobility    TREATMENT:   THERAPEUTIC EXERCISE: ( 40 minutes):  Exercises per grid below to improve mobility, strength, balance and coordination. Required minimal verbal cues to promote proper body alignment and promote proper body posture. Progressed resistance, range, repetitions and complexity of movement as indicated.      Date:  1/15/2020 Date:  1/10/20 Date:  2020   Activity/Exercise Parameters Parameters Parameters   Nu step  Level 4 x 10 minutes Level 4 x 10 mins     Calf stretch  4x30 sec hold on incline 2 x 30 seconds by therapist    Hamstring stretch Strap x 4 4x30 sec hold belt  2 x 30 seconds with belt   Ankle pumps 3 x 15 with heat X 30 reps  2 x 20    Heel slides Active 2 x 10  Strap 1 x 10 X 20 reps  X 12 with overpressure from therapist   Quad sets 3 x 10 with heat X 20 reps 5 sec hold 2 x 10 with 3 second hold    Gluteal sets 3 x 10 with heat X 20 reps 5 sec hold 2 x 10 with 3 second hold   Straight leg raises  2 x 10 X 10 reps X 10   Long arc quads  2 x 10 X 20 reps  X 20 reps with 2.5 lbs   Standing hamstring curls   X 20 reps  2 x 10 reps with 2.5 lbs   Marching in place   X 20 reps  2 x 10 with 2.5 lb   Sit to stand 2 x 10 X 10 reps  2 x 6   Chair slides 3 x 30 sec X 10 reps x 10 sec hold each     Weight shift   4 x 30 seconds    Standing hip extension   X 15 with 2.5 lb   Standing hip flexion   X 15 with 2.5 lb   Standing hip abduction   2x10 reps BLE's  X 15 with 2.5 lb   Seated hip adduction   Yellow ball x 10 reps x 10 sec hold  2 x 10 with theraball     Time spent with patient reviewing proper muscle recruitment and technique with exercises. MANUAL THERAPY: (13 minutes): Soft tissue mobilization was utilized and necessary because of the patient's restricted joint motion and restricted motion of soft tissue   Pt. Received soft tissue mobilization to right distal quad and posterior knee to decrease pain and muscular tightness. -cross friction for tightness   . MODALITIES: (5  minutes):      *  Cold Pack Therapy in order to provide analgesia and reduce inflammation and edema. Not today    Heat x 5 minutes to R knee in conjunction with ankle pumps/quad sets/gluteal sets      HEP: As above; handouts given to patient for all exercises.     Treatment/Session Summary:    · Response to Treatment:  ·   improved ROM  · communication/Consultation:  None today  · Equipment provided today:  None today  · Recommendations/Intent for next treatment session: Next visit will focus on knee mobility and conditioning       Total Treatment Billable Duration:  53 minutes   PT Patient Time In/Time Out  Time In: 0900  Time Out: 2830 [facility]

## 2020-01-20 ENCOUNTER — HOSPITAL ENCOUNTER (OUTPATIENT)
Dept: PHYSICAL THERAPY | Age: 80
Discharge: HOME | End: 2020-01-20
Payer: MEDICARE

## 2020-01-20 PROCEDURE — 97140 MANUAL THERAPY 1/> REGIONS: CPT

## 2020-01-20 PROCEDURE — 97110 THERAPEUTIC EXERCISES: CPT

## 2020-01-20 NOTE — PROGRESS NOTES
Jigar Tapia  : 1940  Primary: [address] at [facility]  [address] [facility], [place], [address] [place] [address] Street  [phone]   [phone]      OUTPATIENT PHYSICAL THERAPY: Daily Treatment Note 2020    ICD-10: Treatment Diagnosis: M17.11 unilateral primary osteoarthritis, right knee                 Treatment Diagnosis 2: M25.561 pain in joint, right knee                Treatment Diagnosis 3: R26.89 other abnormalities of gait and mobility   Precautions: weak R quads -falls precautions  Allergies: Pollen extracts and Ace inhibitors   TREATMENT PLAN:  Effective Dates: 2020 TO 2020 (45 days). Frequency/Duration: 2 times a week for 45 Day(s) MEDICAL/REFERRING DIAGNOSIS:  Unilateral primary osteoarthritis, right knee [M17.11]   DATE OF ONSET:12-10-19-R TKA   REFERRING PHYSICIAN: Amy Cavazos,*  MD Orders: Evaluate and Treat   Return MD Appointment: 2020     Pre-treatment Symptoms/Complaints: Pt. Reported no pain just stiffness. Pain: Initial: Pain Intensity 1: 0  Pain Location 1: Knee  Pain Orientation 1: Right  Post Session:  0/10    Medications Last Reviewed:  2020  Updated Objective Findings:improved gait with cane at the end of session. TREATMENT:   THERAPEUTIC EXERCISE: ( 40 minutes):  Exercises per grid below to improve mobility, strength, balance and coordination. Required minimal verbal cues to promote proper body alignment and promote proper body posture. Progressed resistance, range, repetitions and complexity of movement as indicated.      Date:  1/15/2020 Date:  20 Date:  2020   Activity/Exercise Parameters Parameters Parameters   Nu step  Level 4 x 10 minutes Level 4 x 10 mins     Calf stretch  4x30 sec hold on incline 2 x 30 seconds by therapist    Straight leg raises  X 20 reps 2.5 lb wt.s     Hamstring stretch Strap x 4 4x30 sec hold belt  2 x 30 seconds with belt   Ankle pumps 3 x 15 with heat ------- 2 x 20    Heel slides Active 2 x 10  Strap 1 x 10 X 20 reps supine active only X 12 with overpressure from therapist   Quad sets 3 x 10 with heat --------- 2 x 10 with 3 second hold    Gluteal sets 3 x 10 with heat -------- 2 x 10 with 3 second hold   Straight leg raises  2 x 10 2 X 10 reps X 10   Long arc quads  2 x 10 X 20 reps 2.5 lb wt. s X 20 reps with 2.5 lbs   Standing hamstring curls   X 20 reps 2.5 lb wt.s  2 x 10 reps with 2.5 lbs   Marching in place   X 20 reps 2.5 lb wt.s  2 x 10 with 2.5 lb   Sit to stand 2 x 10 2 X 10 reps  2 x 6   Chair slides 3 x 30 sec X 10 reps x 10 sec hold each     Weight shift  -------- 4 x 30 seconds    Standing hip extension  x20 reps 2.5 lb wt. s  X 15 with 2.5 lb   Standing hip flexion  X 20 reps 2.5 lb wt. s  X 15 with 2.5 lb   Standing hip abduction   2x10 reps BLE's 2.5 lb wt. s X 15 with 2.5 lb   Seated hip adduction   ------- 2 x 10 with theraball     Time spent with patient reviewing proper muscle recruitment and technique with exercises. MANUAL THERAPY: (15 minutes): Soft tissue mobilization was utilized and necessary because of the patient's restricted joint motion and restricted motion of soft tissue   Pt. Received soft tissue mobilization to right distal quad and posterior knee to decrease pain and muscular tightness.  Suction cup to right distal quad and scar to decrease adhesions. MODALITIES: (0  minutes):      none today pt. will ice at home       HEP: As above; handouts given to patient for all exercises. Treatment/Session Summary:    · Response to Treatment:Pt. Tolerated suction cup better with less intensity and reported she could handle it better.   ·   improved ROM  · communication/Consultation:  None today  · Equipment provided today:  None today  · Recommendations/Intent for next treatment session: Next visit will focus on knee mobility and conditioning       Total Treatment Billable Duration:  55 minutes   PT Patient Time In/Time Out  Time In: 1430  Time Out: Anastacia [address], [place]

## 2020-01-22 ENCOUNTER — HOSPITAL ENCOUNTER (OUTPATIENT)
Dept: PHYSICAL THERAPY | Age: 80
Discharge: HOME | End: 2020-01-22
Payer: MEDICARE

## 2020-01-22 PROCEDURE — 97140 MANUAL THERAPY 1/> REGIONS: CPT

## 2020-01-22 PROCEDURE — 97110 THERAPEUTIC EXERCISES: CPT

## 2020-01-22 NOTE — PROGRESS NOTES
Guerline Cavazos  : 1940  Primary: [address] at [facility]  [address] [facility], [place], [address] [place] [address] Street  [phone]   [phone]      OUTPATIENT PHYSICAL THERAPY: Daily Treatment Note 2020    ICD-10: Treatment Diagnosis: M17.11 unilateral primary osteoarthritis, right knee                 Treatment Diagnosis 2: M25.561 pain in joint, right knee                Treatment Diagnosis 3: R26.89 other abnormalities of gait and mobility   Precautions: weak R quads -falls precautions  Allergies: Pollen extracts and Ace inhibitors   TREATMENT PLAN:  Effective Dates: 2020 TO 2020 (45 days). Frequency/Duration: 2 times a week for 45 Day(s) MEDICAL/REFERRING DIAGNOSIS:  Unilateral primary osteoarthritis, right knee [M17.11]   DATE OF ONSET:12-10-19-R TKA   REFERRING PHYSICIAN: Nora De Jesus,*  MD Orders: Evaluate and Treat   Return MD Appointment: 2020     Pre-treatment Symptoms/Complaints: Pt. Reported some lateral posterior knee pain with knee flexion-(possible biceps femoris tendonitis )-\"Tylenol did not do a thing so I am starting ibuprofen \". Pain: Initial: Pain Intensity 1: 1  Pain Location 1: Knee  Pain Orientation 1: Lateral, Posterior, Right  Pain Intervention(s) 1: Cold pack, Exercise  Post Session:  0-1/10 -less lateral/posterior R knee soreness   Medications Last Reviewed:  2020  Updated Objective Findings:decreased biceps femoris pain with manual therapy and cold pack    TREATMENT:   THERAPEUTIC EXERCISE: ( 40 minutes):  Exercises per grid below to improve mobility, strength, balance and coordination. Required minimal verbal cues to promote proper body alignment and promote proper body posture. Progressed resistance, range, repetitions and complexity of movement as indicated.      Date:  1/15/2020 Date:  20 Date:  2020   Activity/Exercise Parameters Parameters Parameters   Nu step  Level 4 x 10 minutes Level 4 x 10 mins     Calf stretch  4x30 sec hold on incline 2 x 30 seconds by therapist    Straight leg raises  X 20 reps 2.5 lb wt.s     Hamstring stretch Strap x 4 4x30 sec hold belt     Heel lifts   X 20 to each LE    Ankle pumps 3 x 15 with heat -------  x 20 to each LE    Heel slides Active 2 x 10  Strap 1 x 10 X 20 reps supine active only X 12 with overpressure from therapist   Quad sets 3 x 10 with heat ---------    Gluteal sets 3 x 10 with heat --------    Straight leg raises  2 x 10 2 X 10 reps    Long arc quads  2 x 10 X 20 reps 2.5 lb wt. s X 25 reps with 3.0 lbs   Standing hamstring curls   X 20 reps 2.5 lb wt. s  X 25 with blue band   Marching in place   X 20 reps 2.5 lb wt.s  2 x 10 with green band   Sit to stand 2 x 10 2 X 10 reps  2 x 7   Chair slides 3 x 30 sec X 10 reps x 10 sec hold each     Weight shift  -------- 4 x 30 seconds    Standing hip extension  x20 reps 2.5 lb wt.s  2 x 10 with green band   Standing hip flexion  X 20 reps 2.5 lb wt.s  2 x 10 with green band   Standing hip abduction   2x10 reps BLE's 2.5 lb wt.s 2 x 10 with green band   Seated hip adduction   ------- 2 x 10 with theraball     Time spent with patient reviewing proper muscle recruitment and technique with exercises. MANUAL THERAPY: (15 minutes): Soft tissue mobilization was utilized and necessary because of the patient's restricted joint motion and restricted motion of soft tissue   Pt. Received soft tissue mobilization to lateral hamstring muscle belly and tendon (biceps femoris ) while supine to decrease pain and muscular tightness.       MODALITIES: (8  minutes):      none today pt. will ice at home   Cold pack to R knee particularly lateral hamstring muscle belly and tendon while supine after exercises and manual therapy     HEP: As above; handouts given to patient for all exercises.     Treatment/Session Summary:    · Response to Treatment:decreased posterior/lateral knee pain with improved knee flexion.   ·   improved ROM  · communication/Consultation:  None today  · Equipment provided today:  None today  · Recommendations/Intent for next treatment session: Next visit will focus on knee mobility and conditioning -monitor for biceps femoris tendonitis      Total Treatment Billable Duration:  55 minutes   PT Patient Time In/Time Out  Time In: 1900  Time Out: 5220  Fadumo Alex, PT

## 2020-01-27 ENCOUNTER — HOSPITAL ENCOUNTER (OUTPATIENT)
Dept: PHYSICAL THERAPY | Age: 80
Discharge: HOME | End: 2020-01-27
Payer: MEDICARE

## 2020-01-27 PROCEDURE — 97110 THERAPEUTIC EXERCISES: CPT

## 2020-01-27 PROCEDURE — 97140 MANUAL THERAPY 1/> REGIONS: CPT

## 2020-01-27 NOTE — PROGRESS NOTES
Bridgette Franki  : 1940  Primary: [address] at [facility]  1900 [facility], [place], [address] [place] [address]  [phone]   [phone]      OUTPATIENT PHYSICAL THERAPY: Daily Treatment Note 2020    ICD-10: Treatment Diagnosis: M17.11 unilateral primary osteoarthritis, right knee                 Treatment Diagnosis 2: M25.561 pain in joint, right knee                Treatment Diagnosis 3: R26.89 other abnormalities of gait and mobility   Precautions: weak R quads -falls precautions  Allergies: Pollen extracts and Ace inhibitors   TREATMENT PLAN:  Effective Dates: 2020 TO 2020 (45 days). Frequency/Duration: 2 times a week for 45 Day(s) MEDICAL/REFERRING DIAGNOSIS:  Unilateral primary osteoarthritis, right knee [M17.11]   DATE OF ONSET:12-10-19- TKA   REFERRING PHYSICIAN: Kaylee Chao,*  MD Orders: Evaluate and Treat   Return MD Appointment: 2020     Pre-treatment Symptoms/Complaints: Pt. Reported the manual therapy to the back of my knee really helped a lot -I was able to go out to see a show this weekend and can stand longer in the kitchen and cook. .even though I do not like cooking \". -The ibuprofen and ice have helped as well       Pain: Initial: Pain Intensity 1: 1  Pain Location 1: Knee  Pain Orientation 1: Right, Posterior  Pain Intervention(s) 1: Cold pack, Exercise  Post Session:  0-1/10 -minimal discomfort   Medications Last Reviewed:  2020  Updated Objective Findings:more pain free knee flexion /coming from sit to stand -decreased biceps femoris pain with manual therapy and cold pack    TREATMENT:   THERAPEUTIC EXERCISE: ( 45 minutes):  Exercises per grid below to improve mobility, strength, balance and coordination. Required minimal verbal cues to promote proper body alignment and promote proper body posture. Progressed resistance, range, repetitions and complexity of movement as indicated. Date:  1- Date:  1/20/20 Date:  1-   Activity/Exercise Parameters Parameters Parameters   Nu step  Level 3 x 10 minutes Level 4 x 10 mins     Calf stretch 2 x 30 seconds by therapist 4x30 sec hold on incline 2 x 30 seconds by therapist    Straight leg raises X 10 with 2 lbs X 20 reps 2.5 lb wt.s     Hamstring stretch  4x30 sec hold belt     Heel lifts X 20 to each LE   X 20 to each LE    Ankle pumps X 20 to each LE while seated -------  x 20 to each LE    Heel slides X 12 with overpessure from therapist  X 20 reps supine active only X 12 with overpressure from therapist   Quad sets  ---------    Gluteal sets  --------    Straight leg raises   2 X 10 reps    Long arc quads  2 x 10 with 4 lbs X 20 reps 2.5 lb wt. s X 25 reps with 3.0 lbs   Standing hamstring curls   X 20 reps 2.5 lb wt. s  X 25 with blue band   Marching in place  2 x 10 to each LE while sitting with green band X 20 reps 2.5 lb wt.s  2 x 10 with green band   Sit to stand X 12  2 X 10 reps  2 x 7   Chair slides  X 10 reps x 10 sec hold each     Weight shift  -------- 4 x 30 seconds    Standing hip extension 2 x 10 with green band to R LE x20 reps 2.5 lb wt.s  2 x 10 with green band   Standing hip flexion 2 x 10 with green band to R LE  X 20 reps 2.5 lb wt.s  2 x 10 with green band   Standing hip abduction  2 x 10 with green band to R LE 2x10 reps BLE's 2.5 lb wt.s 2 x 10 with green band   Seated hip adduction  X 20 with green band ------- 2 x 10 with theraball     Time spent with patient reviewing proper muscle recruitment and technique with exercises. MANUAL THERAPY: (12 minutes): Soft tissue mobilization was utilized and necessary because of the patient's restricted joint motion and restricted motion of soft tissue   Pt. Received soft tissue mobilization to lateral hamstring muscle belly and tendon (biceps femoris ) while supine to decrease pain and muscular tightness.         MODALITIES: (8  minutes):      none today pt. will ice at home   Cold pack to R knee particularly lateral hamstring muscle belly and tendon while supine after exercises and manual therapy     HEP: As above; handouts given to patient for all exercises. Treatment/Session Summary:    · Response to Treatment:improved function/walking/ADLs -decreased posterior/lateral knee pain with improved knee flexion.   ·   improved ROM  · communication/Consultation:  None today  · Equipment provided today:  None today  · Recommendations/Intent for next treatment session: Next visit will focus on knee mobility and conditioning -monitor for biceps femoris tendonitis      Total Treatment Billable Duration:  57 minutes   PT Patient Time In/Time Out  Time In: 1430  Time Out: 1538  Edyta Means, PT

## 2020-01-30 ENCOUNTER — HOSPITAL ENCOUNTER (OUTPATIENT)
Dept: PHYSICAL THERAPY | Age: 80
Discharge: HOME | End: 2020-01-30
Payer: MEDICARE

## 2020-01-30 PROCEDURE — 97140 MANUAL THERAPY 1/> REGIONS: CPT

## 2020-01-30 PROCEDURE — 97110 THERAPEUTIC EXERCISES: CPT

## 2020-01-30 NOTE — PROGRESS NOTES
Roberta You  : 1940  Primary: [address] at [place] [address], Alonzo betts, [address]  [phone]   [phone]      OUTPATIENT PHYSICAL THERAPY: Daily Treatment Note 2020    ICD-10: Treatment Diagnosis: M17.11 unilateral primary osteoarthritis, right knee                 Treatment Diagnosis 2: M25.561 pain in joint, right knee                Treatment Diagnosis 3: R26.89 other abnormalities of gait and mobility   Precautions: weak R quads -falls precautions  Allergies: Pollen extracts and Ace inhibitors   TREATMENT PLAN:  Effective Dates: 2020 TO 2020 (45 days). Frequency/Duration: 2 times a week for 45 Day(s) MEDICAL/REFERRING DIAGNOSIS:  Unilateral primary osteoarthritis, right knee [M17.11]   DATE OF ONSET:12-10-19-R TKA   REFERRING PHYSICIAN: Will Marion,*  MD Orders: Evaluate and Treat   Return MD Appointment: 2020     Pre-treatment Symptoms/Complaints: Pt. Reported having tightness and sensitivity on medial right hamstring      Pain: Initial: Pain Intensity 1: 3  Pain Location 1: Knee  Pain Orientation 1: Medial, Posterior, Right  Pain Intervention(s) 1: Cold pack, Exercise  Post Session:  1/10 less pain and tightness (\"Thank you so much\")   Medications Last Reviewed:  2020  Updated Objective Findings: Pt. Ambulated into clinic with cane and right knee flexed. TREATMENT:   THERAPEUTIC EXERCISE: ( 30  minutes):  Exercises per grid below to improve mobility, strength, balance and coordination. Required minimal verbal cues to promote proper body alignment and promote proper body posture. Progressed resistance, range, repetitions and complexity of movement as indicated.      Date:  20 Date:  20 Date:  2020   Activity/Exercise Parameters Parameters Parameters   Nu step  Level 3 x 10 minutes Level 4 x 10 mins     Calf stretch 4 x 30 sec hold bilaterally with strap 4x30 sec hold on incline 2 x 30 seconds by therapist    Straight leg raises X 10 with 2 lbs X 20 reps 2.5 lb wt.s     Hamstring stretch 4x30 sec hold with strap BLE's  4x30 sec hold belt     Heel lifts X in standing x 20 reps   X 20 to each LE    Ankle pumps ----- -------  x 20 to each LE    Heel slides X 12 with overpessure from therapist  X 20 reps supine active only X 12 with overpressure from therapist   Quad sets ------- ---------    Gluteal sets -------- --------    Straight leg raises  --------- 2 X 10 reps    Long arc quads  2 x 10 reps  X 20 reps 2.5 lb wt. s X 25 reps with 3.0 lbs   Standing hamstring curls  No weights x 20 reps BLE's  X 20 reps 2.5 lb wt. s  X 25 with blue band   Marching in place  X 20 reps BLE's no weights X 20 reps 2.5 lb wt.s  2 x 10 with green band   Sit to stand ------ 2 X 10 reps  2 x 7   Chair slides ------ X 10 reps x 10 sec hold each     Weight shift -------- -------- 4 x 30 seconds    Standing hip extension 2 x 10 with green band to R LE x20 reps 2.5 lb wt.s  2 x 10 with green band   Standing hip flexion X 20 reps no wts X 20 reps 2.5 lb wt.s  2 x 10 with green band   Standing hip abduction  2 x 10 reps BLE's no wt.s 2x10 reps BLE's 2.5 lb wt.s 2 x 10 with green band   Seated hip adduction  ---------- ------- 2 x 10 with theraball         MANUAL THERAPY: (25 minutes): Soft tissue mobilization was utilized and necessary because of the patient's restricted joint motion and restricted motion of soft tissue   Pt. Received soft tissue mobilization to medial hamstring and posterior knee to decrease tightness and sensitivity    suction cup used as per pt.'s request to decrease scar tissue    Pt. Put in supine with 1/2 roll under ankle to get to posterior knee for pt.s comfort. MODALITIES: (8  minutes):      Pt. received moist hot pack initially x 8 mins to warm up soft tissue    HEP: As above; handouts given to patient for all exercises.     Treatment/Session Summary:    · Response to treatment:    Pt. was compliant with all exercises and reported less pain at the end of session.    · communication/Consultation:  None today  · Equipment provided today:  None today  · Recommendations/Intent for next treatment session: Next visit will focus on knee mobility and conditioning -monitor for biceps femoris tendonitis      Total Treatment Billable Duration:  55 minutes   PT Patient Time In/Time Out  Time In: 1430  Time Out: Brody Parker PTA

## 2020-02-03 ENCOUNTER — HOSPITAL ENCOUNTER (OUTPATIENT)
Dept: PHYSICAL THERAPY | Age: 80
Discharge: HOME | End: 2020-02-03
Payer: MEDICARE

## 2020-02-03 PROCEDURE — 97140 MANUAL THERAPY 1/> REGIONS: CPT

## 2020-02-03 PROCEDURE — 97110 THERAPEUTIC EXERCISES: CPT

## 2020-02-03 NOTE — PROGRESS NOTES
Jona Segura  : 1940  Primary: [address] 84 at [facility]  [address] [facility], [facility], [address] [place] [address] Street  [phone]   [phone]       OUTPATIENT PHYSICAL THERAPY:Progress Report 2/3/2020    ICD-10: Treatment Diagnosis: M17.11 unilateral primary osteoarthritis , right knee                 Treatment Diagnosis 2:M25.561 pain in joint/ right knee                Treatment Diagnosis 3:R26.89 other abnomalities of gait and mobility   Precautions:weak R quads -falls precautions   Allergies: Pollen extracts and Ace inhibitors   TREATMENT PLAN:  Effective Dates: 2020 TO 2020 (45 days).     Frequency/Duration: 2 times a week for 45 Day(s) MEDICAL/REFERRING DIAGNOSIS:  Unilateral primary osteoarthritis, right knee [M17.11]   DATE OF ONSET:12-10-19 -R TKA   REFERRING PHYSICIAN: Ami King,*  MD Orders: Evaluate and Treat   Return MD Appointment: 2020     INITIAL ASSESSMENT:  Ms. Amparo Magaña is a 70 y.o. female presenting to physical therapy with complaints of R knee pain and stiffness with abnormality of gait since receiving R TKA on 12-10-19 -tight distal quads and tight popliteal region -pain level is 1-2/10 with mild swelling in R knee -decreased R quad strength makes for increased falls precautions -patient ambulates with cane and moderate antalgic gait  -skin is warm but not hot or red -incision looks good -history of L TKA in -very good candidate for skilled physical therapy to include manual therapy and therapeutic exercises followed with cold pack     Patient has been seen for 9 visits of R LE rehab /R TKA from 20 to 2-3-20 with very good progress-knee pain is minimal at 1/10 -knee range of motion has improved to 0-115 -increased knee strength and no swelling -improved ambulation with and without cane -LEFS score has improved from 24/80 to 46/80-very good progress -we will continue to push knee flexion an dgait training-motivated patient -pleasant lady to work with        PROBLEM LIST (Impacting functional limitations):  1. Decreased Strength  2. Decreased ADL/Functional Activities  3. Decreased Transfer Abilities  4. Decreased Ambulation Ability/Technique  5. Decreased Balance  6. Increased Pain  7. Decreased Activity Tolerance  8. Decreased Flexibility/Joint Mobility  9. Edema/Girth INTERVENTIONS PLANNED: (Treatment may consist of any combination of the following)  1. Cold  2. Gait Training  3. Heat  4. Home Exercise Program (HEP)  5. Manual Therapy  6. Range of Motion (ROM)  7. Therapeutic Exercise/Strengthening     GOALS: (Goals have been discussed and agreed upon with patient.)  Short-Term Functional Goals: Time Frame: 1/8/2020 to 1/22/2020  1. Patient demonstrates independence with home exercise program without verbal cueing provided by therapist.-GOAL MET  2. Knee pain is less than 2/10 to progress to DC goal -GOAL MET  3. Increased knee flexion by 10 degrees -GOAL MET  4. Decreased muscle tightness in distal quads and popliteal region to allow increased mobility -GOAL MET  5. Ambulate with cane and minimal antalgic gait -GOAL MET  6. Sleep at least 4 hours without knee pain-GOAL MET  7. Discharge Goals: Time Frame: 1/8/2020 to 2/22/2020  1. Knee pain is 01/ to allow most home ADLs including light lifting   2. Knee range of motion is wfl to allow full personal care including washing and dressing and putting on shoes and socks   3. R quad strength is at least 3+/5 and preferably 3+ to 4-/5 to allow decreased falls issues   4. Ambulate with cane and no antalgic gait or independent ambulation with minimal antalgic gait to allow walking community distances   5. Sleep at least 6-7 hours without knee pain   6. Able to walk/stand for more than 1-2 hours without knee pain   7. LEFS score is improved from 24/80 to 44/80     Outcome Measure:    Tool Used: Lower Extremity Functional Scale (LEFS)  Score:  Initial: 24/80 Most Recent: 46/80 (Date:2-3-20 -- )   Interpretation of Score: 20 questions each scored on a 5 point scale with 0 representing \"extreme difficulty or unable to perform\" and 4 representing \"no difficulty\". The lower the score, the greater the functional disability. 80/80 represents no disability. Minimal detectable change is 9 points. Observation/Orthostatic Postural Assessment:         Less rounded shoulders and forward head on neck posture   Palpation:         Tight and tender distal quads and popliteal region is decreased   ROM:         R knee range of motion is -0-115    L knee range of motion is 0-129    Strength: Ankles are 3+ to 4-/5     R quads and hamstrings are 3+ to 4-/5      L quads and hamstrings are 4-/5     R hip flexor is 4-/5       L hip flexor is 4/5     Special Tests:          Negative homans sign  Neurological Screen:  No numbness or radiating pain or tingling into lower leg but sore in R lower leg   Mental Status:          alert and oriented-very motivated patient  Edema/Girth:     Left Right    Initial Most Recent Initial Most Recent   Upper  Extremity           Lower  Extremity  15.0 at mid patella    15.75 at mid patella  15.0 at mid patella on 2-3-20       Medical Necessity:   · Patient is expected to demonstrate progress in strength, range of motion, balance and coordination to increase independence with ADLs and improve safety during walking and transfers. · Skilled intervention continues to be required due to R knee pain and stiffness is affecting gait and function. Reason for Services/Other Comments:  · Patient continues to require modification of therapeutic interventions to increase complexity of exercises. Rehabilitation Potential For Stated Goals: Good  Regarding Geovanna Somers's therapy, I certify that the treatment plan above will be carried out by a therapist or under their direction.   Thank you for this referral,  Fadumo Alex, PT                  PAIN/SUBJECTIVE: Body Structures Involved:  1. Bones  2. Joints  3. Muscles Body Functions Affected:  1. Sensory/Pain  2. Neuromusculoskeletal  3. Movement Related Activities and Participation Affected:  1. Learning and Applying Knowledge  2. General Tasks and Demands  3. Mobility  4. Self Care  5. Domestic Life    Number of elements (examined above) that affect the Plan of Care: 3: MODERATE COMPLEXITY    CLINICAL PRESENTATION:    Presentation: Evolving clinical presentation with changing clinical characteristics: MODERATE COMPLEXITY    CLINICAL DECISION MAKING:    Use of outcome tool(s) and clinical judgement create a POC that gives a-impairment of at least 60% but less than 80%:  Questionable prediction of patient's progress: MODERATE COMPLEXITY

## 2020-02-03 NOTE — PROGRESS NOTES
Bakari Latanya  : 1940  Primary: [address] [facility]*  Secondary:  [address] at [facility]  [address] [facility], [place], [address] [place] [address]  [phone]   [phone]      OUTPATIENT PHYSICAL THERAPY: Daily Treatment Note 2/3/2020    ICD-10: Treatment Diagnosis: M17.11 unilateral primary osteoarthritis, right knee                 Treatment Diagnosis 2: M25.561 pain in joint, right knee                Treatment Diagnosis 3: R26.89 other abnormalities of gait and mobility   Precautions: weak R quads -falls precautions  Allergies: Pollen extracts and Ace inhibitors   TREATMENT PLAN:  Effective Dates: 2020 TO 2020 (45 days). Frequency/Duration: 2 times a week for 45 Day(s) MEDICAL/REFERRING DIAGNOSIS:  Unilateral primary osteoarthritis, right knee [M17.11]   DATE OF ONSET:12-10-19-R TKA   REFERRING PHYSICIAN: Hillary Cartagena MD Orders: Evaluate and Treat   Return MD Appointment: 2020     Pre-treatment Symptoms/Complaints: Pt. Reported having tightness and sensitivity on medial right hamstring on last visits but this is mainly resolved at this time with minimal to no pain      Pain: Initial: Pain Intensity 1: 1  Pain Location 1: Knee  Pain Orientation 1: Medial, Posterior, Right  Pain Intervention(s) 1: Cold pack, Exercise, Heat applied  Post Session:  1/10 less pain and tightness-improved knee mobility and gait  with less pain   Medications Last Reviewed:  2/3/2020  Updated Objective Findings: Pt. Ambulated into clinic with cane and minimal  antalgic gait      TREATMENT:   THERAPEUTIC EXERCISE: ( 45  minutes):  Exercises per grid below to improve mobility, strength, balance and coordination. Required minimal verbal cues to promote proper body alignment and promote proper body posture. Progressed resistance, range, repetitions and complexity of movement as indicated.      Date:  20 Date:  2-3-2020 Date:  2020   Activity/Exercise Parameters Parameters Parameters   Nu step  Level 3 x 10 minutes     Calf stretch 4 x 30 sec hold bilaterally with strap 4x30 seconds by therapist  2 x 30 seconds by therapist    Straight leg raises X 10 with 2 lbs 2 x 10 with 1.5 lb     Hamstring stretch 4x30 sec hold with strap BLE's  4x30 sec hold with belt     airdyne  X 5 minutes at seat height 4     Gait training  X 10 minutes in hallways with and without cane    Heel lifts X in standing x 20 reps   X 20 to each LE    Ankle pumps ----- X 20-------  x 20 to each LE    Heel slides X 12 with overpessure from therapist  2 x 15 with therapist assist into flexion X 12 with overpressure from therapist   Quad sets ------- X 10 with 3 second hold-    Gluteal sets -------- x 10 with 3 second hold---     ---------     Long arc quads  2 x 10 reps  X 20 with 3 lbs  X 25 reps with 3.0 lbs   Standing hamstring curls  No weights x 20 reps BLE's  X 20 with 3 lbs  X 25 with blue band   Marching in place  X 20 reps BLE's no weights X 20 reps with 3 lbs in standing 2 x 10 with green band   Sit to stand ------ 2 X 10 reps  2 x 7   Chair slides ------     Weight shift -------- X 3 to right with 30 second hold 4 x 30 seconds    Standing hip extension 2 x 10 with green band to R LE 2 x 10 reps with 3 lbs  2 x 10 with green band   Standing hip flexion X 20 reps no wts 2 x 10 reps with 3 lbs  2 x 10 with green band   Standing hip abduction  2 x 10 reps BLE's no wt.s 2x10 reps with 3 lbs  2 x 10 with green band   steps  X 10 -lead with R LE     Seated hip adduction  ---------- 2 x 10 with yellow theraball------- 2 x 10 with theraball         MANUAL THERAPY: (12 minutes): Soft tissue mobilization was utilized and necessary because of the patient's restricted joint motion and restricted motion of soft tissue   Pt. Received soft tissue mobilization to medial and lateral hamstring and posterior knee to decrease tightness and sensitivity while supine after 5 minutes of heat       .     MODALITIES: (12 minutes): Pt. received moist hot pack initially x 8 mins to warm up soft tissue-5 minutes of heat prior to exercises    Cold pack x 7 minutes to R knee while seated after exercises     HEP: As above; handouts given to patient for all exercises. Treatment/Session Summary:    · Response to treatment: very good progress-decreased pain and swelling -increased gait ability and range of motion   Pt. was compliant with all exercises and reported less pain at the end of session.    · communication/Consultation:  None today  · Equipment provided today:  None today  · Recommendations/Intent for next treatment session: Next visit will focus on knee mobility and conditioning -push knee flexion with airdyne/gait training with weight shift       Total Treatment Billable Duration:  57 minutes     Time in--1330  Time out-1440  Katy Campbell, PT

## 2020-02-06 ENCOUNTER — HOSPITAL ENCOUNTER (OUTPATIENT)
Dept: PHYSICAL THERAPY | Age: 80
Discharge: HOME | End: 2020-02-06
Payer: MEDICARE

## 2020-02-06 PROCEDURE — 97140 MANUAL THERAPY 1/> REGIONS: CPT

## 2020-02-06 PROCEDURE — 97110 THERAPEUTIC EXERCISES: CPT

## 2020-02-06 NOTE — PROGRESS NOTES
Bridgette Franki  : 1940  Primary: [address] [facility]*  Secondary:  [address] at [facility]  [address] [facility], [facility], [address] [place] [address] Street  [phone]   [phone]      OUTPATIENT PHYSICAL THERAPY: Daily Treatment Note 2020    ICD-10: Treatment Diagnosis: M17.11 unilateral primary osteoarthritis, right knee                 Treatment Diagnosis 2: M25.561 pain in joint, right knee                Treatment Diagnosis 3: R26.89 other abnormalities of gait and mobility   Precautions: weak R quads -falls precautions  Allergies: Pollen extracts and Ace inhibitors   TREATMENT PLAN:  Effective Dates: 2020 TO 2020 (45 days). Frequency/Duration: 2 times a week for 45 Day(s) MEDICAL/REFERRING DIAGNOSIS:  Unilateral primary osteoarthritis, right knee [M17.11]   DATE OF ONSET:12-10-19-R TKA   REFERRING PHYSICIAN: Kaylee Chao,*  MD Orders: Evaluate and Treat   Return MD Appointment: 2020     Pre-treatment Symptoms/Complaints: Patient with some tightness in her posterior knee, but feeling as though she is getting better. Pain: Initial: Pain Intensity 1: 3  Pain Location 1: Knee  Pain Orientation 1: Posterior, Right  Post Session:  1/10    Medications Last Reviewed:  2020  Updated Objective Findings: decreased tightness in posterior knee following manual   TREATMENT:   THERAPEUTIC EXERCISE: (40  minutes):  Exercises per grid below to improve mobility, strength, balance and coordination. Required minimal verbal cues to promote proper body alignment and promote proper body posture. Progressed resistance, range, repetitions and complexity of movement as indicated.      Date:  20 Date:  2-3-2020 Date:  2020   Activity/Exercise Parameters Parameters Parameters   Nu step  Level 3 x 10 minutes  Level 3 x 10 minutes   Calf stretch 4 x 30 sec hold bilaterally with strap 4x30 seconds by therapist  Strap, 4 x 30 seconds   Straight leg raises X 10 with 2 lbs 2 x 10 with 1.5 lb  ---   Hamstring stretch 4x30 sec hold with strap BLE's  4x30 sec hold with belt  Strap, 3 x 30 seconds     airdyne  X 5 minutes at seat height 4  ---   Gait training  X 10 minutes in hallways with and without cane ---   Heel lifts X in standing x 20 reps   2 x 10   Ankle pumps ----- X 20------- ---   Heel slides X 12 with overpessure from therapist  2 x 15 with therapist assist into flexion X 10 strap   Quad sets ------- X 10 with 3 second hold- ---   Gluteal sets -------- x 10 with 3 second hold--- ---   Long arc quads  2 x 10 reps  X 20 with 3 lbs  2 x 10   Standing hamstring curls  No weights x 20 reps BLE's  X 20 with 3 lbs  ---   Marching in place  X 20 reps BLE's no weights X 20 reps with 3 lbs in standing ---   Sit to stand ------ 2 X 10 reps  2 x 10   Weight shift -------- X 3 to right with 30 second hold ---   Standing hip extension 2 x 10 with green band to R LE 2 x 10 reps with 3 lbs  ---   Standing hip flexion X 20 reps no wts 2 x 10 reps with 3 lbs  ---   Standing hip abduction  2 x 10 reps BLE's no wt.s 2x10 reps with 3 lbs  2 x 10 with red band   steps  X 10 -lead with R LE  6 inch, x 10 forward and lateral   Seated hip adduction  ---------- 2 x 10 with yellow theraball ---         MANUAL THERAPY: (15 minutes): Soft tissue mobilization was utilized and necessary because of the patient's restricted joint motion and restricted motion of soft tissue   Soft tissue mobilization to medial and lateral hamstring and posterior knee to decrease tightness and sensitivity while supine   Suction cup mobilization over incision  . MODALITIES: (10 minutes):      *  Cold Pack Therapy in order to provide analgesia, relieve muscle spasm and reduce inflammation and edema. Cold pack x 7 minutes to R knee while seated after exercises     HEP: As above; handouts given to patient for all exercises.     Treatment/Session Summary:    · Response to treatment:   Decreased pain and improved gait at end of session  · communication/Consultation:  None today  · Equipment provided today:  None today  · Recommendations/Intent for next treatment session: Next visit will focus on knee mobility and conditioning -push knee flexion with airdyne/gait training with weight shift       Total Treatment Billable Duration:  55 minutes   PT Patient Time In/Time Out  Time In: 1435  Time Out: 1540  Yas Cage, PT

## 2020-02-10 ENCOUNTER — HOSPITAL ENCOUNTER (OUTPATIENT)
Dept: PHYSICAL THERAPY | Age: 80
Discharge: HOME | End: 2020-02-10
Payer: MEDICARE

## 2020-02-10 PROCEDURE — 97110 THERAPEUTIC EXERCISES: CPT

## 2020-02-10 PROCEDURE — 97140 MANUAL THERAPY 1/> REGIONS: CPT

## 2020-02-10 NOTE — PROGRESS NOTES
Mag Castrejon  : 1940  Primary: [address] at [facility]  [address] [facility], [place], [address] [place] [address] Street  [phone]   [phone]      OUTPATIENT PHYSICAL THERAPY: Daily Treatment Note 2/10/2020    ICD-10: Treatment Diagnosis: M17.11 unilateral primary osteoarthritis, right knee                 Treatment Diagnosis 2: M25.561 pain in joint, right knee                Treatment Diagnosis 3: R26.89 other abnormalities of gait and mobility   Precautions: weak R quads -falls precautions  Allergies: Pollen extracts and Ace inhibitors   TREATMENT PLAN:  Effective Dates: 2020 TO 2020 (45 days). Frequency/Duration: 2 times a week for 45 Day(s) MEDICAL/REFERRING DIAGNOSIS:  Unilateral primary osteoarthritis, right knee [M17.11]   DATE OF ONSET:12-10-19-R TKA   REFERRING PHYSICIAN: Iona Bright*  MD Orders: Evaluate and Treat   Return MD Appointment: 2020     Pre-treatment Symptoms/Complaints: Patient states she is doing almost everything she needs to do around the house but hasn't tried driving yet but will this weekend       Pain: Initial: Pain Intensity 1: 1  Pain Location 1: Knee  Pain Orientation 1: Posterior, Right  Pain Intervention(s) 1: Cold pack, Exercise  Post Session:  1/10    Medications Last Reviewed:  2/10/2020  Updated Objective Findings: decreased tightness in posterior knee -improved endurance for exercises   TREATMENT:   THERAPEUTIC EXERCISE: (40  minutes):  Exercises per grid below to improve mobility, strength, balance and coordination. Required minimal verbal cues to promote proper body alignment and promote proper body posture. Progressed resistance, range, repetitions and complexity of movement as indicated.      Date:  2-10-20 Date:  2-3-2020 Date:  2020   Activity/Exercise Parameters Parameters Parameters   Nu step  Level 3 x 10 minutes  Level 3 x 10 minutes   Calf stretch 4 x 30 sec hold bilaterally on incline 4x30 seconds by therapist  Strap, 4 x 30 seconds   Straight leg raises  2 x 10 with 1.5 lb  ---   Hamstring stretch  4x30 sec hold with belt  Strap, 3 x 30 seconds     airdyne  X 5 minutes at seat height 4  ---   Gait training X 8 minutes in hallways working on weight shift -walking unassisted X 10 minutes in hallways with and without cane ---   Heel lifts   2 x 10   Ankle pumps ----- X 20------- ---   Heel slides X 20 with overpessure from therapist  2 x 15 with therapist assist into flexion X 10 strap   Quad sets ------- X 10 with 3 second hold- ---   Gluteal sets -------- x 10 with 3 second hold--- ---   Long arc quads  2 x 10 reps with 3 lbs X 20 with 3 lbs  2 x 10   Standing hamstring curls    X 20 with 3 lbs  ---   Marching in place  X 20 reps to R knee with 3 lbs X 20 reps with 3 lbs in standing ---   Sit to stand 2 x 6------ 2 X 10 reps  2 x 10   Weight shift 3 x 30 seconds-------- X 3 to right with 30 second hold ---   Standing hip extension 2 x 10 with 3 lbs to R LE 2 x 10 reps with 3 lbs  ---   Standing hip flexion 2 x 10 reps with 3 lbs  2 x 10 reps with 3 lbs  ---   Standing hip abduction  2 x 10 reps with 3 lbs to R LE  2x10 reps with 3 lbs  2 x 10 with red band   steps X 10 leading with R LE  X 10 -lead with R LE  6 inch, x 10 forward and lateral   Side step ups 2 x 10 to R LE     Seated hamstring curls 2 x 10 with green band to R LE     Seated hip adduction  2 x 10 with theraball---------- 2 x 10 with yellow theraball ---         MANUAL THERAPY: (14 minutes): Soft tissue mobilization was utilized and necessary because of the patient's restricted joint motion and restricted motion of soft tissue   Soft tissue mobilization to medial and lateral hamstring and posterior knee to decrease tightness and sensitivity while supine   Suction cup mobilization over incision  .     MODALITIES: (6 minutes):      *  Cold Pack Therapy in order to provide analgesia, relieve muscle spasm and reduce inflammation and edema. Cold pack x 6 minutes to R knee while seated after exercises     HEP: As above; handouts given to patient for all exercises.     Treatment/Session Summary:    · Response to treatment:  -progressing well with increased mobility and function- Decreased pain and improved gait at end of session  · communication/Consultation:  None today  · Equipment provided today:  None today  · Recommendations/Intent for next treatment session: Next visit will focus on knee mobility and conditioning -push knee flexion with airdyne/gait training with weight shift       Total Treatment Billable Duration: 60 minutes   PT Patient Time In/Time Out  Time In: 1330  Time Out: [address], PT

## 2020-02-13 ENCOUNTER — HOSPITAL ENCOUNTER (OUTPATIENT)
Dept: PHYSICAL THERAPY | Age: 80
Discharge: HOME | End: 2020-02-13
Payer: MEDICARE

## 2020-02-13 PROCEDURE — 97110 THERAPEUTIC EXERCISES: CPT

## 2020-02-13 NOTE — PROGRESS NOTES
Peg Balling  : 1940  Primary: [address] at [facility]  [address] [facility] [place], [address] [place] [address] Street  [phone]   [phone]      OUTPATIENT PHYSICAL THERAPY: Daily Treatment Note 2020    ICD-10: Treatment Diagnosis: M17.11 unilateral primary osteoarthritis, right knee                 Treatment Diagnosis 2: M25.561 pain in joint, right knee                Treatment Diagnosis 3: R26.89 other abnormalities of gait and mobility   Precautions: weak R quads -falls precautions  Allergies: Pollen extracts and Ace inhibitors   TREATMENT PLAN:  Effective Dates: 2020 TO 2020 (45 days). Frequency/Duration: 2 times a week for 45 Day(s) MEDICAL/REFERRING DIAGNOSIS:  Unilateral primary osteoarthritis, right knee [M17.11]   DATE OF ONSET:12-10-19-R TKA   REFERRING PHYSICIAN: Rosendo Infante,*  MD Orders: Evaluate and Treat   Return MD Appointment: 2020     Pre-treatment Symptoms/Complaints: Patient reported not driving yet but planning on it soon. Pt. Reported overall improvement 75% better. Pain: Initial: Pain Intensity 1: 1  Pain Location 1: Knee  Pain Orientation 1: Posterior  Pain Intervention(s) 1: Cold pack, Exercise  Post Session:  1/10 sore from exercises   Medications Last Reviewed:  2020  Updated Objective Findings: improved gait and exercises today   TREATMENT:   THERAPEUTIC EXERCISE: (55   minutes):  Exercises per grid below to improve mobility, strength, balance and coordination. Required minimal verbal cues to promote proper body alignment and promote proper body posture. Progressed resistance, range, repetitions and complexity of movement as indicated.      Date:  2-10-20 Date:  20 Date:  2020   Activity/Exercise Parameters Parameters Parameters   Nu step  Level 3 x 10 minutes Level 4 x 10 mins  Level 3 x 10 minutes   Calf stretch 4 x 30 sec hold bilaterally on incline 4x30 seconds on incline Strap, 4 x 30 seconds   Hamstring stretch  4x30 sec hold with belt  Strap, 3 x 30 seconds     airdyne  X 10 minutes at seat height #3 full  revolutions ---   Heel lifts  2x10  2 x 10   Long arc quads  2 x 10 reps with 3 lbs X 20 with 3 lbs  2 x 10   Standing hamstring curls    X 20 with 3 lbs  ---   Marching in place  X 20 reps to R knee with 3 lbs X 20 reps with 3 lbs in standing ---   Sit to stand 2 x 6------ 2 X 10 reps  2 x 10   Standing hip extension 2 x 10 with 3 lbs to R LE 2 x 10 reps with 3 lbs  ---   Standing hip flexion 2 x 10 reps with 3 lbs  2 x 10 reps with 3 lbs  ---   Standing hip abduction  2 x 10 reps with 3 lbs to R LE  2x10 reps with 3 lbs  2 x 10 with red band   steps X 10 leading with R LE   8 inch forward  & lateral x 15 reps each  6 inch, x 10 forward and lateral   Seated hip adduction  2 x 10 with theraball---------- 2 x 10 with yellow theraball ---         MANUAL THERAPY: (0 minutes): Soft tissue mobilization was utilized and necessary because of the patient's restricted joint motion and restricted motion of soft tissue  NONE TODAY  . MODALITIES: (0 minutes):      *  Cold Pack Therapy in order to provide analgesia, relieve muscle spasm and reduce inflammation and edema. HEP: As above; handouts given to patient for all exercises.     Treatment/Session Summary:    · Response to treatment:  Pt. reported feeling better after session today  · communication/Consultation:  None today  · Equipment provided today:  None today  · Recommendations/Intent for next treatment session: Next visit will focus on knee mobility and conditioning -push knee flexion with airdyne/gait training with weight shift       Total Treatment Billable Duration: 60 minutes   PT Patient Time In/Time Out  Time In: 1330  Time Out: Rani 37, PTA

## 2020-02-17 ENCOUNTER — HOSPITAL ENCOUNTER (OUTPATIENT)
Dept: PHYSICAL THERAPY | Age: 80
Discharge: HOME | End: 2020-02-17
Payer: MEDICARE

## 2020-02-17 PROCEDURE — 97110 THERAPEUTIC EXERCISES: CPT

## 2020-02-17 PROCEDURE — 97116 GAIT TRAINING THERAPY: CPT

## 2020-02-17 NOTE — PROGRESS NOTES
Erin Shea  : 1940  Primary: [facility] at [address], [address]  [phone]   [phone]      OUTPATIENT PHYSICAL THERAPY: Daily Treatment Note 2020    ICD-10: Treatment Diagnosis: M17.11 unilateral primary osteoarthritis, right knee                 Treatment Diagnosis 2: M25.561 pain in joint, right knee                Treatment Diagnosis 3: R26.89 other abnormalities of gait and mobility   Precautions: weak R quads -falls precautions  Allergies: Pollen extracts and Ace inhibitors   TREATMENT PLAN:  Effective Dates: 2020 TO 2020 (45 days). Frequency/Duration: 2 times a week for 45 Day(s) MEDICAL/REFERRING DIAGNOSIS:  Unilateral primary osteoarthritis, right knee [M17.11]   DATE OF ONSET:12-10-19-R TKA   REFERRING PHYSICIAN: Linda Barrios,*  MD Orders: Evaluate and Treat   Return MD Appointment: 2020     Pre-treatment Symptoms/Complaints: Patient reported a very good night last night with no pain and able to sleep better-I plan of trying to drive a little bit this afternoon and I was able to go up and down steps at a restaurant this weekend        Pain: Initial: Pain Intensity 1: 0  Pain Location 1: Knee  Pain Orientation 1: Right, Posterior  Pain Intervention(s) 1: Cold pack, Exercise  Post Session:  1/10 sore from exercises   Medications Last Reviewed:  2020  Updated Objective Findings: improved gait and endurance   TREATMENT:   THERAPEUTIC EXERCISE: (55   minutes):  Exercises per grid below to improve mobility, strength, balance and coordination. Required minimal verbal cues to promote proper body alignment and promote proper body posture. Progressed resistance, range, repetitions and complexity of movement as indicated.      Date:  2-10-20 Date:  20 Date:  20   Activity/Exercise Parameters Parameters Parameters   Nu step  Level 3 x 10 minutes Level 4 x 10 mins     Calf stretch 4 x 30 sec hold bilaterally on incline 4x30 seconds on incline    Weight shifts   4 x 30 seconds to R LE    Hamstring stretch  4x30 sec hold with belt       airdyne  X 10 minutes at seat height #3 full  revolutions X 12 minutes at seat level 2 ---   Ankle pumps   2 x 10 to each LE    Heel lifts  2x10  2 x 10 to each LE   Long arc quads  2 x 10 reps with 3 lbs X 20 with 3 lbs  X 25 to R LE with 3 lbs    Standing hamstring curls    X 20 with 3 lbs  2 x 10 with 3 lbs to R LE---   Marching in place  X 20 reps to R knee with 3 lbs X 20 reps with 3 lbs in standing X 20 to each LE with green band---   Sit to stand 2 x 6------ 2 X 10 reps  2 x 10   Standing hip extension 2 x 10 with 3 lbs to R LE 2 x 10 reps with 3 lbs  -2 x 10 to R LE with 3 lbs--   Standing hip flexion 2 x 10 reps with 3 lbs  2 x 10 reps with 3 lbs  2 x 10 to R LE with 3 lbs---   Standing hip abduction  2 x 10 reps with 3 lbs to R LE  2x10 reps with 3 lbs  2 x 10 to R LE with 3 lbs    steps X 10 leading with R LE   8 inch forward  & lateral x 15 reps each     Seated hamstring curls   X 20 to R LE with blue band   Gait training   X 15 minutes in hallways and outside on unlevel pavement and up and down curbs -work on R knee flexion with gait   Seated hip adduction  2 x 10 with theraball---------- 2 x 10 with yellow theraball 2 x 10 with theraball---         MANUAL THERAPY: (0 minutes): Soft tissue mobilization was utilized and necessary because of the patient's restricted joint motion and restricted motion of soft tissue  NONE TODAY  . MODALITIES: (8 minutes):      *  Cold Pack Therapy in order to provide analgesia, relieve muscle spasm and reduce inflammation and edema. to R knee while seated after exercises      HEP: As above; handouts given to patient for all exercises.     Treatment/Session Summary:    · Response to treatment: improved gait with some cues -increased mobility and endurance  Pt. reported feeling better after session today  · communication/Consultation:  None today  · Equipment provided today:  None today  · Recommendations/Intent for next treatment session: Next visit will focus on knee mobility and conditioning -push knee flexion with airdyne/gait training with weight shift       Total Treatment Billable Duration: 55 minutes   PT Patient Time In/Time Out  Time In: 1330  Time Out: 6973  Melo Santo, PT

## 2020-02-19 ENCOUNTER — APPOINTMENT (OUTPATIENT)
Dept: PHYSICAL THERAPY | Age: 80
End: 2020-02-19
Payer: MEDICARE

## 2020-02-25 ENCOUNTER — HOSPITAL ENCOUNTER (OUTPATIENT)
Dept: PHYSICAL THERAPY | Age: 80
Discharge: HOME | End: 2020-02-25
Payer: MEDICARE

## 2020-02-25 PROCEDURE — 97110 THERAPEUTIC EXERCISES: CPT

## 2020-02-25 NOTE — PROGRESS NOTES
Guerline Cavazos  : 1940  Primary: [address] at [facility]  [address] [facility] [place], [address] [place] [address]  [phone]   [phone]      OUTPATIENT PHYSICAL THERAPY: Daily Treatment Note 2020    ICD-10: Treatment Diagnosis: M17.11 unilateral primary osteoarthritis, right knee                 Treatment Diagnosis 2: M25.561 pain in joint, right knee                Treatment Diagnosis 3: R26.89 other abnormalities of gait and mobility   Precautions: weak R quads -falls precautions  Allergies: Pollen extracts and Ace inhibitors   TREATMENT PLAN:  Effective Dates: 2020 TO 2020 (45 days). Frequency/Duration: 2 times a week for 45 Day(s) MEDICAL/REFERRING DIAGNOSIS:  Unilateral primary osteoarthritis, right knee [M17.11]   DATE OF ONSET:12-10-19-R TKA   REFERRING PHYSICIAN: Nora De Jesus,*  MD Orders: Evaluate and Treat   Return MD Appointment: 2020     Pre-treatment Symptoms/Complaints: Patient reported feeling well and able to do her chores at home and was able to go to mall and do some shopping- able to sleep better-I am now driving some as well        Pain: Initial: Pain Intensity 1: 0  Pain Location 1: Knee  Pain Orientation 1: Posterior, Right  Pain Intervention(s) 1: Cold pack, Exercise  Post Session:  0/10 minimal sore in posterior knee with knee curls -DC to home program   Medications Last Reviewed:  2020  Updated Objective Findings: improved gait and endurance-performed exercises well with increased knee flexion   TREATMENT:   THERAPEUTIC EXERCISE: (53   minutes):  Exercises per grid below to improve mobility, strength, balance and coordination. Required minimal verbal cues to promote proper body alignment and promote proper body posture. Progressed resistance, range, repetitions and complexity of movement as indicated.      Date:  20 Date:  20 Date:  20   Activity/Exercise Parameters Parameters Parameters   Nu step   Level 4 x 10 mins     Calf stretch 4 x 30 seconds by therapist  4x30 seconds on incline    Weight shifts 3 x 30 seconds on R LE   4 x 30 seconds to R LE    Hamstring stretch 2 x 30 seconds with belt  4x30 sec hold with belt       Straight leg raise 2 x 10      airdyne  X 10 minutes at seat height #3 full  revolutions X 12 minutes at seat level 2 ---   Ankle pumps   2 x 10 to each LE    Heel lifts  2x10  2 x 10 to each LE   Long arc quads  X 25 with 4 lbs X 20 with 3 lbs  X 25 to R LE with 3 lbs    Standing hamstring curls   x 25 with 4 lbs X 20 with 3 lbs  2 x 10 with 3 lbs to R LE---   Marching in place  X 20 reps to R knee with 4 lbs X 20 reps with 3 lbs in standing X 20 to each LE with green band---   Sit to stand 2 x 7------ 2 X 10 reps  2 x 10   Standing hip extension 2 x 10 with 4 lbs to R LE 2 x 10 reps with 3 lbs  -2 x 10 to R LE with 3 lbs--   Standing hip flexion 2 x 10 reps with 4 lbs to R LE 2 x 10 reps with 3 lbs  2 x 10 to R LE with 3 lbs---   Standing hip abduction  2 x 10 reps with 4 lbs to R LE  2x10 reps with 3 lbs  2 x 10 to R LE with 3 lbs    steps X 10 leading with R LE   8 inch forward  & lateral x 15 reps each     Seated hamstring curls X 25 with blue band  X 20 to R LE with blue band   Side steps X 10 leading with R LE      Gait training X 5 minutes in hallways-good weight shift  X 15 minutes in hallways and outside on unlevel pavement and up and down curbs -work on R knee flexion with gait   Seated hip adduction  2 x 10 with theraball---------- 2 x 10 with yellow theraball 2 x 10 with theraball---         MANUAL THERAPY: (0 minutes): Soft tissue mobilization was utilized and necessary because of the patient's restricted joint motion and restricted motion of soft tissue  NONE TODAY  . MODALITIES: (8 minutes):      *  Cold Pack Therapy in order to provide analgesia, relieve muscle spasm and reduce inflammation and edema.  to R knee while seated after exercises      HEP: As above; handouts given to patient for all exercises.     Treatment/Session Summary:    · Response to treatment: improved gait with some cues -increased mobility and endurance-knee range is 0-120 on R -goals met -DC to home program  Pt. reported feeling better after session today  · communication/Consultation:  None today  · Equipment provided today:  None today  · Recommendations/Intent for next treatment session: DC to home exercise program -patient soon leaving on long vacation [facility] including a cruise        Total Treatment Billable Duration: 53 minutes   PT Patient Time In/Time Out  Time In: 1330  Time Out: [address], PT

## 2020-02-25 NOTE — THERAPY DISCHARGE
Bakari Pelaez  : 1940  Primary: [address] at [address], [address] [place] [address]  [phone]   [phone]       OUTPATIENT PHYSICAL [facility] 2020    ICD-10: Treatment Diagnosis: M17.11 unilateral primary osteoarthritis , right knee                 Treatment Diagnosis 2:M25.561 pain in joint/ right knee                Treatment Diagnosis 3:R26.89 other abnomalities of gait and mobility   Precautions:weak R quads -falls precautions   Allergies: Pollen extracts and Ace inhibitors   TREATMENT PLAN:  Effective Dates: 2020 TO 2020 (45 days).     Frequency/Duration: 2 times a week for 45 Day(s) MEDICAL/REFERRING DIAGNOSIS:  Unilateral primary osteoarthritis, right knee [M17.11]   DATE OF ONSET:12-10-19 -R TKA   REFERRING PHYSICIAN: Hillary Cartagena MD Orders: Evaluate and Treat   Return MD Appointment: 2020     INITIAL ASSESSMENT:  Ms. DAVIS [facility] is a 68 y.o. female presenting to physical therapy with complaints of R knee pain and stiffness with abnormality of gait since receiving R TKA on 12-10-19 -tight distal quads and tight popliteal region -pain level is 1-2/10 with mild swelling in R knee -decreased R quad strength makes for increased falls precautions -patient ambulates with cane and moderate antalgic gait  -skin is warm but not hot or red -incision looks good -history of L TKA in -very good candidate for skilled physical therapy to include manual therapy and therapeutic exercises followed with cold pack     Patient has been seen for 14 visits of R LE rehab /R TKA from 20 to 20 with very good progress-knee pain is minimal at 0-1/10 -knee range of motion has improved to 0-120 -increased knee strength and no swelling -improved ambulation to independent with minimal to no antalgic gait  -LEFS score has improved from 24/80 to 46/80 to 51/80-very good progress -goals met-DC to home program -very pleasant and motivated patient         PROBLEM LIST (Impacting functional limitations):  1. Decreased Strength  2. Decreased ADL/Functional Activities  3. Decreased Transfer Abilities  4. Decreased Ambulation Ability/Technique  5. Decreased Balance  6. Increased Pain  7. Decreased Activity Tolerance  8. Decreased Flexibility/Joint Mobility  9. Edema/Girth INTERVENTIONS PLANNED: (Treatment may consist of any combination of the following)  1. Cold  2. Gait Training  3. Heat  4. Home Exercise Program (HEP)  5. Manual Therapy  6. Range of Motion (ROM)  7. Therapeutic Exercise/Strengthening     GOALS: (Goals have been discussed and agreed upon with patient.)  Short-Term Functional Goals: Time Frame: 1/8/2020 to 1/22/2020  1. Patient demonstrates independence with home exercise program without verbal cueing provided by therapist.-GOAL MET  2. Knee pain is less than 2/10 to progress to DC goal -GOAL MET  3. Increased knee flexion by 10 degrees -GOAL MET  4. Decreased muscle tightness in distal quads and popliteal region to allow increased mobility -GOAL MET  5. Ambulate with cane and minimal antalgic gait -GOAL MET  6. Sleep at least 4 hours without knee pain-GOAL MET  7. Discharge Goals: Time Frame: 1/8/2020 to 2/22/2020  1. Knee pain is 01/ to allow most home ADLs including light lifting -GOAL MET  2. Knee range of motion is wfl to allow full personal care including washing and dressing and putting on shoes and socks -GOAL MET  3. R quad strength is at least 3+/5 and preferably 3+ to 4-/5 to allow decreased falls issues -GOAL MET  4. Ambulate with cane and no antalgic gait or independent ambulation with minimal antalgic gait to allow walking community distances -GOAL MET  5. Sleep at least 6-7 hours without knee pain -GOAL MET  6. Able to walk/stand for more than 1-2 hours without knee pain -PROGRESSING  7.  LEFS score is improved from 24/80 to 44/80 -GOAL MET-LATEST SCORE IS 51/80    Outcome Measure: Tool Used: Lower Extremity Functional Scale (LEFS)  Score:  Initial: 24/80 Most Recent: 51/80 (Date:2-25-20 -- )   Interpretation of Score: 20 questions each scored on a 5 point scale with 0 representing \"extreme difficulty or unable to perform\" and 4 representing \"no difficulty\". The lower the score, the greater the functional disability. 80/80 represents no disability. Minimal detectable change is 9 points. Observation/Orthostatic Postural Assessment:         Less rounded shoulders and forward head on neck posture   Palpation:         Tight and tender distal quads and popliteal region is decreased   ROM:         R knee range of motion is -0-120    L knee range of motion is 0-129    Strength: Ankles are 3+ to 4-/5     R quads and hamstrings are 3+ to 4-/5      L quads and hamstrings are 4-/5     R hip flexor is 4-/5       L hip flexor is 4/5     Special Tests:          Negative homans sign  Neurological Screen:  No numbness or radiating pain or tingling into lower leg but sore in R lower leg   Mental Status:          alert and oriented-very motivated patient  Edema/Girth:     Left Right    Initial Most Recent Initial Most Recent   Upper  Extremity           Lower  Extremity  15.0 at mid patella    15.75 at mid patella  15.0 at mid patella on 2-3-20       Medical Necessity:   · DC to home exercise program  Reason for Services/Other Comments:  · DC to home program    Rehabilitation Potential For Stated Goals: Catarino Somers's therapy, I certify that the treatment plan above will be carried out by a therapist or under their direction.   Thank you for this referral,  Nani Rahman, PT                  PAIN/SUBJECTIVE:                                                                 1.  1.  1.

## 2020-03-08 PROBLEM — M17.12 OSTEOARTHRITIS OF LEFT KNEE: Status: ACTIVE | Noted: 2020-03-08

## 2020-03-08 PROBLEM — M17.11 OSTEOARTHRITIS OF RIGHT KNEE: Status: ACTIVE | Noted: 2020-03-08

## 2020-03-08 PROBLEM — Z96.651 PRESENCE OF RIGHT ARTIFICIAL KNEE JOINT: Status: ACTIVE | Noted: 2020-03-08

## 2020-03-08 PROBLEM — S50.12XA CONTUSION OF ELBOW AND FOREARM, LEFT, INITIAL ENCOUNTER: Status: ACTIVE | Noted: 2020-03-08

## 2020-03-08 PROBLEM — Z96.652 PRESENCE OF LEFT ARTIFICIAL KNEE JOINT: Status: ACTIVE | Noted: 2020-03-08

## 2020-10-14 PROBLEM — E55.9 VITAMIN D DEFICIENCY: Status: ACTIVE | Noted: 2020-10-14

## 2020-10-14 PROBLEM — G25.81 RESTLESS LEG SYNDROME: Status: ACTIVE | Noted: 2020-10-14

## 2021-08-18 ENCOUNTER — APPOINTMENT (OUTPATIENT)
Dept: GENERAL RADIOLOGY | Age: 81
End: 2021-08-18
Attending: PHYSICIAN ASSISTANT
Payer: MEDICARE

## 2021-08-18 ENCOUNTER — HOSPITAL ENCOUNTER (EMERGENCY)
Age: 81
Discharge: HOME | End: 2021-08-18
Attending: EMERGENCY MEDICINE | Admitting: EMERGENCY MEDICINE
Payer: MEDICARE

## 2021-08-18 VITALS
DIASTOLIC BLOOD PRESSURE: 63 MMHG | HEART RATE: 71 BPM | TEMPERATURE: 98 F | BODY MASS INDEX: 28.17 KG/M2 | OXYGEN SATURATION: 98 % | WEIGHT: 165 LBS | RESPIRATION RATE: 18 BRPM | HEIGHT: 64 IN | SYSTOLIC BLOOD PRESSURE: 128 MMHG

## 2021-08-18 DIAGNOSIS — M54.41 ACUTE RIGHT-SIDED LOW BACK PAIN WITH RIGHT-SIDED SCIATICA: Primary | ICD-10-CM

## 2021-08-18 PROCEDURE — 99283 EMERGENCY DEPT VISIT LOW MDM: CPT

## 2021-08-18 PROCEDURE — 72100 X-RAY EXAM L-S SPINE 2/3 VWS: CPT

## 2021-08-18 PROCEDURE — 74011636637 HC RX REV CODE- 636/637: Performed by: PHYSICIAN ASSISTANT

## 2021-08-18 PROCEDURE — 99284 EMERGENCY DEPT VISIT MOD MDM: CPT

## 2021-08-18 PROCEDURE — 74011250637 HC RX REV CODE- 250/637: Performed by: PHYSICIAN ASSISTANT

## 2021-08-18 RX ORDER — PREDNISONE 20 MG/1
40 TABLET ORAL DAILY
Qty: 10 TABLET | Refills: 0 | Status: SHIPPED | OUTPATIENT
Start: 2021-08-18 | End: 2021-08-23

## 2021-08-18 RX ORDER — TRAMADOL HYDROCHLORIDE 50 MG/1
50 TABLET ORAL
Status: COMPLETED | OUTPATIENT
Start: 2021-08-18 | End: 2021-08-18

## 2021-08-18 RX ADMIN — PREDNISONE 60 MG: 10 TABLET ORAL at 13:07

## 2021-08-18 RX ADMIN — TRAMADOL HYDROCHLORIDE 50 MG: 50 TABLET, FILM COATED ORAL at 13:07

## 2021-08-18 NOTE — ED NOTES
I have reviewed discharge instructions with the patient. The patient verbalized understanding. Patient left ED via Discharge Method: ambulatory to Home with self    Opportunity for questions and clarification provided. Patient given 0 scripts. To continue your aftercare when you leave the hospital, you may receive an automated call from our care team to check in on how you are doing. This is a free service and part of our promise to provide the best care and service to meet your aftercare needs.  If you have questions, or wish to unsubscribe from this service please call [phone]. Thank you for Choosing our [facility] Emergency Department.

## 2021-08-18 NOTE — ED TRIAGE NOTES
Pt arrives via POV c/o lower right sided back pain after taking a shower 2 days ago. No issues with urination or BM. Pt reports the pain radiates down the right leg and increased pain with movement. Denies injuries.

## 2021-08-18 NOTE — ED PROVIDER NOTES
Patient is a 68-year-old female who presents with complaint of right lower back pain that shoots through her hip x2 days. Began 2 days ago when she was in the shower she twisted and reached to the left and felt immediate sharp pain in her back. Started the pain was bothersome but did not keep her from doing anything that she normally does. However this morning she woke up and when she stood up and got out of bed she had sharp pain that shot through her hip and into her leg was so intense it almost caused her to fall over. Patient is able to sit herself down but was unable to get around the house without help from her  due to the pain. She does take Tylenol every morning for her arthritis. She denies any numbness or tingling in the lower extremities. She denies any loss of bowel or bladder. Pain improves with being still and is worse with movement and most bothersome with bearing weight. The history is provided by the patient. Back Pain   Pertinent negatives include no chest pain, no fever, no abdominal pain and no dysuria.         Past Medical History:   Diagnosis Date    Allergic rhinitis due to pollen     Arthritis     Asthma as a child    Constipation 02/2019    post-op after TKA    Ear problems     Fibrocystic breast     GERD (gastroesophageal reflux disease)     Managed with meds     H/O colonoscopy 2009    Normal (Awilda Nation)    Hashimoto's thyroiditis     Hearing reduced     Hypertension     Managed with meds     Macular degeneration     Mixed hyperlipidemia     Daily meds     Osteoporosis 1/2013    GYN Dr. Damian Medina Restless leg     managed with medication    Unspecified hypothyroidism     Varicella        Past Surgical History:   Procedure Laterality Date    HX ADENOIDECTOMY  as a child    HX APPENDECTOMY  1973    HX CATARACT REMOVAL Bilateral 2007    HX COLONOSCOPY      Dr. Awilda Nation 2009-- no more per pt    HX HERNIA REPAIR  06/16/2018    HX KNEE REPLACEMENT Left 2019    HX OVARIAN CYST REMOVAL      HX TONSILLECTOMY  as a child         Family History:   Problem Relation Age of Onset    Breast Cancer Mother     Cancer Mother         breast, lung    Coronary Artery Disease Mother     Heart Disease Mother     COPD Father     Other Father         smoker       Social History     Socioeconomic History    Marital status:      Spouse name: Johnnie Heaton Number of children: Not on file    Years of education: Not on file    Highest education level: Not on file   Occupational History    Occupation: [employer]. Comment: Retired   Tobacco Use    Smoking status: Former Smoker     Packs/day: 0.50     Years: 12.00     Pack years: 6.00     Quit date: 1975     Years since quittin.11    Smokeless tobacco: Never Used    Tobacco comment: quit age of 30   Vaping Use    Vaping Use: Never used   Substance and Sexual Activity    Alcohol use: Not Currently     Alcohol/week: 1.0 standard drinks     Types: 1 Glasses of wine per week    Drug use: No    Sexual activity: Not Currently     Partners: Male   Other Topics Concern    Not on file   Social History Narrative    Not on file     Social Determinants of Health     Financial Resource Strain:     Difficulty of Paying Living Expenses:    Food Insecurity:     Worried About Running Out of Food in the Last Year:     Ran Out of Food in the Last Year:    Transportation Needs:     Lack of Transportation (Medical):      Lack of Transportation (Non-Medical):    Physical Activity:     Days of Exercise per Week:     Minutes of Exercise per Session:    Stress:     Feeling of Stress :    Social Connections:     Frequency of Communication with Friends and Family:     Frequency of Social Gatherings with Friends and Family:     Attends Sabianism Services:     Active Member of Clubs or Organizations:     Attends Club or Organization Meetings:     Marital Status:    Intimate Partner Violence:     Fear of Current or Ex-Partner:     Emotionally Abused:     Physically Abused:     Sexually Abused: ALLERGIES: Pollen extracts and Ace inhibitors    Review of Systems   Constitutional: Negative for chills, fatigue and fever. HENT: Negative for congestion and sore throat. Respiratory: Negative for cough and shortness of breath. Cardiovascular: Negative for chest pain. Gastrointestinal: Negative for abdominal pain, nausea and vomiting. Genitourinary: Negative for dysuria and flank pain. Musculoskeletal: Positive for back pain (right lowere that shoots through right buttock and upper leg). Neurological: Negative for light-headedness. Psychiatric/Behavioral: Negative for behavioral problems. Vitals:    08/18/21 1151 08/18/21 1152 08/18/21 1232   BP:  119/65    Pulse: 81     Resp: 15     Temp: 98 °F (36.7 °C)     SpO2: 97%  97%   Weight: 74.8 kg (165 lb)     Height: 5' 4\" (1.626 m)              Physical Exam  Vitals and nursing note reviewed. Constitutional:       General: She is not in acute distress. Appearance: She is not ill-appearing or toxic-appearing. HENT:      Head: Normocephalic and atraumatic. Cardiovascular:      Rate and Rhythm: Normal rate. Pulses: Normal pulses. Pulmonary:      Effort: Pulmonary effort is normal.      Breath sounds: Normal breath sounds. Musculoskeletal:        Back:       Comments: Pain in back is worse with lifting the left leg  NAIMA LE strength 5/5  Sensation intact throughout  Distal pulses 2+   Skin:     General: Skin is warm and dry. Neurological:      Mental Status: She is alert and oriented to person, place, and time. Psychiatric:         Mood and Affect: Mood normal.         Behavior: Behavior normal.         Thought Content:  Thought content normal.         Judgment: Judgment normal.          MDM  Number of Diagnoses or Management Options  Acute right-sided low back pain with right-sided sciatica  Diagnosis management comments: Patient is a 80-year-old female presenting with acute onset right lower back pain that shoots through her buttocks and right leg worsening over 2 days after a twisting and reaching incident. No concerns for cauda equina. Due to her age and reported never experienced pain like this in the past, will get x-rays of the lumbar spine. Patient given tramadol and prednisone while in the ED. Lumbar spine x-ray show: Grade 1 spondylolisthesis of L4 and L5, mild scoliosis with   convexity to the right and moderate degenerative disc disease L2-3 and L4-5. All results discussed with patient. She reports improvement in pain following medications provided in the ED. She states is able to move on and off the x-ray table with much less discomfort than she was experiencing earlier while she was at home. She does have prescription for tramadol that she takes when her pain is bad at home. Will discharge with 5-day course of prednisone. Advised rest, application of heat or ice and avoidance of any strenuous activity or heavy lifting. Discussed follow-up as well as reasons to return to the ER. Patient agreeable to plan.          Procedures

## 2021-08-18 NOTE — ED NOTES
I have reviewed discharge instructions with the patient. The patient verbalized understanding. Patient left ED via Discharge Method: wheelchair to Home with . Opportunity for questions and clarification provided. Patient given 1 scripts. To continue your aftercare when you leave the hospital, you may receive an automated call from our care team to check in on how you are doing. This is a free service and part of our promise to provide the best care and service to meet your aftercare needs.  If you have questions, or wish to unsubscribe from this service please call [phone]. Thank you for Choosing our [facility] Emergency Department.

## 2022-03-18 PROBLEM — M17.12 ARTHRITIS OF LEFT KNEE: Status: ACTIVE | Noted: 2019-02-07

## 2022-03-18 PROBLEM — M17.12 OSTEOARTHRITIS OF LEFT KNEE: Status: ACTIVE | Noted: 2020-03-08

## 2022-03-18 PROBLEM — E55.9 VITAMIN D DEFICIENCY: Status: ACTIVE | Noted: 2020-10-14

## 2022-03-19 PROBLEM — Z96.652 PRESENCE OF LEFT ARTIFICIAL KNEE JOINT: Status: ACTIVE | Noted: 2020-03-08

## 2022-03-19 PROBLEM — M17.11 ARTHRITIS OF RIGHT KNEE: Status: ACTIVE | Noted: 2019-12-10

## 2022-03-19 PROBLEM — M17.11 OSTEOARTHRITIS OF RIGHT KNEE: Status: ACTIVE | Noted: 2020-03-08

## 2022-03-19 PROBLEM — S50.12XA CONTUSION OF ELBOW AND FOREARM, LEFT, INITIAL ENCOUNTER: Status: ACTIVE | Noted: 2020-03-08

## 2022-03-20 PROBLEM — Z96.651 PRESENCE OF RIGHT ARTIFICIAL KNEE JOINT: Status: ACTIVE | Noted: 2020-03-08

## 2022-03-20 PROBLEM — M19.90 OSTEOARTHRITIS: Status: ACTIVE | Noted: 2019-10-01

## 2022-03-20 PROBLEM — G25.81 RESTLESS LEG SYNDROME: Status: ACTIVE | Noted: 2020-10-14

## 2022-06-09 ENCOUNTER — OFFICE VISIT (OUTPATIENT)
Dept: ENT CLINIC | Age: 82
End: 2022-06-09
Payer: MEDICARE

## 2022-06-09 VITALS
DIASTOLIC BLOOD PRESSURE: 80 MMHG | BODY MASS INDEX: 27.66 KG/M2 | HEIGHT: 64 IN | SYSTOLIC BLOOD PRESSURE: 120 MMHG | WEIGHT: 162 LBS

## 2022-06-09 DIAGNOSIS — H61.23 BILATERAL IMPACTED CERUMEN: Primary | ICD-10-CM

## 2022-06-09 PROCEDURE — 69210 REMOVE IMPACTED EAR WAX UNI: CPT | Performed by: PHYSICIAN ASSISTANT

## 2022-06-09 RX ORDER — CALCIUM POLYCARBOPHIL 625 MG
TABLET ORAL
COMMUNITY

## 2022-06-09 RX ORDER — UBIDECARENONE 100 MG
CAPSULE ORAL EVERY MORNING
COMMUNITY

## 2022-06-09 ASSESSMENT — ENCOUNTER SYMPTOMS
EYE DISCHARGE: 0
COUGH: 0
ABDOMINAL PAIN: 0

## 2022-06-09 NOTE — PROGRESS NOTES
Chief Complaint   Patient presents with    Cerumen Impaction     Patient here for wax removal.       HPI:  Luciana Desir is a [de-identified] y.o. female seen for cerumen. She states that she had her hearing aids checked and the audiologist told her that she had wax in her ears. She denies otalgia, otorrhea or OM. Past Medical History, Past Surgical History, Family history, Social History, and Medications were all reviewed with the patient today and updated as necessary.      Allergies   Allergen Reactions    Ace Inhibitors Angioedema and Swelling     Pt stated \"ace blockers\" can't remember name for htn  Facial swelling     Patient Active Problem List   Diagnosis    Allergic rhinitis due to pollen    Acquired hypothyroidism    Osteoarthritis of left knee    Vitamin D deficiency    Arthritis of left knee    Abdominal pain    Hypercholesterolemia    Thyroid disease    Osteoarthritis of right knee    Gastroesophageal reflux disease with esophagitis    Presence of left artificial knee joint    Contusion of elbow and forearm, left, initial encounter    Sigmoid diverticulitis    Arthritis of right knee    GERD (gastroesophageal reflux disease)    Essential hypertension, benign    Osteoarthritis    Presence of right artificial knee joint    Restless leg syndrome     Current Outpatient Medications   Medication Sig    acetaminophen (TYLENOL) 325 MG tablet Take 650 mg by mouth every 4 hours as needed    aspirin 81 MG EC tablet Take 81 mg by mouth every 12 hours    carbidopa-levodopa (SINEMET)  MG per tablet TAKE 1 TABLET BY MOUTH AT NIGHT    cetirizine (ZYRTEC) 10 MG tablet Take 10 mg by mouth daily    clobetasol (TEMOVATE) 0.05 % cream Apply topically 2 times daily as needed    clotrimazole-betamethasone (LOTRISONE) 1-0.05 % cream Apply topically 2 times daily    diphenhydrAMINE-APAP, sleep, (TYLENOL PM EXTRA STRENGTH)  MG tablet Take 1-2 tablets by mouth    doxycycline monohydrate (ADOXA) 100 MG tablet Take 100 mg by mouth 2 times daily    fluticasone-salmeterol (ADVAIR) 250-50 MCG/ACT AEPB diskus inhaler Inhale 1 puff into the lungs every 12 hours    Glucosamine 500 MG CAPS Take 500 mg by mouth daily    hydroCHLOROthiazide (HYDRODIURIL) 12.5 MG tablet TAKE 1 TABLET BY MOUTH DAILY    hydrocortisone 2.5 % cream Place rectally 4 times daily    ketoconazole (NIZORAL) 2 % cream Apply topically 2 times daily as needed    levothyroxine (SYNTHROID) 75 MCG tablet TAKE 1 TABLET BY MOUTH DAILY BEFORE BREAKFAST    pantoprazole (PROTONIX) 40 MG tablet TAKE 1 TABLET BY MOUTH DAILY BEFORE DINNER    pravastatin (PRAVACHOL) 40 MG tablet TAKE 1 TABLET BY MOUTH AT NIGHT    prednisoLONE acetate (PRED FORTE) 1 % ophthalmic suspension Apply 1 drop to eye Twice a Week    telmisartan (MICARDIS) 80 MG tablet Take 80 mg by mouth daily    traMADol (ULTRAM) 50 MG tablet Take 50 mg by mouth every 6 hours as needed.  triamcinolone (KENALOG) 0.1 % cream Apply topically 2 times daily as needed    zoster recombinant adjuvanted vaccine [facility]) 50 MCG/0.5ML SUSR injection 0.5mL by IntraMUSCular route once now and then repeat in 2-6 months    Calcium Polycarbophil (FIBER) 625 MG TABS take 1 qd    coenzyme Q10 100 MG CAPS capsule Take by mouth every morning     No current facility-administered medications for this visit.      Past Medical History:   Diagnosis Date    Allergic rhinitis due to pollen     Arthritis     Asthma as a child    Constipation 02/2019    post-op after TKA    Ear problems     Fibrocystic breast     GERD (gastroesophageal reflux disease)     Managed with meds     H/O colonoscopy 2009    Normal (Dairl Ta)    Hashimoto's thyroiditis     Hearing reduced     Hypertension     Managed with meds     Macular degeneration     Mixed hyperlipidemia     Daily meds     Osteoporosis 1/2013    GYN Dr. Shah Course    Restless leg     managed with medication    Unspecified hypothyroidism     Varicella Bilateral cerumen impaction. Pinna: bilateral - No hematomas or lacerations    Eye  Eyeball - bilateral - extraocular motions intact, equal in size and movement    Neck   Neck - Full range of motion and Supple. Trachea - Midline. Neurologic - II - XII Grossly intact bilaterally    Cardiac  Inspection - Jugular Vein:  Bilateral - non distended, no prominent pulsations    Chest and Lung  Inspection - Movements:  Chest symmetrical with bilateral expansion, respirations even and non labored           Cerumen Removal Procedure Note      PROCEDURE: Cerumen removal under binocular microscopy  INDICATIONS: Cerumen impaction  DESCRIPTION: After verbal consent was obtained and a timeout was performed, the otologic microscope was used to visualize both ears. There were normal appearing auricles bilaterally. There was cerumen impaction bilaterally. I cleaned out both ears under the scope w/ a right angle hook and otologic suctions. After cleaning, the ear canal skin was healthy and both TMs were intact w/ no perforations. Both middle ear spaces were clear w/ no effusions. The patient tolerated the procedure well and there were no complications. ASSESSMENT and PLAN      ICD-10-CM    1. Bilateral impacted cerumen  H61.23 REMOVE IMPACTED EAR WAX       Patient reports improvement with hearing.  She will follow up in one year for cerumen removal.     Manjit Houser PA-C  6/9/2022

## 2022-07-21 ENCOUNTER — OFFICE VISIT (OUTPATIENT)
Dept: PRIMARY CARE CLINIC | Facility: CLINIC | Age: 82
End: 2022-07-21
Payer: MEDICARE

## 2022-07-21 VITALS
DIASTOLIC BLOOD PRESSURE: 57 MMHG | WEIGHT: 162.8 LBS | HEIGHT: 64 IN | SYSTOLIC BLOOD PRESSURE: 112 MMHG | OXYGEN SATURATION: 96 % | HEART RATE: 71 BPM | BODY MASS INDEX: 27.79 KG/M2 | TEMPERATURE: 97.3 F

## 2022-07-21 DIAGNOSIS — Z96.651 PRESENCE OF RIGHT ARTIFICIAL KNEE JOINT: ICD-10-CM

## 2022-07-21 DIAGNOSIS — E55.9 VITAMIN D DEFICIENCY: ICD-10-CM

## 2022-07-21 DIAGNOSIS — E03.9 ACQUIRED HYPOTHYROIDISM: ICD-10-CM

## 2022-07-21 DIAGNOSIS — Z96.652 PRESENCE OF LEFT ARTIFICIAL KNEE JOINT: ICD-10-CM

## 2022-07-21 DIAGNOSIS — K21.00 GASTROESOPHAGEAL REFLUX DISEASE WITH ESOPHAGITIS WITHOUT HEMORRHAGE: ICD-10-CM

## 2022-07-21 DIAGNOSIS — D52.9 ANEMIA DUE TO FOLIC ACID DEFICIENCY, UNSPECIFIED DEFICIENCY TYPE: Primary | ICD-10-CM

## 2022-07-21 DIAGNOSIS — G25.81 RESTLESS LEG SYNDROME: ICD-10-CM

## 2022-07-21 DIAGNOSIS — I10 ESSENTIAL HYPERTENSION, BENIGN: ICD-10-CM

## 2022-07-21 DIAGNOSIS — M17.11 OSTEOARTHRITIS OF RIGHT KNEE, UNSPECIFIED OSTEOARTHRITIS TYPE: ICD-10-CM

## 2022-07-21 DIAGNOSIS — M17.12 OSTEOARTHRITIS OF LEFT KNEE, UNSPECIFIED OSTEOARTHRITIS TYPE: ICD-10-CM

## 2022-07-21 DIAGNOSIS — E78.00 HYPERCHOLESTEROLEMIA: ICD-10-CM

## 2022-07-21 PROCEDURE — 1036F TOBACCO NON-USER: CPT | Performed by: FAMILY MEDICINE

## 2022-07-21 PROCEDURE — 99214 OFFICE O/P EST MOD 30 MIN: CPT | Performed by: FAMILY MEDICINE

## 2022-07-21 PROCEDURE — G8417 CALC BMI ABV UP PARAM F/U: HCPCS | Performed by: FAMILY MEDICINE

## 2022-07-21 PROCEDURE — G8427 DOCREV CUR MEDS BY ELIG CLIN: HCPCS | Performed by: FAMILY MEDICINE

## 2022-07-21 PROCEDURE — 1123F ACP DISCUSS/DSCN MKR DOCD: CPT | Performed by: FAMILY MEDICINE

## 2022-07-21 PROCEDURE — 1090F PRES/ABSN URINE INCON ASSESS: CPT | Performed by: FAMILY MEDICINE

## 2022-07-21 PROCEDURE — G8400 PT W/DXA NO RESULTS DOC: HCPCS | Performed by: FAMILY MEDICINE

## 2022-07-21 RX ORDER — LEVOTHYROXINE SODIUM 0.07 MG/1
75 TABLET ORAL DAILY
Qty: 90 TABLET | Refills: 1 | Status: SHIPPED | OUTPATIENT
Start: 2022-07-21

## 2022-07-21 RX ORDER — HYDROCHLOROTHIAZIDE 12.5 MG/1
12.5 TABLET ORAL DAILY
Qty: 90 TABLET | Refills: 3 | Status: SHIPPED | OUTPATIENT
Start: 2022-07-21 | End: 2022-09-09 | Stop reason: SDUPTHER

## 2022-07-21 RX ORDER — PANTOPRAZOLE SODIUM 40 MG/1
40 TABLET, DELAYED RELEASE ORAL DAILY
Qty: 90 TABLET | Refills: 3 | Status: SHIPPED | OUTPATIENT
Start: 2022-07-21 | End: 2022-09-13 | Stop reason: SDUPTHER

## 2022-07-21 RX ORDER — TELMISARTAN 80 MG/1
80 TABLET ORAL DAILY
Qty: 90 TABLET | Refills: 3 | Status: SHIPPED | OUTPATIENT
Start: 2022-07-21

## 2022-07-21 RX ORDER — FOLIC ACID 1 MG/1
1 TABLET ORAL DAILY
Qty: 90 TABLET | Refills: 1 | Status: SHIPPED | OUTPATIENT
Start: 2022-07-21

## 2022-07-21 RX ORDER — PRAVASTATIN SODIUM 40 MG
40 TABLET ORAL DAILY
Qty: 90 TABLET | Refills: 3 | Status: SHIPPED | OUTPATIENT
Start: 2022-07-21 | End: 2022-09-13 | Stop reason: SDUPTHER

## 2022-07-21 ASSESSMENT — PATIENT HEALTH QUESTIONNAIRE - PHQ9
SUM OF ALL RESPONSES TO PHQ QUESTIONS 1-9: 0
SUM OF ALL RESPONSES TO PHQ QUESTIONS 1-9: 0
1. LITTLE INTEREST OR PLEASURE IN DOING THINGS: 0
SUM OF ALL RESPONSES TO PHQ QUESTIONS 1-9: 0
SUM OF ALL RESPONSES TO PHQ9 QUESTIONS 1 & 2: 0
SUM OF ALL RESPONSES TO PHQ QUESTIONS 1-9: 0
2. FEELING DOWN, DEPRESSED OR HOPELESS: 0

## 2022-07-21 NOTE — PROGRESS NOTES
Lida Keith MD  [address] [place], [place]  Ph No:  [phone]  Fax:  [phone]:  Chief Complaint   Patient presents with    Follow-up     Pt in for 3 month follow up. Also has questions about her blood work. Arthritis     Pt says that her hands are hurting more this days. HISTORY OF PRESENT ILLNESS:  Ms. Mateo Wagner is a [de-identified] y.o. female. Pt presents for 3 months follow up visit. Pt says arthritis in hands Is bothering her, has been taking  tramadol and helps a little, takng tynelol. Pt does not request additional tramadol at this time    PT is given permission to take tylenol and ibuprofen as combination for arthritis pain, within limits. Pt is checked as to GFR of kidney and is [de-identified], robust for [de-identified] yo female. Pt is given refills on the following meds:  pravastatin for cholesterol management. Bp is stable at 112/57. She continues to take telmisartan once daily, hctz once daily. Pt is given refills on protonix for acid reflux and on synthroid for hypothyroidism. Lab review shows tsh 0.869, free t4 at 2.03 (slightly elevated). Pt reports no evidence of problems with skin, hair or energy (although she say skin is always dry), so no changes of synthroid dosing. MCV is 98 and pt is advised needs additional folic acid, to prevent folic acid anemia. Refills of folate 1 mg are given today. Other labs are stable, hgb is 15.0. Pt will return to clinic in 3 months, another lab review at that time to review thyroid, and other labs, also Medicare [facility] visit.         HISTORY:  Allergies   Allergen Reactions    Ace Inhibitors Angioedema and Swelling     Pt stated \"ace blockers\" can't remember name for htn  Facial swelling     Past Medical History:   Diagnosis Date    Allergic rhinitis due to pollen     Arthritis     Asthma as a child    Constipation 02/2019    post-op after TKA    Ear problems     Fibrocystic breast     GERD (gastroesophageal reflux disease)     Managed with meds     H/O colonoscopy     Normal (Enrique)    Hashimoto's thyroiditis     Hearing reduced     Hypertension     Managed with meds     Macular degeneration     Mixed hyperlipidemia     Daily meds     Osteoporosis 2013    GYN Dr. Trenton Michaels    Restless leg     managed with medication    Unspecified hypothyroidism     Varicella      Past Surgical History:   Procedure Laterality Date    ADENOIDECTOMY  as a child    APPENDECTOMY  1973    CATARACT REMOVAL Bilateral 2007    CATARACT REMOVAL Bilateral     COLONOSCOPY      Dr. Niurka Hope -- no more per pt    GYN      ovarian cyst    HERNIA REPAIR  2018    OVARIAN CYST REMOVAL  1973    TONSILLECTOMY  as a child    TOTAL KNEE ARTHROPLASTY Left 2019     Family History   Problem Relation Age of Onset    Coronary Art Dis Mother     COPD Father     Other Father         smoker    Heart Disease Brother     Cancer Maternal Grandmother     Breast Cancer Mother     Cancer Mother         breast, lung    Heart Disease Mother     Heart Attack Mother      Social History     Socioeconomic History    Marital status:      Spouse name: Not on file    Number of children: Not on file    Years of education: Not on file    Highest education level: Not on file   Occupational History    Not on file   Tobacco Use    Smoking status: Former     Packs/day: 0.50     Types: Cigarettes     Quit date: 1975     Years since quittin.5    Smokeless tobacco: Never    Tobacco comments:     Quit smoking: quit age of 30   Substance and Sexual Activity    Alcohol use:  Yes     Alcohol/week: 1.0 standard drink    Drug use: No    Sexual activity: Not on file   Other Topics Concern    Not on file   Social History Narrative    Not on file     Social Determinants of Health     Financial Resource Strain: Not on file   Food Insecurity: Not on file   Transportation Needs: Not on file   Physical Activity: Not on file   Stress: Not on file Social Connections: Not on file   Intimate Partner Violence: Not on file   Housing Stability: Not on file     Current Outpatient Medications   Medication Sig Dispense Refill    pravastatin (PRAVACHOL) 40 MG tablet Take 1 tablet by mouth in the morning. TAKE 1 TABLET BY MOUTH AT NIGHT. 90 tablet 3    telmisartan (MICARDIS) 80 MG tablet Take 1 tablet by mouth in the morning. 90 tablet 3    pantoprazole (PROTONIX) 40 MG tablet Take 1 tablet by mouth in the morning. TAKE 1 TABLET BY MOUTH DAILY BEFORE DINNER. 90 tablet 3    levothyroxine (SYNTHROID) 75 MCG tablet Take 1 tablet by mouth in the morning. TAKE 1 TABLET BY MOUTH DAILY BEFORE BREAKFAST. 90 tablet 1    hydroCHLOROthiazide (HYDRODIURIL) 12.5 MG tablet Take 1 tablet by mouth in the morning. TAKE 1 TABLET BY MOUTH DAILY. 90 tablet 3    folic acid (FOLVITE) 1 MG tablet Take 1 tablet by mouth in the morning.  90 tablet 1    Calcium Polycarbophil (FIBER) 625 MG TABS take 1 qd      coenzyme Q10 100 MG CAPS capsule Take by mouth every morning      acetaminophen (TYLENOL) 325 MG tablet Take 650 mg by mouth every 4 hours as needed      aspirin 81 MG EC tablet Take 81 mg by mouth every 12 hours      carbidopa-levodopa (SINEMET)  MG per tablet TAKE 1 TABLET BY MOUTH AT NIGHT      cetirizine (ZYRTEC) 10 MG tablet Take 10 mg by mouth daily      clobetasol (TEMOVATE) 0.05 % cream Apply topically 2 times daily as needed      clotrimazole-betamethasone (LOTRISONE) 1-0.05 % cream Apply topically 2 times daily      diphenhydrAMINE-APAP, sleep, (TYLENOL PM EXTRA STRENGTH)  MG tablet Take 1-2 tablets by mouth      doxycycline monohydrate (ADOXA) 100 MG tablet Take 100 mg by mouth 2 times daily      fluticasone-salmeterol (ADVAIR) 250-50 MCG/ACT AEPB diskus inhaler Inhale 1 puff into the lungs every 12 hours      Glucosamine 500 MG CAPS Take 500 mg by mouth daily      hydrocortisone 2.5 % cream Place rectally 4 times daily      ketoconazole (NIZORAL) 2 % cream Apply topically 2 times daily as needed      prednisoLONE acetate (PRED FORTE) 1 % ophthalmic suspension Apply 1 drop to eye Twice a Week      traMADol (ULTRAM) 50 MG tablet Take 50 mg by mouth every 6 hours as needed. triamcinolone (KENALOG) 0.1 % cream Apply topically 2 times daily as needed      zoster recombinant adjuvanted vaccine [facility]) 50 MCG/0.5ML SUSR injection 0.5mL by IntraMUSCular route once now and then repeat in 2-6 months       No current facility-administered medications for this visit. REVIEW OF SYSTEMS:  Review of systems is as indicated in HPI otherwise negative. PHYSICAL EXAM:  Vital Signs - BP (!) 112/57 (Site: Left Upper Arm, Position: Sitting, Cuff Size: Medium Adult)   Pulse 71   Temp 97.3 °F (36.3 °C) (Tympanic)   Ht 5' 4\" (1.626 m)   Wt 162 lb 12.8 oz (73.8 kg)   SpO2 96%   BMI 27.94 kg/m²      Physical Exam  Vitals and nursing note reviewed. Constitutional:       General: She is in acute distress. Appearance: Normal appearance. She is normal weight. Comments: Pleasant elderly female, [de-identified] yo, with concerns about arthritis pain in hands, med refills, see  HPI, lab review. HENT:      Head: Normocephalic and atraumatic. Nose: Nose normal.      Mouth/Throat:      Mouth: Mucous membranes are moist.      Pharynx: Oropharynx is clear. Eyes:      Extraocular Movements: Extraocular movements intact. Conjunctiva/sclera: Conjunctivae normal.      Pupils: Pupils are equal, round, and reactive to light. Cardiovascular:      Rate and Rhythm: Normal rate and regular rhythm. Pulses: Normal pulses. Heart sounds: Normal heart sounds. Pulmonary:      Effort: Pulmonary effort is normal.      Comments: Coarse breath sounds, but otherwise mostly clear to ascultation bilaterally  Abdominal:      General: Abdomen is flat. Bowel sounds are normal.      Palpations: Abdomen is soft. Musculoskeletal:         General: Tenderness and deformity present. Cervical back: Normal range of motion and neck supple. Comments: Arthritis in hands, fingers, with some deformity of joints fingers, pain   Skin:     General: Skin is warm and dry. Capillary Refill: Capillary refill takes 2 to 3 seconds. Neurological:      General: No focal deficit present. Mental Status: She is alert and oriented to person, place, and time. Psychiatric:         Behavior: Behavior normal.         Thought Content: Thought content normal.         Judgment: Judgment normal.      Comments: Acute and general health concerns, see HPI           LABS  No results found for this visit on 07/21/22. IMPRESSION/PLAN     Diagnosis Orders   1. 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 Essential hypertension, benign  telmisartan (MICARDIS) 80 MG tablet    hydroCHLOROthiazide (HYDRODIURIL) 12.5 MG tablet      3. Gastroesophageal reflux disease with esophagitis without hemorrhage  pantoprazole (PROTONIX) 40 MG tablet      4. Acquired hypothyroidism  levothyroxine (SYNTHROID) 75 MCG tablet      5. Vitamin D deficiency        6. Hypercholesterolemia  pravastatin (PRAVACHOL) 40 MG tablet      7. Restless leg syndrome        8. Osteoarthritis of left knee, unspecified osteoarthritis type        9. Osteoarthritis of right knee, unspecified osteoarthritis type        10. Presence of left artificial knee joint        11. Presence of right artificial knee joint            No follow-up provider specified. Clarisa Quezada MD            Dictated using voice recognition software.  Proofread, but unrecognized voice recognition errors may exist.

## 2022-07-22 PROBLEM — M17.11 OSTEOARTHRITIS OF RIGHT KNEE: Status: RESOLVED | Noted: 2020-03-08 | Resolved: 2022-07-22

## 2022-07-22 PROBLEM — M17.12 OSTEOARTHRITIS OF LEFT KNEE: Status: RESOLVED | Noted: 2020-03-08 | Resolved: 2022-07-22

## 2022-09-09 ENCOUNTER — TELEPHONE (OUTPATIENT)
Dept: PRIMARY CARE CLINIC | Facility: CLINIC | Age: 82
End: 2022-09-09

## 2022-09-09 DIAGNOSIS — I10 ESSENTIAL HYPERTENSION, BENIGN: ICD-10-CM

## 2022-09-09 RX ORDER — HYDROCHLOROTHIAZIDE 12.5 MG/1
12.5 TABLET ORAL DAILY
Qty: 90 TABLET | Refills: 3 | Status: SHIPPED | OUTPATIENT
Start: 2022-09-09 | End: 2022-09-09 | Stop reason: SDUPTHER

## 2022-09-09 RX ORDER — HYDROCHLOROTHIAZIDE 12.5 MG/1
12.5 TABLET ORAL DAILY
Qty: 90 TABLET | Refills: 3 | Status: SHIPPED | OUTPATIENT
Start: 2022-09-09

## 2022-09-13 DIAGNOSIS — K21.00 GASTROESOPHAGEAL REFLUX DISEASE WITH ESOPHAGITIS WITHOUT HEMORRHAGE: ICD-10-CM

## 2022-09-13 DIAGNOSIS — E78.00 HYPERCHOLESTEROLEMIA: ICD-10-CM

## 2022-09-13 RX ORDER — PANTOPRAZOLE SODIUM 40 MG/1
TABLET, DELAYED RELEASE ORAL
Qty: 90 TABLET | Refills: 3 | Status: SHIPPED | OUTPATIENT
Start: 2022-09-13

## 2022-09-13 RX ORDER — PRAVASTATIN SODIUM 40 MG
TABLET ORAL
Qty: 90 TABLET | Refills: 3 | Status: SHIPPED | OUTPATIENT
Start: 2022-09-13

## 2022-09-16 ENCOUNTER — APPOINTMENT (OUTPATIENT)
Dept: GENERAL RADIOLOGY | Age: 82
End: 2022-09-16
Payer: MEDICARE

## 2022-09-16 ENCOUNTER — HOSPITAL ENCOUNTER (EMERGENCY)
Age: 82
Discharge: HOME | End: 2022-09-16
Attending: EMERGENCY MEDICINE
Payer: MEDICARE

## 2022-09-16 VITALS
HEIGHT: 64 IN | RESPIRATION RATE: 20 BRPM | OXYGEN SATURATION: 95 % | TEMPERATURE: 97.8 F | HEART RATE: 80 BPM | WEIGHT: 163 LBS | SYSTOLIC BLOOD PRESSURE: 105 MMHG | BODY MASS INDEX: 27.83 KG/M2 | DIASTOLIC BLOOD PRESSURE: 80 MMHG

## 2022-09-16 DIAGNOSIS — R07.9 CHEST PAIN, UNSPECIFIED TYPE: Primary | ICD-10-CM

## 2022-09-16 LAB
ALBUMIN SERPL-MCNC: 3.5 G/DL (ref 3.2–4.6)
ALBUMIN/GLOB SERPL: 0.9 {RATIO} (ref 1.2–3.5)
ALP SERPL-CCNC: 112 U/L (ref 50–136)
ALT SERPL-CCNC: 54 U/L (ref 12–65)
ANION GAP SERPL CALC-SCNC: 5 MMOL/L (ref 4–13)
AST SERPL-CCNC: 92 U/L (ref 15–37)
BILIRUB SERPL-MCNC: 0.3 MG/DL (ref 0.2–1.1)
BUN SERPL-MCNC: 32 MG/DL (ref 8–23)
CALCIUM SERPL-MCNC: 9.1 MG/DL (ref 8.3–10.4)
CHLORIDE SERPL-SCNC: 107 MMOL/L (ref 101–110)
CO2 SERPL-SCNC: 27 MMOL/L (ref 21–32)
CREAT SERPL-MCNC: 0.9 MG/DL (ref 0.6–1)
EKG ATRIAL RATE: 76 BPM
EKG DIAGNOSIS: NORMAL
EKG P AXIS: 68 DEGREES
EKG P-R INTERVAL: 204 MS
EKG Q-T INTERVAL: 376 MS
EKG QRS DURATION: 76 MS
EKG QTC CALCULATION (BAZETT): 423 MS
EKG R AXIS: 72 DEGREES
EKG T AXIS: 51 DEGREES
EKG VENTRICULAR RATE: 76 BPM
ERYTHROCYTE [DISTWIDTH] IN BLOOD BY AUTOMATED COUNT: 13.5 % (ref 11.9–14.6)
GLOBULIN SER CALC-MCNC: 3.7 G/DL (ref 2.3–3.5)
GLUCOSE SERPL-MCNC: 115 MG/DL (ref 65–100)
HCT VFR BLD AUTO: 41.3 % (ref 35.8–46.3)
HGB BLD-MCNC: 13.6 G/DL (ref 11.7–15.4)
LIPASE SERPL-CCNC: 348 U/L (ref 73–393)
MAGNESIUM SERPL-MCNC: 2.7 MG/DL (ref 1.8–2.4)
MCH RBC QN AUTO: 31.6 PG (ref 26.1–32.9)
MCHC RBC AUTO-ENTMCNC: 32.9 G/DL (ref 31.4–35)
MCV RBC AUTO: 95.8 FL (ref 79.6–97.8)
NRBC # BLD: 0 K/UL (ref 0–0.2)
PLATELET # BLD AUTO: 315 K/UL (ref 150–450)
PMV BLD AUTO: 9.8 FL (ref 9.4–12.3)
POTASSIUM SERPL-SCNC: 3.8 MMOL/L (ref 3.5–5.1)
PROT SERPL-MCNC: 7.2 G/DL (ref 6.3–8.2)
RBC # BLD AUTO: 4.31 M/UL (ref 4.05–5.2)
SODIUM SERPL-SCNC: 139 MMOL/L (ref 136–145)
TROPONIN I SERPL HS-MCNC: 5.1 PG/ML (ref 0–14)
TROPONIN I SERPL HS-MCNC: 5.9 PG/ML (ref 0–14)
WBC # BLD AUTO: 8.2 K/UL (ref 4.3–11.1)

## 2022-09-16 PROCEDURE — 71046 X-RAY EXAM CHEST 2 VIEWS: CPT

## 2022-09-16 PROCEDURE — 83690 ASSAY OF LIPASE: CPT

## 2022-09-16 PROCEDURE — 83735 ASSAY OF MAGNESIUM: CPT

## 2022-09-16 PROCEDURE — 93005 ELECTROCARDIOGRAM TRACING: CPT | Performed by: EMERGENCY MEDICINE

## 2022-09-16 PROCEDURE — 84484 ASSAY OF TROPONIN QUANT: CPT

## 2022-09-16 PROCEDURE — 99285 EMERGENCY DEPT VISIT HI MDM: CPT

## 2022-09-16 PROCEDURE — 80053 COMPREHEN METABOLIC PANEL: CPT

## 2022-09-16 PROCEDURE — 85027 COMPLETE CBC AUTOMATED: CPT

## 2022-09-16 RX ORDER — HYOSCYAMINE SULFATE 0.12 MG/1
0.25 TABLET SUBLINGUAL 4 TIMES DAILY PRN
Qty: 20 EACH | Refills: 1 | Status: SHIPPED | OUTPATIENT
Start: 2022-09-16

## 2022-09-16 ASSESSMENT — ENCOUNTER SYMPTOMS
BACK PAIN: 0
RHINORRHEA: 0
COUGH: 0
FACIAL SWELLING: 0
VOMITING: 0
COLOR CHANGE: 0
NAUSEA: 0
EYE REDNESS: 0
SHORTNESS OF BREATH: 0
EYE DISCHARGE: 0
ABDOMINAL PAIN: 1

## 2022-09-16 ASSESSMENT — PAIN DESCRIPTION - LOCATION: LOCATION: CHEST

## 2022-09-16 ASSESSMENT — PAIN SCALES - GENERAL
PAINLEVEL_OUTOF10: 5
PAINLEVEL_OUTOF10: 10

## 2022-09-16 ASSESSMENT — PAIN - FUNCTIONAL ASSESSMENT: PAIN_FUNCTIONAL_ASSESSMENT: 0-10

## 2022-09-17 NOTE — DISCHARGE SUMMARY
I have reviewed discharge instructions with the patient. The patient verbalized understanding. Patient left ED via Discharge Method: ambulatory to Home with family. Opportunity for questions and clarification provided. Patient given 1 scripts. To continue your aftercare when you leave the hospital, you may receive an automated call from our care team to check in on how you are doing. This is a free service and part of our promise to provide the best care and service to meet your aftercare needs.  If you have questions, or wish to unsubscribe from this service please call [phone]. Thank you for Choosing our [facility] Emergency Department.

## 2022-09-17 NOTE — DISCHARGE INSTRUCTIONS
Continue your Prilosec every day  For future spells pain like this, try to Levsin under the tongue and maybe some Rolaids or Maalox or Mylanta    Still having chest pain come back to the ER for further evaluation    Follow-up with Dr. Flavia Pack

## 2022-09-17 NOTE — ED PROVIDER NOTES
[facility] Emergency Department Provider Note                   PCP:                Tran Kim MD               Age: [de-identified] y.o. Sex: female     No diagnosis found. DISPOSITION         New Prescriptions    No medications on file       Orders Placed This Encounter   Procedures    XR CHEST (2 VW)    CBC    Comprehensive Metabolic Panel    Troponin    Lipase    Magnesium    Cardiac Monitor    Pulse Oximetry    EKG 12 Lead    Saline lock IV         Erum Allen is a [de-identified] y.o. female who presents to the Emergency Department with chief complaint of    Chief Complaint   Patient presents with    Chest Pain      Chief complaint : Chest pain    HISTORY OF PRESENT ILLNESS :  Location : Epigastrium to lower central chest with no radiation to the neck, shoulder, arm, back    Quality : Sharp    Quantity : Constant but waxing and waning, about an hour and a half total    Timing : Started 630, similar down but did not disappear on the way here, recurred/worsened in triage and then went away after chest x-ray    Severity : Mild to moderate    Context : Similar spell about 2 years ago had a stress test to follow that up and \"passed with flying colors\"    Alleviating / exacerbating factors : No remedies attempted, she does take Protonix every day, had just eaten dinner (lasagna) right before the episode started, never finds her self needing to use antacids, moving on walking and changing positions seemed to exacerbate    Associated Symptoms : Profuse diaphoresis but no dyspnea or nausea    -------------------------------    SOCIAL HISTORY : , non-smoker, occasional drinker, likes lemon drop martinis        Review of Systems   Constitutional:  Positive for diaphoresis. Negative for chills and fever. HENT:  Negative for facial swelling and rhinorrhea. Eyes:  Negative for discharge and redness. Respiratory:  Negative for cough and shortness of breath. Cardiovascular:  Positive for chest pain.  Negative for palpitations. Gastrointestinal:  Positive for abdominal pain. Negative for nausea and vomiting. Endocrine: Negative for polydipsia and polyuria. Genitourinary:  Negative for difficulty urinating and dysuria. Musculoskeletal:  Negative for arthralgias and back pain. Skin:  Negative for color change and pallor. Neurological:  Negative for dizziness and headaches. All other systems reviewed and are negative. All other systems reviewed and are negative.       Past Medical History:   Diagnosis Date    Allergic rhinitis due to pollen     Arthritis     Asthma as a child    Constipation 02/2019    post-op after TKA    Ear problems     Fibrocystic breast     GERD (gastroesophageal reflux disease)     Managed with meds     H/O colonoscopy 2009    Normal ([facility])    Hashimoto's thyroiditis     Hearing reduced     Hypertension     Managed with meds     Macular degeneration     Mixed hyperlipidemia     Daily meds     Osteoporosis 1/2013    GYN Dr. Cecilio Knott    Restless leg     managed with medication    Unspecified hypothyroidism     Varicella         Past Surgical History:   Procedure Laterality Date    ADENOIDECTOMY  as a child    APPENDECTOMY  1973    CATARACT REMOVAL Bilateral 2007    CATARACT REMOVAL Bilateral     COLONOSCOPY      Dr. Milka Hdez 2009-- no more per pt    GYN      ovarian cyst    HERNIA REPAIR  06/16/2018    OVARIAN CYST REMOVAL  1973    TONSILLECTOMY  as a child    TOTAL KNEE ARTHROPLASTY Left 02/07/2019        Family History   Problem Relation Age of Onset    Coronary Art Dis Mother     COPD Father     Other Father         smoker    Heart Disease Brother     Cancer Maternal Grandmother     Breast Cancer Mother     Cancer Mother         breast, lung    Heart Disease Mother     Heart Attack Mother         Social Connections: Not on file        Allergies   Allergen Reactions    Ace Inhibitors Angioedema and Swelling     Pt stated \"ace blockers\" can't remember name for htn  Facial swelling        Vitals signs and nursing note reviewed. Patient Vitals for the past 4 hrs:   Temp Pulse Resp BP SpO2   09/16/22 1934 97.6 °F (36.4 °C) 63 16 (!) 107/54 97 %   09/16/22 1900 97.6 °F (36.4 °C) 75 16 105/61 100 %          Physical Exam  Vitals and nursing note reviewed. Constitutional:       General: She is not in acute distress. Appearance: Normal appearance. She is not ill-appearing, toxic-appearing or diaphoretic. HENT:      Head: Normocephalic and atraumatic. Right Ear: External ear normal.      Left Ear: External ear normal.      Nose: No rhinorrhea. Eyes:      General: No scleral icterus. Right eye: No discharge. Left eye: No discharge. Conjunctiva/sclera: Conjunctivae normal.   Cardiovascular:      Rate and Rhythm: Normal rate and regular rhythm. Pulmonary:      Effort: Pulmonary effort is normal. No respiratory distress. Breath sounds: Normal breath sounds. Abdominal:      Palpations: Abdomen is soft. There is no fluid wave or pulsatile mass. Tenderness: There is no abdominal tenderness. There is no guarding or rebound. Musculoskeletal:         General: No deformity or signs of injury. Normal range of motion. Cervical back: Normal range of motion and neck supple. No rigidity. Skin:     General: Skin is warm and dry. Coloration: Skin is not jaundiced or pale. Neurological:      General: No focal deficit present. Mental Status: She is alert and oriented to person, place, and time. Psychiatric:         Mood and Affect: Mood normal.         Behavior: Behavior normal.         Thought Content:  Thought content normal.        MDM  Number of Diagnoses or Management Options  Diagnosis management comments: Cute chest pain and epigastric pain, differential diagnosis to include MI, unstable angina, gastritis, pancreatitis, cholecystitis, GERD, favor GERD or other GI risk considering its proximity to dinner when it started EKG looks totally normal, first troponin negative, second troponin pending, is already on a PPI, anticipate home with Levsin and as needed antacids for future spells if second troponin is flat       Amount and/or Complexity of Data Reviewed  Clinical lab tests: reviewed and ordered  Tests in the radiology section of CPT®: ordered and reviewed  Decide to obtain previous medical records or to obtain history from someone other than the patient: yes  Obtain history from someone other than the patient: yes ( at bedside)    Risk of Complications, Morbidity, and/or Mortality  Presenting problems: moderate  Diagnostic procedures: low  Management options: low    Patient Progress  Patient progress: improved      EKG 12 Lead    Date/Time: 9/16/2022 9:00 PM  Performed by: Marin Mar MD  Authorized by: Marin Mar MD     ECG reviewed by ED Physician in the absence of a cardiologist: yes    Previous ECG:     Previous ECG:  Unavailable  Interpretation:     Interpretation: normal    Rate:     ECG rate:  76    ECG rate assessment: normal    Rhythm:     Rhythm: sinus rhythm    Ectopy:     Ectopy: none    QRS:     QRS axis:  Normal    QRS intervals:  Normal    QRS conduction: normal    ST segments:     ST segments:  Normal  Q waves:     Abnormal Q-waves: not present      Labs Reviewed   COMPREHENSIVE METABOLIC PANEL - Abnormal; Notable for the following components:       Result Value    Glucose 115 (*)     BUN 32 (*)     AST 92 (*)     Globulin 3.7 (*)     Albumin/Globulin Ratio 0.9 (*)     All other components within normal limits   MAGNESIUM - Abnormal; Notable for the following components:    Magnesium 2.7 (*)     All other components within normal limits   CBC   TROPONIN   LIPASE   TROPONIN        XR CHEST (2 VW)   Final Result   1. No consolidative pneumonia. 2.  Mild lingular opacity, favor atelectasis over infection.                   Rebecca Coma Scale  Eye Opening: Spontaneous  Best Verbal Response: Oriented  Best Motor Response: Obeys commands  [place] Coma Scale Score: 15                     Voice dictation software was used during the making of this note. This software is not perfect and grammatical and other typographical errors may be present. This note has not been completely proofread for errors.         Cy Benitez MD  09/16/22 1531

## 2022-10-15 ENCOUNTER — HOSPITAL ENCOUNTER (EMERGENCY)
Dept: GENERAL RADIOLOGY | Age: 82
Discharge: HOME | DRG: 439 | End: 2022-10-18
Payer: MEDICARE

## 2022-10-15 ENCOUNTER — APPOINTMENT (OUTPATIENT)
Dept: CT IMAGING | Age: 82
DRG: 439 | End: 2022-10-15
Payer: MEDICARE

## 2022-10-15 ENCOUNTER — HOSPITAL ENCOUNTER (INPATIENT)
Age: 82
LOS: 4 days | Discharge: HOME | DRG: 439 | End: 2022-10-19
Attending: EMERGENCY MEDICINE | Admitting: FAMILY MEDICINE
Payer: MEDICARE

## 2022-10-15 DIAGNOSIS — R74.8 ELEVATED LIVER ENZYMES: ICD-10-CM

## 2022-10-15 DIAGNOSIS — K85.10 GALLSTONE PANCREATITIS: Primary | ICD-10-CM

## 2022-10-15 DIAGNOSIS — R07.9 CHEST PAIN, UNSPECIFIED TYPE: ICD-10-CM

## 2022-10-15 DIAGNOSIS — R10.84 GENERALIZED ABDOMINAL PAIN: ICD-10-CM

## 2022-10-15 PROBLEM — D52.9: Status: ACTIVE | Noted: 2022-10-15

## 2022-10-15 LAB
ALBUMIN SERPL-MCNC: 3.8 G/DL (ref 3.2–4.6)
ALBUMIN/GLOB SERPL: 1.1 {RATIO} (ref 0.4–1.6)
ALP SERPL-CCNC: 113 U/L (ref 50–136)
ALT SERPL-CCNC: 134 U/L (ref 12–65)
ANION GAP SERPL CALC-SCNC: 6 MMOL/L (ref 2–11)
AST SERPL-CCNC: 241 U/L (ref 15–37)
BILIRUB SERPL-MCNC: 1.7 MG/DL (ref 0.2–1.1)
BUN SERPL-MCNC: 24 MG/DL (ref 8–23)
CALCIUM SERPL-MCNC: 9.2 MG/DL (ref 8.3–10.4)
CHLORIDE SERPL-SCNC: 104 MMOL/L (ref 101–110)
CO2 SERPL-SCNC: 26 MMOL/L (ref 21–32)
CREAT SERPL-MCNC: 0.9 MG/DL (ref 0.6–1)
ERYTHROCYTE [DISTWIDTH] IN BLOOD BY AUTOMATED COUNT: 13.4 % (ref 11.9–14.6)
GLOBULIN SER CALC-MCNC: 3.5 G/DL (ref 2.8–4.5)
GLUCOSE SERPL-MCNC: 117 MG/DL (ref 65–100)
HCT VFR BLD AUTO: 43.4 % (ref 35.8–46.3)
HGB BLD-MCNC: 13.8 G/DL (ref 11.7–15.4)
LIPASE SERPL-CCNC: >1500 U/L (ref 73–393)
MCH RBC QN AUTO: 31 PG (ref 26.1–32.9)
MCHC RBC AUTO-ENTMCNC: 31.8 G/DL (ref 31.4–35)
MCV RBC AUTO: 97.5 FL (ref 82–102)
NRBC # BLD: 0 K/UL (ref 0–0.2)
PLATELET # BLD AUTO: 280 K/UL (ref 150–450)
PMV BLD AUTO: 9.8 FL (ref 9.4–12.3)
POTASSIUM SERPL-SCNC: 3.6 MMOL/L (ref 3.5–5.1)
PROT SERPL-MCNC: 7.3 G/DL (ref 6.3–8.2)
RBC # BLD AUTO: 4.45 M/UL (ref 4.05–5.2)
SODIUM SERPL-SCNC: 136 MMOL/L (ref 133–143)
TROPONIN I SERPL HS-MCNC: 5.6 PG/ML (ref 0–14)
TROPONIN I SERPL HS-MCNC: 8.4 PG/ML (ref 0–14)
WBC # BLD AUTO: 13 K/UL (ref 4.3–11.1)

## 2022-10-15 PROCEDURE — 6360000004 HC RX CONTRAST MEDICATION: Performed by: EMERGENCY MEDICINE

## 2022-10-15 PROCEDURE — 96375 TX/PRO/DX INJ NEW DRUG ADDON: CPT

## 2022-10-15 PROCEDURE — 84484 ASSAY OF TROPONIN QUANT: CPT

## 2022-10-15 PROCEDURE — 80053 COMPREHEN METABOLIC PANEL: CPT

## 2022-10-15 PROCEDURE — 96374 THER/PROPH/DIAG INJ IV PUSH: CPT

## 2022-10-15 PROCEDURE — 99285 EMERGENCY DEPT VISIT HI MDM: CPT

## 2022-10-15 PROCEDURE — 6370000000 HC RX 637 (ALT 250 FOR IP): Performed by: EMERGENCY MEDICINE

## 2022-10-15 PROCEDURE — 93005 ELECTROCARDIOGRAM TRACING: CPT | Performed by: EMERGENCY MEDICINE

## 2022-10-15 PROCEDURE — 71260 CT THORAX DX C+: CPT

## 2022-10-15 PROCEDURE — 71045 X-RAY EXAM CHEST 1 VIEW: CPT

## 2022-10-15 PROCEDURE — 36415 COLL VENOUS BLD VENIPUNCTURE: CPT

## 2022-10-15 PROCEDURE — 2500000003 HC RX 250 WO HCPCS: Performed by: EMERGENCY MEDICINE

## 2022-10-15 PROCEDURE — 2580000003 HC RX 258: Performed by: INTERNAL MEDICINE

## 2022-10-15 PROCEDURE — 94761 N-INVAS EAR/PLS OXIMETRY MLT: CPT

## 2022-10-15 PROCEDURE — 85027 COMPLETE CBC AUTOMATED: CPT

## 2022-10-15 PROCEDURE — 6370000000 HC RX 637 (ALT 250 FOR IP): Performed by: INTERNAL MEDICINE

## 2022-10-15 PROCEDURE — 83690 ASSAY OF LIPASE: CPT

## 2022-10-15 PROCEDURE — 96361 HYDRATE IV INFUSION ADD-ON: CPT

## 2022-10-15 PROCEDURE — 1100000000 HC RM PRIVATE

## 2022-10-15 PROCEDURE — 2580000003 HC RX 258: Performed by: EMERGENCY MEDICINE

## 2022-10-15 PROCEDURE — 6360000002 HC RX W HCPCS: Performed by: EMERGENCY MEDICINE

## 2022-10-15 RX ORDER — ONDANSETRON 2 MG/ML
4 INJECTION INTRAMUSCULAR; INTRAVENOUS EVERY 6 HOURS PRN
Status: DISCONTINUED | OUTPATIENT
Start: 2022-10-15 | End: 2022-10-19 | Stop reason: HOSPADM

## 2022-10-15 RX ORDER — HYDROCODONE BITARTRATE AND ACETAMINOPHEN 5; 325 MG/1; MG/1
1 TABLET ORAL EVERY 6 HOURS PRN
Status: DISCONTINUED | OUTPATIENT
Start: 2022-10-15 | End: 2022-10-19 | Stop reason: HOSPADM

## 2022-10-15 RX ORDER — FAMOTIDINE 20 MG/1
10 TABLET, FILM COATED ORAL DAILY PRN
Status: DISCONTINUED | OUTPATIENT
Start: 2022-10-15 | End: 2022-10-19 | Stop reason: HOSPADM

## 2022-10-15 RX ORDER — POLYETHYLENE GLYCOL 3350 17 G/17G
17 POWDER, FOR SOLUTION ORAL DAILY PRN
Status: DISCONTINUED | OUTPATIENT
Start: 2022-10-15 | End: 2022-10-18

## 2022-10-15 RX ORDER — ENOXAPARIN SODIUM 100 MG/ML
40 INJECTION SUBCUTANEOUS EVERY 24 HOURS
Status: DISCONTINUED | OUTPATIENT
Start: 2022-10-16 | End: 2022-10-19 | Stop reason: HOSPADM

## 2022-10-15 RX ORDER — ACETAMINOPHEN 325 MG/1
650 TABLET ORAL EVERY 6 HOURS PRN
Status: DISCONTINUED | OUTPATIENT
Start: 2022-10-15 | End: 2022-10-19 | Stop reason: HOSPADM

## 2022-10-15 RX ORDER — 0.9 % SODIUM CHLORIDE 0.9 %
1000 INTRAVENOUS SOLUTION INTRAVENOUS
Status: COMPLETED | OUTPATIENT
Start: 2022-10-15 | End: 2022-10-15

## 2022-10-15 RX ORDER — OMEGA-3 FATTY ACIDS CAP DELAYED RELEASE 1000 MG 1000 MG
1000 CAPSULE DELAYED RELEASE ORAL DAILY
COMMUNITY

## 2022-10-15 RX ORDER — SODIUM CHLORIDE 9 MG/ML
INJECTION, SOLUTION INTRAVENOUS PRN
Status: DISCONTINUED | OUTPATIENT
Start: 2022-10-15 | End: 2022-10-18

## 2022-10-15 RX ORDER — M-VIT,TX,IRON,MINS/CALC/FOLIC 27MG-0.4MG
1 TABLET ORAL DAILY
COMMUNITY

## 2022-10-15 RX ORDER — SODIUM CHLORIDE 9 MG/ML
INJECTION, SOLUTION INTRAVENOUS CONTINUOUS
Status: DISCONTINUED | OUTPATIENT
Start: 2022-10-15 | End: 2022-10-16

## 2022-10-15 RX ORDER — SODIUM CHLORIDE 0.9 % (FLUSH) 0.9 %
5-40 SYRINGE (ML) INJECTION EVERY 12 HOURS SCHEDULED
Status: DISCONTINUED | OUTPATIENT
Start: 2022-10-15 | End: 2022-10-19 | Stop reason: HOSPADM

## 2022-10-15 RX ORDER — ONDANSETRON 2 MG/ML
8 INJECTION INTRAMUSCULAR; INTRAVENOUS
Status: COMPLETED | OUTPATIENT
Start: 2022-10-15 | End: 2022-10-15

## 2022-10-15 RX ORDER — ACETAMINOPHEN 650 MG/1
650 SUPPOSITORY RECTAL EVERY 6 HOURS PRN
Status: DISCONTINUED | OUTPATIENT
Start: 2022-10-15 | End: 2022-10-19 | Stop reason: HOSPADM

## 2022-10-15 RX ORDER — ONDANSETRON 4 MG/1
4 TABLET, ORALLY DISINTEGRATING ORAL EVERY 8 HOURS PRN
Status: DISCONTINUED | OUTPATIENT
Start: 2022-10-15 | End: 2022-10-19 | Stop reason: HOSPADM

## 2022-10-15 RX ORDER — TRAMADOL HYDROCHLORIDE 50 MG/1
50 TABLET ORAL EVERY 6 HOURS PRN
Status: DISCONTINUED | OUTPATIENT
Start: 2022-10-15 | End: 2022-10-19 | Stop reason: HOSPADM

## 2022-10-15 RX ORDER — FAMOTIDINE 10 MG/ML
20 INJECTION, SOLUTION INTRAVENOUS
Status: COMPLETED | OUTPATIENT
Start: 2022-10-15 | End: 2022-10-15

## 2022-10-15 RX ORDER — ASPIRIN 81 MG/1
81 TABLET, CHEWABLE ORAL DAILY
Status: DISCONTINUED | OUTPATIENT
Start: 2022-10-15 | End: 2022-10-19 | Stop reason: HOSPADM

## 2022-10-15 RX ORDER — MAGNESIUM HYDROXIDE/ALUMINUM HYDROXICE/SIMETHICONE 120; 1200; 1200 MG/30ML; MG/30ML; MG/30ML
30 SUSPENSION ORAL EVERY 6 HOURS PRN
Status: DISCONTINUED | OUTPATIENT
Start: 2022-10-15 | End: 2022-10-18

## 2022-10-15 RX ORDER — BISACODYL 10 MG
10 SUPPOSITORY, RECTAL RECTAL DAILY PRN
Status: DISCONTINUED | OUTPATIENT
Start: 2022-10-15 | End: 2022-10-19 | Stop reason: HOSPADM

## 2022-10-15 RX ORDER — SODIUM CHLORIDE 0.9 % (FLUSH) 0.9 %
5-40 SYRINGE (ML) INJECTION PRN
Status: DISCONTINUED | OUTPATIENT
Start: 2022-10-15 | End: 2022-10-19 | Stop reason: HOSPADM

## 2022-10-15 RX ADMIN — SODIUM CHLORIDE 1000 ML: 9 INJECTION, SOLUTION INTRAVENOUS at 18:58

## 2022-10-15 RX ADMIN — SODIUM CHLORIDE 1000 ML: 9 INJECTION, SOLUTION INTRAVENOUS at 20:30

## 2022-10-15 RX ADMIN — FAMOTIDINE 20 MG: 10 INJECTION, SOLUTION INTRAVENOUS at 18:58

## 2022-10-15 RX ADMIN — ASPIRIN 81 MG: 81 TABLET, CHEWABLE ORAL at 18:57

## 2022-10-15 RX ADMIN — IOPAMIDOL 100 ML: 755 INJECTION, SOLUTION INTRAVENOUS at 19:07

## 2022-10-15 RX ADMIN — SODIUM CHLORIDE: 9 INJECTION, SOLUTION INTRAVENOUS at 22:51

## 2022-10-15 RX ADMIN — ONDANSETRON 8 MG: 2 INJECTION INTRAMUSCULAR; INTRAVENOUS at 18:57

## 2022-10-15 RX ADMIN — HYDROCODONE BITARTRATE AND ACETAMINOPHEN 1 TABLET: 5; 325 TABLET ORAL at 21:18

## 2022-10-15 RX ADMIN — FENTANYL CITRATE 75 MCG: 50 INJECTION, SOLUTION INTRAMUSCULAR; INTRAVENOUS at 19:26

## 2022-10-15 RX ADMIN — ONDANSETRON 4 MG: 4 TABLET, ORALLY DISINTEGRATING ORAL at 21:18

## 2022-10-15 ASSESSMENT — PAIN SCALES - GENERAL
PAINLEVEL_OUTOF10: 10
PAINLEVEL_OUTOF10: 6
PAINLEVEL_OUTOF10: 7
PAINLEVEL_OUTOF10: 5

## 2022-10-15 ASSESSMENT — PAIN - FUNCTIONAL ASSESSMENT
PAIN_FUNCTIONAL_ASSESSMENT: 0-10
PAIN_FUNCTIONAL_ASSESSMENT: 0-10

## 2022-10-15 ASSESSMENT — PAIN DESCRIPTION - LOCATION
LOCATION: CHEST
LOCATION: ABDOMEN;CHEST

## 2022-10-15 NOTE — ED PROVIDER NOTES
Emergency Department Provider Note                   PCP:                Tricia Loomis MD               Age: [de-identified] y.o. Sex: female     Final diagnosis/impression:  1. Gallstone pancreatitis    2. Chest pain, unspecified type    3. Generalized abdominal pain    4. Elevated liver enzymes         Disposition: Admit to hospitalist    MDM/Clinical Course:  Patient seen by myself here in the [facility] emergency department. Patient had signs, symptoms and clinical history most consistent with chest pain, abdominal pain symptoms as previously described. Differential included PE, ACS, intra-abdominal emergency including aortic rupture, pancreatitis. Labs and radiology ordered, notable for gallstone pancreatitis on CT scan without signs of pericholecystic edema or inflammation. While under my care, patient ordered and / or received IV fluids, IV Zofran, IV fentanyl. Given CT scan findings I did at first reach out to general surgery who recommended hospitalist admission, consultation with gastroenterology for planning of ERCP. Patient well-appearing with improving blood pressure with IV fluids here in the emergency department. I spoke with the hospitalist team in detail but the patient agreed to see and admit the patient. Patient/family verbalized understanding and agreement with ED course/plan. HPI: Ele Koenig is a [de-identified] y.o. female with past medical history of arthritis, hypertension presenting for evaluation of chest and abdominal pain symptoms. Patient throughout the day with lower substernal chest pain radiating down into her abdomen. Patient notes associated nausea and vomiting symptoms. No diarrheal symptoms. Patient notes pleuritic pain as well with some dyspnea symptoms. Denies any palpitations, near-syncope or syncopal episodes. Pain is rated as severe, constant in nature, pressure-like and at times sharp. No focal neurologic deficit of speech, gait, sensorimotor function. Patient notes similar though much less severe pain symptoms on 9/16/2022. Patient/family denies any other evaluation for today's acute complaint. Patient/family denies any other aggravating or alleviating factors. Patient/family denies any other symptoms. ROS:   All review of systems negative except as noted above in the history of the present illness.     Past Medical/ Family/ Social History:     Medical history:   Past Medical History:   Diagnosis Date    Allergic rhinitis due to pollen     Arthritis     Asthma as a child    Constipation 02/2019    post-op after TKA    Ear problems     Fibrocystic breast     GERD (gastroesophageal reflux disease)     Managed with meds     H/O colonoscopy 2009    Normal ([facility])    Hashimoto's thyroiditis     Hearing reduced     Hypertension     Managed with meds     Macular degeneration     Mixed hyperlipidemia     Daily meds     Osteoporosis 1/2013    GYN Dr. Nora Aguila    Restless leg     managed with medication    Unspecified hypothyroidism     Varicella        Surgical history:   Past Surgical History:   Procedure Laterality Date    ADENOIDECTOMY  as a child    APPENDECTOMY  1973    CATARACT REMOVAL Bilateral 2007    CATARACT REMOVAL Bilateral     COLONOSCOPY      Dr. Morenita Eugene 2009-- no more per pt    GYN      ovarian cyst    HERNIA REPAIR  06/16/2018    OVARIAN CYST [address]  as a child    TOTAL KNEE ARTHROPLASTY Left 02/07/2019       Family history:   Family History   Problem Relation Age of Onset    Coronary Art Dis Mother     COPD Father     Other Father         smoker    Heart Disease Brother     Cancer Maternal Grandmother     Breast Cancer Mother     Cancer Mother         breast, lung    Heart Disease Mother     Heart Attack Mother        Social history:   Social History     Socioeconomic History    Marital status:    Tobacco Use    Smoking status: Former     Packs/day: 0.50     Types: Cigarettes     Quit date: 1/31/1975     Years since quittin.7    Smokeless tobacco: Never    Tobacco comments:     Quit smoking: quit age of 30   Substance and Sexual Activity    Alcohol use: Yes     Alcohol/week: 1.0 standard drink    Drug use: No        Medications:   Previous Medications    ACETAMINOPHEN (TYLENOL) 325 MG TABLET    Take 650 mg by mouth every 4 hours as needed    ASPIRIN 81 MG EC TABLET    Take 81 mg by mouth every 12 hours    CALCIUM POLYCARBOPHIL (FIBER) 625 MG TABS    take 1 qd    CARBIDOPA-LEVODOPA (SINEMET)  MG PER TABLET    TAKE 1 TABLET BY MOUTH AT NIGHT    CETIRIZINE (ZYRTEC) 10 MG TABLET    Take 10 mg by mouth daily    CLOBETASOL (TEMOVATE) 0.05 % CREAM    Apply topically 2 times daily as needed    CLOTRIMAZOLE-BETAMETHASONE (LOTRISONE) 1-0.05 % CREAM    Apply topically 2 times daily    COENZYME Q10 100 MG CAPS CAPSULE    Take by mouth every morning    DIPHENHYDRAMINE-APAP, SLEEP, (TYLENOL PM EXTRA STRENGTH)  MG TABLET    Take 1-2 tablets by mouth    DOXYCYCLINE MONOHYDRATE (ADOXA) 100 MG TABLET    Take 100 mg by mouth 2 times daily    FLUTICASONE-SALMETEROL (ADVAIR) 250-50 MCG/ACT AEPB DISKUS INHALER    Inhale 1 puff into the lungs every 12 hours    FOLIC ACID (FOLVITE) 1 MG TABLET    Take 1 tablet by mouth in the morning. GLUCOSAMINE 500 MG CAPS    Take 500 mg by mouth daily    HYDROCHLOROTHIAZIDE (HYDRODIURIL) 12.5 MG TABLET    Take 1 tablet by mouth daily TAKE 1 TABLET BY MOUTH DAILY    HYDROCORTISONE 2.5 % CREAM    Place rectally 4 times daily    HYOSCYAMINE SULFATE SL (LEVSIN/SL) 0.125 MG SUBL    Place 0.25 mg under the tongue 4 times daily as needed (abdominal pain or cramping)    KETOCONAZOLE (NIZORAL) 2 % CREAM    Apply topically 2 times daily as needed    LEVOTHYROXINE (SYNTHROID) 75 MCG TABLET    Take 1 tablet by mouth in the morning. TAKE 1 TABLET BY MOUTH DAILY BEFORE BREAKFAST.     PANTOPRAZOLE (PROTONIX) 40 MG TABLET    TAKE 1 TABLET BY MOUTH DAILY BEFORE DINNER    PRAVASTATIN (PRAVACHOL) 40 MG TABLET TAKE 1 TABLET BY MOUTH AT NIGHT    PREDNISOLONE ACETATE (PRED FORTE) 1 % OPHTHALMIC SUSPENSION    Apply 1 drop to eye Twice a Week    TELMISARTAN (MICARDIS) 80 MG TABLET    Take 1 tablet by mouth in the morning. TRAMADOL (ULTRAM) 50 MG TABLET    Take 50 mg by mouth every 6 hours as needed. TRIAMCINOLONE (KENALOG) 0.1 % CREAM    Apply topically 2 times daily as needed    ZOSTER RECOMBINANT ADJUVANTED VACCINE (SHINGRIX) 50 MCG/0.5ML SUSR INJECTION    0.5mL by IntraMUSCular route once now and then repeat in 2-6 months        Allergies: Allergies   Allergen Reactions    Ace Inhibitors Angioedema and Swelling     Pt stated \"ace blockers\" can't remember name for htn  Facial swelling         Physical Exam     Vitals signs reviewed. Patient Vitals for the past 4 hrs:   Temp Pulse Resp BP SpO2   10/15/22 1832 -- -- -- -- 94 %   10/15/22 1826 -- -- -- -- 94 %   10/15/22 1824 -- 73 13 (!) 102/47 --   10/15/22 1802 97.4 °F (36.3 °C) 87 18 (!) 116/94 98 %       General: Alert and oriented ×4, no acute distress   Eyes: Anicteric, conjunctiva pink, PERRLA, EOMI  ENT: No nasal discharge, no gross nasal congestion present  Pulmonary: Clear to auscultation bilaterally with symmetric chest rise, no increased work of breathing, no accessory muscle use  Cardiovascular: Regular rate and rhythm, no rub or gallop appreciated on my exam  GI: Abdomen is soft, nondistended, tender to palpation with mild to moderate guarding in epigastrium  Musculoskeletal: No obvious joint deformity or joint effusion, normal joint range of motion  Neuro: Cranial nerves II through VII grossly intact, upper and lower extremity sensation is intact bilaterally, bilateral strength is 5 out of 5 in the upper and lower extremities.    Skin: Skin is warm and dry    Procedures    ED EKG Interpretation  EKG was interpreted in the absence of a cardiologist.  Interpretation:  EKG read on 10/15/2022 at 1900  Normal sinus rhythm, rate of 83, no evidence of ST elevation MI or other gross signs of cardiac ischemia, QTC is 432 and is not prolonged, QRS is 76 and is not widened. Results for orders placed or performed during the hospital encounter of 10/15/22   XR CHEST PORTABLE    Narrative    AP PORTABLE CHEST X-RAY 10/15/2022 6:32 PM    HISTORY: Midsternal chest pain that began suddenly today and radiates into back    COMPARISON: 9/16/2022    FINDINGS:  EKG leads are present. There is minimal retrocardiac atelectasis or  scarring. There is no lobar consolidation, pleural effusions or pulmonary edema. Impression    No consolidation. CT CHEST ABDOMEN PELVIS W CONTRAST Additional Contrast? None    Narrative    EXAMINATION: CT CHEST ABDOMEN PELVIS W CONTRAST 10/15/2022 7:14 PM    ACCESSION NUMBER: [id number]    COMPARISON: CT abdomen/pelvis January 4, 2013    INDICATION: Chest pain, unspecified; Generalized abdominal pain    TECHNIQUE: Multiple contiguous axial CT images of the chest, abdomen, and pelvis  were obtained from the lung apices to the symphysis pubis after the intravenous  administration of 100mL Iso-gia 370. Reformats are provided. Radiation dose reduction techniques were used for this study. Our CT scanners  use one or all of the following: Automated exposure control, adjustment of the  mA and/or kV according to patient size, iterative reconstruction. FINDINGS:  AIRWAYS: The central airways are patent. LUNGS: Dependent changes and scarring within the lung bases. Pleural parenchymal  scarring within the lung apices. No suspicious pulmonary nodules. PLEURA: No pleural effusion or pneumothorax. HEART: The heart is not enlarged. Moderate calcified coronary atherosclerosis. No pericardial effusion. THORACIC AORTA: The aorta is normal in caliber. PULMONARY ARTERY: The main pulmonary artery is normal in caliber. No evidence of  pulmonary embolism. MEDIASTINUM/KEREN: No mediastinal mass or lymphadenopathy.  Moderate sized hiatal  hernia. CHEST WALL: No mass or axillary lymphadenopathy. LIVER: The liver contour is normal. No suspicious liver lesion. BILIARY TREE: Gallbladder is distended. There are calcified gallstones  dependently within the gallbladder. Prominent intra and extrahepatic biliary  ductal dilatation with common bile duct measuring up to 1.6 cm diameter. There  is suggestion of rounded hypodense 0.8 cm focus within the distal common bile  duct    SPLEEN: Normal.    PANCREAS: No pancreatic mass or ductal dilation. ADRENALS: Normal.    KIDNEYS/BLADDER: The kidneys are symmetric in size. No renal calculus or  hydronephrosis. Several renal cysts and too small to characterize hypodensities  are present within the kidneys. The urinary bladder is unremarkable. BOWEL: The colon is unremarkable. The small bowel is normal in caliber. No bowel  wall thickening. Small duodenal diverticulum is present. Sigmoid diverticulosis  without diverticulitis. APPENDIX: Appendix not definitely seen however no inflammatory changes in the  right lower quadrant to suggest appendicitis. PERITONEUM/RETROPERITONEUM: No ascites or free air. No pelvic or retroperitoneal  lymphadenopathy. VESSELS: No abdominal aortic aneurysm. ABDOMINAL WALL: No hernia or mass. REPRODUCTIVE: 3.6 cm cyst within the right adnexa. BONES: No suspicious osseous lesion. Impression    1. Intra and extrahepatic biliary ductal dilatation with distended gallbladder  and suggestion of hyperdense 0.8 cm focus at the distal common bile duct likely  cause of obstruction. Although favor noncalcified gallstone given the presence  of additional gallstones, obstructing mass lesion not excluded. No pancreatic  ductal dilatation. 2.  No evidence of pulmonary embolism or acute abnormality in the chest.  3.  Moderate coronary artery calcification. 4.  Right adnexal 3.6 cm cyst. Recommend nonurgent pelvic ultrasound for further  evaluation. CBC   Result Value Ref Range    WBC 13.0 (H) 4.3 - 11.1 K/uL    RBC 4.45 4.05 - 5.2 M/uL    Hemoglobin 13.8 11.7 - 15.4 g/dL    Hematocrit 43.4 35.8 - 46.3 %    MCV 97.5 82 - 102 FL    MCH 31.0 26.1 - 32.9 PG    MCHC 31.8 31.4 - 35.0 g/dL    RDW 13.4 11.9 - 14.6 %    Platelets 511 323 - 968 K/uL    MPV 9.8 9.4 - 12.3 FL    nRBC 0.00 0.0 - 0.2 K/uL   Comprehensive Metabolic Panel   Result Value Ref Range    Sodium 136 133 - 143 mmol/L    Potassium 3.6 3.5 - 5.1 mmol/L    Chloride 104 101 - 110 mmol/L    CO2 26 21 - 32 mmol/L    Anion Gap 6 2 - 11 mmol/L    Glucose 117 (H) 65 - 100 mg/dL    BUN 24 (H) 8 - 23 MG/DL    Creatinine 0.90 0.6 - 1.0 MG/DL    Est, Glom Filt Rate >60 >60 ml/min/1.73m2    Calcium 9.2 8.3 - 10.4 MG/DL    Total Bilirubin 1.7 (H) 0.2 - 1.1 MG/DL     (H) 12 - 65 U/L     (H) 15 - 37 U/L    Alk Phosphatase 113 50 - 136 U/L    Total Protein 7.3 6.3 - 8.2 g/dL    Albumin 3.8 3.2 - 4.6 g/dL    Globulin 3.5 2.8 - 4.5 g/dL    Albumin/Globulin Ratio 1.1 0.4 - 1.6     Troponin   Result Value Ref Range    Troponin, High Sensitivity 5.6 0 - 14 pg/mL   Lipase   Result Value Ref Range    Lipase >1,500 (H) 73 - 393 U/L   EKG 12 Lead   Result Value Ref Range    Ventricular Rate 83 BPM    Atrial Rate 83 BPM    P-R Interval 174 ms    QRS Duration 76 ms    Q-T Interval 368 ms    QTc Calculation (Bazett) 432 ms    P Axis 33 degrees    R Axis 60 degrees    T Axis 36 degrees    Diagnosis Normal sinus rhythm         CT CHEST ABDOMEN PELVIS W CONTRAST Additional Contrast? None   Final Result   1. Intra and extrahepatic biliary ductal dilatation with distended gallbladder   and suggestion of hyperdense 0.8 cm focus at the distal common bile duct likely   cause of obstruction. Although favor noncalcified gallstone given the presence   of additional gallstones, obstructing mass lesion not excluded. No pancreatic   ductal dilatation.    2.  No evidence of pulmonary embolism or acute abnormality in the chest. 3.  Moderate coronary artery calcification. 4.  Right adnexal 3.6 cm cyst. Recommend nonurgent pelvic ultrasound for further   evaluation. XR CHEST PORTABLE   Final Result   No consolidation. Voice dictation software was used during the making of this note. This software is not perfect and grammatical and other typographical errors may be present. This note has not been completely proofread for errors.      West Sneed MD  10/15/22 0439

## 2022-10-15 NOTE — ED TRIAGE NOTES
Patient to triage via wheelchair with CO CP that started suddenly this afternoon while she was cleaning. Pain is midsternal. Radiates to back. Patient vomiting in triage, was seen 3 weeks ago for same sent home with Levsin and attempted it without any relief.

## 2022-10-16 ENCOUNTER — APPOINTMENT (OUTPATIENT)
Dept: ULTRASOUND IMAGING | Age: 82
DRG: 439 | End: 2022-10-16
Payer: MEDICARE

## 2022-10-16 LAB
ANION GAP SERPL CALC-SCNC: 5 MMOL/L (ref 2–11)
APPEARANCE UR: ABNORMAL
BACTERIA URNS QL MICRO: ABNORMAL /HPF
BASOPHILS # BLD: 0 K/UL (ref 0–0.2)
BASOPHILS NFR BLD: 0 % (ref 0–2)
BILIRUB UR QL: ABNORMAL
BUN SERPL-MCNC: 22 MG/DL (ref 8–23)
CALCIUM SERPL-MCNC: 8 MG/DL (ref 8.3–10.4)
CASTS URNS QL MICRO: ABNORMAL /LPF
CHLORIDE SERPL-SCNC: 109 MMOL/L (ref 101–110)
CO2 SERPL-SCNC: 24 MMOL/L (ref 21–32)
COLOR UR: ABNORMAL
CREAT SERPL-MCNC: 0.9 MG/DL (ref 0.6–1)
CRYSTALS URNS QL MICRO: 0 /LPF
DIFFERENTIAL METHOD BLD: ABNORMAL
EKG ATRIAL RATE: 83 BPM
EKG DIAGNOSIS: NORMAL
EKG P AXIS: 33 DEGREES
EKG P-R INTERVAL: 174 MS
EKG Q-T INTERVAL: 368 MS
EKG QRS DURATION: 76 MS
EKG QTC CALCULATION (BAZETT): 432 MS
EKG R AXIS: 60 DEGREES
EKG T AXIS: 36 DEGREES
EKG VENTRICULAR RATE: 83 BPM
EOSINOPHIL # BLD: 0 K/UL (ref 0–0.8)
EOSINOPHIL NFR BLD: 0 % (ref 0.5–7.8)
EPI CELLS #/AREA URNS HPF: ABNORMAL /HPF
ERYTHROCYTE [DISTWIDTH] IN BLOOD BY AUTOMATED COUNT: 13.8 % (ref 11.9–14.6)
GLUCOSE SERPL-MCNC: 101 MG/DL (ref 65–100)
GLUCOSE UR STRIP.AUTO-MCNC: NEGATIVE MG/DL
HCT VFR BLD AUTO: 36.3 % (ref 35.8–46.3)
HGB BLD-MCNC: 11.7 G/DL (ref 11.7–15.4)
HGB UR QL STRIP: ABNORMAL
IMM GRANULOCYTES # BLD AUTO: 0.2 K/UL (ref 0–0.5)
IMM GRANULOCYTES NFR BLD AUTO: 1 % (ref 0–5)
KETONES UR QL STRIP.AUTO: NEGATIVE MG/DL
LEUKOCYTE ESTERASE UR QL STRIP.AUTO: ABNORMAL
LYMPHOCYTES # BLD: 0.4 K/UL (ref 0.5–4.6)
LYMPHOCYTES NFR BLD: 2 % (ref 13–44)
MCH RBC QN AUTO: 31.5 PG (ref 26.1–32.9)
MCHC RBC AUTO-ENTMCNC: 32.2 G/DL (ref 31.4–35)
MCV RBC AUTO: 97.8 FL (ref 82–102)
MONOCYTES # BLD: 0.9 K/UL (ref 0.1–1.3)
MONOCYTES NFR BLD: 4 % (ref 4–12)
MUCOUS THREADS URNS QL MICRO: 0 /LPF
NEUTS SEG # BLD: 19.4 K/UL (ref 1.7–8.2)
NEUTS SEG NFR BLD: 93 % (ref 43–78)
NITRITE UR QL STRIP.AUTO: NEGATIVE
NRBC # BLD: 0 K/UL (ref 0–0.2)
OTHER OBSERVATIONS: ABNORMAL
PH UR STRIP: 5.5 [PH] (ref 5–9)
PLATELET # BLD AUTO: 241 K/UL (ref 150–450)
PMV BLD AUTO: 10.3 FL (ref 9.4–12.3)
POTASSIUM SERPL-SCNC: 3.9 MMOL/L (ref 3.5–5.1)
PROCALCITONIN SERPL-MCNC: 23.95 NG/ML (ref 0–0.49)
PROT UR STRIP-MCNC: ABNORMAL MG/DL
RBC # BLD AUTO: 3.71 M/UL (ref 4.05–5.2)
RBC #/AREA URNS HPF: 0 /HPF
SODIUM SERPL-SCNC: 138 MMOL/L (ref 133–143)
SP GR UR REFRACTOMETRY: 1.02 (ref 1–1.02)
URINE CULTURE IF INDICATED: ABNORMAL
UROBILINOGEN UR QL STRIP.AUTO: 1 EU/DL (ref 0.2–1)
WBC # BLD AUTO: 20.9 K/UL (ref 4.3–11.1)
WBC URNS QL MICRO: ABNORMAL /HPF

## 2022-10-16 PROCEDURE — 84145 PROCALCITONIN (PCT): CPT

## 2022-10-16 PROCEDURE — 6360000002 HC RX W HCPCS: Performed by: INTERNAL MEDICINE

## 2022-10-16 PROCEDURE — 76856 US EXAM PELVIC COMPLETE: CPT

## 2022-10-16 PROCEDURE — 81001 URINALYSIS AUTO W/SCOPE: CPT

## 2022-10-16 PROCEDURE — 36415 COLL VENOUS BLD VENIPUNCTURE: CPT

## 2022-10-16 PROCEDURE — 85025 COMPLETE CBC W/AUTO DIFF WBC: CPT

## 2022-10-16 PROCEDURE — 2580000003 HC RX 258: Performed by: INTERNAL MEDICINE

## 2022-10-16 PROCEDURE — 6370000000 HC RX 637 (ALT 250 FOR IP): Performed by: INTERNAL MEDICINE

## 2022-10-16 PROCEDURE — 1100000000 HC RM PRIVATE

## 2022-10-16 PROCEDURE — 2580000003 HC RX 258: Performed by: NURSE PRACTITIONER

## 2022-10-16 PROCEDURE — 87040 BLOOD CULTURE FOR BACTERIA: CPT

## 2022-10-16 PROCEDURE — 80048 BASIC METABOLIC PNL TOTAL CA: CPT

## 2022-10-16 RX ORDER — FOLIC ACID 1 MG/1
1 TABLET ORAL DAILY
Status: DISCONTINUED | OUTPATIENT
Start: 2022-10-16 | End: 2022-10-19 | Stop reason: HOSPADM

## 2022-10-16 RX ORDER — HYDROCHLOROTHIAZIDE 25 MG/1
12.5 TABLET ORAL DAILY
Status: DISCONTINUED | OUTPATIENT
Start: 2022-10-16 | End: 2022-10-17

## 2022-10-16 RX ORDER — PRAVASTATIN SODIUM 20 MG
40 TABLET ORAL NIGHTLY
Status: DISCONTINUED | OUTPATIENT
Start: 2022-10-16 | End: 2022-10-19 | Stop reason: HOSPADM

## 2022-10-16 RX ORDER — PANTOPRAZOLE SODIUM 40 MG/1
40 TABLET, DELAYED RELEASE ORAL
Status: DISCONTINUED | OUTPATIENT
Start: 2022-10-17 | End: 2022-10-19 | Stop reason: HOSPADM

## 2022-10-16 RX ORDER — LEVOTHYROXINE SODIUM 0.07 MG/1
75 TABLET ORAL DAILY
Status: DISCONTINUED | OUTPATIENT
Start: 2022-10-16 | End: 2022-10-16

## 2022-10-16 RX ORDER — LEVOTHYROXINE SODIUM 0.07 MG/1
75 TABLET ORAL
Status: DISCONTINUED | OUTPATIENT
Start: 2022-10-17 | End: 2022-10-19 | Stop reason: HOSPADM

## 2022-10-16 RX ORDER — SODIUM CHLORIDE 9 MG/ML
INJECTION, SOLUTION INTRAVENOUS CONTINUOUS
Status: DISCONTINUED | OUTPATIENT
Start: 2022-10-16 | End: 2022-10-18

## 2022-10-16 RX ADMIN — SODIUM CHLORIDE, PRESERVATIVE FREE 10 ML: 5 INJECTION INTRAVENOUS at 21:35

## 2022-10-16 RX ADMIN — ONDANSETRON 4 MG: 2 INJECTION INTRAMUSCULAR; INTRAVENOUS at 03:41

## 2022-10-16 RX ADMIN — ACETAMINOPHEN 650 MG: 325 TABLET, FILM COATED ORAL at 12:43

## 2022-10-16 RX ADMIN — CARBIDOPA AND LEVODOPA 1 TABLET: 25; 100 TABLET ORAL at 21:34

## 2022-10-16 RX ADMIN — SODIUM CHLORIDE, PRESERVATIVE FREE 10 ML: 5 INJECTION INTRAVENOUS at 08:20

## 2022-10-16 RX ADMIN — SODIUM CHLORIDE: 9 INJECTION, SOLUTION INTRAVENOUS at 10:28

## 2022-10-16 RX ADMIN — SODIUM CHLORIDE: 9 INJECTION, SOLUTION INTRAVENOUS at 16:41

## 2022-10-16 RX ADMIN — PRAVASTATIN SODIUM 40 MG: 20 TABLET ORAL at 21:34

## 2022-10-16 RX ADMIN — ENOXAPARIN SODIUM 40 MG: 100 INJECTION SUBCUTANEOUS at 10:28

## 2022-10-16 RX ADMIN — SODIUM CHLORIDE: 9 INJECTION, SOLUTION INTRAVENOUS at 22:51

## 2022-10-16 RX ADMIN — HYDROCODONE BITARTRATE AND ACETAMINOPHEN 1 TABLET: 5; 325 TABLET ORAL at 03:43

## 2022-10-16 ASSESSMENT — PAIN DESCRIPTION - LOCATION
LOCATION: ABDOMEN
LOCATION: HEAD

## 2022-10-16 ASSESSMENT — PAIN SCALES - GENERAL
PAINLEVEL_OUTOF10: 0
PAINLEVEL_OUTOF10: 1
PAINLEVEL_OUTOF10: 3
PAINLEVEL_OUTOF10: 4
PAINLEVEL_OUTOF10: 0

## 2022-10-16 ASSESSMENT — PAIN DESCRIPTION - DESCRIPTORS
DESCRIPTORS: SORE
DESCRIPTORS: ACHING

## 2022-10-16 ASSESSMENT — PAIN DESCRIPTION - ORIENTATION
ORIENTATION: ANTERIOR;MID
ORIENTATION: ANTERIOR

## 2022-10-16 ASSESSMENT — PAIN DESCRIPTION - PAIN TYPE
TYPE: ACUTE PAIN
TYPE: ACUTE PAIN

## 2022-10-16 ASSESSMENT — PAIN DESCRIPTION - DIRECTION: RADIATING_TOWARDS: ABDOMEN

## 2022-10-16 ASSESSMENT — PAIN DESCRIPTION - FREQUENCY: FREQUENCY: INTERMITTENT

## 2022-10-16 ASSESSMENT — PAIN - FUNCTIONAL ASSESSMENT
PAIN_FUNCTIONAL_ASSESSMENT: ACTIVITIES ARE NOT PREVENTED
PAIN_FUNCTIONAL_ASSESSMENT: PREVENTS OR INTERFERES SOME ACTIVE ACTIVITIES AND ADLS

## 2022-10-16 ASSESSMENT — PAIN DESCRIPTION - ONSET: ONSET: GRADUAL

## 2022-10-16 NOTE — H&P (VIEW-ONLY)
[facility] Consult Note       Primary GI Physician: Dr. Arturo Potter formerly-Dr. Nancey Curling    Referring Provider:  Dr. Libertad Harris Date:  10/16/2022    Admit Date:  10/15/2022    Chief Complaint:  biliary pancreatitis    Subjective:     History of Present Illness:  Patient is a [de-identified] y.o. female with PMH including but not limited to,  HTN, HLD, hypothyroid, anemia due to folate deficiency, Parkinson'swho is seen in consultation at the request of Dr. Amy Bobby for biliary pancreatitis. Ms. Sarah Charles had an episode of epigastric abdominal pain radiating into her chest. She had associated N&V. This happened 3 weeks ago as well and resolved. She has no symptoms this morning. She denies any diarrhea and had a normal BM yesterday. She drinks an occasional glass of wine and make \"beef tips with red wine\" the night before. She denies any tobacco or recent new meds. Patient presented to the ED with substernal chest pain with associated N&V. On evaluation in the ED, she was found to have leukocytosis with WBC of 13, t bili 1.7, Alt 134, , , lipase >1500. CT findings as below. EXAMINATION: CT CHEST ABDOMEN PELVIS W CONTRAST 10/15/2022 7:14 PM       ACCESSION NUMBER: [id number]       COMPARISON: CT abdomen/pelvis January 4, 2013       INDICATION: Chest pain, unspecified; Generalized abdominal pain       TECHNIQUE: Multiple contiguous axial CT images of the chest, abdomen, and pelvis   were obtained from the lung apices to the symphysis pubis after the intravenous   administration of 100mL Iso-gia 370. Reformats are provided. Radiation dose reduction techniques were used for this study. Our CT scanners   use one or all of the following: Automated exposure control, adjustment of the   mA and/or kV according to patient size, iterative reconstruction. FINDINGS:   AIRWAYS: The central airways are patent. LUNGS: Dependent changes and scarring within the lung bases.  Pleural parenchymal   scarring within the lung apices. No suspicious pulmonary nodules. PLEURA: No pleural effusion or pneumothorax. HEART: The heart is not enlarged. Moderate calcified coronary atherosclerosis. No pericardial effusion. THORACIC AORTA: The aorta is normal in caliber. PULMONARY ARTERY: The main pulmonary artery is normal in caliber. No evidence of   pulmonary embolism. MEDIASTINUM/KEREN: No mediastinal mass or lymphadenopathy. Moderate sized hiatal   hernia. CHEST WALL: No mass or axillary lymphadenopathy. LIVER: The liver contour is normal. No suspicious liver lesion. BILIARY TREE: Gallbladder is distended. There are calcified gallstones   dependently within the gallbladder. Prominent intra and extrahepatic biliary   ductal dilatation with common bile duct measuring up to 1.6 cm diameter. There   is suggestion of rounded hypodense 0.8 cm focus within the distal common bile   duct       SPLEEN: Normal.       PANCREAS: No pancreatic mass or ductal dilation. ADRENALS: Normal.       KIDNEYS/BLADDER: The kidneys are symmetric in size. No renal calculus or   hydronephrosis. Several renal cysts and too small to characterize hypodensities   are present within the kidneys. The urinary bladder is unremarkable. BOWEL: The colon is unremarkable. The small bowel is normal in caliber. No bowel   wall thickening. Small duodenal diverticulum is present. Sigmoid diverticulosis   without diverticulitis. APPENDIX: Appendix not definitely seen however no inflammatory changes in the   right lower quadrant to suggest appendicitis. PERITONEUM/RETROPERITONEUM: No ascites or free air. No pelvic or retroperitoneal   lymphadenopathy. VESSELS: No abdominal aortic aneurysm. ABDOMINAL WALL: No hernia or mass. REPRODUCTIVE: 3.6 cm cyst within the right adnexa. BONES: No suspicious osseous lesion. Impression   1. Intra and extrahepatic biliary ductal dilatation with distended gallbladder   and suggestion of hyperdense 0.8 cm focus at the distal common bile duct likely   cause of obstruction. Although favor noncalcified gallstone given the presence   of additional gallstones, obstructing mass lesion not excluded. No pancreatic   ductal dilatation. 2.  No evidence of pulmonary embolism or acute abnormality in the chest.   3.  Moderate coronary artery calcification. 4.  Right adnexal 3.6 cm cyst. Recommend nonurgent pelvic ultrasound for further   evaluation. Colonoscopoy 1/20/2011 by Dr. Key Hernandez revealed diverticulosis. PMH:  Past Medical History:   Diagnosis Date    Allergic rhinitis due to pollen     Arthritis     Asthma as a child    Constipation 02/2019    post-op after TKA    Ear problems     Fibrocystic breast     GERD (gastroesophageal reflux disease)     Managed with meds     H/O colonoscopy 2009    Normal ([facility])    Hashimoto's thyroiditis     Hearing reduced     Hypertension     Managed with meds     Macular degeneration     Mixed hyperlipidemia     Daily meds     Osteoporosis 1/2013    GYN Dr. Gomes Mac    Restless leg     managed with medication    Unspecified hypothyroidism     Varicella        PSH:  Past Surgical History:   Procedure Laterality Date    ADENOIDECTOMY  as a child    APPENDECTOMY  1973    CATARACT REMOVAL Bilateral 2007    CATARACT REMOVAL Bilateral     COLONOSCOPY      Dr. Romy Wilhelm 2009-- no more per pt    GYN      ovarian cyst    HERNIA REPAIR  06/16/2018    OVARIAN CYST REMOVAL  1973    TONSILLECTOMY  as a child    TOTAL KNEE ARTHROPLASTY Left 02/07/2019       Allergies: Allergies   Allergen Reactions    Ace Inhibitors Angioedema and Swelling     Pt stated \"ace blockers\" can't remember name for htn  Facial swelling       Home Medications:  Prior to Admission medications    Medication Sig Start Date End Date Taking?  Authorizing Provider   NONFORMULARY Preservision/Lutein oral cap 1 cap bid Yes Historical Provider, MD   Multiple Vitamins-Minerals (THERAPEUTIC MULTIVITAMIN-MINERALS) tablet Take 1 tablet by mouth daily   Yes Historical Provider, MD   Omega-3 Fatty Acids (FISH OIL) 1000 MG CPDR Take 1,000 mg by mouth daily   Yes Historical Provider, MD   NONFORMULARY Fiber oral tablet 1 capsule daily   Yes Historical Provider, MD   Hyoscyamine Sulfate SL (LEVSIN/SL) 0.125 MG SUBL Place 0.25 mg under the tongue 4 times daily as needed (abdominal pain or cramping) 9/16/22   Cordelia Gale MD   pantoprazole (PROTONIX) 40 MG tablet TAKE 1 TABLET BY MOUTH DAILY BEFORE DINNER 9/13/22   Corliss Koyanagi., MD   pravastatin (PRAVACHOL) 40 MG tablet TAKE 1 TABLET BY MOUTH AT NIGHT 9/13/22   Corliss Koyanagi., MD   hydroCHLOROthiazide (HYDRODIURIL) 12.5 MG tablet Take 1 tablet by mouth daily TAKE 1 TABLET BY MOUTH DAILY 9/9/22   Corliss Koyanagi., MD   telmisartan (MICARDIS) 80 MG tablet Take 1 tablet by mouth in the morning. 7/21/22   Corliss Koyanagi., MD   levothyroxine (SYNTHROID) 75 MCG tablet Take 1 tablet by mouth in the morning.  TAKE 1 TABLET BY MOUTH DAILY BEFORE BREAKFAST. 7/21/22   Corliss Koyanagi., MD   folic acid (FOLVITE) 1 MG tablet Take 1 tablet by mouth in the morning. 7/21/22   Corliss Koyanagi., MD   Calcium Polycarbophil (FIBER) 625 MG TABS take 1 qd    Historical Provider, MD   coenzyme Q10 100 MG CAPS capsule Take by mouth every morning    Historical Provider, MD   acetaminophen (TYLENOL) 325 MG tablet Take 650 mg by mouth every 4 hours as needed    Ar Automatic Reconciliation   aspirin 81 MG EC tablet Take 81 mg by mouth every 12 hours 12/11/19   Ar Automatic Reconciliation   carbidopa-levodopa (SINEMET)  MG per tablet TAKE 1 TABLET BY MOUTH AT NIGHT 11/23/21   Ar Automatic Reconciliation   cetirizine (ZYRTEC) 10 MG tablet Take 10 mg by mouth daily    Ar Automatic Reconciliation   clobetasol (TEMOVATE) 0.05 % cream Apply topically 2 times daily as needed 8/25/16   Ar Automatic Reconciliation   clotrimazole-betamethasone (LOTRISONE) 1-0.05 % cream Apply topically 2 times daily  Patient not taking: Reported on 10/15/2022 3/28/18   Ar Automatic Reconciliation   diphenhydrAMINE-APAP, sleep, (TYLENOL PM EXTRA STRENGTH)  MG tablet Take 1-2 tablets by mouth    Ar Automatic Reconciliation   doxycycline monohydrate (ADOXA) 100 MG tablet Take 100 mg by mouth 2 times daily 10/14/21   Ar Automatic Reconciliation   Glucosamine 500 MG CAPS Take 500 mg by mouth daily    Ar Automatic Reconciliation   hydrocortisone 2.5 % cream Place rectally 4 times daily 2/15/19   Ar Automatic Reconciliation   ketoconazole (NIZORAL) 2 % cream Apply topically 2 times daily as needed    Ar Automatic Reconciliation   prednisoLONE acetate (PRED FORTE) 1 % ophthalmic suspension Apply 1 drop to eye Twice a Week    Ar Automatic Reconciliation   traMADol (ULTRAM) 50 MG tablet Take 50 mg by mouth every 6 hours as needed.     Ar Automatic Reconciliation   triamcinolone (KENALOG) 0.1 % cream Apply topically 2 times daily as needed 2/9/18   Ar Automatic Reconciliation       Hospital Medications:  Current Facility-Administered Medications   Medication Dose Route Frequency    [Held by provider] aspirin chewable tablet 81 mg  81 mg Oral Daily    sodium chloride flush 0.9 % injection 5-40 mL  5-40 mL IntraVENous 2 times per day    sodium chloride flush 0.9 % injection 5-40 mL  5-40 mL IntraVENous PRN    0.9 % sodium chloride infusion   IntraVENous PRN    ondansetron (ZOFRAN-ODT) disintegrating tablet 4 mg  4 mg Oral Q8H PRN    Or    ondansetron (ZOFRAN) injection 4 mg  4 mg IntraVENous Q6H PRN    polyethylene glycol (GLYCOLAX) packet 17 g  17 g Oral Daily PRN    bisacodyl (DULCOLAX) suppository 10 mg  10 mg Rectal Daily PRN    famotidine (PEPCID) tablet 10 mg  10 mg Oral Daily PRN    aluminum & magnesium hydroxide-simethicone (MAALOX) 200-200-20 MG/5ML suspension 30 mL  30 mL Oral Q6H PRN    acetaminophen (TYLENOL) tablet 650 mg  650 mg Oral Q6H PRN    Or    acetaminophen (TYLENOL) suppository 650 mg  650 mg Rectal Q6H PRN    0.9 % sodium chloride infusion   IntraVENous Continuous    enoxaparin (LOVENOX) injection 40 mg  40 mg SubCUTAneous Q24H    traMADol (ULTRAM) tablet 50 mg  50 mg Oral Q6H PRN    HYDROcodone-acetaminophen (NORCO) 5-325 MG per tablet 1 tablet  1 tablet Oral Q6H PRN       Social History:  Social History     Tobacco Use    Smoking status: Former     Packs/day: 0.50     Types: Cigarettes     Quit date: 1975     Years since quittin.7    Smokeless tobacco: Never    Tobacco comments:     Quit smoking: quit age of 30   Substance Use Topics    Alcohol use: Yes     Alcohol/week: 1.0 standard drink     Family History:  Family History   Problem Relation Age of Onset    Coronary Art Dis Mother     COPD Father     Other Father         smoker    Heart Disease Brother     Cancer Maternal Grandmother     Breast Cancer Mother     Cancer Mother         breast, lung    Heart Disease Mother     Heart Attack Mother        Review of Systems:  A detailed 10 system ROS is obtained, with pertinent positives as listed above. All others are negative. Diet:  NPO    Objective:     Physical Exam:  Vitals:  BP (!) 106/51   Pulse 70   Temp 98.2 °F (36.8 °C) (Oral)   Resp 18   Ht 5' 4\" (1.626 m)   Wt 160 lb (72.6 kg)   SpO2 100%   BMI 27.46 kg/m²   Gen:  Pt is alert, cooperative, no acute distress VERY HARD OF HEARING; NURSING AT THE BEDSIDE  Skin:  Extremities and face reveal no rashes. HEENT: Sclerae anicteric. Extra-occular muscles are intact. No oral ulcers. No abnormal pigmentation of the lips. The neck is supple. Cardiovascular: Regular rate and rhythm. No murmurs, gallops, or rubs. Respiratory:  Comfortable breathing with no accessory muscle use. Clear breath sounds anteriorly with no wheezes, rales, or rhonchi.   GI:  Abdomen nondistended, soft, and nontender ON EXAM  Normal active bowel sounds. No enlargement of the liver or spleen. No masses palpable. Musculoskeletal:  No pitting edema of the lower legs. Neurological:  Gross memory appears intact. Patient is alert and oriented. Psychiatric:  Mood appears appropriate with judgement intact. Lymphatic:  No cervical or supraclavicular adenopathy. Laboratory:    Recent Labs     10/15/22  1814   WBC 13.0*   HGB 13.8   HCT 43.4      MCV 97.5      K 3.6      CO2 26   BUN 24*   CREATININE 0.90   CALCIUM 9.2   GLUCOSE 117*   ALKPHOS 113   *   *   BILITOT 1.7*   ALBUMIN 1.1   PROT 7.3   LIPASE >1,500*         Assessment:     Principal Problem:    Acute gallstone pancreatitis  Active Problems:    Anemia due to folate deficiency    Acquired hypothyroidism    Hypercholesterolemia    Gastroesophageal reflux disease with esophagitis  Resolved Problems:    * No resolved hospital problems. *  [de-identified] y.o. female with PMH including but not limited to,  HTN, HLD, hypothyroid, anemia due to folate deficiency, Parkinson'swho is seen in consultation at the request of Dr. Jesusita Greene for biliary pancreatitis. Ms. Ilene Garcia has had intermittent epigastric abdominal pain radiating into her chest. She had associated N&V with current resolution of symptoms. On evaluation in the ED, she was found to have leukocytosis with WBC of 13, t bili 1.7, Alt 134, , , lipase >1500. CT findings revealed intra and extrahepatic biliary ductal dilatation with distended gallbladder and suggestion of hyperdense 0.8 cm focus at the distal common bile duct likely of additional gallstones, obstructing mass lesion not excluded. She may have passed a stone. Plan:     -Repeat labs in the morning and the rounding team will decide if need for ERCP at that time.  -Clear liquid diet nothing red  -NPO after midnight  -Hold Lovenox for possible procedure tomorrow 10/17  -Increase fluids to 150cc/hr  -LFTS and lipase in the morning    Ana Bateman.  Jessi Zuniga, Counts include 234 beds at the [facility] [facility] [place] [facility]     Patient is seen and examined in collaboration with Dr. Tiesha James. Assessment and plan as per Dr. Tiesha James. ATTENDING NOTE:  I have seen and examined the patient along with my NP/PA. The assessment and plan above is my own. Symptoms of acute pancreatitis or possibly GS passage have abated and she feels well   Plan to repeat labs    IF IMPROVING WE MIGHT JUST EVALUATE WITH MRCP    IF WORSENING SHE WILL LIKELY NEED ERCP   I willl make her NPO so that all our options are available tomorrow.   MD Eamon Ya MD

## 2022-10-16 NOTE — PROGRESS NOTES
END OF SHIFT NOTE:    INTAKE/OUTPUT  10/15 0701 - 10/16 0700  In: 726 [I.V.:772]  Out: 400 [Urine:400]  Voiding: yes  Catheter: no  Drain:              Flatus: Patient does have flatus present. Stool:  0 occurrences. Characteristics:       Emesis: 0 occurrences. Characteristics:        VITAL SIGNS  Patient Vitals for the past 12 hrs:   Temp Pulse Resp BP SpO2   10/16/22 0343 -- -- 20 -- --   10/16/22 0340 -- -- -- (!) 96/50 --   10/16/22 0330 -- 72 -- (!) 102/50 --   10/16/22 0315 97.7 °F (36.5 °C) 70 18 (!) 96/57 95 %   10/15/22 2210 97.5 °F (36.4 °C) 83 20 (!) 102/56 100 %   10/15/22 2110 97.4 °F (36.3 °C) -- 16 -- --   10/15/22 2107 -- -- -- -- 96 %   10/15/22 2106 -- 85 -- 98/60 --   10/15/22 1832 -- -- -- -- 94 %   10/15/22 1826 -- -- -- -- 94 %   10/15/22 1824 -- 73 13 (!) 102/47 --   10/15/22 1802 97.4 °F (36.3 °C) 87 18 (!) 116/94 98 %       Pain Assessment                Ambulating  Yes, few steps in room. Shift report given to oncoming nurse at the bedside.     Daya Crooks RN

## 2022-10-16 NOTE — ED NOTES
TRANSFER - OUT REPORT:    Verbal report given to Kerri Rodríguez  being transferred to room 212 for routine progression of patient care       Report consisted of patient's Situation, Background, Assessment and   Recommendations(SBAR). Information from the following report(s) ED SBAR was reviewed with the receiving nurse. Lines:   Peripheral IV 10/15/22 Left Antecubital (Active)   Site Assessment Clean, dry & intact 10/15/22 2122   Line Status Infusing 10/15/22 2122        Opportunity for questions and clarification was provided.       Patient transported with:  Triindad Steven, RN  10/15/22 2134

## 2022-10-16 NOTE — PROGRESS NOTES
Hospitalist Progress Note   Admit Date:  10/15/2022  6:14 PM   Name:  Norah Mckay   Age:  [de-identified] y.o. Sex:  female  :  1940   MRN:  [id number]   Room:      Presenting Complaint: Chest Pain     Reason(s) for Admission: Generalized abdominal pain [R10.84]  Gallstone pancreatitis [K85.10]  Elevated liver enzymes [R74.8]  Acute gallstone pancreatitis [K85.10]  Chest pain, unspecified type [R07.9]     Hospital Course:   Norah Mckay is a [de-identified] y.o. female with medical history of hypertension, hyperlipidemia, hypothyroidism, anemia due to folate deficiency, Parkinson's disease admitted with acute Pancreatitis. GI consulted. Subjective & 24hr Events (10/16/22): Patient is seen at the bedside. Reports feeling better today. Denies chest pain, palpitation, nausea, vomiting. Abdominal pain improved. Continue liquid diet for now, plan for possible MRCP or ERCP tomorrow. Leukocytosis worsening      Assessment & Plan:     Acute gallstone pancreatitis  Image noted. GI consulted  NPO after midnight for possible MRCP or ERCP  Pain control and IVF     Leukocytosis  Worsening, 20.9 today, could be reactive, afebrile  Check urine and blood cultures  Check procalcitonin  Will continue to monitor off antibiotic for now     Right adnexal cyst  Noted in CT  Pelvic ultrasound ordered     HTN:   Continue with hctz    Hypothyroidism:   Continue with synthroid    Parkinson's disease :   Continue with sinemet. Anticipated discharge needs: TBD    Diet:  Diet NPO Exceptions are: Sips of Water with Meds  ADULT DIET; Clear Liquid;  No red dye  Diet NPO Exceptions are: Sips of Water with Meds  DVT PPx: Lovenox on hold due to possible procedure tomorrow  Code status: Full Code    Hospital Problems:  Principal Problem:    Acute gallstone pancreatitis  Active Problems:    Anemia due to folate deficiency    Acquired hypothyroidism    Hypercholesterolemia    Gastroesophageal reflux disease with esophagitis  Resolved Problems:    * No resolved hospital problems. *      Objective:   Patient Vitals for the past 24 hrs:   Temp Pulse Resp BP SpO2   10/16/22 1126 99.2 °F (37.3 °C) 69 18 (!) 104/48 98 %   10/16/22 0730 98.2 °F (36.8 °C) 70 18 (!) 106/51 100 %   10/16/22 0343 -- -- 20 -- --   10/16/22 0340 -- -- -- (!) 96/50 --   10/16/22 0330 -- 72 -- (!) 102/50 --   10/16/22 0315 97.7 °F (36.5 °C) 70 18 (!) 96/57 95 %   10/15/22 2210 97.5 °F (36.4 °C) 83 20 (!) 102/56 100 %   10/15/22 2110 97.4 °F (36.3 °C) -- 16 -- --   10/15/22 2107 -- -- -- -- 96 %   10/15/22 2106 -- 85 -- 98/60 --   10/15/22 1832 -- -- -- -- 94 %   10/15/22 1826 -- -- -- -- 94 %   10/15/22 1824 -- 73 13 (!) 102/47 --   10/15/22 1802 97.4 °F (36.3 °C) 87 18 (!) 116/94 98 %       Oxygen Therapy  SpO2: 98 %  Pulse Oximeter Device Mode: Continuous  O2 Device: None (Room air)  O2 Flow Rate (L/min): 2 L/min    Estimated body mass index is 27.46 kg/m² as calculated from the following:    Height as of this encounter: 5' 4\" (1.626 m). Weight as of this encounter: 160 lb (72.6 kg). Intake/Output Summary (Last 24 hours) at 10/16/2022 1432  Last data filed at 10/16/2022 1338  Gross per 24 hour   Intake 1122 ml   Output 1025 ml   Net 97 ml         Physical Exam:     Blood pressure (!) 104/48, pulse 69, temperature 99.2 °F (37.3 °C), temperature source Oral, resp. rate 18, height 5' 4\" (1.626 m), weight 160 lb (72.6 kg), SpO2 98 %. General:    Well nourished. Head:  Normocephalic, atraumatic  Eyes:  Sclerae appear normal.  Pupils equally round. ENT:  Nares appear normal, no drainage. Moist oral mucosa  Neck:  No restricted ROM. Trachea midline   CV:   RRR. No m/r/g. No jugular venous distension. Lungs:   CTAB. No wheezing, rhonchi, or rales. Symmetric expansion. Abdomen: Bowel sounds present. Soft, nontender, nondistended. Extremities: No cyanosis or clubbing. No edema  Skin:     No rashes and normal coloration. Warm and dry.     Neuro:  CN II-XII grossly intact. Sensation intact. A&Ox3  Psych:  Normal mood and affect.       I have personally reviewed labs and tests showing:  Recent Labs:  Recent Results (from the past 48 hour(s))   EKG 12 Lead    Collection Time: 10/15/22  6:01 PM   Result Value Ref Range    Ventricular Rate 83 BPM    Atrial Rate 83 BPM    P-R Interval 174 ms    QRS Duration 76 ms    Q-T Interval 368 ms    QTc Calculation (Bazett) 432 ms    P Axis 33 degrees    R Axis 60 degrees    T Axis 36 degrees    Diagnosis Normal sinus rhythm    CBC    Collection Time: 10/15/22  6:14 PM   Result Value Ref Range    WBC 13.0 (H) 4.3 - 11.1 K/uL    RBC 4.45 4.05 - 5.2 M/uL    Hemoglobin 13.8 11.7 - 15.4 g/dL    Hematocrit 43.4 35.8 - 46.3 %    MCV 97.5 82 - 102 FL    MCH 31.0 26.1 - 32.9 PG    MCHC 31.8 31.4 - 35.0 g/dL    RDW 13.4 11.9 - 14.6 %    Platelets 723 530 - 653 K/uL    MPV 9.8 9.4 - 12.3 FL    nRBC 0.00 0.0 - 0.2 K/uL   Comprehensive Metabolic Panel    Collection Time: 10/15/22  6:14 PM   Result Value Ref Range    Sodium 136 133 - 143 mmol/L    Potassium 3.6 3.5 - 5.1 mmol/L    Chloride 104 101 - 110 mmol/L    CO2 26 21 - 32 mmol/L    Anion Gap 6 2 - 11 mmol/L    Glucose 117 (H) 65 - 100 mg/dL    BUN 24 (H) 8 - 23 MG/DL    Creatinine 0.90 0.6 - 1.0 MG/DL    Est, Glom Filt Rate >60 >60 ml/min/1.73m2    Calcium 9.2 8.3 - 10.4 MG/DL    Total Bilirubin 1.7 (H) 0.2 - 1.1 MG/DL     (H) 12 - 65 U/L     (H) 15 - 37 U/L    Alk Phosphatase 113 50 - 136 U/L    Total Protein 7.3 6.3 - 8.2 g/dL    Albumin 3.8 3.2 - 4.6 g/dL    Globulin 3.5 2.8 - 4.5 g/dL    Albumin/Globulin Ratio 1.1 0.4 - 1.6     Troponin    Collection Time: 10/15/22  6:14 PM   Result Value Ref Range    Troponin, High Sensitivity 5.6 0 - 14 pg/mL   Lipase    Collection Time: 10/15/22  6:14 PM   Result Value Ref Range    Lipase >1,500 (H) 73 - 393 U/L   Troponin    Collection Time: 10/15/22 10:49 PM   Result Value Ref Range    Troponin, High Sensitivity 8.4 0 - 14 pg/mL Basic Metabolic Panel w/ Reflex to MG    Collection Time: 10/16/22  7:12 AM   Result Value Ref Range    Sodium 138 133 - 143 mmol/L    Potassium 3.9 3.5 - 5.1 mmol/L    Chloride 109 101 - 110 mmol/L    CO2 24 21 - 32 mmol/L    Anion Gap 5 2 - 11 mmol/L    Glucose 101 (H) 65 - 100 mg/dL    BUN 22 8 - 23 MG/DL    Creatinine 0.90 0.6 - 1.0 MG/DL    Est, Glom Filt Rate >60 >60 ml/min/1.73m2    Calcium 8.0 (L) 8.3 - 10.4 MG/DL   CBC with Auto Differential    Collection Time: 10/16/22  7:12 AM   Result Value Ref Range    WBC 20.9 (H) 4.3 - 11.1 K/uL    RBC 3.71 (L) 4.05 - 5.2 M/uL    Hemoglobin 11.7 11.7 - 15.4 g/dL    Hematocrit 36.3 35.8 - 46.3 %    MCV 97.8 82 - 102 FL    MCH 31.5 26.1 - 32.9 PG    MCHC 32.2 31.4 - 35.0 g/dL    RDW 13.8 11.9 - 14.6 %    Platelets 330 834 - 549 K/uL    MPV 10.3 9.4 - 12.3 FL    nRBC 0.00 0.0 - 0.2 K/uL    Differential Type AUTOMATED      Seg Neutrophils 93 (H) 43 - 78 %    Lymphocytes 2 (L) 13 - 44 %    Monocytes 4 4.0 - 12.0 %    Eosinophils % 0 (L) 0.5 - 7.8 %    Basophils 0 0.0 - 2.0 %    Immature Granulocytes 1 0.0 - 5.0 %    Segs Absolute 19.4 (H) 1.7 - 8.2 K/UL    Absolute Lymph # 0.4 (L) 0.5 - 4.6 K/UL    Absolute Mono # 0.9 0.1 - 1.3 K/UL    Absolute Eos # 0.0 0.0 - 0.8 K/UL    Basophils Absolute 0.0 0.0 - 0.2 K/UL    Absolute Immature Granulocyte 0.2 0.0 - 0.5 K/UL       I have personally reviewed imaging studies showing: Other Studies:  CT CHEST ABDOMEN PELVIS W CONTRAST Additional Contrast? None   Final Result   1. Intra and extrahepatic biliary ductal dilatation with distended gallbladder   and suggestion of hyperdense 0.8 cm focus at the distal common bile duct likely   cause of obstruction. Although favor noncalcified gallstone given the presence   of additional gallstones, obstructing mass lesion not excluded. No pancreatic   ductal dilatation. 2.  No evidence of pulmonary embolism or acute abnormality in the chest.   3.  Moderate coronary artery calcification.    4. Right adnexal 3.6 cm cyst. Recommend nonurgent pelvic ultrasound for further   evaluation. XR CHEST PORTABLE   Final Result   No consolidation. US PELVIS COMPLETE    (Results Pending)       Current Meds:  Current Facility-Administered Medications   Medication Dose Route Frequency    0.9 % sodium chloride infusion   IntraVENous Continuous    [Held by provider] aspirin chewable tablet 81 mg  81 mg Oral Daily    sodium chloride flush 0.9 % injection 5-40 mL  5-40 mL IntraVENous 2 times per day    sodium chloride flush 0.9 % injection 5-40 mL  5-40 mL IntraVENous PRN    0.9 % sodium chloride infusion   IntraVENous PRN    ondansetron (ZOFRAN-ODT) disintegrating tablet 4 mg  4 mg Oral Q8H PRN    Or    ondansetron (ZOFRAN) injection 4 mg  4 mg IntraVENous Q6H PRN    polyethylene glycol (GLYCOLAX) packet 17 g  17 g Oral Daily PRN    bisacodyl (DULCOLAX) suppository 10 mg  10 mg Rectal Daily PRN    famotidine (PEPCID) tablet 10 mg  10 mg Oral Daily PRN    aluminum & magnesium hydroxide-simethicone (MAALOX) 200-200-20 MG/5ML suspension 30 mL  30 mL Oral Q6H PRN    acetaminophen (TYLENOL) tablet 650 mg  650 mg Oral Q6H PRN    Or    acetaminophen (TYLENOL) suppository 650 mg  650 mg Rectal Q6H PRN    [Held by provider] enoxaparin (LOVENOX) injection 40 mg  40 mg SubCUTAneous Q24H    traMADol (ULTRAM) tablet 50 mg  50 mg Oral Q6H PRN    HYDROcodone-acetaminophen (NORCO) 5-325 MG per tablet 1 tablet  1 tablet Oral Q6H PRN       Signed:  Chance Watts MD    Part of this note may have been written by using a voice dictation software. The note has been proof read but may still contain some grammatical/other typographical errors.

## 2022-10-16 NOTE — PROGRESS NOTES
TRANSFER - IN REPORT:    Verbal report received from [facility]  being received from erfor routine progression of patient care      Report consisted of patient's Situation, Background, Assessment and   Recommendations(SBAR). Information from the following report(s) Nurse Handoff Report was reviewed with the receiving nurse. Opportunity for questions and clarification was provided. Assessment completed upon patient's arrival to unit and care assumed. Arrived awake, alert,oriented x4. No c/os pain, just feeling cold. Warmed up eventually with blankets. Aware to be NPO after MN.

## 2022-10-16 NOTE — CONSULTS
[facility] Consult Note       Primary GI Physician: Dr. Viktor Hawk formerly-Dr. Asiya Ambrocio    Referring Provider:  Dr. Marium Jha Date:  10/16/2022    Admit Date:  10/15/2022    Chief Complaint:  biliary pancreatitis    Subjective:     History of Present Illness:  Patient is a [de-identified] y.o. female with PMH including but not limited to,  HTN, HLD, hypothyroid, anemia due to folate deficiency, Parkinson'swho is seen in consultation at the request of Dr. Tete Smith for biliary pancreatitis. Ms. Navdeep Downs had an episode of epigastric abdominal pain radiating into her chest. She had associated N&V. This happened 3 weeks ago as well and resolved. She has no symptoms this morning. She denies any diarrhea and had a normal BM yesterday. She drinks an occasional glass of wine and make \"beef tips with red wine\" the night before. She denies any tobacco or recent new meds. Patient presented to the ED with substernal chest pain with associated N&V. On evaluation in the ED, she was found to have leukocytosis with WBC of 13, t bili 1.7, Alt 134, , , lipase >1500. CT findings as below. EXAMINATION: CT CHEST ABDOMEN PELVIS W CONTRAST 10/15/2022 7:14 PM       ACCESSION NUMBER: [id number]       COMPARISON: CT abdomen/pelvis January 4, 2013       INDICATION: Chest pain, unspecified; Generalized abdominal pain       TECHNIQUE: Multiple contiguous axial CT images of the chest, abdomen, and pelvis   were obtained from the lung apices to the symphysis pubis after the intravenous   administration of 100mL Iso-gia 370. Reformats are provided. Radiation dose reduction techniques were used for this study. Our CT scanners   use one or all of the following: Automated exposure control, adjustment of the   mA and/or kV according to patient size, iterative reconstruction. FINDINGS:   AIRWAYS: The central airways are patent. LUNGS: Dependent changes and scarring within the lung bases.  Pleural parenchymal   scarring within the lung apices. No suspicious pulmonary nodules. PLEURA: No pleural effusion or pneumothorax. HEART: The heart is not enlarged. Moderate calcified coronary atherosclerosis. No pericardial effusion. THORACIC AORTA: The aorta is normal in caliber. PULMONARY ARTERY: The main pulmonary artery is normal in caliber. No evidence of   pulmonary embolism. MEDIASTINUM/KEREN: No mediastinal mass or lymphadenopathy. Moderate sized hiatal   hernia. CHEST WALL: No mass or axillary lymphadenopathy. LIVER: The liver contour is normal. No suspicious liver lesion. BILIARY TREE: Gallbladder is distended. There are calcified gallstones   dependently within the gallbladder. Prominent intra and extrahepatic biliary   ductal dilatation with common bile duct measuring up to 1.6 cm diameter. There   is suggestion of rounded hypodense 0.8 cm focus within the distal common bile   duct       SPLEEN: Normal.       PANCREAS: No pancreatic mass or ductal dilation. ADRENALS: Normal.       KIDNEYS/BLADDER: The kidneys are symmetric in size. No renal calculus or   hydronephrosis. Several renal cysts and too small to characterize hypodensities   are present within the kidneys. The urinary bladder is unremarkable. BOWEL: The colon is unremarkable. The small bowel is normal in caliber. No bowel   wall thickening. Small duodenal diverticulum is present. Sigmoid diverticulosis   without diverticulitis. APPENDIX: Appendix not definitely seen however no inflammatory changes in the   right lower quadrant to suggest appendicitis. PERITONEUM/RETROPERITONEUM: No ascites or free air. No pelvic or retroperitoneal   lymphadenopathy. VESSELS: No abdominal aortic aneurysm. ABDOMINAL WALL: No hernia or mass. REPRODUCTIVE: 3.6 cm cyst within the right adnexa. BONES: No suspicious osseous lesion. Impression   1. Intra and extrahepatic biliary ductal dilatation with distended gallbladder   and suggestion of hyperdense 0.8 cm focus at the distal common bile duct likely   cause of obstruction. Although favor noncalcified gallstone given the presence   of additional gallstones, obstructing mass lesion not excluded. No pancreatic   ductal dilatation. 2.  No evidence of pulmonary embolism or acute abnormality in the chest.   3.  Moderate coronary artery calcification. 4.  Right adnexal 3.6 cm cyst. Recommend nonurgent pelvic ultrasound for further   evaluation. Colonoscopoy 1/20/2011 by Dr. Anupama Cheney revealed diverticulosis. PMH:  Past Medical History:   Diagnosis Date    Allergic rhinitis due to pollen     Arthritis     Asthma as a child    Constipation 02/2019    post-op after TKA    Ear problems     Fibrocystic breast     GERD (gastroesophageal reflux disease)     Managed with meds     H/O colonoscopy 2009    Normal ([facility])    Hashimoto's thyroiditis     Hearing reduced     Hypertension     Managed with meds     Macular degeneration     Mixed hyperlipidemia     Daily meds     Osteoporosis 1/2013    GYN Dr. Nora Aguila    Restless leg     managed with medication    Unspecified hypothyroidism     Varicella        PSH:  Past Surgical History:   Procedure Laterality Date    ADENOIDECTOMY  as a child    APPENDECTOMY  1973    CATARACT REMOVAL Bilateral 2007    CATARACT REMOVAL Bilateral     COLONOSCOPY      Dr. Morenita Eugene 2009-- no more per pt    GYN      ovarian cyst    HERNIA REPAIR  06/16/2018    OVARIAN CYST REMOVAL  1973    TONSILLECTOMY  as a child    TOTAL KNEE ARTHROPLASTY Left 02/07/2019       Allergies: Allergies   Allergen Reactions    Ace Inhibitors Angioedema and Swelling     Pt stated \"ace blockers\" can't remember name for htn  Facial swelling       Home Medications:  Prior to Admission medications    Medication Sig Start Date End Date Taking?  Authorizing Provider   NONFORMULARY Preservision/Lutein oral cap 1 cap bid Yes Historical Provider, MD   Multiple Vitamins-Minerals (THERAPEUTIC MULTIVITAMIN-MINERALS) tablet Take 1 tablet by mouth daily   Yes Historical Provider, MD   Omega-3 Fatty Acids (FISH OIL) 1000 MG CPDR Take 1,000 mg by mouth daily   Yes Historical Provider, MD   NONFORMULARY Fiber oral tablet 1 capsule daily   Yes Historical Provider, MD   Hyoscyamine Sulfate SL (LEVSIN/SL) 0.125 MG SUBL Place 0.25 mg under the tongue 4 times daily as needed (abdominal pain or cramping) 9/16/22   Heraclio Javier MD   pantoprazole (PROTONIX) 40 MG tablet TAKE 1 TABLET BY MOUTH DAILY BEFORE DINNER 9/13/22   Lani Acevedo MD   pravastatin (PRAVACHOL) 40 MG tablet TAKE 1 TABLET BY MOUTH AT NIGHT 9/13/22   Lani Acevedo MD   hydroCHLOROthiazide (HYDRODIURIL) 12.5 MG tablet Take 1 tablet by mouth daily TAKE 1 TABLET BY MOUTH DAILY 9/9/22   Lani Acevedo MD   telmisartan (MICARDIS) 80 MG tablet Take 1 tablet by mouth in the morning. 7/21/22   Lani Acevedo MD   levothyroxine (SYNTHROID) 75 MCG tablet Take 1 tablet by mouth in the morning.  TAKE 1 TABLET BY MOUTH DAILY BEFORE BREAKFAST. 7/21/22   Lani Acevedo MD   folic acid (FOLVITE) 1 MG tablet Take 1 tablet by mouth in the morning. 7/21/22   Lani Acevedo MD   Calcium Polycarbophil (FIBER) 625 MG TABS take 1 qd    Historical Provider, MD   coenzyme Q10 100 MG CAPS capsule Take by mouth every morning    Historical Provider, MD   acetaminophen (TYLENOL) 325 MG tablet Take 650 mg by mouth every 4 hours as needed    Ar Automatic Reconciliation   aspirin 81 MG EC tablet Take 81 mg by mouth every 12 hours 12/11/19   Ar Automatic Reconciliation   carbidopa-levodopa (SINEMET)  MG per tablet TAKE 1 TABLET BY MOUTH AT NIGHT 11/23/21   Ar Automatic Reconciliation   cetirizine (ZYRTEC) 10 MG tablet Take 10 mg by mouth daily    Ar Automatic Reconciliation   clobetasol (TEMOVATE) 0.05 % cream Apply topically 2 times daily as needed 8/25/16   Ar Automatic Reconciliation   clotrimazole-betamethasone (LOTRISONE) 1-0.05 % cream Apply topically 2 times daily  Patient not taking: Reported on 10/15/2022 3/28/18   Ar Automatic Reconciliation   diphenhydrAMINE-APAP, sleep, (TYLENOL PM EXTRA STRENGTH)  MG tablet Take 1-2 tablets by mouth    Ar Automatic Reconciliation   doxycycline monohydrate (ADOXA) 100 MG tablet Take 100 mg by mouth 2 times daily 10/14/21   Ar Automatic Reconciliation   Glucosamine 500 MG CAPS Take 500 mg by mouth daily    Ar Automatic Reconciliation   hydrocortisone 2.5 % cream Place rectally 4 times daily 2/15/19   Ar Automatic Reconciliation   ketoconazole (NIZORAL) 2 % cream Apply topically 2 times daily as needed    Ar Automatic Reconciliation   prednisoLONE acetate (PRED FORTE) 1 % ophthalmic suspension Apply 1 drop to eye Twice a Week    Ar Automatic Reconciliation   traMADol (ULTRAM) 50 MG tablet Take 50 mg by mouth every 6 hours as needed.     Ar Automatic Reconciliation   triamcinolone (KENALOG) 0.1 % cream Apply topically 2 times daily as needed 2/9/18   Ar Automatic Reconciliation       Hospital Medications:  Current Facility-Administered Medications   Medication Dose Route Frequency    [Held by provider] aspirin chewable tablet 81 mg  81 mg Oral Daily    sodium chloride flush 0.9 % injection 5-40 mL  5-40 mL IntraVENous 2 times per day    sodium chloride flush 0.9 % injection 5-40 mL  5-40 mL IntraVENous PRN    0.9 % sodium chloride infusion   IntraVENous PRN    ondansetron (ZOFRAN-ODT) disintegrating tablet 4 mg  4 mg Oral Q8H PRN    Or    ondansetron (ZOFRAN) injection 4 mg  4 mg IntraVENous Q6H PRN    polyethylene glycol (GLYCOLAX) packet 17 g  17 g Oral Daily PRN    bisacodyl (DULCOLAX) suppository 10 mg  10 mg Rectal Daily PRN    famotidine (PEPCID) tablet 10 mg  10 mg Oral Daily PRN    aluminum & magnesium hydroxide-simethicone (MAALOX) 200-200-20 MG/5ML suspension 30 mL  30 mL Oral Q6H PRN    acetaminophen (TYLENOL) tablet 650 mg  650 mg Oral Q6H PRN    Or    acetaminophen (TYLENOL) suppository 650 mg  650 mg Rectal Q6H PRN    0.9 % sodium chloride infusion   IntraVENous Continuous    enoxaparin (LOVENOX) injection 40 mg  40 mg SubCUTAneous Q24H    traMADol (ULTRAM) tablet 50 mg  50 mg Oral Q6H PRN    HYDROcodone-acetaminophen (NORCO) 5-325 MG per tablet 1 tablet  1 tablet Oral Q6H PRN       Social History:  Social History     Tobacco Use    Smoking status: Former     Packs/day: 0.50     Types: Cigarettes     Quit date: 1975     Years since quittin.7    Smokeless tobacco: Never    Tobacco comments:     Quit smoking: quit age of 30   Substance Use Topics    Alcohol use: Yes     Alcohol/week: 1.0 standard drink     Family History:  Family History   Problem Relation Age of Onset    Coronary Art Dis Mother     COPD Father     Other Father         smoker    Heart Disease Brother     Cancer Maternal Grandmother     Breast Cancer Mother     Cancer Mother         breast, lung    Heart Disease Mother     Heart Attack Mother        Review of Systems:  A detailed 10 system ROS is obtained, with pertinent positives as listed above. All others are negative. Diet:  NPO    Objective:     Physical Exam:  Vitals:  BP (!) 106/51   Pulse 70   Temp 98.2 °F (36.8 °C) (Oral)   Resp 18   Ht 5' 4\" (1.626 m)   Wt 160 lb (72.6 kg)   SpO2 100%   BMI 27.46 kg/m²   Gen:  Pt is alert, cooperative, no acute distress VERY HARD OF HEARING; NURSING AT THE BEDSIDE  Skin:  Extremities and face reveal no rashes. HEENT: Sclerae anicteric. Extra-occular muscles are intact. No oral ulcers. No abnormal pigmentation of the lips. The neck is supple. Cardiovascular: Regular rate and rhythm. No murmurs, gallops, or rubs. Respiratory:  Comfortable breathing with no accessory muscle use. Clear breath sounds anteriorly with no wheezes, rales, or rhonchi.   GI:  Abdomen nondistended, soft, and nontender ON EXAM  Normal active bowel sounds. No enlargement of the liver or spleen. No masses palpable. Musculoskeletal:  No pitting edema of the lower legs. Neurological:  Gross memory appears intact. Patient is alert and oriented. Psychiatric:  Mood appears appropriate with judgement intact. Lymphatic:  No cervical or supraclavicular adenopathy. Laboratory:    Recent Labs     10/15/22  1814   WBC 13.0*   HGB 13.8   HCT 43.4      MCV 97.5      K 3.6      CO2 26   BUN 24*   CREATININE 0.90   CALCIUM 9.2   GLUCOSE 117*   ALKPHOS 113   *   *   BILITOT 1.7*   ALBUMIN 1.1   PROT 7.3   LIPASE >1,500*         Assessment:     Principal Problem:    Acute gallstone pancreatitis  Active Problems:    Anemia due to folate deficiency    Acquired hypothyroidism    Hypercholesterolemia    Gastroesophageal reflux disease with esophagitis  Resolved Problems:    * No resolved hospital problems. *  [de-identified] y.o. female with PMH including but not limited to,  HTN, HLD, hypothyroid, anemia due to folate deficiency, Parkinson'swho is seen in consultation at the request of Dr. Karri Jordan for biliary pancreatitis. Ms. Corwin Hernandez has had intermittent epigastric abdominal pain radiating into her chest. She had associated N&V with current resolution of symptoms. On evaluation in the ED, she was found to have leukocytosis with WBC of 13, t bili 1.7, Alt 134, , , lipase >1500. CT findings revealed intra and extrahepatic biliary ductal dilatation with distended gallbladder and suggestion of hyperdense 0.8 cm focus at the distal common bile duct likely of additional gallstones, obstructing mass lesion not excluded. She may have passed a stone. Plan:     -Repeat labs in the morning and the rounding team will decide if need for ERCP at that time.  -Clear liquid diet nothing red  -NPO after midnight  -Hold Lovenox for possible procedure tomorrow 10/17  -Increase fluids to 150cc/hr  -LFTS and lipase in the morning    Brenton Barnett 36

## 2022-10-16 NOTE — H&P
Hospitalist History and Physical   Admit Date:  10/15/2022  6:14 PM   Name:  Norah Mckay   Age:  [de-identified] y.o. Sex:  female  :  1940   MRN:  [id number]   Room:      Presenting Complaint: Chest Pain     Reason(s) for Admission: Generalized abdominal pain [R10.84]  Gallstone pancreatitis [K85.10]  Elevated liver enzymes [R74.8]  Acute gallstone pancreatitis [K85.10]  Chest pain, unspecified type [R07.9]     History of Present Illness:   Norah Mckay is a [de-identified] y.o. female with medical history of hypertension, hyperlipidemia, hypothyroidism, anemia due to folate deficiency, Parkinson's disease who presented to emergency room with acute onset of substernal chest.  Reports that she is in her normal state of health until this afternoon when she started having substernal chest pain radiating down to her abdomen and her back. Reports pain is severe, 10 out of 10, constant, pressure-like as well as sharp. Also reports associated nausea and vomiting. She had vomited 3 times. Work-up in the ED is remarkable for lipase of more than 1500, WBC of 13.0. CXR without any acute cardiopulmonary disease. CT of abdomen pelvis shows intra and extrahepatic biliary ductal dilatation with distended gallbladder with hyperdense 0.8 cm focus at the distal common bile duct. Also noted to have right adnexal 3.6 cm cyst.     Review of Systems:  10 systems reviewed and negative except as noted in HPI. Assessment & Plan:     Acute gallstone pancreatitis  Imagine noted. - GI consulted. Will need ERCP  - NPO, Pain control and IVF    Leukocytosis  - likely reactive. No fever  - monitor for now off abx for now. Right adnexal cyst  Noted in CT  - check with pelvic US    HTN: continue with hctz  Hypothyroidism: continue with synthroid  Parkinson's disease : continue with sinemet.     Diet: Diet NPO  VTE ppx: lovenox  Code status: Full Code    Hospital Problems:  Principal Problem:    Acute gallstone pancreatitis  Active Problems: Anemia due to folate deficiency    Acquired hypothyroidism    Hypercholesterolemia    Gastroesophageal reflux disease with esophagitis  Resolved Problems:    * No resolved hospital problems. *       Past History:     Past Medical History:   Diagnosis Date    Allergic rhinitis due to pollen     Arthritis     Asthma as a child    Constipation 2019    post-op after TKA    Ear problems     Fibrocystic breast     GERD (gastroesophageal reflux disease)     Managed with meds     H/O colonoscopy     Normal ([facility])    Hashimoto's thyroiditis     Hearing reduced     Hypertension     Managed with meds     Macular degeneration     Mixed hyperlipidemia     Daily meds     Osteoporosis 2013    GYN Dr. Óscar Warren    Restless leg     managed with medication    Unspecified hypothyroidism     Varicella        Past Surgical History:   Procedure Laterality Date    ADENOIDECTOMY  as a child    APPENDECTOMY  1973    CATARACT REMOVAL Bilateral 2007    CATARACT REMOVAL Bilateral     COLONOSCOPY      Dr. Dolly Hanna -- no more per pt    GYN      ovarian cyst    HERNIA REPAIR  2018    OVARIAN CYST [address]  as a child    TOTAL KNEE ARTHROPLASTY Left 2019        Social History     Tobacco Use    Smoking status: Former     Packs/day: 0.50     Types: Cigarettes     Quit date: 1975     Years since quittin.7    Smokeless tobacco: Never    Tobacco comments:     Quit smoking: quit age of 30   Substance Use Topics    Alcohol use:  Yes     Alcohol/week: 1.0 standard drink      Social History     Substance and Sexual Activity   Drug Use No       Family History   Problem Relation Age of Onset    Coronary Art Dis Mother     COPD Father     Other Father         smoker    Heart Disease Brother     Cancer Maternal Grandmother     Breast Cancer Mother     Cancer Mother         breast, lung    Heart Disease Mother     Heart Attack Mother         Immunization History   Administered Date(s) Administered (PROTONIX) 40 MG tablet TAKE 1 TABLET BY MOUTH DAILY BEFORE DINNER    pravastatin (PRAVACHOL) 40 MG tablet TAKE 1 TABLET BY MOUTH AT NIGHT    prednisoLONE acetate (PRED FORTE) 1 % ophthalmic suspension 1 drop, Ophthalmic, TWICE WEEKLY    telmisartan (MICARDIS) 80 mg, Oral, DAILY    traMADol (ULTRAM) 50 mg, Oral, EVERY 6 HOURS PRN    triamcinolone (KENALOG) 0.1 % cream Topical, 2 TIMES DAILY PRN    zoster recombinant adjuvanted vaccine [facility]) 50 MCG/0.5ML SUSR injection 0.5mL by IntraMUSCular route once now and then repeat in 2-6 months         Objective:   Patient Vitals for the past 24 hrs:   Temp Pulse Resp BP SpO2   10/15/22 2210 97.5 °F (36.4 °C) 83 20 (!) 102/56 100 %   10/15/22 2110 97.4 °F (36.3 °C) -- 16 -- --   10/15/22 2107 -- -- -- -- 96 %   10/15/22 2106 -- 85 -- 98/60 --   10/15/22 1832 -- -- -- -- 94 %   10/15/22 1826 -- -- -- -- 94 %   10/15/22 1824 -- 73 13 (!) 102/47 --   10/15/22 1802 97.4 °F (36.3 °C) 87 18 (!) 116/94 98 %       Oxygen Therapy  SpO2: 100 %  Pulse Oximeter Device Mode: Continuous  O2 Device: None (Room air)  O2 Flow Rate (L/min): 2 L/min    Estimated body mass index is 27.46 kg/m² as calculated from the following:    Height as of this encounter: 5' 4\" (1.626 m). Weight as of this encounter: 160 lb (72.6 kg). Intake/Output Summary (Last 24 hours) at 10/15/2022 2224  Last data filed at 10/15/2022 2109  Gross per 24 hour   Intake 0 ml   Output --   Net 0 ml         Physical Exam:    Blood pressure (!) 102/56, pulse 83, temperature 97.5 °F (36.4 °C), temperature source Axillary, resp. rate 20, height 5' 4\" (1.626 m), weight 160 lb (72.6 kg), SpO2 100 %. General:    Alert and awake, ill appearing. Head:  Normocephalic, atraumatic  Eyes:  Sclerae appear normal.  Pupils equally round. ENT:  Nares appear normal, no drainage. Moist oral mucosa  Neck:  No restricted ROM. Trachea midline   CV:   RRR. No m/r/g. No jugular venous distension. Lungs:   CTAB.   No wheezing, rhonchi, or rales. Symmetric expansion. Abdomen: Bowel sounds present. Soft, TTP at epigastric region, nondistended. Extremities: No cyanosis or clubbing. No edema  Skin:     No rashes and normal coloration. Warm and dry. Neuro:  CN II-XII grossly intact. A&Ox3  Psych:  Normal mood and affect.       I have personally reviewed labs and tests showing:  Recent Labs:  Recent Results (from the past 24 hour(s))   EKG 12 Lead    Collection Time: 10/15/22  6:01 PM   Result Value Ref Range    Ventricular Rate 83 BPM    Atrial Rate 83 BPM    P-R Interval 174 ms    QRS Duration 76 ms    Q-T Interval 368 ms    QTc Calculation (Bazett) 432 ms    P Axis 33 degrees    R Axis 60 degrees    T Axis 36 degrees    Diagnosis Normal sinus rhythm    CBC    Collection Time: 10/15/22  6:14 PM   Result Value Ref Range    WBC 13.0 (H) 4.3 - 11.1 K/uL    RBC 4.45 4.05 - 5.2 M/uL    Hemoglobin 13.8 11.7 - 15.4 g/dL    Hematocrit 43.4 35.8 - 46.3 %    MCV 97.5 82 - 102 FL    MCH 31.0 26.1 - 32.9 PG    MCHC 31.8 31.4 - 35.0 g/dL    RDW 13.4 11.9 - 14.6 %    Platelets 120 573 - 248 K/uL    MPV 9.8 9.4 - 12.3 FL    nRBC 0.00 0.0 - 0.2 K/uL   Comprehensive Metabolic Panel    Collection Time: 10/15/22  6:14 PM   Result Value Ref Range    Sodium 136 133 - 143 mmol/L    Potassium 3.6 3.5 - 5.1 mmol/L    Chloride 104 101 - 110 mmol/L    CO2 26 21 - 32 mmol/L    Anion Gap 6 2 - 11 mmol/L    Glucose 117 (H) 65 - 100 mg/dL    BUN 24 (H) 8 - 23 MG/DL    Creatinine 0.90 0.6 - 1.0 MG/DL    Est, Glom Filt Rate >60 >60 ml/min/1.73m2    Calcium 9.2 8.3 - 10.4 MG/DL    Total Bilirubin 1.7 (H) 0.2 - 1.1 MG/DL     (H) 12 - 65 U/L     (H) 15 - 37 U/L    Alk Phosphatase 113 50 - 136 U/L    Total Protein 7.3 6.3 - 8.2 g/dL    Albumin 3.8 3.2 - 4.6 g/dL    Globulin 3.5 2.8 - 4.5 g/dL    Albumin/Globulin Ratio 1.1 0.4 - 1.6     Troponin    Collection Time: 10/15/22  6:14 PM   Result Value Ref Range    Troponin, High Sensitivity 5.6 0 - 14 pg/mL   Lipase    Collection Time: 10/15/22  6:14 PM   Result Value Ref Range    Lipase >1,500 (H) 73 - 393 U/L       I have personally reviewed imaging studies showing:  XR CHEST PORTABLE    Result Date: 10/15/2022  AP PORTABLE CHEST X-RAY 10/15/2022 6:32 PM HISTORY: Midsternal chest pain that began suddenly today and radiates into back COMPARISON: 9/16/2022 FINDINGS:  EKG leads are present. There is minimal retrocardiac atelectasis or scarring. There is no lobar consolidation, pleural effusions or pulmonary edema. No consolidation. CT CHEST ABDOMEN PELVIS W CONTRAST Additional Contrast? None    Result Date: 10/15/2022  EXAMINATION: CT CHEST ABDOMEN PELVIS W CONTRAST 10/15/2022 7:14 PM ACCESSION NUMBER: [id number] COMPARISON: CT abdomen/pelvis January 4, 2013 INDICATION: Chest pain, unspecified; Generalized abdominal pain TECHNIQUE: Multiple contiguous axial CT images of the chest, abdomen, and pelvis were obtained from the lung apices to the symphysis pubis after the intravenous administration of 100mL Iso-gia 370. Reformats are provided. Radiation dose reduction techniques were used for this study. Our CT scanners use one or all of the following: Automated exposure control, adjustment of the mA and/or kV according to patient size, iterative reconstruction. FINDINGS: AIRWAYS: The central airways are patent. LUNGS: Dependent changes and scarring within the lung bases. Pleural parenchymal scarring within the lung apices. No suspicious pulmonary nodules. PLEURA: No pleural effusion or pneumothorax. HEART: The heart is not enlarged. Moderate calcified coronary atherosclerosis. No pericardial effusion. THORACIC AORTA: The aorta is normal in caliber. PULMONARY ARTERY: The main pulmonary artery is normal in caliber. No evidence of pulmonary embolism. MEDIASTINUM/KEREN: No mediastinal mass or lymphadenopathy. Moderate sized hiatal hernia. CHEST WALL: No mass or axillary lymphadenopathy.  LIVER: The liver contour is normal. No suspicious liver lesion. BILIARY TREE: Gallbladder is distended. There are calcified gallstones dependently within the gallbladder. Prominent intra and extrahepatic biliary ductal dilatation with common bile duct measuring up to 1.6 cm diameter. There is suggestion of rounded hypodense 0.8 cm focus within the distal common bile duct SPLEEN: Normal. PANCREAS: No pancreatic mass or ductal dilation. ADRENALS: Normal. KIDNEYS/BLADDER: The kidneys are symmetric in size. No renal calculus or hydronephrosis. Several renal cysts and too small to characterize hypodensities are present within the kidneys. The urinary bladder is unremarkable. BOWEL: The colon is unremarkable. The small bowel is normal in caliber. No bowel wall thickening. Small duodenal diverticulum is present. Sigmoid diverticulosis without diverticulitis. APPENDIX: Appendix not definitely seen however no inflammatory changes in the right lower quadrant to suggest appendicitis. PERITONEUM/RETROPERITONEUM: No ascites or free air. No pelvic or retroperitoneal lymphadenopathy. VESSELS: No abdominal aortic aneurysm. ABDOMINAL WALL: No hernia or mass. REPRODUCTIVE: 3.6 cm cyst within the right adnexa. BONES: No suspicious osseous lesion. 1.  Intra and extrahepatic biliary ductal dilatation with distended gallbladder and suggestion of hyperdense 0.8 cm focus at the distal common bile duct likely cause of obstruction. Although favor noncalcified gallstone given the presence of additional gallstones, obstructing mass lesion not excluded. No pancreatic ductal dilatation. 2.  No evidence of pulmonary embolism or acute abnormality in the chest. 3.  Moderate coronary artery calcification. 4.  Right adnexal 3.6 cm cyst. Recommend nonurgent pelvic ultrasound for further evaluation. Echocardiogram:  No results found for this or any previous visit.         Orders Placed This Encounter   Medications    0.9 % sodium chloride bolus    ondansetron (ZOFRAN) injection 8 mg    famotidine (PEPCID) injection 20 mg    aspirin chewable tablet 81 mg    fentaNYL (SUBLIMAZE) injection 75 mcg    0.9 % sodium chloride bolus    iopamidol (ISOVUE-370) 76 % injection 100 mL    sodium chloride flush 0.9 % injection 5-40 mL    sodium chloride flush 0.9 % injection 5-40 mL    0.9 % sodium chloride infusion    OR Linked Order Group     ondansetron (ZOFRAN-ODT) disintegrating tablet 4 mg     ondansetron (ZOFRAN) injection 4 mg    polyethylene glycol (GLYCOLAX) packet 17 g    bisacodyl (DULCOLAX) suppository 10 mg    famotidine (PEPCID) tablet 10 mg    aluminum & magnesium hydroxide-simethicone (MAALOX) 200-200-20 MG/5ML suspension 30 mL    OR Linked Order Group     acetaminophen (TYLENOL) tablet 650 mg     acetaminophen (TYLENOL) suppository 650 mg    0.9 % sodium chloride infusion    enoxaparin (LOVENOX) injection 40 mg     Hold if platelets < 34N, or Hb < 7     Order Specific Question:   Indication of Use     Answer:   Prophylaxis-DVT/PE    traMADol (ULTRAM) tablet 50 mg    HYDROcodone-acetaminophen (NORCO) 5-325 MG per tablet 1 tablet         Signed:  Serge Rangel MD    Part of this note may have been written by using a voice dictation software. The note has been proof read but may still contain some grammatical/other typographical errors.

## 2022-10-16 NOTE — PROGRESS NOTES
END OF SHIFT NOTE:    INTAKE/OUTPUT  10/15 0701 - 10/16 0700  In: 320 [I.V.:772]  Out: 400 [Urine:400]  Voiding: Yes  Catheter: No  Drain:      Flatus: Patient does have flatus present. Stool: 0 occurrences. Characteristics:           Stool Assessment  Last BM (including prior to admit): 10/15/22    Emesis: 0 occurrences. Characteristics:        VITAL SIGNS  Patient Vitals for the past 12 hrs:   Temp Pulse Resp BP SpO2   10/16/22 1523 99 °F (37.2 °C) 63 18 100/61 93 %   10/16/22 1126 99.2 °F (37.3 °C) 69 18 (!) 104/48 98 %   10/16/22 0730 98.2 °F (36.8 °C) 70 18 (!) 106/51 100 %       Pain Assessment  Pain Level: 1 (10/16/22 1341)  Pain Location: Head  Patient's Stated Pain Goal: 0 - No pain    Ambulating  Yes    Shift report given to oncoming nurse at the bedside.     Brittany Victoria RN

## 2022-10-17 ENCOUNTER — APPOINTMENT (OUTPATIENT)
Dept: GENERAL RADIOLOGY | Age: 82
DRG: 439 | End: 2022-10-17
Payer: MEDICARE

## 2022-10-17 ENCOUNTER — ANESTHESIA EVENT (OUTPATIENT)
Dept: ENDOSCOPY | Age: 82
DRG: 439 | End: 2022-10-17
Payer: MEDICARE

## 2022-10-17 ENCOUNTER — ANESTHESIA (OUTPATIENT)
Dept: ENDOSCOPY | Age: 82
DRG: 439 | End: 2022-10-17
Payer: MEDICARE

## 2022-10-17 DIAGNOSIS — Z13.228 SCREENING FOR METABOLIC DISORDER: Primary | ICD-10-CM

## 2022-10-17 DIAGNOSIS — E55.9 AVITAMINOSIS D: ICD-10-CM

## 2022-10-17 DIAGNOSIS — Z13.6 SCREENING FOR ISCHEMIC HEART DISEASE: ICD-10-CM

## 2022-10-17 DIAGNOSIS — Z13.1 SCREENING FOR DIABETES MELLITUS: ICD-10-CM

## 2022-10-17 DIAGNOSIS — E55.9 VITAMIN D DEFICIENCY, UNSPECIFIED: ICD-10-CM

## 2022-10-17 DIAGNOSIS — R79.9 ABNORMAL BLOOD CHEMISTRY: ICD-10-CM

## 2022-10-17 DIAGNOSIS — E53.8 B12 DEFICIENCY: ICD-10-CM

## 2022-10-17 DIAGNOSIS — R53.82 CHRONIC FATIGUE: ICD-10-CM

## 2022-10-17 LAB
ALBUMIN SERPL-MCNC: 2.8 G/DL (ref 3.2–4.6)
ALBUMIN/GLOB SERPL: 0.9 {RATIO} (ref 0.4–1.6)
ALP SERPL-CCNC: 161 U/L (ref 50–136)
ALT SERPL-CCNC: 61 U/L (ref 12–65)
ANION GAP SERPL CALC-SCNC: 7 MMOL/L (ref 2–11)
AST SERPL-CCNC: 249 U/L (ref 15–37)
BASOPHILS # BLD: 0 K/UL (ref 0–0.2)
BASOPHILS NFR BLD: 0 % (ref 0–2)
BILIRUB DIRECT SERPL-MCNC: 4.6 MG/DL
BILIRUB SERPL-MCNC: 5.5 MG/DL (ref 0.2–1.1)
BUN SERPL-MCNC: 15 MG/DL (ref 8–23)
CALCIUM SERPL-MCNC: 8.3 MG/DL (ref 8.3–10.4)
CHLORIDE SERPL-SCNC: 110 MMOL/L (ref 101–110)
CO2 SERPL-SCNC: 23 MMOL/L (ref 21–32)
CREAT SERPL-MCNC: 0.6 MG/DL (ref 0.6–1)
DIFFERENTIAL METHOD BLD: ABNORMAL
EOSINOPHIL # BLD: 0.1 K/UL (ref 0–0.8)
EOSINOPHIL NFR BLD: 1 % (ref 0.5–7.8)
ERYTHROCYTE [DISTWIDTH] IN BLOOD BY AUTOMATED COUNT: 14.1 % (ref 11.9–14.6)
GLOBULIN SER CALC-MCNC: 3.2 G/DL (ref 2.8–4.5)
GLUCOSE SERPL-MCNC: 79 MG/DL (ref 65–100)
HCT VFR BLD AUTO: 36.9 % (ref 35.8–46.3)
HGB BLD-MCNC: 11.8 G/DL (ref 11.7–15.4)
IMM GRANULOCYTES # BLD AUTO: 0.1 K/UL (ref 0–0.5)
IMM GRANULOCYTES NFR BLD AUTO: 1 % (ref 0–5)
LIPASE SERPL-CCNC: 434 U/L (ref 73–393)
LYMPHOCYTES # BLD: 0.8 K/UL (ref 0.5–4.6)
LYMPHOCYTES NFR BLD: 6 % (ref 13–44)
MAGNESIUM SERPL-MCNC: 2.3 MG/DL (ref 1.8–2.4)
MCH RBC QN AUTO: 31.8 PG (ref 26.1–32.9)
MCHC RBC AUTO-ENTMCNC: 32 G/DL (ref 31.4–35)
MCV RBC AUTO: 99.5 FL (ref 82–102)
MONOCYTES # BLD: 0.8 K/UL (ref 0.1–1.3)
MONOCYTES NFR BLD: 6 % (ref 4–12)
NEUTS SEG # BLD: 12 K/UL (ref 1.7–8.2)
NEUTS SEG NFR BLD: 86 % (ref 43–78)
NRBC # BLD: 0 K/UL (ref 0–0.2)
PLATELET # BLD AUTO: 223 K/UL (ref 150–450)
PMV BLD AUTO: 10.9 FL (ref 9.4–12.3)
POTASSIUM SERPL-SCNC: 3.5 MMOL/L (ref 3.5–5.1)
PROT SERPL-MCNC: 6 G/DL (ref 6.3–8.2)
RBC # BLD AUTO: 3.71 M/UL (ref 4.05–5.2)
SODIUM SERPL-SCNC: 140 MMOL/L (ref 133–143)
WBC # BLD AUTO: 13.8 K/UL (ref 4.3–11.1)

## 2022-10-17 PROCEDURE — 83735 ASSAY OF MAGNESIUM: CPT

## 2022-10-17 PROCEDURE — 1100000000 HC RM PRIVATE

## 2022-10-17 PROCEDURE — 3700000001 HC ADD 15 MINUTES (ANESTHESIA): Performed by: INTERNAL MEDICINE

## 2022-10-17 PROCEDURE — 6360000004 HC RX CONTRAST MEDICATION: Performed by: INTERNAL MEDICINE

## 2022-10-17 PROCEDURE — 83690 ASSAY OF LIPASE: CPT

## 2022-10-17 PROCEDURE — 3700000000 HC ANESTHESIA ATTENDED CARE: Performed by: INTERNAL MEDICINE

## 2022-10-17 PROCEDURE — 7100000001 HC PACU RECOVERY - ADDTL 15 MIN: Performed by: INTERNAL MEDICINE

## 2022-10-17 PROCEDURE — 6360000002 HC RX W HCPCS

## 2022-10-17 PROCEDURE — 6370000000 HC RX 637 (ALT 250 FOR IP): Performed by: INTERNAL MEDICINE

## 2022-10-17 PROCEDURE — 2580000003 HC RX 258: Performed by: NURSE PRACTITIONER

## 2022-10-17 PROCEDURE — 6370000000 HC RX 637 (ALT 250 FOR IP): Performed by: FAMILY MEDICINE

## 2022-10-17 PROCEDURE — C1769 GUIDE WIRE: HCPCS | Performed by: INTERNAL MEDICINE

## 2022-10-17 PROCEDURE — 3609018800 HC ERCP DX COLLECTION SPECIMEN BRUSHING/WASHING: Performed by: INTERNAL MEDICINE

## 2022-10-17 PROCEDURE — 2500000003 HC RX 250 WO HCPCS

## 2022-10-17 PROCEDURE — 2580000003 HC RX 258: Performed by: FAMILY MEDICINE

## 2022-10-17 PROCEDURE — 6360000002 HC RX W HCPCS: Performed by: FAMILY MEDICINE

## 2022-10-17 PROCEDURE — 2580000003 HC RX 258: Performed by: INTERNAL MEDICINE

## 2022-10-17 PROCEDURE — 74330 X-RAY BILE/PANC ENDOSCOPY: CPT

## 2022-10-17 PROCEDURE — 80076 HEPATIC FUNCTION PANEL: CPT

## 2022-10-17 PROCEDURE — 0FC98ZZ EXTIRPATION OF MATTER FROM COMMON BILE DUCT, VIA NATURAL OR ARTIFICIAL OPENING ENDOSCOPIC: ICD-10-PCS | Performed by: INTERNAL MEDICINE

## 2022-10-17 PROCEDURE — 2720000010 HC SURG SUPPLY STERILE: Performed by: INTERNAL MEDICINE

## 2022-10-17 PROCEDURE — 80048 BASIC METABOLIC PNL TOTAL CA: CPT

## 2022-10-17 PROCEDURE — 85025 COMPLETE CBC W/AUTO DIFF WBC: CPT

## 2022-10-17 PROCEDURE — 7100000000 HC PACU RECOVERY - FIRST 15 MIN: Performed by: INTERNAL MEDICINE

## 2022-10-17 PROCEDURE — 2709999900 HC NON-CHARGEABLE SUPPLY: Performed by: INTERNAL MEDICINE

## 2022-10-17 PROCEDURE — 2580000003 HC RX 258

## 2022-10-17 PROCEDURE — 36415 COLL VENOUS BLD VENIPUNCTURE: CPT

## 2022-10-17 RX ORDER — IPRATROPIUM BROMIDE AND ALBUTEROL SULFATE 2.5; .5 MG/3ML; MG/3ML
1 SOLUTION RESPIRATORY (INHALATION)
Status: CANCELLED | OUTPATIENT
Start: 2022-10-17 | End: 2022-10-18

## 2022-10-17 RX ORDER — PROPOFOL 10 MG/ML
INJECTION, EMULSION INTRAVENOUS PRN
Status: DISCONTINUED | OUTPATIENT
Start: 2022-10-17 | End: 2022-10-17 | Stop reason: SDUPTHER

## 2022-10-17 RX ORDER — HALOPERIDOL 5 MG/ML
1 INJECTION INTRAMUSCULAR
Status: CANCELLED | OUTPATIENT
Start: 2022-10-17 | End: 2022-10-18

## 2022-10-17 RX ORDER — PROCHLORPERAZINE EDISYLATE 5 MG/ML
5 INJECTION INTRAMUSCULAR; INTRAVENOUS
Status: CANCELLED | OUTPATIENT
Start: 2022-10-17 | End: 2022-10-18

## 2022-10-17 RX ORDER — OXYCODONE HYDROCHLORIDE 5 MG/1
5 TABLET ORAL
Status: CANCELLED | OUTPATIENT
Start: 2022-10-17 | End: 2022-10-18

## 2022-10-17 RX ORDER — ONDANSETRON 2 MG/ML
INJECTION INTRAMUSCULAR; INTRAVENOUS PRN
Status: DISCONTINUED | OUTPATIENT
Start: 2022-10-17 | End: 2022-10-17 | Stop reason: SDUPTHER

## 2022-10-17 RX ORDER — LIDOCAINE HYDROCHLORIDE 20 MG/ML
INJECTION, SOLUTION EPIDURAL; INFILTRATION; INTRACAUDAL; PERINEURAL PRN
Status: DISCONTINUED | OUTPATIENT
Start: 2022-10-17 | End: 2022-10-17 | Stop reason: SDUPTHER

## 2022-10-17 RX ORDER — SUCCINYLCHOLINE CHLORIDE 20 MG/ML
INJECTION INTRAMUSCULAR; INTRAVENOUS PRN
Status: DISCONTINUED | OUTPATIENT
Start: 2022-10-17 | End: 2022-10-17 | Stop reason: SDUPTHER

## 2022-10-17 RX ORDER — SODIUM CHLORIDE, SODIUM LACTATE, POTASSIUM CHLORIDE, CALCIUM CHLORIDE 600; 310; 30; 20 MG/100ML; MG/100ML; MG/100ML; MG/100ML
INJECTION, SOLUTION INTRAVENOUS CONTINUOUS
Status: DISCONTINUED | OUTPATIENT
Start: 2022-10-17 | End: 2022-10-17 | Stop reason: HOSPADM

## 2022-10-17 RX ORDER — HYDROMORPHONE HYDROCHLORIDE 2 MG/ML
0.5 INJECTION, SOLUTION INTRAMUSCULAR; INTRAVENOUS; SUBCUTANEOUS EVERY 5 MIN PRN
Status: CANCELLED | OUTPATIENT
Start: 2022-10-17

## 2022-10-17 RX ADMIN — FENTANYL CITRATE 25 MCG: 50 INJECTION INTRAMUSCULAR; INTRAVENOUS at 13:44

## 2022-10-17 RX ADMIN — PIPERACILLIN AND TAZOBACTAM 4500 MG: 4; .5 INJECTION, POWDER, FOR SOLUTION INTRAVENOUS at 09:04

## 2022-10-17 RX ADMIN — PRAVASTATIN SODIUM 40 MG: 20 TABLET ORAL at 20:26

## 2022-10-17 RX ADMIN — LEVOTHYROXINE SODIUM 75 MCG: 0.07 TABLET ORAL at 06:19

## 2022-10-17 RX ADMIN — INDOMETHACIN 100 MG: 50 SUPPOSITORY RECTAL at 14:12

## 2022-10-17 RX ADMIN — CARBIDOPA AND LEVODOPA 1 TABLET: 25; 100 TABLET ORAL at 20:26

## 2022-10-17 RX ADMIN — SODIUM CHLORIDE, PRESERVATIVE FREE 10 ML: 5 INJECTION INTRAVENOUS at 09:04

## 2022-10-17 RX ADMIN — PIPERACILLIN AND TAZOBACTAM 3375 MG: 3; .375 INJECTION, POWDER, FOR SOLUTION INTRAVENOUS at 17:35

## 2022-10-17 RX ADMIN — SODIUM CHLORIDE, PRESERVATIVE FREE 10 ML: 5 INJECTION INTRAVENOUS at 20:32

## 2022-10-17 RX ADMIN — LIDOCAINE HYDROCHLORIDE 60 MG: 20 INJECTION, SOLUTION EPIDURAL; INFILTRATION; INTRACAUDAL; PERINEURAL at 13:44

## 2022-10-17 RX ADMIN — SODIUM CHLORIDE: 9 INJECTION, SOLUTION INTRAVENOUS at 23:54

## 2022-10-17 RX ADMIN — Medication 160 MG: at 13:45

## 2022-10-17 RX ADMIN — PANTOPRAZOLE SODIUM 40 MG: 40 TABLET, DELAYED RELEASE ORAL at 06:19

## 2022-10-17 RX ADMIN — PROPOFOL 120 MG: 10 INJECTION, EMULSION INTRAVENOUS at 13:44

## 2022-10-17 RX ADMIN — ACETAMINOPHEN 650 MG: 325 TABLET, FILM COATED ORAL at 11:09

## 2022-10-17 RX ADMIN — ONDANSETRON 4 MG: 2 INJECTION INTRAMUSCULAR; INTRAVENOUS at 14:11

## 2022-10-17 RX ADMIN — FOLIC ACID 1 MG: 1 TABLET ORAL at 09:04

## 2022-10-17 RX ADMIN — PHENYLEPHRINE HYDROCHLORIDE 100 MCG: 10 INJECTION INTRAVENOUS at 14:04

## 2022-10-17 RX ADMIN — PHENYLEPHRINE HYDROCHLORIDE 100 MCG: 10 INJECTION INTRAVENOUS at 14:08

## 2022-10-17 RX ADMIN — PROPOFOL 50 MG: 10 INJECTION, EMULSION INTRAVENOUS at 13:58

## 2022-10-17 RX ADMIN — PIPERACILLIN AND TAZOBACTAM 3375 MG: 3; .375 INJECTION, POWDER, FOR SOLUTION INTRAVENOUS at 23:50

## 2022-10-17 RX ADMIN — SODIUM CHLORIDE: 9 INJECTION, SOLUTION INTRAVENOUS at 06:21

## 2022-10-17 ASSESSMENT — PAIN - FUNCTIONAL ASSESSMENT
PAIN_FUNCTIONAL_ASSESSMENT: ACTIVITIES ARE NOT PREVENTED
PAIN_FUNCTIONAL_ASSESSMENT: NONE - DENIES PAIN

## 2022-10-17 ASSESSMENT — PAIN DESCRIPTION - ORIENTATION: ORIENTATION: MID

## 2022-10-17 ASSESSMENT — PAIN DESCRIPTION - DESCRIPTORS: DESCRIPTORS: ACHING

## 2022-10-17 ASSESSMENT — PAIN DESCRIPTION - ONSET: ONSET: PROGRESSIVE

## 2022-10-17 ASSESSMENT — PAIN DESCRIPTION - PAIN TYPE: TYPE: ACUTE PAIN

## 2022-10-17 ASSESSMENT — PAIN SCALES - GENERAL
PAINLEVEL_OUTOF10: 0
PAINLEVEL_OUTOF10: 3

## 2022-10-17 ASSESSMENT — PAIN DESCRIPTION - LOCATION: LOCATION: HEAD

## 2022-10-17 ASSESSMENT — PAIN DESCRIPTION - FREQUENCY: FREQUENCY: INTERMITTENT

## 2022-10-17 NOTE — PROGRESS NOTES
[facility] Progress Note         Admit Date:  10/15/2022    Today's Date:  10/17/2022    CC: biliary pancreatitis     Subjective:     Patient reports that she is not having pain at the moment other than a little headache. Denies any N/V.      Medications:   Current Facility-Administered Medications   Medication Dose Route Frequency    piperacillin-tazobactam (ZOSYN) 4,500 mg in sodium chloride 0.9 % 100 mL IVPB (mini-bag)  4,500 mg IntraVENous Once    piperacillin-tazobactam (ZOSYN) 3,375 mg in sodium chloride 0.9 % 50 mL IVPB (mini-bag)  3,375 mg IntraVENous q8h    carbidopa-levodopa (SINEMET)  MG per tablet 1 tablet  1 tablet Oral Nightly    folic acid (FOLVITE) tablet 1 mg  1 mg Oral Daily    pantoprazole (PROTONIX) tablet 40 mg  40 mg Oral QAM AC    pravastatin (PRAVACHOL) tablet 40 mg  40 mg Oral Nightly    0.9 % sodium chloride infusion   IntraVENous Continuous    levothyroxine (SYNTHROID) tablet 75 mcg  75 mcg Oral QAM AC    [Held by provider] aspirin chewable tablet 81 mg  81 mg Oral Daily    sodium chloride flush 0.9 % injection 5-40 mL  5-40 mL IntraVENous 2 times per day    sodium chloride flush 0.9 % injection 5-40 mL  5-40 mL IntraVENous PRN    0.9 % sodium chloride infusion   IntraVENous PRN    ondansetron (ZOFRAN-ODT) disintegrating tablet 4 mg  4 mg Oral Q8H PRN    Or    ondansetron (ZOFRAN) injection 4 mg  4 mg IntraVENous Q6H PRN    polyethylene glycol (GLYCOLAX) packet 17 g  17 g Oral Daily PRN    bisacodyl (DULCOLAX) suppository 10 mg  10 mg Rectal Daily PRN    famotidine (PEPCID) tablet 10 mg  10 mg Oral Daily PRN    aluminum & magnesium hydroxide-simethicone (MAALOX) 200-200-20 MG/5ML suspension 30 mL  30 mL Oral Q6H PRN    acetaminophen (TYLENOL) tablet 650 mg  650 mg Oral Q6H PRN    Or    acetaminophen (TYLENOL) suppository 650 mg  650 mg Rectal Q6H PRN    [Held by provider] enoxaparin (LOVENOX) injection 40 mg  40 mg SubCUTAneous Q24H    traMADol (ULTRAM) tablet 50 mg 50 mg Oral Q6H PRN    HYDROcodone-acetaminophen (NORCO) 5-325 MG per tablet 1 tablet  1 tablet Oral Q6H PRN       Review of Systems:  ROS was obtained, with pertinent positives as listed above. No chest pain or SOB. Diet:  NPO    Objective:   Vitals:  BP (!) 122/55   Pulse 70   Temp 98.8 °F (37.1 °C) (Oral)   Resp 18   Ht 5' 4\" (1.626 m)   Wt 168 lb 6.4 oz (76.4 kg)   SpO2 98%   BMI 28.91 kg/m²   Intake/Output:  No intake/output data recorded. 10/15 1901 - 10/17 0700  In: 5101 [P.O.:830; I.V.:4271]  Out: 2445 [Urine:2445]  Exam:  General appearance: alert, cooperative, no distress jaundiced appearance   Lungs: clear to auscultation bilaterally anteriorly  Heart: regular rate and rhythm  Abdomen: soft, non-tender. Bowel sounds normal. No masses, no organomegaly  Extremities: extremities normal, atraumatic, no cyanosis or edema  Neuro:  alert and oriented    Data Review (Labs):    Recent Labs     10/15/22  1814 10/16/22  0712 10/17/22  0707   WBC 13.0* 20.9* 13.8*   HGB 13.8 11.7 11.8   HCT 43.4 36.3 36.9    241 223   MCV 97.5 97.8 99.5    138 140   K 3.6 3.9 3.5    109 110   CO2 26 24 23   BUN 24* 22 15   CREATININE 0.90 0.90 0.60   CALCIUM 9.2 8.0* 8.3   MG  --   --  2.3   GLUCOSE 117* 101* 79   ALKPHOS 113  --  161*   *  --  249*   *  --  61   BILITOT 1.7*  --  5.5*   BILIDIR  --   --  4.6*   ALBUMIN 1.1  --  0.9   PROT 7.3  --  6.0*   LIPASE >1,500*  --  434*     EXAMINATION: CT CHEST ABDOMEN PELVIS W CONTRAST 10/15/2022 7:14 PM       ACCESSION NUMBER: [id number]       COMPARISON: CT abdomen/pelvis January 4, 2013       INDICATION: Chest pain, unspecified; Generalized abdominal pain       TECHNIQUE: Multiple contiguous axial CT images of the chest, abdomen, and pelvis   were obtained from the lung apices to the symphysis pubis after the intravenous   administration of 100mL Iso-gia 370. Reformats are provided.        Radiation dose reduction techniques were used for this study. Our CT scanners   use one or all of the following: Automated exposure control, adjustment of the   mA and/or kV according to patient size, iterative reconstruction. FINDINGS:   AIRWAYS: The central airways are patent. LUNGS: Dependent changes and scarring within the lung bases. Pleural parenchymal   scarring within the lung apices. No suspicious pulmonary nodules. PLEURA: No pleural effusion or pneumothorax. HEART: The heart is not enlarged. Moderate calcified coronary atherosclerosis. No pericardial effusion. THORACIC AORTA: The aorta is normal in caliber. PULMONARY ARTERY: The main pulmonary artery is normal in caliber. No evidence of   pulmonary embolism. MEDIASTINUM/KEREN: No mediastinal mass or lymphadenopathy. Moderate sized hiatal   hernia. CHEST WALL: No mass or axillary lymphadenopathy. LIVER: The liver contour is normal. No suspicious liver lesion. BILIARY TREE: Gallbladder is distended. There are calcified gallstones   dependently within the gallbladder. Prominent intra and extrahepatic biliary   ductal dilatation with common bile duct measuring up to 1.6 cm diameter. There   is suggestion of rounded hypodense 0.8 cm focus within the distal common bile   duct       SPLEEN: Normal.       PANCREAS: No pancreatic mass or ductal dilation. ADRENALS: Normal.       KIDNEYS/BLADDER: The kidneys are symmetric in size. No renal calculus or   hydronephrosis. Several renal cysts and too small to characterize hypodensities   are present within the kidneys. The urinary bladder is unremarkable. BOWEL: The colon is unremarkable. The small bowel is normal in caliber. No bowel   wall thickening. Small duodenal diverticulum is present. Sigmoid diverticulosis   without diverticulitis. APPENDIX: Appendix not definitely seen however no inflammatory changes in the   right lower quadrant to suggest appendicitis. PERITONEUM/RETROPERITONEUM: No ascites or free air. No pelvic or retroperitoneal   lymphadenopathy. VESSELS: No abdominal aortic aneurysm. ABDOMINAL WALL: No hernia or mass. REPRODUCTIVE: 3.6 cm cyst within the right adnexa. BONES: No suspicious osseous lesion. Impression   1. Intra and extrahepatic biliary ductal dilatation with distended gallbladder   and suggestion of hyperdense 0.8 cm focus at the distal common bile duct likely   cause of obstruction. Although favor noncalcified gallstone given the presence   of additional gallstones, obstructing mass lesion not excluded. No pancreatic   ductal dilatation. 2.  No evidence of pulmonary embolism or acute abnormality in the chest.   3.  Moderate coronary artery calcification. 4.  Right adnexal 3.6 cm cyst. Recommend nonurgent pelvic ultrasound for further   evaluation. Colonoscopy 1/20/2011 by Dr. Key Hernandez revealed diverticulosis. Assessment:     Principal Problem:    Acute gallstone pancreatitis  Active Problems:    Anemia due to folate deficiency    Acquired hypothyroidism    Hypercholesterolemia    Gastroesophageal reflux disease with esophagitis  Resolved Problems:    * No resolved hospital problems. *  Patient is a [de-identified] y.o. female with PMH including but not limited to,  HTN, HLD, hypothyroid, anemia due to folate deficiency, Parkinson's who was seen in consultation at the request of Dr. Shyanne Alegre for biliary pancreatitis. Ms. Shani Novoa had an episode of epigastric abdominal pain radiating into her chest. She had associated N&V. This happened 3 weeks ago as well and resolved. She drinks an occasional glass of wine     Repeat lab work this morning reveal increase in bilirubin from 1.7 to now 5.5, AST increased to 249 and Alk Phos increased from 113 to 161. Lipase has improved now at 434. Patient is pain free, but is now jaundice in appearance. Plan:      Will plan to proceed with ERCP for probable gallstone removal.     Patient is seen and examined in collaboration with Dr. Ryan Cleary. Assessment and plan as per Dr. Ryan Cleary. ATTENDING NOTE:  I agree with the above assessment and plan. Based on imaging and rising LFTs, will keep NPO for ERCP today with Dr. Duyen Interiano.     Sara Harry MD

## 2022-10-17 NOTE — PROGRESS NOTES
TRANSFER - OUT REPORT:    Verbal report given to Jonathon Husbands on Galo Davison  being transferred to GI Lab for routine progression of patient care       Report consisted of patient's Situation, Background, Assessment and   Recommendations(SBAR). Information from the following report(s) Nurse Handoff Report, Adult Overview, Intake/Output, MAR, and Recent Results was reviewed with the receiving nurse. Lines:   Peripheral IV 10/15/22 Left Antecubital (Active)   Site Assessment Clean, dry & intact 10/17/22 0730   Line Status Flushed;Capped 10/17/22 0730   Line Care Connections checked and tightened 10/17/22 0730   Phlebitis Assessment No symptoms 10/17/22 0730   Infiltration Assessment 0 10/17/22 0730   Alcohol Cap Used Yes 10/17/22 0730   Dressing Status Clean, dry & intact 10/17/22 0730   Dressing Type Transparent 10/17/22 0730       Peripheral IV 10/16/22 Right Forearm (Active)   Site Assessment Clean, dry & intact 10/17/22 0730   Line Status Infusing 10/17/22 0730   Line Care Connections checked and tightened 10/17/22 0730   Phlebitis Assessment No symptoms 10/17/22 0730   Infiltration Assessment 0 10/17/22 0730   Alcohol Cap Used Yes 10/17/22 0730   Dressing Status Clean, dry & intact 10/17/22 0730   Dressing Type Transparent 10/17/22 0730        Opportunity for questions and clarification was provided.       Patient transported with:  Mozes

## 2022-10-17 NOTE — PROGRESS NOTES
END OF SHIFT NOTE:    INTAKE/OUTPUT  10/16 0701 - 10/17 0700  In: 7217 [P.O.:830; I.V.:3499]  Out: 2045 [Urine:2045]  Voiding: Yes  Catheter: No  Drain:              Flatus: Patient does have flatus present. Stool: 0 occurrences. Characteristics:           Stool Assessment  Last BM (including prior to admit): 10/15/22    Emesis: 0 occurrences. Characteristics:        VITAL SIGNS  Patient Vitals for the past 12 hrs:   Temp Pulse Resp BP SpO2   10/17/22 1612 98.1 °F (36.7 °C) 61 18 120/65 97 %   10/17/22 1504 -- 64 16 (!) 141/63 100 %   10/17/22 1458 98.4 °F (36.9 °C) 60 15 127/66 100 %   10/17/22 1455 -- 61 16 (!) 141/65 100 %   10/17/22 1450 -- 60 16 132/60 99 %   10/17/22 1445 -- 65 15 (!) 142/63 93 %   10/17/22 1440 -- 63 15 128/60 97 %   10/17/22 1435 -- 65 16 130/72 96 %   10/17/22 1429 98.2 °F (36.8 °C) 67 16 (!) 120/56 100 %   10/17/22 1427 98.3 °F (36.8 °C) -- 16 -- 100 %   10/17/22 1228 98.7 °F (37.1 °C) 67 18 (!) 128/59 94 %   10/17/22 0734 98.8 °F (37.1 °C) 70 18 (!) 122/55 98 %       Pain Assessment  Pain Level: 0 (10/17/22 1139)  Pain Location: Head  Patient's Stated Pain Goal: 0 - No pain    Ambulating  Yes    Shift report given to oncoming nurse at the bedside.     Joaquim Diehl RN

## 2022-10-17 NOTE — PROGRESS NOTES
TRANSFER - IN REPORT:    Verbal report received from [facility] on Faith Levers  being received from 212 for ordered procedure      Report consisted of patient's Situation, Background, Assessment and   Recommendations(SBAR). Information from the following report(s) Nurse Handoff Report was reviewed with the receiving nurse. Opportunity for questions and clarification was provided. Assessment completed upon patient's arrival to unit and care assumed.

## 2022-10-17 NOTE — CARE COORDINATION
ASSESSMENT NOTE    Attending Physician: Juanita Smith MD  Admit Problem: Generalized abdominal pain [R10.84]  Gallstone pancreatitis [K85.10]  Elevated liver enzymes [R74.8]  Acute gallstone pancreatitis [K85.10]  Chest pain, unspecified type [R07.9]  Date/Time of Admission: 10/15/2022  6:14 PM  Problem List:  Patient Active Problem List   Diagnosis    Allergic rhinitis due to pollen    Acquired hypothyroidism    Vitamin D deficiency    Arthritis of left knee    Abdominal pain    Hypercholesterolemia    Thyroid disease    Gastroesophageal reflux disease with esophagitis    Presence of left artificial knee joint    Contusion of elbow and forearm, left, initial encounter    Sigmoid diverticulitis    Arthritis of right knee    GERD (gastroesophageal reflux disease)    Essential hypertension, benign    Osteoarthritis    Presence of right artificial knee joint    Restless leg syndrome    Acute gallstone pancreatitis    Anemia due to folate deficiency       Service Assessment  Patient Orientation Alert and Oriented   Cognition Alert   History Provided By Patient   Primary Caregiver Self   Accompanied By/Relationship     Support Systems Spouse/Significant Other   Patient's Healthcare Decision Maker is: Legal Next of Kin (Spouse: Melissa Zelaya  [phone])   PCP Verified by CM Yes (Dr Bianca Owens  [phone])   Last Visit to PCP Within last 3 months   Prior Functional Level Independent in ADLs/IADLs   Current Functional Level Independent in ADLs/IADLs   Can patient return to prior living arrangement Yes   Ability to make needs known: Good   Family able to assist with home care needs: Yes   Would you like for me to discuss the discharge plan with any other family members/significant others, and if so, who?      Financial Resources Medicare ([facility] Medicare)   Community Resources     CM/SW Referral       Social/Functional History  Lives With Spouse   Type of [address] One level   Home Access Level entry Entrance Stairs - Number of Steps     Entrance Stairs - Rails     Bathroom Shower/Tub     Bathroom Toilet     Bathroom Equipment     Bathroom Accessibility     Home Equipment     Receives Help From Family   ADL Assistance     [employer]     Grooming     Feeding     Toileting     [address] Work     Driving     Shopping          Other (Comment)     Homemaking Responsibilities     Meal Prep Responsibility     Laundry Responsibility     Cleaning Responsibility     Bill Paying/Finance Responsibility     Shopping Responsibility     Dependent Care Responsibility     Health Care Management     Other (Comment)     Ambulation Assistance     Transfer Assistance     Active  Yes   Patient's  Info     Mode of Transportation Car   Education     Occupation Retired   Type of Occupation       Discharge Planning   Type of [place] Prior To Admission None   Potential Assistance Needed N/A   DME     DME     DME Ordered? No   Potential Assistance Purchasing Medications No   Meds-to-Beds: Does the patient want to have any new prescriptions delivered to bedside prior to discharge? Type of Home Care Services None   Patient expects to be discharged to: House   Follow Up Appointment: Best Day/Time     One/Two Story Residence: One story   # of Interior Steps     Height of Each Step (in)     [employer] Available     History of Falls?        Services At/After Discharge  Transition of Care Consult (CM Consult): Discharge Planning   Internal Home Health     Internal Hospice     Reason Outside Agency [address]     Partner SNF     Reason Why Partner SNF Not Chosen     Internal Comfort Care     Reason Outside [address] Discharge None   [facility] Information Provided? No   Mode of Transport at Discharge [address] [facility] Time of Discharge     Confirm Follow Up Transport Self     Condition of Participation: Discharge Planning  The plan for Transition of Care is related to the following treatment goals: return to baseline   The Patient and/or Patient Representative was provided with a Choice of Provider? Patient   Name of the Patient Representative who was provided with the Choice of Provider and agrees with the Discharge Plan? The Patient and/or Patient Representative Agree with the Discharge Plan? Yes   Freedom of Choice list was provided with basic dialogue that supports the individualized plan of care/goals, treatment preferences, and shares the quality data associated with the providers?  Yes     Documentation for Discharge Appeal  Discharge Appealed by     Date notified by QIO of appeal request:     Time notified by QIO of appeal request:     Detailed Notice of Discharge given to:     Date Notice of Discharge given:     Time Notice of Discharge given:     [facility] [address] [facility]     Time records sent to [address]     Date Notified of Outcome     Time Notified of Outcome     Outcome of appeal           Elder Thakkar RN 10/17/22 10:04 AM

## 2022-10-17 NOTE — PLAN OF CARE
Problem: Discharge Planning  Goal: Discharge to home or other facility with appropriate resources  10/17/2022 1014 by Polina Singh RN  Outcome: Progressing  10/16/2022 2022 by Jocelyn aVldes RN  Outcome: Progressing  Flowsheets (Taken 10/16/2022 1930)  Discharge to home or other facility with appropriate resources: Identify barriers to discharge with patient and caregiver     Problem: Pain  Goal: Verbalizes/displays adequate comfort level or baseline comfort level  10/17/2022 1014 by Polina Singh RN  Outcome: Progressing  10/16/2022 2022 by Jocelyn Valdes RN  Outcome: Progressing     Problem: Safety - Adult  Goal: Free from fall injury  10/17/2022 1014 by Polina Singh RN  Outcome: Progressing  Flowsheets (Taken 10/17/2022 0730)  Free From Fall Injury: Instruct family/caregiver on patient safety  10/16/2022 2022 by Jocelyn Valdes RN  Outcome: Progressing  Flowsheets (Taken 10/16/2022 1918)  Free From Fall Injury: Instruct family/caregiver on patient safety     Problem: ABCDS Injury Assessment  Goal: Absence of physical injury  10/17/2022 1014 by Polina Snigh RN  Outcome: Progressing  10/16/2022 2022 by Jocelyn Valdes RN  Outcome: Progressing  Flowsheets (Taken 10/16/2022 1918)  Absence of Physical Injury: Implement safety measures based on patient assessment

## 2022-10-17 NOTE — PROGRESS NOTES
Physical Therapy Note:    Attempted to see patient this PM for physical therapy evaluation session. Patient just got back from GI lab and politely declined. Will follow and re-attempt as schedule permits/patient available.  Thank you,    A Wes Mi PT     Rehab Caseload Tracker

## 2022-10-17 NOTE — OP NOTE
ERCP: Endoscopic Retrograde Cholangiopancreatogram    DATE of PROCEDURE: 10/17/2022    INDICATION:  Biliary pancreatitis. Jaundice  RUQ pain    POSTPROCEDURE DIAGNOSIS:  Choledocholithiasis  Duodenal diverticulum    MEDICATIONS ADMINISTERED:    1. General anesthesia    INSTRUMENT: [id number]    PROCEDURE:  After obtaining informed consent, the patient was placed in prone position on the fluoroscopy table. The patient was then sedated. The endoscope was passed under indirect technique to the oropharynx and gently passed into the duodenum. Only partial views of the stomach and duodenum were obtained. After the procedure, the patient was taken to the recovery area in stable condition. FINDINGS:  AMPULLA: Normal, small. On the edge of a large diverticulum containing food. Ampulla had to be pulled out of the opening with a sphincterotome to cannulate. BILE DUCT: cannulated using a 44 sphincterotome over a 0.035 wire with moderate difficulty. Wire advanced into intrahepatic ducts under fluoroscopic guidance, and a cholangiogram was obtained. This revealed a single large mobile mobile filling defect consistent with a stone. The CBD was mildly dilated, ~12 mm. There was partial filling of the cystic duct,, but gallbladder was not well visualized. An 8mm biliary sphincterotomy was performed over a guidewire without complications. The tome was exchanged for a 9/12mm extraction balloon. A large brown stone was removed from the duct. Repeat occlusion cholangiogram was clear. There was adequate bile drainage. PANCREATIC DUCT: Wire cannulation x 2. Pancreatogram was intentionally not performed. Rectal Indomethicin Given: Yes, 100mg    ASSESSMENT/PLAN:  1. Cont NPO today for biliary pancreatitis  2. Avoid NSAIDs x 2 weeks after biliary sphincterotomy  3.  Consider lap lazaro to prevent recurrent pancreatitis      Carolina Galo MD  [facility] [place]

## 2022-10-17 NOTE — PROGRESS NOTES
Consent for ERCP signed and placed in pt's chart. Opportunity for questions provided to pt and pt's .

## 2022-10-17 NOTE — PROGRESS NOTES
Hospitalist Progress Note   Admit Date:  10/15/2022  6:14 PM   Name:  Ele Koenig   Age:  [de-identified] y.o. Sex:  female  :  1940   MRN:  [id number]   Room:  ENDO/    Presenting Complaint: Chest Pain     Reason(s) for Admission: Generalized abdominal pain [R10.84]  Gallstone pancreatitis [K85.10]  Elevated liver enzymes [R74.8]  Acute gallstone pancreatitis [K85.10]  Chest pain, unspecified type [R07.9]     Hospital Course:   Ele Koenig is a [de-identified] y.o. female with medical history of hypertension, hyperlipidemia, hypothyroidism, anemia due to folate deficiency, Parkinson's disease admitted with acute Pancreatitis. GI consulted and plan for ERCP on 10/17. Procalcitonin elevated and worsening leukocytosis, patient started on Zosyn. Subjective & 24hr Events (10/17/22): Patient is seen at the bedside. Reports feeling tired. Denies nausea, vomiting or abdominal pain but jaundiced. Bilirubin 5.5. WBC 13.8. Procalcitonin elevated at 23.95. Will start patient on empiric antibiotics. Plan for ERCP today. Assessment & Plan:     Acute gallstone pancreatitis  Image noted  GI consulted and plan for ERCP today  Pain control and IV fluids     Leukocytosis  Procalcitonin elevated and worsening leukocytosis, patient started on empiric antibiotic coverage, possible intra-abdominal source  Follow-up on cultures     Right adnexal cyst  Noted in CT  Pelvic ultrasound showed 3.9 cm right adnexal cyst, routine OB/GYN follow-up recommended     HTN:   Continue with hctz    Hypothyroidism:   Continue with synthroid    Parkinson's disease :   Continue with sinemet.        Anticipated discharge needs: TBD    Diet:  Diet NPO Exceptions are: Sips of Water with Meds  DVT PPx: Lovenox on hold due to possible procedure tomorrow  Code status: Full Code    Hospital Problems:  Principal Problem:    Acute gallstone pancreatitis  Active Problems:    Anemia due to folate deficiency    Acquired hypothyroidism    Hypercholesterolemia Gastroesophageal reflux disease with esophagitis  Resolved Problems:    * No resolved hospital problems. *      Objective:   Patient Vitals for the past 24 hrs:   Temp Pulse Resp BP SpO2   10/17/22 1228 98.7 °F (37.1 °C) 67 18 (!) 128/59 94 %   10/17/22 0734 98.8 °F (37.1 °C) 70 18 (!) 122/55 98 %   10/17/22 0303 98.1 °F (36.7 °C) 70 16 121/73 98 %   10/16/22 2245 97.5 °F (36.4 °C) 90 20 114/70 97 %   10/16/22 1918 98.2 °F (36.8 °C) 72 20 98/82 93 %   10/16/22 1915 -- -- -- -- (!) 87 %   10/16/22 1523 99 °F (37.2 °C) 63 18 100/61 93 %         Oxygen Therapy  SpO2: 94 %  Pulse Oximeter Device Mode: Continuous  O2 Device: None (Room air)  O2 Flow Rate (L/min): 2 L/min    Estimated body mass index is 28.84 kg/m² as calculated from the following:    Height as of this encounter: 5' 4\" (1.626 m). Weight as of this encounter: 168 lb (76.2 kg). Intake/Output Summary (Last 24 hours) at 10/17/2022 1334  Last data filed at 10/17/2022 1017  Gross per 24 hour   Intake 3969 ml   Output 1620 ml   Net 2349 ml           Physical Exam:     Blood pressure (!) 128/59, pulse 67, temperature 98.7 °F (37.1 °C), temperature source Oral, resp. rate 18, height 5' 4\" (1.626 m), weight 168 lb (76.2 kg), SpO2 94 %. General:    Well nourished. Head:  Normocephalic, atraumatic  Eyes:  Sclerae appear normal.  Pupils equally round. ENT:  Nares appear normal, no drainage. Moist oral mucosa  Neck:  No restricted ROM. Trachea midline   CV:   RRR. No m/r/g. No jugular venous distension. Lungs:   CTAB. No wheezing, rhonchi, or rales. Symmetric expansion. Abdomen: Bowel sounds present. Soft, nontender, nondistended. Extremities: No cyanosis or clubbing. No edema  Skin:     No rashes and normal coloration. Warm and dry. Neuro:  CN II-XII grossly intact. Sensation intact. A&Ox3  Psych:  Normal mood and affect.       I have personally reviewed labs and tests showing:  Recent Labs:  Recent Results (from the past 48 hour(s)) EKG 12 Lead    Collection Time: 10/15/22  6:01 PM   Result Value Ref Range    Ventricular Rate 83 BPM    Atrial Rate 83 BPM    P-R Interval 174 ms    QRS Duration 76 ms    Q-T Interval 368 ms    QTc Calculation (Bazett) 432 ms    P Axis 33 degrees    R Axis 60 degrees    T Axis 36 degrees    Diagnosis Normal sinus rhythm    CBC    Collection Time: 10/15/22  6:14 PM   Result Value Ref Range    WBC 13.0 (H) 4.3 - 11.1 K/uL    RBC 4.45 4.05 - 5.2 M/uL    Hemoglobin 13.8 11.7 - 15.4 g/dL    Hematocrit 43.4 35.8 - 46.3 %    MCV 97.5 82 - 102 FL    MCH 31.0 26.1 - 32.9 PG    MCHC 31.8 31.4 - 35.0 g/dL    RDW 13.4 11.9 - 14.6 %    Platelets 720 449 - 878 K/uL    MPV 9.8 9.4 - 12.3 FL    nRBC 0.00 0.0 - 0.2 K/uL   Comprehensive Metabolic Panel    Collection Time: 10/15/22  6:14 PM   Result Value Ref Range    Sodium 136 133 - 143 mmol/L    Potassium 3.6 3.5 - 5.1 mmol/L    Chloride 104 101 - 110 mmol/L    CO2 26 21 - 32 mmol/L    Anion Gap 6 2 - 11 mmol/L    Glucose 117 (H) 65 - 100 mg/dL    BUN 24 (H) 8 - 23 MG/DL    Creatinine 0.90 0.6 - 1.0 MG/DL    Est, Glom Filt Rate >60 >60 ml/min/1.73m2    Calcium 9.2 8.3 - 10.4 MG/DL    Total Bilirubin 1.7 (H) 0.2 - 1.1 MG/DL     (H) 12 - 65 U/L     (H) 15 - 37 U/L    Alk Phosphatase 113 50 - 136 U/L    Total Protein 7.3 6.3 - 8.2 g/dL    Albumin 3.8 3.2 - 4.6 g/dL    Globulin 3.5 2.8 - 4.5 g/dL    Albumin/Globulin Ratio 1.1 0.4 - 1.6     Troponin    Collection Time: 10/15/22  6:14 PM   Result Value Ref Range    Troponin, High Sensitivity 5.6 0 - 14 pg/mL   Lipase    Collection Time: 10/15/22  6:14 PM   Result Value Ref Range    Lipase >1,500 (H) 73 - 393 U/L   Troponin    Collection Time: 10/15/22 10:49 PM   Result Value Ref Range    Troponin, High Sensitivity 8.4 0 - 14 pg/mL   Basic Metabolic Panel w/ Reflex to MG    Collection Time: 10/16/22  7:12 AM   Result Value Ref Range    Sodium 138 133 - 143 mmol/L    Potassium 3.9 3.5 - 5.1 mmol/L    Chloride 109 101 - 110 mmol/L    CO2 24 21 - 32 mmol/L    Anion Gap 5 2 - 11 mmol/L    Glucose 101 (H) 65 - 100 mg/dL    BUN 22 8 - 23 MG/DL    Creatinine 0.90 0.6 - 1.0 MG/DL    Est, Glom Filt Rate >60 >60 ml/min/1.73m2    Calcium 8.0 (L) 8.3 - 10.4 MG/DL   CBC with Auto Differential    Collection Time: 10/16/22  7:12 AM   Result Value Ref Range    WBC 20.9 (H) 4.3 - 11.1 K/uL    RBC 3.71 (L) 4.05 - 5.2 M/uL    Hemoglobin 11.7 11.7 - 15.4 g/dL    Hematocrit 36.3 35.8 - 46.3 %    MCV 97.8 82 - 102 FL    MCH 31.5 26.1 - 32.9 PG    MCHC 32.2 31.4 - 35.0 g/dL    RDW 13.8 11.9 - 14.6 %    Platelets 723 300 - 080 K/uL    MPV 10.3 9.4 - 12.3 FL    nRBC 0.00 0.0 - 0.2 K/uL    Differential Type AUTOMATED      Seg Neutrophils 93 (H) 43 - 78 %    Lymphocytes 2 (L) 13 - 44 %    Monocytes 4 4.0 - 12.0 %    Eosinophils % 0 (L) 0.5 - 7.8 %    Basophils 0 0.0 - 2.0 %    Immature Granulocytes 1 0.0 - 5.0 %    Segs Absolute 19.4 (H) 1.7 - 8.2 K/UL    Absolute Lymph # 0.4 (L) 0.5 - 4.6 K/UL    Absolute Mono # 0.9 0.1 - 1.3 K/UL    Absolute Eos # 0.0 0.0 - 0.8 K/UL    Basophils Absolute 0.0 0.0 - 0.2 K/UL    Absolute Immature Granulocyte 0.2 0.0 - 0.5 K/UL   Culture, Blood 1    Collection Time: 10/16/22  3:14 PM    Specimen: Blood   Result Value Ref Range    Special Requests LEFT  ARM        Culture NO GROWTH AFTER 15 HOURS     Procalcitonin    Collection Time: 10/16/22  3:14 PM   Result Value Ref Range    Procalcitonin 23.95 (H) 0.00 - 0.49 ng/mL   Urinalysis with Reflex to Culture    Collection Time: 10/16/22  6:05 PM    Specimen: Urine   Result Value Ref Range    Color, UA DARK YELLOW      Appearance CLOUDY      Specific Gravity, UA 1.020 1.001 - 1.023      pH, Urine 5.5 5.0 - 9.0      Protein, UA TRACE (A) NEG mg/dL    Glucose, UA Negative mg/dL    Ketones, Urine Negative NEG mg/dL    Bilirubin Urine LARGE (A) NEG      Blood, Urine SMALL (A) NEG      Urobilinogen, Urine 1.0 0.2 - 1.0 EU/dL    Nitrite, Urine Negative NEG      Leukocyte Esterase, Urine SMALL (A) NEG      WBC, UA 5-10 0 /hpf    RBC, UA 0 0 /hpf    BACTERIA, URINE 2+ (H) 0 /hpf    Urine Culture if Indicated CULTURE NOT INDICATED BY UA RESULT      Epithelial Cells UA 3-5 0 /hpf    Casts 0-3 0 /lpf    Crystals 0 0 /LPF    Mucus, UA 0 0 /lpf    OTHER OBSERVATIONS RESULTS VERIFIED MANUALLY     Basic Metabolic Panel w/ Reflex to MG    Collection Time: 10/17/22  7:07 AM   Result Value Ref Range    Sodium 140 133 - 143 mmol/L    Potassium 3.5 3.5 - 5.1 mmol/L    Chloride 110 101 - 110 mmol/L    CO2 23 21 - 32 mmol/L    Anion Gap 7 2 - 11 mmol/L    Glucose 79 65 - 100 mg/dL    BUN 15 8 - 23 MG/DL    Creatinine 0.60 0.6 - 1.0 MG/DL    Est, Glom Filt Rate >60 >60 ml/min/1.73m2    Calcium 8.3 8.3 - 10.4 MG/DL   CBC with Auto Differential    Collection Time: 10/17/22  7:07 AM   Result Value Ref Range    WBC 13.8 (H) 4.3 - 11.1 K/uL    RBC 3.71 (L) 4.05 - 5.2 M/uL    Hemoglobin 11.8 11.7 - 15.4 g/dL    Hematocrit 36.9 35.8 - 46.3 %    MCV 99.5 82 - 102 FL    MCH 31.8 26.1 - 32.9 PG    MCHC 32.0 31.4 - 35.0 g/dL    RDW 14.1 11.9 - 14.6 %    Platelets 252 653 - 156 K/uL    MPV 10.9 9.4 - 12.3 FL    nRBC 0.00 0.0 - 0.2 K/uL    Differential Type AUTOMATED      Seg Neutrophils 86 (H) 43 - 78 %    Lymphocytes 6 (L) 13 - 44 %    Monocytes 6 4.0 - 12.0 %    Eosinophils % 1 0.5 - 7.8 %    Basophils 0 0.0 - 2.0 %    Immature Granulocytes 1 0.0 - 5.0 %    Segs Absolute 12.0 (H) 1.7 - 8.2 K/UL    Absolute Lymph # 0.8 0.5 - 4.6 K/UL    Absolute Mono # 0.8 0.1 - 1.3 K/UL    Absolute Eos # 0.1 0.0 - 0.8 K/UL    Basophils Absolute 0.0 0.0 - 0.2 K/UL    Absolute Immature Granulocyte 0.1 0.0 - 0.5 K/UL   Hepatic Function Panel    Collection Time: 10/17/22  7:07 AM   Result Value Ref Range    Total Protein 6.0 (L) 6.3 - 8.2 g/dL    Albumin 2.8 (L) 3.2 - 4.6 g/dL    Globulin 3.2 2.8 - 4.5 g/dL    Albumin/Globulin Ratio 0.9 0.4 - 1.6      Total Bilirubin 5.5 (H) 0.2 - 1.1 MG/DL    Bilirubin, Direct 4.6 (H) <0.4 MG/DL    Alk Phosphatase 161 (H) 50 - 136 U/L     (H) 15 - 37 U/L    ALT 61 12 - 65 U/L   Lipase    Collection Time: 10/17/22  7:07 AM   Result Value Ref Range    Lipase 434 (H) 73 - 393 U/L   Magnesium    Collection Time: 10/17/22  7:07 AM   Result Value Ref Range    Magnesium 2.3 1.8 - 2.4 mg/dL       I have personally reviewed imaging studies showing: Other Studies:  US PELVIS COMPLETE   Final Result   3.9 cm right adnexal cyst. Routine OB/GYN follow-up is recommended. CT CHEST ABDOMEN PELVIS W CONTRAST Additional Contrast? None   Final Result   1. Intra and extrahepatic biliary ductal dilatation with distended gallbladder   and suggestion of hyperdense 0.8 cm focus at the distal common bile duct likely   cause of obstruction. Although favor noncalcified gallstone given the presence   of additional gallstones, obstructing mass lesion not excluded. No pancreatic   ductal dilatation. 2.  No evidence of pulmonary embolism or acute abnormality in the chest.   3.  Moderate coronary artery calcification. 4.  Right adnexal 3.6 cm cyst. Recommend nonurgent pelvic ultrasound for further   evaluation. XR CHEST PORTABLE   Final Result   No consolidation.       FL ERCP BILIARY AND PANCREATIC S&I    (Results Pending)       Current Meds:  Current Facility-Administered Medications   Medication Dose Route Frequency    piperacillin-tazobactam (ZOSYN) 3,375 mg in sodium chloride 0.9 % 50 mL IVPB (mini-bag)  3,375 mg IntraVENous q8h    lactated ringers infusion   IntraVENous Continuous    glucagon (rDNA) injection 1 mg  1 mg IntraVENous PRN    iopamidol (ISOVUE-370) 76 % injection 50 mL  50 mL Other PRN    indomethacin (INDOCIN) 50 MG suppository 100 mg  100 mg Rectal PRN    carbidopa-levodopa (SINEMET)  MG per tablet 1 tablet  1 tablet Oral Nightly    folic acid (FOLVITE) tablet 1 mg  1 mg Oral Daily    pantoprazole (PROTONIX) tablet 40 mg  40 mg Oral QAM AC    pravastatin (PRAVACHOL) tablet 40 mg  40 mg Oral Nightly    0.9 % sodium chloride infusion   IntraVENous Continuous    levothyroxine (SYNTHROID) tablet 75 mcg  75 mcg Oral QAM AC    [Held by provider] aspirin chewable tablet 81 mg  81 mg Oral Daily    sodium chloride flush 0.9 % injection 5-40 mL  5-40 mL IntraVENous 2 times per day    sodium chloride flush 0.9 % injection 5-40 mL  5-40 mL IntraVENous PRN    0.9 % sodium chloride infusion   IntraVENous PRN    ondansetron (ZOFRAN-ODT) disintegrating tablet 4 mg  4 mg Oral Q8H PRN    Or    ondansetron (ZOFRAN) injection 4 mg  4 mg IntraVENous Q6H PRN    polyethylene glycol (GLYCOLAX) packet 17 g  17 g Oral Daily PRN    bisacodyl (DULCOLAX) suppository 10 mg  10 mg Rectal Daily PRN    famotidine (PEPCID) tablet 10 mg  10 mg Oral Daily PRN    aluminum & magnesium hydroxide-simethicone (MAALOX) 200-200-20 MG/5ML suspension 30 mL  30 mL Oral Q6H PRN    acetaminophen (TYLENOL) tablet 650 mg  650 mg Oral Q6H PRN    Or    acetaminophen (TYLENOL) suppository 650 mg  650 mg Rectal Q6H PRN    [Held by provider] enoxaparin (LOVENOX) injection 40 mg  40 mg SubCUTAneous Q24H    traMADol (ULTRAM) tablet 50 mg  50 mg Oral Q6H PRN    HYDROcodone-acetaminophen (NORCO) 5-325 MG per tablet 1 tablet  1 tablet Oral Q6H PRN       Signed:  Ruben Staples MD    Part of this note may have been written by using a voice dictation software. The note has been proof read but may still contain some grammatical/other typographical errors. 24-Apr-2018 17:43

## 2022-10-17 NOTE — INTERVAL H&P NOTE
Update History & Physical    The Patient's History and Physical attached below was reviewed with the patient and I examined the patient. There was no change in the plan, or pertinent findings. The surgical site was confirmed with the patient. Plan:  The risk, benefits, expected outcome, and alternative to the recommended procedure have been discussed with the patient. Patient understands and wants to proceed with the procedure.     Electronically signed by Starla Wayne MD

## 2022-10-17 NOTE — ANESTHESIA PROCEDURE NOTES
Airway  Date/Time: 10/17/2022 1:47 PM  Urgency: elective    Airway not difficult    General Information and Staff    Patient location during procedure: OR  Resident/CRNA: JUSTINO Gomez - CRNA  Performed: resident/CRNA     Indications and Patient Condition  Indications for airway management: anesthesia  Spontaneous Ventilation: absent  Sedation level: deep  Preoxygenated: yes  Patient position: sniffing  MILS not maintained throughout  Mask difficulty assessment: vent by bag mask    Final Airway Details  Final airway type: endotracheal airway      Successful airway: ETT  Cuffed: yes   Successful intubation technique: direct laryngoscopy  Facilitating devices/methods: intubating stylet  Endotracheal tube insertion site: oral  Blade: Mikal  Blade size: #4  ETT size (mm): 7.0  Cormack-Lehane Classification: grade I - full view of glottis  Placement verified by: chest auscultation and capnometry   Measured from: lips  ETT to lips (cm): 21  Number of attempts at approach: 1  Ventilation between attempts: bag mask

## 2022-10-17 NOTE — PROGRESS NOTES
END OF SHIFT NOTE:    INTAKE/OUTPUT  10/16 0701 - 10/17 0700  In: 1689 [P.O.:830; I.V.:2622]  Out: 1895 [Urine:1895]  Voiding: yes  Catheter: no  Drain:              Flatus: Patient does have flatus present. Stool:  0 occurrences. Characteristics:       Emesis: 0 occurrences. Characteristics:        VITAL SIGNS  Patient Vitals for the past 12 hrs:   Temp Pulse Resp BP SpO2   10/17/22 0303 98.1 °F (36.7 °C) 70 16 121/73 98 %   10/16/22 2245 97.5 °F (36.4 °C) 90 20 114/70 97 %   10/16/22 1918 98.2 °F (36.8 °C) 72 20 98/82 93 %   10/16/22 1915 -- -- -- -- (!) 87 %       Pain Assessment                Ambulating  Yes few steps in room. @l nc keeping O2 sats greater than 90. No c/o's pain this shift. Shift report given to oncoming nurse at the bedside.     Shelley Rivera RN

## 2022-10-17 NOTE — ANESTHESIA POSTPROCEDURE EVALUATION
Department of Anesthesiology  Postprocedure Note    Patient: Khloe Peres  MRN: [id number]  YOB: 1940  Date of evaluation: 10/17/2022      Procedure Summary     Date: 10/17/22 Room / Location:  ENDO [facility]OURO 1 /  ENDOSCOPY    Anesthesia Start: 6444 Anesthesia Stop: 1734    Procedure: ERCP ENDOSCOPIC RETROGRADE CHOLANGIOPANCREATOGRAPHY Balloon sweep (Upper GI Region) Diagnosis:       Gallstones      Acute pancreatitis, unspecified complication status, unspecified pancreatitis type      (Gallstones [K80.20])      (Acute pancreatitis, unspecified complication status, unspecified pancreatitis type [K85.90])    Surgeons: Justin Montalvo MD Responsible Provider: Analisa Meadows MD    Anesthesia Type: general ASA Status: 2          Anesthesia Type: No value filed. Perfecto Phase I: Perfecto Score: 9    Perfecto Phase II:        Anesthesia Post Evaluation    Patient location during evaluation: PACU  Patient participation: complete - patient participated  Level of consciousness: awake and alert  Airway patency: patent  Nausea & Vomiting: no nausea and no vomiting  Complications: no  Cardiovascular status: hemodynamically stable  Respiratory status: acceptable  Hydration status: euvolemic  Comments: Blood pressure 127/66, pulse 60, temperature 98.4 °F (36.9 °C), temperature source Temporal, resp. rate 15, height 5' 4\" (1.626 m), weight 168 lb (76.2 kg), SpO2 100 %.       Pt stable for discharge from PACU  Multimodal analgesia pain management approach

## 2022-10-17 NOTE — ANESTHESIA PRE PROCEDURE
Department of Anesthesiology  Preprocedure Note       Name:  Audrey Callaway   Age:  [de-identified] y.o.  :  1940                                          MRN:  [id number]         Date:  10/17/2022      Surgeon: Pavan Peña):  Praneeth Mitchell MD    Procedure: Procedure(s):  ERCP ENDOSCOPIC RETROGRADE CHOLANGIOPANCREATOGRAPHY rm 212    Medications prior to admission:   Prior to Admission medications    Medication Sig Start Date End Date Taking? Authorizing Provider   NONFORMULARY Preservision/Lutein oral cap 1 cap bid   Yes Historical Provider, MD   Multiple Vitamins-Minerals (THERAPEUTIC MULTIVITAMIN-MINERALS) tablet Take 1 tablet by mouth daily   Yes Historical Provider, MD   Omega-3 Fatty Acids (FISH OIL) 1000 MG CPDR Take 1,000 mg by mouth daily   Yes Historical Provider, MD   NONFORMULARY Fiber oral tablet 1 capsule daily   Yes Historical Provider, MD   Hyoscyamine Sulfate SL (LEVSIN/SL) 0.125 MG SUBL Place 0.25 mg under the tongue 4 times daily as needed (abdominal pain or cramping) 22   Anabela Hernandez MD   pantoprazole (PROTONIX) 40 MG tablet TAKE 1 TABLET BY MOUTH DAILY BEFORE DINNER 22   Deuce Ruvalcaba MD   pravastatin (PRAVACHOL) 40 MG tablet TAKE 1 TABLET BY MOUTH AT NIGHT 22   Deuce Ruvalcaba MD   hydroCHLOROthiazide (HYDRODIURIL) 12.5 MG tablet Take 1 tablet by mouth daily TAKE 1 TABLET BY MOUTH DAILY 22   Deuce Ruvalcaba MD   telmisartan (MICARDIS) 80 MG tablet Take 1 tablet by mouth in the morning. 22   Deuce Ruvalcaba MD   levothyroxine (SYNTHROID) 75 MCG tablet Take 1 tablet by mouth in the morning.  TAKE 1 TABLET BY MOUTH DAILY BEFORE BREAKFAST. 22   Deuce Ruvalcaba MD   folic acid (FOLVITE) 1 MG tablet Take 1 tablet by mouth in the morning. 22   Deuce Ruvalcaba MD   Calcium Polycarbophil (FIBER) 625 MG TABS take 1 qd    Historical Provider, MD   coenzyme Q10 100 MG CAPS capsule Take by mouth every morning    Historical Provider, MD   acetaminophen (TYLENOL) 325 MG tablet Take 650 mg by mouth every 4 hours as needed    Ar Automatic Reconciliation   aspirin 81 MG EC tablet Take 81 mg by mouth every 12 hours 12/11/19   Ar Automatic Reconciliation   carbidopa-levodopa (SINEMET)  MG per tablet TAKE 1 TABLET BY MOUTH AT NIGHT 11/23/21   Ar Automatic Reconciliation   cetirizine (ZYRTEC) 10 MG tablet Take 10 mg by mouth daily    Ar Automatic Reconciliation   clobetasol (TEMOVATE) 0.05 % cream Apply topically 2 times daily as needed 8/25/16   Ar Automatic Reconciliation   clotrimazole-betamethasone (LOTRISONE) 1-0.05 % cream Apply topically 2 times daily  Patient not taking: Reported on 10/15/2022 3/28/18   Ar Automatic Reconciliation   diphenhydrAMINE-APAP, sleep, (TYLENOL PM EXTRA STRENGTH)  MG tablet Take 1-2 tablets by mouth    Ar Automatic Reconciliation   doxycycline monohydrate (ADOXA) 100 MG tablet Take 100 mg by mouth 2 times daily 10/14/21   Ar Automatic Reconciliation   Glucosamine 500 MG CAPS Take 500 mg by mouth daily    Ar Automatic Reconciliation   hydrocortisone 2.5 % cream Place rectally 4 times daily 2/15/19   Ar Automatic Reconciliation   ketoconazole (NIZORAL) 2 % cream Apply topically 2 times daily as needed    Ar Automatic Reconciliation   prednisoLONE acetate (PRED FORTE) 1 % ophthalmic suspension Apply 1 drop to eye Twice a Week    Ar Automatic Reconciliation   traMADol (ULTRAM) 50 MG tablet Take 50 mg by mouth every 6 hours as needed.     Ar Automatic Reconciliation   triamcinolone (KENALOG) 0.1 % cream Apply topically 2 times daily as needed 2/9/18   Ar Automatic Reconciliation       Current medications:    Current Facility-Administered Medications   Medication Dose Route Frequency Provider Last Rate Last Admin    piperacillin-tazobactam (ZOSYN) 3,375 mg in sodium chloride 0.9 % 50 mL IVPB (mini-bag)  3,375 mg IntraVENous q8h Rustam Herbert MD        lactated ringers infusion   IntraVENous Continuous Maite Clarke MD        glucagon (rDNA) injection 1 mg  1 mg IntraVENous PRN Lita Pruett MD        iopamidol (ISOVUE-370) 76 % injection 50 mL  50 mL Other PRN Lita Pruett MD        indomethacin (INDOCIN) 50 MG suppository 100 mg  100 mg Rectal PRN Lita Pruett MD        carbidopa-levodopa (SINEMET)  MG per tablet 1 tablet  1 tablet Oral Nightly Maycol Mcmillan MD   1 tablet at 33/14/34 2680    folic acid (FOLVITE) tablet 1 mg  1 mg Oral Daily Maycol Mcmillan MD   1 mg at 10/17/22 0904    pantoprazole (PROTONIX) tablet 40 mg  40 mg Oral QAM  Maycol Mcmillan MD   40 mg at 10/17/22 0619    pravastatin (PRAVACHOL) tablet 40 mg  40 mg Oral Nightly Maycol Mcmillan MD   40 mg at 10/16/22 2134    0.9 % sodium chloride infusion   IntraVENous Continuous Florrie Pinks, APRN -  mL/hr at 10/17/22 1246 NoRateChange at 10/17/22 1246    levothyroxine (SYNTHROID) tablet 75 mcg  75 mcg Oral QAM  John Charles MD   75 mcg at 10/17/22 0619    [Held by provider] aspirin chewable tablet 81 mg  81 mg Oral Daily Liya Ozuna MD   81 mg at 10/15/22 1857    sodium chloride flush 0.9 % injection 5-40 mL  5-40 mL IntraVENous 2 times per day Maycol Mcmillan MD   10 mL at 10/17/22 0904    sodium chloride flush 0.9 % injection 5-40 mL  5-40 mL IntraVENous PRN Maycol Mcmillan MD        0.9 % sodium chloride infusion   IntraVENous PRN Maycol Mcmillan MD        ondansetron (ZOFRAN-ODT) disintegrating tablet 4 mg  4 mg Oral Q8H PRN Maycol Mcmillan MD   4 mg at 10/15/22 2118    Or    ondansetron (ZOFRAN) injection 4 mg  4 mg IntraVENous Q6H PRN Maycol Mcmillan MD   4 mg at 10/16/22 0341    polyethylene glycol (GLYCOLAX) packet 17 g  17 g Oral Daily PRN Maycol Mcmillan MD        bisacodyl (DULCOLAX) suppository 10 mg  10 mg Rectal Daily PRN Maycol Mcmillan MD        famotidine (PEPCID) tablet 10 mg  10 mg Oral Daily PRN Maycol Mcmillan MD        aluminum & magnesium hydroxide-simethicone (Verlinda Flash) 290-615-26 MG/5ML suspension 30 mL  30 mL Oral Q6H PRN Latoya Rashid MD        acetaminophen (TYLENOL) tablet 650 mg  650 mg Oral Q6H PRN Latoya Rashid MD   650 mg at 10/17/22 1109    Or    acetaminophen (TYLENOL) suppository 650 mg  650 mg Rectal Q6H PRN Latoya Rashid MD        [Held by provider] enoxaparin (LOVENOX) injection 40 mg  40 mg SubCUTAneous Q24H Obinna Easton MD   40 mg at 10/16/22 1028    traMADol (ULTRAM) tablet 50 mg  50 mg Oral Q6H PRN Latoya Rashid MD        HYDROcodone-acetaminophen (NORCO) 5-325 MG per tablet 1 tablet  1 tablet Oral Q6H PRN Latoya Rashid MD   1 tablet at 10/16/22 0343       Allergies: Allergies   Allergen Reactions    Ace Inhibitors Angioedema and Swelling     Pt stated \"ace blockers\" can't remember name for htn  Facial swelling       Problem List:    Patient Active Problem List   Diagnosis Code    Allergic rhinitis due to pollen J30.1    Acquired hypothyroidism E03.9    Vitamin D deficiency E55.9    Arthritis of left knee M17.12    Abdominal pain R10.9    Hypercholesterolemia E78.00    Thyroid disease E07.9    Gastroesophageal reflux disease with esophagitis K21.00    Presence of left artificial knee joint Z96.652    Contusion of elbow and forearm, left, initial encounter S50. 14XA    Sigmoid diverticulitis K57.32    Arthritis of right knee M17.11    GERD (gastroesophageal reflux disease) K21.9    Essential hypertension, benign I10    Osteoarthritis M19.90    Presence of right artificial knee joint Z96.651    Restless leg syndrome G25.81    Acute gallstone pancreatitis K85.10    Anemia due to folate deficiency D52.9       Past Medical History:        Diagnosis Date    Allergic rhinitis due to pollen     Arthritis     Asthma as a child    Constipation 02/2019    post-op after TKA    Ear problems     Fibrocystic breast     GERD (gastroesophageal reflux disease)     Managed with meds     H/O colonoscopy 2009    Normal (Enrique)    Hashimoto's thyroiditis     Hearing reduced     Hypertension     Managed with meds     Macular degeneration     Mixed hyperlipidemia     Daily meds     Osteoporosis 2013    GYN Dr. Delatorre Spotted    Restless leg     managed with medication    Unspecified hypothyroidism     Varicella        Past Surgical History:        Procedure Laterality Date    ADENOIDECTOMY  as a child    APPENDECTOMY  1973    CATARACT REMOVAL Bilateral 2007    CATARACT REMOVAL Bilateral     COLONOSCOPY      Dr. Matthew Mauro -- no more per pt    GYN      ovarian cyst    HERNIA REPAIR  2018    OVARIAN CYST REMOVAL  1973    TONSILLECTOMY  as a child    TOTAL KNEE ARTHROPLASTY Left 2019       Social History:    Social History     Tobacco Use    Smoking status: Former     Packs/day: 0.50     Types: Cigarettes     Quit date: 1975     Years since quittin.7    Smokeless tobacco: Never    Tobacco comments:     Quit smoking: quit age of 30   Substance Use Topics    Alcohol use:  Yes     Alcohol/week: 1.0 standard drink                                Counseling given: Not Answered  Tobacco comments: Quit smoking: quit age of 30      Vital Signs (Current):   Vitals:    10/17/22 0303 10/17/22 0615 10/17/22 0734 10/17/22 1228   BP: 121/73  (!) 122/55 (!) 128/59   Pulse: 70  70 67   Resp: 16  18 18   Temp: 98.1 °F (36.7 °C)  98.8 °F (37.1 °C) 98.7 °F (37.1 °C)   TempSrc: Oral  Oral Oral   SpO2: 98%  98% 94%   Weight:  168 lb 6.4 oz (76.4 kg)  168 lb (76.2 kg)   Height:    5' 4\" (1.626 m)                                              BP Readings from Last 3 Encounters:   10/17/22 (!) 128/59   22 105/80   22 (!) 112/57       NPO Status: Time of last liquid consumption: 1115 (sips with meds)                        Time of last solid consumption: 1900                        Date of last liquid consumption: 10/17/22                        Date of last solid food consumption: 10/15/22    BMI:   Wt Readings from Last 3 Encounters:   10/17/22 168 lb (76.2 kg)   09/16/22 163 lb (73.9 kg)   07/21/22 162 lb 12.8 oz (73.8 kg)     Body mass index is 28.84 kg/m². CBC:   Lab Results   Component Value Date/Time    WBC 13.8 10/17/2022 07:07 AM    RBC 3.71 10/17/2022 07:07 AM    HGB 11.8 10/17/2022 07:07 AM    HCT 36.9 10/17/2022 07:07 AM    MCV 99.5 10/17/2022 07:07 AM    RDW 14.1 10/17/2022 07:07 AM     10/17/2022 07:07 AM       CMP:   Lab Results   Component Value Date/Time     10/17/2022 07:07 AM    K 3.5 10/17/2022 07:07 AM     10/17/2022 07:07 AM    CO2 23 10/17/2022 07:07 AM    BUN 15 10/17/2022 07:07 AM    CREATININE 0.60 10/17/2022 07:07 AM    GFRAA >60 09/16/2022 07:13 PM    AGRATIO 1.3 04/21/2022 11:30 AM    LABGLOM >60 10/17/2022 07:07 AM    GLUCOSE 79 10/17/2022 07:07 AM    PROT 6.0 10/17/2022 07:07 AM    CALCIUM 8.3 10/17/2022 07:07 AM    BILITOT 5.5 10/17/2022 07:07 AM    ALKPHOS 161 10/17/2022 07:07 AM    ALKPHOS 71 04/21/2022 11:30 AM     10/17/2022 07:07 AM    ALT 61 10/17/2022 07:07 AM       POC Tests: No results for input(s): POCGLU, POCNA, POCK, POCCL, POCBUN, POCHEMO, POCHCT in the last 72 hours.     Coags:   Lab Results   Component Value Date/Time    PROTIME 12.6 11/19/2019 08:55 AM    INR 0.9 11/19/2019 08:55 AM    APTT 30.8 11/19/2019 08:55 AM       HCG (If Applicable): No results found for: PREGTESTUR, PREGSERUM, HCG, HCGQUANT     ABGs: No results found for: PHART, PO2ART, BCL2CRK, BBT2SPC, BEART, W2JQRSSM     Type & Screen (If Applicable):  No results found for: LABABO, LABRH    Drug/Infectious Status (If Applicable):  No results found for: HIV, HEPCAB    COVID-19 Screening (If Applicable): No results found for: COVID19        Anesthesia Evaluation  Patient summary reviewed and Nursing notes reviewed no history of anesthetic complications:   Airway: Mallampati: III     Neck ROM: full  Mouth opening: > = 3 FB   Dental: normal exam         Pulmonary:Negative Pulmonary ROS breath sounds clear to auscultation                             Cardiovascular:    (+) hypertension:, hyperlipidemia        Rhythm: regular                      Neuro/Psych:   Negative Neuro/Psych ROS              GI/Hepatic/Renal:   (+) GERD:,          ROS comment: Admitted with gallstone pancreatitis. Endo/Other:    (+) hypothyroidism::., .                 Abdominal:             Vascular: negative vascular ROS. Other Findings: Very hard of hearing          Anesthesia Plan      general     ASA 2       Induction: intravenous. Anesthetic plan and risks discussed with patient and spouse.                         Davey Gaytan MD   10/17/2022

## 2022-10-18 ENCOUNTER — PREP FOR PROCEDURE (OUTPATIENT)
Dept: SURGERY | Age: 82
End: 2022-10-18

## 2022-10-18 LAB
ALBUMIN SERPL-MCNC: 2.6 G/DL (ref 3.2–4.6)
ALBUMIN/GLOB SERPL: 0.8 {RATIO} (ref 0.4–1.6)
ALP SERPL-CCNC: 218 U/L (ref 50–136)
ALT SERPL-CCNC: 57 U/L (ref 12–65)
ANION GAP SERPL CALC-SCNC: 13 MMOL/L (ref 2–11)
AST SERPL-CCNC: 154 U/L (ref 15–37)
BASOPHILS # BLD: 0 K/UL (ref 0–0.2)
BASOPHILS NFR BLD: 0 % (ref 0–2)
BILIRUB DIRECT SERPL-MCNC: 3.3 MG/DL
BILIRUB SERPL-MCNC: 4.2 MG/DL (ref 0.2–1.1)
BUN SERPL-MCNC: 20 MG/DL (ref 8–23)
CALCIUM SERPL-MCNC: 7.8 MG/DL (ref 8.3–10.4)
CHLORIDE SERPL-SCNC: 114 MMOL/L (ref 101–110)
CO2 SERPL-SCNC: 17 MMOL/L (ref 21–32)
CREAT SERPL-MCNC: 0.6 MG/DL (ref 0.6–1)
DIFFERENTIAL METHOD BLD: ABNORMAL
EOSINOPHIL # BLD: 0.1 K/UL (ref 0–0.8)
EOSINOPHIL NFR BLD: 1 % (ref 0.5–7.8)
ERYTHROCYTE [DISTWIDTH] IN BLOOD BY AUTOMATED COUNT: 14.2 % (ref 11.9–14.6)
GLOBULIN SER CALC-MCNC: 3.2 G/DL (ref 2.8–4.5)
GLUCOSE SERPL-MCNC: 58 MG/DL (ref 65–100)
HCT VFR BLD AUTO: 34.9 % (ref 35.8–46.3)
HGB BLD-MCNC: 10.8 G/DL (ref 11.7–15.4)
IMM GRANULOCYTES # BLD AUTO: 0.1 K/UL (ref 0–0.5)
IMM GRANULOCYTES NFR BLD AUTO: 1 % (ref 0–5)
LYMPHOCYTES # BLD: 0.5 K/UL (ref 0.5–4.6)
LYMPHOCYTES NFR BLD: 5 % (ref 13–44)
MAGNESIUM SERPL-MCNC: 2.5 MG/DL (ref 1.8–2.4)
MCH RBC QN AUTO: 31.2 PG (ref 26.1–32.9)
MCHC RBC AUTO-ENTMCNC: 30.9 G/DL (ref 31.4–35)
MCV RBC AUTO: 100.9 FL (ref 82–102)
MONOCYTES # BLD: 0.6 K/UL (ref 0.1–1.3)
MONOCYTES NFR BLD: 6 % (ref 4–12)
NEUTS SEG # BLD: 9.3 K/UL (ref 1.7–8.2)
NEUTS SEG NFR BLD: 87 % (ref 43–78)
NRBC # BLD: 0 K/UL (ref 0–0.2)
PLATELET # BLD AUTO: 209 K/UL (ref 150–450)
PMV BLD AUTO: 11 FL (ref 9.4–12.3)
POTASSIUM SERPL-SCNC: 3.4 MMOL/L (ref 3.5–5.1)
PROT SERPL-MCNC: 5.8 G/DL (ref 6.3–8.2)
RBC # BLD AUTO: 3.46 M/UL (ref 4.05–5.2)
SODIUM SERPL-SCNC: 144 MMOL/L (ref 133–143)
WBC # BLD AUTO: 10.6 K/UL (ref 4.3–11.1)

## 2022-10-18 PROCEDURE — 99223 1ST HOSP IP/OBS HIGH 75: CPT | Performed by: SURGERY

## 2022-10-18 PROCEDURE — 2580000003 HC RX 258: Performed by: FAMILY MEDICINE

## 2022-10-18 PROCEDURE — 97530 THERAPEUTIC ACTIVITIES: CPT

## 2022-10-18 PROCEDURE — 80048 BASIC METABOLIC PNL TOTAL CA: CPT

## 2022-10-18 PROCEDURE — 6370000000 HC RX 637 (ALT 250 FOR IP): Performed by: INTERNAL MEDICINE

## 2022-10-18 PROCEDURE — 97161 PT EVAL LOW COMPLEX 20 MIN: CPT

## 2022-10-18 PROCEDURE — 97165 OT EVAL LOW COMPLEX 30 MIN: CPT

## 2022-10-18 PROCEDURE — 80076 HEPATIC FUNCTION PANEL: CPT

## 2022-10-18 PROCEDURE — 6360000002 HC RX W HCPCS: Performed by: FAMILY MEDICINE

## 2022-10-18 PROCEDURE — 6370000000 HC RX 637 (ALT 250 FOR IP): Performed by: FAMILY MEDICINE

## 2022-10-18 PROCEDURE — 97535 SELF CARE MNGMENT TRAINING: CPT

## 2022-10-18 PROCEDURE — 85025 COMPLETE CBC W/AUTO DIFF WBC: CPT

## 2022-10-18 PROCEDURE — 2580000003 HC RX 258: Performed by: INTERNAL MEDICINE

## 2022-10-18 PROCEDURE — 83735 ASSAY OF MAGNESIUM: CPT

## 2022-10-18 PROCEDURE — 2580000003 HC RX 258: Performed by: SURGERY

## 2022-10-18 PROCEDURE — 36415 COLL VENOUS BLD VENIPUNCTURE: CPT

## 2022-10-18 PROCEDURE — 1100000000 HC RM PRIVATE

## 2022-10-18 RX ORDER — DIPHENHYDRAMINE HYDROCHLORIDE 50 MG/ML
12.5 INJECTION INTRAMUSCULAR; INTRAVENOUS
Status: CANCELLED | OUTPATIENT
Start: 2022-10-18 | End: 2022-10-19

## 2022-10-18 RX ORDER — ONDANSETRON 2 MG/ML
4 INJECTION INTRAMUSCULAR; INTRAVENOUS
Status: CANCELLED | OUTPATIENT
Start: 2022-10-18 | End: 2022-10-19

## 2022-10-18 RX ORDER — SODIUM CHLORIDE, SODIUM LACTATE, POTASSIUM CHLORIDE, CALCIUM CHLORIDE 600; 310; 30; 20 MG/100ML; MG/100ML; MG/100ML; MG/100ML
INJECTION, SOLUTION INTRAVENOUS CONTINUOUS
Status: CANCELLED | OUTPATIENT
Start: 2022-10-18

## 2022-10-18 RX ORDER — PROCHLORPERAZINE EDISYLATE 5 MG/ML
5 INJECTION INTRAMUSCULAR; INTRAVENOUS
Status: CANCELLED | OUTPATIENT
Start: 2022-10-18 | End: 2022-10-19

## 2022-10-18 RX ORDER — SODIUM CHLORIDE 0.9 % (FLUSH) 0.9 %
5-40 SYRINGE (ML) INJECTION PRN
Status: CANCELLED | OUTPATIENT
Start: 2022-10-18

## 2022-10-18 RX ORDER — OXYCODONE HYDROCHLORIDE 5 MG/1
10 TABLET ORAL PRN
Status: CANCELLED | OUTPATIENT
Start: 2022-10-18 | End: 2022-10-18

## 2022-10-18 RX ORDER — LIDOCAINE HYDROCHLORIDE 10 MG/ML
1 INJECTION, SOLUTION INFILTRATION; PERINEURAL
Status: CANCELLED | OUTPATIENT
Start: 2022-10-18 | End: 2022-10-19

## 2022-10-18 RX ORDER — HYDROMORPHONE HYDROCHLORIDE 2 MG/ML
0.5 INJECTION, SOLUTION INTRAMUSCULAR; INTRAVENOUS; SUBCUTANEOUS EVERY 5 MIN PRN
Status: CANCELLED | OUTPATIENT
Start: 2022-10-18

## 2022-10-18 RX ORDER — MIDAZOLAM HYDROCHLORIDE 2 MG/2ML
2 INJECTION, SOLUTION INTRAMUSCULAR; INTRAVENOUS
Status: CANCELLED | OUTPATIENT
Start: 2022-10-18 | End: 2022-10-19

## 2022-10-18 RX ORDER — SODIUM CHLORIDE 9 MG/ML
INJECTION, SOLUTION INTRAVENOUS PRN
Status: CANCELLED | OUTPATIENT
Start: 2022-10-18

## 2022-10-18 RX ORDER — POTASSIUM CHLORIDE 7.45 MG/ML
10 INJECTION INTRAVENOUS ONCE
Status: COMPLETED | OUTPATIENT
Start: 2022-10-18 | End: 2022-10-18

## 2022-10-18 RX ORDER — SODIUM CHLORIDE 0.9 % (FLUSH) 0.9 %
5-40 SYRINGE (ML) INJECTION EVERY 12 HOURS SCHEDULED
Status: CANCELLED | OUTPATIENT
Start: 2022-10-18

## 2022-10-18 RX ORDER — OXYCODONE HYDROCHLORIDE 5 MG/1
5 TABLET ORAL PRN
Status: CANCELLED | OUTPATIENT
Start: 2022-10-18 | End: 2022-10-18

## 2022-10-18 RX ORDER — HYDROMORPHONE HYDROCHLORIDE 2 MG/ML
0.25 INJECTION, SOLUTION INTRAMUSCULAR; INTRAVENOUS; SUBCUTANEOUS EVERY 5 MIN PRN
Status: CANCELLED | OUTPATIENT
Start: 2022-10-18

## 2022-10-18 RX ORDER — ACETAMINOPHEN 500 MG
1000 TABLET ORAL ONCE
Status: CANCELLED | OUTPATIENT
Start: 2022-10-18 | End: 2022-10-18

## 2022-10-18 RX ORDER — SODIUM CHLORIDE, SODIUM LACTATE, POTASSIUM CHLORIDE, CALCIUM CHLORIDE 600; 310; 30; 20 MG/100ML; MG/100ML; MG/100ML; MG/100ML
INJECTION, SOLUTION INTRAVENOUS CONTINUOUS
Status: DISCONTINUED | OUTPATIENT
Start: 2022-10-18 | End: 2022-10-18

## 2022-10-18 RX ADMIN — PANTOPRAZOLE SODIUM 40 MG: 40 TABLET, DELAYED RELEASE ORAL at 05:24

## 2022-10-18 RX ADMIN — POTASSIUM CHLORIDE 10 MEQ: 7.46 INJECTION, SOLUTION INTRAVENOUS at 08:56

## 2022-10-18 RX ADMIN — CARBIDOPA AND LEVODOPA 1 TABLET: 25; 100 TABLET ORAL at 21:16

## 2022-10-18 RX ADMIN — PIPERACILLIN AND TAZOBACTAM 3375 MG: 3; .375 INJECTION, POWDER, FOR SOLUTION INTRAVENOUS at 15:51

## 2022-10-18 RX ADMIN — ACETAMINOPHEN 650 MG: 325 TABLET, FILM COATED ORAL at 11:41

## 2022-10-18 RX ADMIN — PIPERACILLIN AND TAZOBACTAM 3375 MG: 3; .375 INJECTION, POWDER, FOR SOLUTION INTRAVENOUS at 08:56

## 2022-10-18 RX ADMIN — LEVOTHYROXINE SODIUM 75 MCG: 0.07 TABLET ORAL at 05:24

## 2022-10-18 RX ADMIN — SODIUM CHLORIDE, PRESERVATIVE FREE 10 ML: 5 INJECTION INTRAVENOUS at 21:18

## 2022-10-18 RX ADMIN — PRAVASTATIN SODIUM 40 MG: 20 TABLET ORAL at 21:16

## 2022-10-18 RX ADMIN — FOLIC ACID 1 MG: 1 TABLET ORAL at 08:55

## 2022-10-18 RX ADMIN — SODIUM CHLORIDE, POTASSIUM CHLORIDE, SODIUM LACTATE AND CALCIUM CHLORIDE: 600; 310; 30; 20 INJECTION, SOLUTION INTRAVENOUS at 11:44

## 2022-10-18 RX ADMIN — SODIUM CHLORIDE, PRESERVATIVE FREE 10 ML: 5 INJECTION INTRAVENOUS at 08:59

## 2022-10-18 ASSESSMENT — PAIN SCALES - GENERAL
PAINLEVEL_OUTOF10: 5
PAINLEVEL_OUTOF10: 0

## 2022-10-18 ASSESSMENT — PAIN DESCRIPTION - LOCATION: LOCATION: HEAD

## 2022-10-18 ASSESSMENT — PAIN DESCRIPTION - DESCRIPTORS: DESCRIPTORS: ACHING

## 2022-10-18 NOTE — PROGRESS NOTES
ACUTE OCCUPATIONAL THERAPY GOALS:   (Developed with and agreed upon by patient and/or caregiver.)  1. Pt will complete LB ADL set up only with AE as needed. 2. Pt will complete toileting I.  3. Pt will complete UB ADL I.  4. Pt will tolerate 25 minutes of OT treatment requiring 1-2 breaks as needed. 5. Pt will complete grooming tasks while standing at sink I.  6. Pt will complete functional mobility supervision. 7. Pt will tolerate BUE exercises to increase strength for safe, functional transfers, ADL participation. OCCUPATIONAL THERAPY Initial Assessment       OT Visit Days: 1  Acknowledge Orders  Time  OT Charge Capture  Rehab Caseload Tracker      Ranjana Brito is a [de-identified] y.o. female   PRIMARY DIAGNOSIS: Acute gallstone pancreatitis  Generalized abdominal pain [R10.84]  Gallstone pancreatitis [K85.10]  Elevated liver enzymes [R74.8]  Acute gallstone pancreatitis [K85.10]  Chest pain, unspecified type [R07.9]  Procedure(s) (LRB):  ERCP ENDOSCOPIC RETROGRADE CHOLANGIOPANCREATOGRAPHY Balloon sweep (N/A)  1 Day Post-Op  Reason for Referral: Generalized Muscle Weakness (M62.81)  Inpatient: Payor: Rohit Martin / Plan: [place] / Product Type: *No Product type* /     ASSESSMENT:     REHAB RECOMMENDATIONS:   Recommendation to date pending progress:  Setting:  No further skilled therapy after discharge from hospital    Equipment:    None     ASSESSMENT:  Ms. [employer] is a [de-identified] y F with a hx of arthritis, HTN, HLD, anemia, Parkinson's. Pt has not had fall in 4 years. Pt was admitted for acute gallstone pancreatitis requiring ERCP. Pt was received sitting on toilet with  present in room. Toilet transfer SBA. Toileting SBA. Turah robe gown in standing SBA. Combing hair in standing SBA. Turah/donning undewear in sitting and standing SBA, requiring v/c to sit for unthreading to avoid fall. Functional mobility toilet > chair > bed SBA no AD due to fatigue. O2 stayed [facility]. Still requested 2L.  Pt has deficits in strength, balance, activity tolerance, and performing ADLs. Pt requires continued skilled OT services due to performing functionally below baseline. [address] [place] [facility]\"" [address] [address] [facility] 6 Clicks Daily Activity Inpatient Short Form:    AM-PAC Daily Activity Inpatient   How much help for putting on and taking off regular lower body clothing?: A Little  How much help for Bathing?: None  How much help for Toileting?: None  How much help for putting on and taking off regular upper body clothing?: None  How much help for taking care of personal grooming?: None  How much help for eating meals?: None  AM-[facility] Inpatient Daily Activity Raw Score: 23  AM-PAC Inpatient ADL T-Scale Score : 51.12  ADL Inpatient CMS 0-100% Score: 15.86  ADL Inpatient CMS G-Code Modifier : CI           SUBJECTIVE:     Ms. Rashid Jung states, [employer] you so much dear. \"     Social/Functional Lives With: Spouse  Type of Home: House  Home Layout: One level  Home Access: Stairs to enter without rails  Entrance Stairs - Number of Steps: 1  Bathroom Shower/Tub: Walk-in shower  Bathroom Equipment: Built-in shower seat, Grab bars in [address] [place]: [address] [facility] Help From: Family  ADL Assistance: Independent  Homemaking Assistance: Independent  Ambulation Assistance: Independent  Transfer Assistance: Independent  Active : Yes  Mode of Transportation: Car    OBJECTIVE:     Macey Pencil / Arthurine Layla / Mendel Sánchez: IV    RESTRICTIONS/PRECAUTIONS:       PAIN: Vikas Holter / O2:   Pre Treatment:   Pain Assessment: None - Denies Pain      Post Treatment: none       Vitals          Oxygen   WFL         GROSS EVALUATION: INTACT IMPAIRED   (See Comments)   UE AROM [x] []   UE PROM [] []   Strength []  Slightly overall dereased     Posture / Balance [] Standing - Dynamic: Good, -, Fair, +   Sensation [x]     Coordination [x]       Tone [x]       Edema [x]    Activity Tolerance [] Patient limited by fatigue     Hand Dominance R [] L []      COGNITION/  PERCEPTION: INTACT IMPAIRED   (See Comments)   Orientation [x]     Vision [x]     Hearing []  Hearing aids   Cognition  [x]     Perception [x]       MOBILITY: I Mod I S SBA CGA Min Mod Max Total  NT x2 Comments:   Bed Mobility    Rolling [] [] [] [] [] [] [] [] [] [] []    Supine to Sit [] [] [] [] [] [] [] [] [] [] []    Scooting [] [] [] [] [] [] [] [] [] [] []    Sit to Supine [] [] [] [x] [] [] [] [] [] [] [] Increased effort   Transfers    Sit to Stand [] [] [] [x] [] [] [] [] [] [] []    Bed to Chair [] [] [] [] [] [] [] [] [] [] []    Stand to Sit [] [] [] [x] [] [] [] [] [] [] []    Tub/Shower [] [] [] [] [] [] [] [] [] [] []     Toilet [] [] [] [x] [] [] [] [] [] [] []      [] [] [] [] [] [] [] [] [] [] []    I=Independent, Mod I=Modified Independent, S=Supervision/Setup, SBA=Standby Assistance, CGA=Contact Guard Assistance, Min=Minimal Assistance, Mod=Moderate Assistance, Max=Maximal Assistance, Total=Total Assistance, NT=Not Tested    ACTIVITIES OF DAILY LIVING: I Mod I S SBA CGA Min Mod Max Total NT Comments   BASIC ADLs:              Upper Body Bathing  [] [] [] [] [] [] [] [] [] []    Lower Body Bathing [] [] [] [] [] [] [] [] [] []    Toileting [] [] [] [x] [] [] [] [] [] []    Upper Body Dressing [] [] [] [x] [] [] [] [] [] []    Lower Body Dressing [] [] [] [x] [] [] [] [] [] [] V/c for safe techniques   Feeding [] [] [] [] [] [] [] [] [] []    Grooming [] [] [] [x] [] [] [] [] [] []    Personal Device Care [] [] [] [] [] [] [] [] [] []    Functional Mobility [] [] [] [x] [] [] [] [] [] [] No AD   I=Independent, Mod I=Modified Independent, S=Supervision/Setup, SBA=Standby Assistance, CGA=Contact Guard Assistance, Min=Minimal Assistance, Mod=Moderate Assistance, Max=Maximal Assistance, Total=Total Assistance, NT=Not Tested    PLAN:   FREQUENCY/DURATION   OT Plan of Care: 2 times/week for duration of hospital stay or until stated goals are met, whichever comes first.    PROBLEM LIST:   (Skilled intervention is medically necessary to address:)  Decreased ADL/Functional Activities  Decreased Activity Tolerance  Decreased Balance  Decreased Gait Ability  Decreased Safety Awareness  Decreased Strength   INTERVENTIONS PLANNED:  (Benefits and precautions of occupational therapy have been discussed with the patient.)  Self Care Training  Therapeutic Activity  Therapeutic Exercise/HEP  Neuromuscular Re-education  Manual Therapy  Education         TREATMENT:     EVALUATION: LOW COMPLEXITY: (Untimed Charge)    TREATMENT:   Self Care (8 minutes): Patient participated in toileting, upper body dressing, lower body dressing, and grooming ADLs in supported sitting and standing with minimal verbal cueing to increase safety awareness. Patient also participated in functional mobility training to increase independence, decrease assistance required, increase activity tolerance, and increase safety awareness. TREATMENT GRID:  N/A    AFTER TREATMENT PRECAUTIONS: Bed, Bed/Chair Locked, Call light within reach, Needs within reach, RN notified, Side rails x3, and Visitors at bedside    INTERDISCIPLINARY COLLABORATION:  RN/ PCT, PT/ PTA, and OT/ GRANT    EDUCATION:  Education Given To: Patient; Family  Education Provided: Role of Therapy;Plan of Care;ADL Adaptive Strategies; Fall Prevention Strategies  Education Outcome: Verbalized understanding    TOTAL TREATMENT DURATION AND TIME:  Time In: 3060  Time Out: [facility] [address]  Minutes: [address] OT

## 2022-10-18 NOTE — DISCHARGE INSTRUCTIONS
Soft, Low fat diet    Follow-up  General Surgery - Gallbladder removal (lap cholecystectomy) is scheduled on Tuesday 10/25/22 with Dr Livia Campbell  Nothing to eat after midnight on Monday 10/24/22 for surgery on Tuesday 10/25/22  You will be called by the pre-op staff (likely on Monday) with details about where and when to show up    Follow-up with GYN for a right ovarian cystic mass shown on your CT scan and your pelvic US

## 2022-10-18 NOTE — CONSULTS
H&P/Consult Note/Progress Note/Office Note:   Greg Villarreal  MRN: [id number]  JKT:[id number]  Age:82 y.o.    HPI: Greg Villarreal is a [de-identified] y.o. female who was admitted by the hospitalist on 10/15/22   after she presented to the ER with a 1 day h/o constant and severe epigastric pain with radiation to her back. Nothing in particular made her symptoms better or worse. Wbc 13k-->20.9k  Lipase >1500  T Bili 1.7--->5.5  AST//134     She was diagnosed with gallstone pancreatitis and had imaging as shown below    She is s/p ERCP on 10/17/22 by Dr. Jensen Díaz with clearance of her common bile duct  General surgery was consulted. She denies prior abd surgery. 10/15/22 CXR  No lobar consolidation, pleural effusions or pulmonary edema. 10/15/22 CT chest/abd/pelvis and with IV contrast  FINDINGS: AIRWAYS: The central airways are patent. LUNGS: Dependent changes and scarring within the lung bases. Pleural parenchymal scarring within the lung apices. No suspicious pulmonary nodules. PLEURA: No pleural effusion or pneumothorax. HEART: The heart is not enlarged. Moderate calcified coronary atherosclerosis. No pericardial effusion. THORACIC AORTA: The aorta is normal in caliber. PULMONARY ARTERY: The main pulmonary artery is normal in caliber. No evidence of pulmonary embolism. MEDIASTINUM/KEREN: No mediastinal mass or lymphadenopathy. Moderate sized hiatal hernia. CHEST WALL: No mass or axillary lymphadenopathy. LIVER: The liver contour is normal. No suspicious liver lesion. BILIARY TREE: Gallbladder is distended. There are calcified gallstones dependently within the gallbladder. Prominent intra and extrahepatic biliary ductal dilatation with common bile duct measuring up to 1.6 cm diameter. There is suggestion of rounded hypodense 0.8 cm focus within the distal common bile duct       SPLEEN: Normal.     PANCREAS: No pancreatic mass or ductal dilation.      ADRENALS: Normal. KIDNEYS/BLADDER: The kidneys are symmetric in size. No renal calculus or hydronephrosis. Several renal cysts and too small to characterize hypodensities are present within the kidneys. The urinary bladder is unremarkable. BOWEL: The colon is unremarkable. The small bowel is normal in caliber. No bowel wall thickening. Small duodenal diverticulum is present. Sigmoid diverticulosis without diverticulitis. APPENDIX: Appendix not definitely seen however no inflammatory changes in the right lower quadrant to suggest appendicitis. PERITONEUM/RETROPERITONEUM: No ascites or free air. No pelvic or retroperitoneal lymphadenopathy. VESSELS: No abdominal aortic aneurysm. ABDOMINAL WALL: No hernia or mass. REPRODUCTIVE: 3.6 cm cyst within the right adnexa. BONES: No suspicious osseous lesion. Impression:  1. Intra and extrahepatic biliary ductal dilatation with distended gallbladder and suggestion of hyperdense 0.8 cm focus at the distal common bile duct likely cause of obstruction. Although favor noncalcified gallstone given the presence of additional gallstones, obstructing mass lesion not excluded. No pancreatic ductal dilatation. 2. No evidence of pulmonary embolism or acute abnormality in the chest.   3. Moderate coronary artery calcification. 4. Right adnexal 3.6 cm cyst. Recommend nonurgent pelvic ultrasound for further evaluation. 10/16/22 pelvic US  Hx: [de-identified]year-old patient with Right adnexal mass on CT earlier today. The uterus is 6 cm in length with an endometrial stripe thickness measuring 2 mm. The right ovary is 4.9 x 3.9 x 3.9 cm. There is a 3.9 cm right adnexal cyst. Doppler flow is present in the right ovary. The left ovary is not visible. There is no free pelvic fluid. The bladder is normal in appearance. Impression:  3.9 cm right adnexal cyst. Routine OB/GYN follow-up is recommended.      10/17/22 ERCP  Fluoroscopic spot imaging during ERCP; Hx:  CBD stones. PROCEDURE: Fluoroscopic spot imaging performed during a ERCP procedure   FINDINGS: The common bile duct was cannulated at the ampulla. Contrast was administered. The common bile duct was mildly dilated. There was a clear intraluminal filling defect indicative of a common bile duct stone. A balloon sweep was performed and the stone was extracted. Impression:   Comment bile duct stones appreciated.  The stones were extracted without consequence           Past Medical History:   Diagnosis Date    Allergic rhinitis due to pollen     Arthritis     Asthma as a child    Constipation 02/2019    post-op after TKA    Ear problems     Fibrocystic breast     GERD (gastroesophageal reflux disease)     Managed with meds     H/O colonoscopy 2009    Normal ([facility])    Hashimoto's thyroiditis     Hearing reduced     Hypertension     Managed with meds     Macular degeneration     Mixed hyperlipidemia     Daily meds     Osteoporosis 1/2013    GYN Dr. Óscar Warren    Restless leg     managed with medication    Unspecified hypothyroidism     Varicella      Past Surgical History:   Procedure Laterality Date    ADENOIDECTOMY  as a child    APPENDECTOMY  1973    CATARACT REMOVAL Bilateral 2007    CATARACT REMOVAL Bilateral     COLONOSCOPY      Dr. Dolly Hanna 2009-- no more per pt    ERCP N/A 10/17/2022    ERCP ENDOSCOPIC RETROGRADE CHOLANGIOPANCREATOGRAPHY Balloon sweep performed by Justin Montalvo MD at [facility] ENDOSCOPY    GYN      ovarian cyst    HERNIA REPAIR  06/16/2018    OVARIAN CYST REMOVAL  1973    TONSILLECTOMY  as a child    TOTAL KNEE ARTHROPLASTY Left 02/07/2019     Current Facility-Administered Medications   Medication Dose Route Frequency    lactated ringers infusion   IntraVENous Continuous    piperacillin-tazobactam (ZOSYN) 3,375 mg in sodium chloride 0.9 % 50 mL IVPB (mini-bag)  3,375 mg IntraVENous q8h    glucagon (rDNA) injection 1 mg  1 mg IntraVENous PRN    iopamidol (ISOVUE-370) 76 % injection 50 mL 50 mL Other PRN    carbidopa-levodopa (SINEMET)  MG per tablet 1 tablet  1 tablet Oral Nightly    folic acid (FOLVITE) tablet 1 mg  1 mg Oral Daily    pantoprazole (PROTONIX) tablet 40 mg  40 mg Oral QAM AC    pravastatin (PRAVACHOL) tablet 40 mg  40 mg Oral Nightly    levothyroxine (SYNTHROID) tablet 75 mcg  75 mcg Oral QAM AC    [Held by provider] aspirin chewable tablet 81 mg  81 mg Oral Daily    sodium chloride flush 0.9 % injection 5-40 mL  5-40 mL IntraVENous 2 times per day    sodium chloride flush 0.9 % injection 5-40 mL  5-40 mL IntraVENous PRN    ondansetron (ZOFRAN-ODT) disintegrating tablet 4 mg  4 mg Oral Q8H PRN    Or    ondansetron (ZOFRAN) injection 4 mg  4 mg IntraVENous Q6H PRN    bisacodyl (DULCOLAX) suppository 10 mg  10 mg Rectal Daily PRN    famotidine (PEPCID) tablet 10 mg  10 mg Oral Daily PRN    acetaminophen (TYLENOL) tablet 650 mg  650 mg Oral Q6H PRN    Or    acetaminophen (TYLENOL) suppository 650 mg  650 mg Rectal Q6H PRN    [Held by provider] enoxaparin (LOVENOX) injection 40 mg  40 mg SubCUTAneous Q24H    traMADol (ULTRAM) tablet 50 mg  50 mg Oral Q6H PRN    HYDROcodone-acetaminophen (NORCO) 5-325 MG per tablet 1 tablet  1 tablet Oral Q6H PRN     ALLERGIES:  Ace inhibitors    Social History     Socioeconomic History    Marital status:    Tobacco Use    Smoking status: Former     Packs/day: 0.50     Types: Cigarettes     Quit date: 1975     Years since quittin.7    Smokeless tobacco: Never    Tobacco comments:     Quit smoking: quit age of 30   Substance and Sexual Activity    Alcohol use:  Yes     Alcohol/week: 1.0 standard drink    Drug use: No     Social History     Tobacco Use   Smoking Status Former    Packs/day: 0.50    Types: Cigarettes    Quit date: 1975    Years since quittin.7   Smokeless Tobacco Never   Tobacco Comments    Quit smoking: quit age of 30     Family History   Problem Relation Age of Onset    Coronary Art Dis Mother     COPD Father Other Father         smoker    Heart Disease Brother     Cancer Maternal Grandmother     Breast Cancer Mother     Cancer Mother         breast, lung    Heart Disease Mother     Heart Attack Mother      ROS: The patient has no difficulty with chest pain or shortness of breath. No fever or chills. Comprehensive review of systems was otherwise unremarkable except as noted above. Physical Exam:   /68   Pulse 75   Temp 98.4 °F (36.9 °C) (Oral)   Resp 20   Ht 5' 4\" (1.626 m)   Wt 168 lb (76.2 kg)   SpO2 93%   BMI 28.84 kg/m²   Vitals:    10/17/22 2345 10/18/22 0419 10/18/22 0729 10/18/22 1054   BP: 123/69 132/77 135/69 131/68   Pulse: 73 78 72 75   Resp: 17 17 18 20   Temp: 97.9 °F (36.6 °C) 97.8 °F (36.6 °C) 98.4 °F (36.9 °C) 98.4 °F (36.9 °C)   TempSrc: Oral Oral Oral Oral   SpO2: 94% 97% 96% 93%   Weight:       Height:         No intake/output data recorded. 10/16 1901 - 10/18 0700  In: 3689.1 [I.V.:3689.1]  Out: 2175 [Urine:2170]    Constitutional: Alert, oriented, cooperative patient in no acute distress; appears stated age    Eyes:Sclera are clear. EOMs intact  ENMT: no external lesions gross hearing normal; no obvious neck masses, no ear or lip lesions, nares normal  CV: RRR. Normal perfusion  Resp: No JVD. Breathing is  non-labored; no audible wheezing. GI: soft and non-distended; non-tender     Musculoskeletal: unremarkable with normal function. No embolic signs or cyanosis.    Neuro:  Oriented; moves all 4; no focal deficits  Psychiatric: normal affect and mood, no memory impairment    Recent vitals (if inpt):  Patient Vitals for the past 24 hrs:   BP Temp Temp src Pulse Resp SpO2 Height Weight   10/18/22 1054 131/68 98.4 °F (36.9 °C) Oral 75 20 93 % -- --   10/18/22 0729 135/69 98.4 °F (36.9 °C) Oral 72 18 96 % -- --   10/18/22 0419 132/77 97.8 °F (36.6 °C) Oral 78 17 97 % -- --   10/17/22 2345 123/69 97.9 °F (36.6 °C) Oral 73 17 94 % -- --   10/17/22 1913 (!) 140/81 97.5 °F (36.4 °C) Oral 67 18 96 % -- --   10/17/22 1612 120/65 98.1 °F (36.7 °C) Oral 61 18 97 % -- --   10/17/22 1504 (!) 141/63 -- -- 64 16 100 % -- --   10/17/22 1458 127/66 98.4 °F (36.9 °C) Temporal 60 15 100 % -- --   10/17/22 1455 (!) 141/65 -- -- 61 16 100 % -- --   10/17/22 1450 132/60 -- -- 60 16 99 % -- --   10/17/22 1445 (!) 142/63 -- -- 65 15 93 % -- --   10/17/22 1440 128/60 -- -- 63 15 97 % -- --   10/17/22 1435 130/72 -- -- 65 16 96 % -- --   10/17/22 1429 (!) 120/56 98.2 °F (36.8 °C) Temporal 67 16 100 % -- --   10/17/22 1427 -- 98.3 °F (36.8 °C) Temporal -- 16 100 % -- --   10/17/22 1228 (!) 128/59 98.7 °F (37.1 °C) Oral 67 18 94 % 5' 4\" (1.626 m) 168 lb (76.2 kg)       Amount and/or Complexity of Data Reviewed and Analyzed:  I reviewed and analyzed all of the unique labs and radiologic studies that are shown below as well as any that are in the HPI, and any that are in the expanded problem list below  *Each unique test, order, or document contributes to the combination of 2 or combination of 3 in Category 1 below. For this visit I also reviewed old records and prior notes.       Recent Labs     10/17/22  0707 10/18/22  0615   WBC 13.8*  --    HGB 11.8  --      --     144*   K 3.5 3.4*    114*   CO2 23 17*   BUN 15 20   ALT 61 57     Review of most recent CBC  Lab Results   Component Value Date    WBC 13.8 (H) 10/17/2022    HGB 11.8 10/17/2022    HCT 36.9 10/17/2022    MCV 99.5 10/17/2022     10/17/2022       Review of most recent BMP  Lab Results   Component Value Date/Time     10/18/2022 06:15 AM    K 3.4 10/18/2022 06:15 AM     10/18/2022 06:15 AM    CO2 17 10/18/2022 06:15 AM    BUN 20 10/18/2022 06:15 AM    CREATININE 0.60 10/18/2022 06:15 AM    GLUCOSE 58 10/18/2022 06:15 AM    CALCIUM 7.8 10/18/2022 06:15 AM        Review of most recent LFTs (and lipase if done)  Lab Results   Component Value Date    ALT 57 10/18/2022     (H) 10/18/2022    ALKPHOS 218 (H) 10/18/2022    BILITOT 4.2 (H) 10/18/2022     Lab Results   Component Value Date    LIPASE 434 (H) 10/17/2022       Lab Results   Component Value Date/Time    INR 0.9 11/19/2019 08:55 AM    APTT 30.8 11/19/2019 08:55 AM       Review of most recent HgbA1c  Hemoglobin A1C   Date Value Ref Range Status   04/21/2022 5.2 4.8 - 5.6 % Final     Comment:              Prediabetes: 5.7 - 6.4           Diabetes: >6.4           Glycemic control for adults with diabetes: <7.0         Nutritional assessment screen for wound healing issues:  Lab Results   Component Value Date    LABALBU 2.6 (L) 10/18/2022       @lastcovr@    Xray Result (most recent):  XR CHEST PORTABLE 10/15/2022    Narrative  AP PORTABLE CHEST X-RAY 10/15/2022 6:32 PM    HISTORY: Midsternal chest pain that began suddenly today and radiates into back    COMPARISON: 9/16/2022    FINDINGS:  EKG leads are present. There is minimal retrocardiac atelectasis or  scarring. There is no lobar consolidation, pleural effusions or pulmonary edema. Impression  No consolidation. CT Result (most recent):  CT CHEST ABDOMEN PELVIS W CONTRAST 10/15/2022    Narrative  EXAMINATION: CT CHEST ABDOMEN PELVIS W CONTRAST 10/15/2022 7:14 PM    ACCESSION NUMBER: [id number]    COMPARISON: CT abdomen/pelvis January 4, 2013    INDICATION: Chest pain, unspecified; Generalized abdominal pain    TECHNIQUE: Multiple contiguous axial CT images of the chest, abdomen, and pelvis  were obtained from the lung apices to the symphysis pubis after the intravenous  administration of 100mL Iso-gia 370. Reformats are provided. Radiation dose reduction techniques were used for this study. Our CT scanners  use one or all of the following: Automated exposure control, adjustment of the  mA and/or kV according to patient size, iterative reconstruction. FINDINGS:  AIRWAYS: The central airways are patent. LUNGS: Dependent changes and scarring within the lung bases.  Pleural parenchymal  scarring within the lung apices. No suspicious pulmonary nodules. PLEURA: No pleural effusion or pneumothorax. HEART: The heart is not enlarged. Moderate calcified coronary atherosclerosis. No pericardial effusion. THORACIC AORTA: The aorta is normal in caliber. PULMONARY ARTERY: The main pulmonary artery is normal in caliber. No evidence of  pulmonary embolism. MEDIASTINUM/KEREN: No mediastinal mass or lymphadenopathy. Moderate sized hiatal  hernia. CHEST WALL: No mass or axillary lymphadenopathy. LIVER: The liver contour is normal. No suspicious liver lesion. BILIARY TREE: Gallbladder is distended. There are calcified gallstones  dependently within the gallbladder. Prominent intra and extrahepatic biliary  ductal dilatation with common bile duct measuring up to 1.6 cm diameter. There  is suggestion of rounded hypodense 0.8 cm focus within the distal common bile  duct    SPLEEN: Normal.    PANCREAS: No pancreatic mass or ductal dilation. ADRENALS: Normal.    KIDNEYS/BLADDER: The kidneys are symmetric in size. No renal calculus or  hydronephrosis. Several renal cysts and too small to characterize hypodensities  are present within the kidneys. The urinary bladder is unremarkable. BOWEL: The colon is unremarkable. The small bowel is normal in caliber. No bowel  wall thickening. Small duodenal diverticulum is present. Sigmoid diverticulosis  without diverticulitis. APPENDIX: Appendix not definitely seen however no inflammatory changes in the  right lower quadrant to suggest appendicitis. PERITONEUM/RETROPERITONEUM: No ascites or free air. No pelvic or retroperitoneal  lymphadenopathy. VESSELS: No abdominal aortic aneurysm. ABDOMINAL WALL: No hernia or mass. REPRODUCTIVE: 3.6 cm cyst within the right adnexa. BONES: No suspicious osseous lesion. Impression  1.   Intra and extrahepatic biliary ductal dilatation with distended gallbladder  and suggestion of hyperdense 0.8 cm focus at the distal common bile duct likely  cause of obstruction. Although favor noncalcified gallstone given the presence  of additional gallstones, obstructing mass lesion not excluded. No pancreatic  ductal dilatation. 2.  No evidence of pulmonary embolism or acute abnormality in the chest.  3.  Moderate coronary artery calcification. 4.  Right adnexal 3.6 cm cyst. Recommend nonurgent pelvic ultrasound for further  evaluation. US Result (most recent):  US PELVIS COMPLETE 10/16/2022    Narrative  PELVIC ULTRASOUND 10/16/2022    HISTORY: 79-year-old patient with Right adnexal mass on CT earlier today. TECHNIQUE: Sonographic imaging of the pelvis was performed from a transabdominal  approach. COMPARISON: CT abdomen and pelvis from yesterday. FINDINGS: The uterus is 6 cm in length with an endometrial stripe thickness  measuring 2 mm. The right ovary is 4.9 x 3.9 x 3.9 cm. There is a 3.9 cm right adnexal cyst.  Doppler flow is present in the right ovary. The left ovary is not visible. There  is no free pelvic fluid. The bladder is normal in appearance. Impression  3.9 cm right adnexal cyst. Routine OB/GYN follow-up is recommended. Admission date (for inpatients): 10/15/2022   1 Day Post-Op  Procedure(s):  ERCP ENDOSCOPIC RETROGRADE CHOLANGIOPANCREATOGRAPHY Balloon sweep        ASSESSMENT/PLAN:  [unfilled]  Principal Problem:    Acute gallstone pancreatitis  Active Problems:    Anemia due to folate deficiency    Acquired hypothyroidism    Hypercholesterolemia    Gastroesophageal reflux disease with esophagitis  Resolved Problems:    * No resolved hospital problems.  *     Patient Active Problem List    Diagnosis Date Noted    Acute gallstone pancreatitis 10/15/2022     Priority: Medium    Anemia due to folate deficiency 10/15/2022     Priority: Medium    Vitamin D deficiency 10/14/2020    Restless leg syndrome 10/14/2020    Presence of left artificial knee joint 03/08/2020    Contusion of elbow and forearm, left, initial encounter 03/08/2020    Presence of right artificial knee joint 03/08/2020    Arthritis of right knee 12/10/2019    Osteoarthritis 10/01/2019    Arthritis of left knee 02/07/2019    Acquired hypothyroidism 04/19/2016    Gastroesophageal reflux disease with esophagitis 04/19/2016    GERD (gastroesophageal reflux disease)     Allergic rhinitis due to pollen 06/17/2015    Essential hypertension, benign 06/17/2015    Hypercholesterolemia     Thyroid disease     Abdominal pain 01/07/2013    Sigmoid diverticulitis 01/07/2013          Number and Complexity of Problems addressed and   Risks of complications and/or morbidity of management        Gallstone pancreatitis  S/p ERCP on 10/18/22  LFTs and T Bili improving    We discussed her condition at length. Operative risks were discussed including bleeding, infection, recurrent pancreatitis, injury to bowel or bile duct, the need for open surgery, blood clots, risk of anesthesia, etc.. We had an opening in the operating room schedule for today and I encouraged her to take it to lower the risks of developing recurrent pancreatitis while waiting for another date. Risk of not having surgery was also discussed including recurrent pancreatitis and cholecystitis. All her questions were answered. I offered and recommended laparoscopic cholecystectomy today  She absolutely refused to proceed with surgery today (said she was too tired and had a headache) and wanted to do the surgery as an outpatient. She acknowledged understanding that delay of cholecystectomy could lead to increased risk of recurrent pancreatitis or recurrent CBD stones    The next available date I offered was on 10/25/22. She is agreeable to this and we will bring her in as an outpatient for laparoscopic cholecystectomy. Assuming her labs improved tomorrow she can be discharged in am.  The preoperative staff will contact her regarding details of her surgery.     3.9cm right adnexal cystic mass on pelvic US (And CT)  Recommend outpatient GYN follow-up        Level of MDM (2/3 elements below)  Number and Complexity of Problems Addressed Amount and/or Complexity of Data to be Reviewed and Analyzed  *Each unique test, order, or document contributes to the combination of 2 or combination of 3 in Category 1 below. Risk of Complications and/or Morbidity or Mortality of pt Management     83229  29304 SF Minimal  ?1self-limited or minor problem Minimal or none Minimal risk of morbidity from additional diagnostic testing or Rx   50352  15004 Low Low  ? 2or more self-limited or minor problems;    or  ? 1stable chronic illness;    or  ?1acute, uncomplicated illness or injury   Limited  (Must meet the requirements of at least 1 of the 2 categories)  Category 1: Tests and documents   ? Any combination of 2 from the following:  ?Review of prior external note(s) from each unique source*;  ?review of the result(s) of each unique test*;   ?ordering of each unique test*    or   Category 2: Assessment requiring an independent historian(s)  (For the categories of independent interpretation of tests and discussion of management or test interpretation, see moderate or high) Low risk of morbidity from additional diagnostic testing or treatment     05390  84446 Mod Moderate  ? 1or more chronic illnesses with exacerbation, progression, or side effects of treatment;    or  ?2or more stable chronic illnesses;    or  ?1undiagnosed new problem with uncertain prognosis;    or  ?1acute illness with systemic symptoms;    or  ?1acute complicated injury   Moderate  (Must meet the requirements of at least 1 out of 3 categories)  Category 1: Tests, documents, or independent historian(s)  ? Any combination of 3 from the following:   ?Review of prior external note(s) from each unique source*;  ?Review of the result(s) of each unique test*;  ?Ordering of each unique test*;  ?Assessment requiring an independent historian(s)    or  Category 2: Independent interpretation of tests   ? Independent interpretation of a test performed by another physician/other qualified health care professional (not separately reported);     or  Category 3: Discussion of management or test interpretation  ? Discussion of management or test interpretation with external physician/other qualified health care professional/appropriate source (not separately reported)   Moderate risk of morbidity from additional diagnostic testing or treatment  Examples only:  ?Prescription drug management   ? Decision regarding minor surgery with identified patient or procedure risk factors  ? Decision regarding elective major surgery without identified patient or procedure risk factors   ? Diagnosis or treatment significantly limited by social determinants of health       [id number] High High  ? 1or more chronic illnesses with severe exacerbation, progression, or side effects of treatment;    or  ?1 acute or chronic illness or injury that poses a threat to life or bodily function   Extensive  (Must meet the requirements of at least 2 out of 3 categories)  Category 1: Tests, documents, or independent historian(s)  ? Any combination of 3 from the following:   ?Review of prior external note(s) from each unique source*;  ?Review of the result(s) of each unique test*;   ?Ordering of each unique test*;   ?Assessment requiring an independent historian(s)    or   Category 2: Independent interpretation of tests   ? Independent interpretation of a test performed by another physician/other qualified health care professional (not separately reported);     or  Category 3: Discussion of management or test interpretation  ? Discussion of management or test interpretation with external physician/other qualified health care professional/appropriate source (not separately reported)   High risk of morbidity from additional diagnostic testing or treatment  Examples only:  ?Drug therapy requiring intensive monitoring for toxicity  ? Decision regarding elective major surgery with identified patient or procedure risk factors  ? Decision regarding emergency major surgery  ? Decision regarding hospitalization  ? Decision not to resuscitate or to de-escalate care because of poor prognosis             I have personally performed a face-to-face diagnostic evaluation and management  service on this patient. I have independently seen the patient. I have independently obtained the above history from the patient/family. I have independently examined the patient with above findings. I have independently reviewed data/labs for this patient and developed the above plan of care (MDM).       Signed: Anna No MD  10/18/2022    10:57 AM

## 2022-10-18 NOTE — PROGRESS NOTES
END OF SHIFT NOTE:    INTAKE/OUTPUT  10/17 0701 - 10/18 0700  In: 1790.1 [I.V.:1790.1]  Out: 0224 [Urine:1150]  Voiding: Yes  Catheter: No  Drain:              Flatus: Patient does have flatus present. Stool: 0 occurrences. Characteristics:  Stool Appearance: Formed  Stool Color: Brown  Stool Amount: Small  Stool Assessment  Incontinence: No  Stool Appearance: Formed  Stool Color: Brown  Stool Amount: Small  Stool Source: Rectum  Last BM (including prior to admit): 10/17/22    Emesis:  0 occurrences. Characteristics:        VITAL SIGNS  Patient Vitals for the past 12 hrs:   Temp Pulse Resp BP SpO2   10/18/22 1454 97.5 °F (36.4 °C) 79 20 113/62 100 %   10/18/22 1054 98.4 °F (36.9 °C) 75 20 131/68 93 %   10/18/22 0729 98.4 °F (36.9 °C) 72 18 135/69 96 %       Pain Assessment  Pain Level: 5 (10/18/22 1141)  Pain Location: Head  Patient's Stated Pain Goal: 0 - No pain    Ambulating  Yes    Shift report given to oncoming nurse at the bedside. Gris Schmitz

## 2022-10-18 NOTE — PROGRESS NOTES
Date of admission:  10/15/2022    Today's date:  10/18/2022    CC:     Jaundice    HPI:   No events or new complaints. Patient denies abdominal pain.     ROS:    Gen: No fevers, no chills   CV:   No chest pain, no SOB    Current Medications:     Current Facility-Administered Medications   Medication Dose Route Frequency    potassium chloride 10 mEq/100 mL IVPB (Peripheral Line)  10 mEq IntraVENous Once    piperacillin-tazobactam (ZOSYN) 3,375 mg in sodium chloride 0.9 % 50 mL IVPB (mini-bag)  3,375 mg IntraVENous q8h    glucagon (rDNA) injection 1 mg  1 mg IntraVENous PRN    iopamidol (ISOVUE-370) 76 % injection 50 mL  50 mL Other PRN    indomethacin (INDOCIN) 50 MG suppository 100 mg  100 mg Rectal PRN    carbidopa-levodopa (SINEMET)  MG per tablet 1 tablet  1 tablet Oral Nightly    folic acid (FOLVITE) tablet 1 mg  1 mg Oral Daily    pantoprazole (PROTONIX) tablet 40 mg  40 mg Oral QAM AC    pravastatin (PRAVACHOL) tablet 40 mg  40 mg Oral Nightly    0.9 % sodium chloride infusion   IntraVENous Continuous    levothyroxine (SYNTHROID) tablet 75 mcg  75 mcg Oral QAM AC    [Held by provider] aspirin chewable tablet 81 mg  81 mg Oral Daily    sodium chloride flush 0.9 % injection 5-40 mL  5-40 mL IntraVENous 2 times per day    sodium chloride flush 0.9 % injection 5-40 mL  5-40 mL IntraVENous PRN    0.9 % sodium chloride infusion   IntraVENous PRN    ondansetron (ZOFRAN-ODT) disintegrating tablet 4 mg  4 mg Oral Q8H PRN    Or    ondansetron (ZOFRAN) injection 4 mg  4 mg IntraVENous Q6H PRN    polyethylene glycol (GLYCOLAX) packet 17 g  17 g Oral Daily PRN    bisacodyl (DULCOLAX) suppository 10 mg  10 mg Rectal Daily PRN    famotidine (PEPCID) tablet 10 mg  10 mg Oral Daily PRN    aluminum & magnesium hydroxide-simethicone (MAALOX) 200-200-20 MG/5ML suspension 30 mL  30 mL Oral Q6H PRN    acetaminophen (TYLENOL) tablet 650 mg  650 mg Oral Q6H PRN    Or    acetaminophen (TYLENOL) suppository 650 mg  650 mg Rectal Q6H PRN    [Held by provider] enoxaparin (LOVENOX) injection 40 mg  40 mg SubCUTAneous Q24H    traMADol (ULTRAM) tablet 50 mg  50 mg Oral Q6H PRN    HYDROcodone-acetaminophen (NORCO) 5-325 MG per tablet 1 tablet  1 tablet Oral Q6H PRN       Physical Exam:   Vitals:  /69   Pulse 72   Temp 98.4 °F (36.9 °C) (Oral)   Resp 18   Ht 5' 4\" (1.626 m)   Wt 168 lb (76.2 kg)   SpO2 96%   BMI 28.84 kg/m²   Intake/Output:  No intake/output data recorded. 10/16 1901 - 10/18 0700  In: 3689.1 [I.V.:3689.1]  Out: 2175 [Urine:2170]    HEENT:  No scleral icterus, no oral ulcers  Abdomen: Soft, nontender, normoactive bowel sounds  Extremities:  No cyanosis, no leg edema  Neuro:  Alert and oriented to person, place, and time    Data:     Recent Results (from the past 24 hour(s))   Basic Metabolic Panel w/ Reflex to MG    Collection Time: 10/18/22  6:15 AM   Result Value Ref Range    Sodium 144 (H) 133 - 143 mmol/L    Potassium 3.4 (L) 3.5 - 5.1 mmol/L    Chloride 114 (H) 101 - 110 mmol/L    CO2 17 (L) 21 - 32 mmol/L    Anion Gap 13 (H) 2 - 11 mmol/L    Glucose 58 (L) 65 - 100 mg/dL    BUN 20 8 - 23 MG/DL    Creatinine 0.60 0.6 - 1.0 MG/DL    Est, Glom Filt Rate >60 >60 ml/min/1.73m2    Calcium 7.8 (L) 8.3 - 10.4 MG/DL       Impression/Plan:       [de-identified] y.o. female with PMH including but not limited to,  HTN, HLD, hypothyroid, anemia due to folate deficiency, Parkinson's who was seen in consultation at the request of Dr. Chayo Mejia for biliary pancreatitis. Patient underwent ERCP yesterday with extraction of biliary stone. Will follow-up results of morning LFTs. As patient has additional gallstones, she will be a risk for further complications such as cholangitis or gallstone pancreatitis. Will request that surgery evaluate for and determine timing of possible cholecystectomy.      Signed:  Essence Schmid MD  10/18/2022  8:10 AM

## 2022-10-18 NOTE — PROGRESS NOTES
Physician Progress Note      PATIENT:               Lilia Sierra  CSN #:                  [id number]  :                       1940  ADMIT DATE:       10/15/2022 6:14 PM  [address] [place] DATE:  RESPONDING  PROVIDER #:        Estelle Hood MD          QUERY TEXT:    Pt admitted with acute gallstone pancreatitis. Pt noted to meet sepsis   criteria. If possible, please document in the progress notes and discharge   summary if you are evaluating and /or treating any of the following: The medical record reflects the following:  Risk Factors: [de-identified] y.o. female with medical history of hypertension,   hyperlipidemia, hypothyroidism, anemia due to folate deficiency, Parkinson's   disease who presented to emergency room with acute onset of substernal chest.  Clinical Indicators: WBC 13, 20.9, 13.9, procal 23.95  Treatment: UA, blood cultures, zosyn      Thank you,  Stephy Tobin RN, BSN, CDI  Sujatha [email or web address]  . Options provided:  -- Sepsis, present on admission  -- UTI without Sepsis  -- Other - I will add my own diagnosis  -- Disagree - Not applicable / Not valid  -- Disagree - Clinically unable to determine / Unknown  -- Refer to Clinical Documentation Reviewer    PROVIDER RESPONSE TEXT:    This patient has UTI without Sepsis.     Query created by: Marylen Laundry on 10/17/2022 11:47 AM      Electronically signed by:  Estelle Hood MD 10/18/2022 7:49 AM

## 2022-10-18 NOTE — PROGRESS NOTES
ACUTE PHYSICAL THERAPY GOALS:   (Developed with and agreed upon by patient and/or caregiver. )    LTG:  (1.)Ms. Navdeep Downs will move from supine to sit and sit to supine , scoot up and down and roll side to side in flat bed without siderails with  INDEPENDENT within 7 day(s). (2.)Ms. Navdeep Downs will perform all functional transfers with  INDEPENDENT using the least restrictive/no device within 7 day(s). (3.)Ms. Navdeep Downs will ambulate with  INDEPENDENT for 500+ feet with normal vital sign response with the least restrictive/no device within 7 day(s). (4.)Ms. Navdeep Downs will ambulate up/down 1 steps with bilateral railing with  MODIFIED INDEPENDENCE with no device within  7  day(s). PHYSICAL THERAPY Initial Assessment and Daily Note  (Link to Caseload Tracking: PT Visit Days : 1  Acknowledge Orders  Time In/Out  PT Charge Capture  Rehab Caseload Tracker    Merlinda Cordia is a [de-identified] y.o. female   PRIMARY DIAGNOSIS: Acute gallstone pancreatitis  Generalized abdominal pain [R10.84]  Gallstone pancreatitis [K85.10]  Elevated liver enzymes [R74.8]  Acute gallstone pancreatitis [K85.10]  Chest pain, unspecified type [R07.9]  Procedure(s) (LRB):  ERCP ENDOSCOPIC RETROGRADE CHOLANGIOPANCREATOGRAPHY Balloon sweep (N/A)  1 Day Post-Op  Reason for Referral: Other abnormalities of gait and mobility (R26.89)  Inpatient: Payor: [place] Failing / Plan: [place] / Product Type: *No Product type* /     ASSESSMENT:     REHAB RECOMMENDATIONS:   Recommendation to date pending progress:  Setting:  No further skilled therapy after discharge from hospital    Equipment:    None     ASSESSMENT:  Ms. Navdeep Downs lives with her  she is independent in home and community. Patient SBA with transfers and ambulation today. Slight decline from baseline with this acute event. Ms. Navdeep Downs would benefit from skilled physical therapy while in acute care (medically necessary) to address her deficits and maximize her function.    [facility] AM-PAC 6 Clicks Basic Mobility Inpatient Short Form  AM-PAC Mobility Inpatient   How much difficulty turning over in bed?: None  How much difficulty sitting down on / standing up from a chair with arms?: None  How much difficulty moving from lying on back to sitting on side of bed?: None  How much help from another person moving to and from a bed to a chair?: A Little  How much help from another person needed to walk in hospital room?: A Little  How much help from another person for climbing 3-5 steps with a railing?: A Little  [facility] Inpatient Mobility Raw Score : 21  AM-PAC Inpatient T-Scale Score : 50.25  Mobility Inpatient CMS 0-100% Score: 28.97  Mobility Inpatient CMS G-Code Modifier : CJ    SUBJECTIVE:   Ms. Nelda Fletcher states, \"My  will be coming soon. \"     Social/Functional Lives With: Spouse  Type of Home: House  Home Layout: One level  Home Access: Stairs to enter without rails  Entrance Stairs - Number of Steps: 1  Ambulation Assistance: Independent  Transfer Assistance: Independent  Active : Yes  Mode of Transportation: Car    OBJECTIVE:     PAIN: Melanie Blight / O2: PRECAUTION / Larwance Ashwin / DRAINS:   Pre Treatment:   Pain Assessment: None - Denies Pain      Post Treatment: 0 Vitals        Oxygen      IV    RESTRICTIONS/PRECAUTIONS:                    GROSS EVALUATION: Intact Impaired (Comments):   AROM [x]     PROM []    Strength [x]     Balance []     Posture [] N/A   Sensation []     Coordination [x]      Tone [x]     Edema []    Activity Tolerance []      []      COGNITION/  PERCEPTION: Intact Impaired (Comments):   Orientation [x]     Vision []     Hearing [x]     Cognition  [x]       MOBILITY: I Mod I S SBA CGA Min Mod Max Total  NT x2 Comments:   Bed Mobility    Rolling [] [x] [] [] [] [] [] [] [] [] []    Supine to Sit [] [x] [] [] [] [] [] [] [] [] []    Scooting [] [x] [] [] [] [] [] [] [] [] []    Sit to Supine [] [] [] [] [] [] [] [] [] [] []    Transfers    Sit to Stand [x] [] [] [] [] [] [] [] [] [] []    Bed to Chair [] [] [] [x] [] [] [] [] [] [] []    Stand to Sit [x] [] [] [] [] [] [] [] [] [] []     [] [] [] [] [] [] [] [] [] [] []    I=Independent, Mod I=Modified Independent, S=Supervision, SBA=Standby Assistance, CGA=Contact Guard Assistance,   Min=Minimal Assistance, Mod=Moderate Assistance, Max=Maximal Assistance, Total=Total Assistance, NT=Not Tested    GAIT: I Mod I S SBA CGA Min Mod Max Total  NT x2 Comments:   Level of Assistance [] [] [] [x] [] [] [] [] [] [] []    Distance 50, 250 feet    DME None    Gait Quality Altered arm swing, Decreased rodolfo , and Decreased step length    Weightbearing Status      Stairs      I=Independent, Mod I=Modified Independent, S=Supervision, SBA=Standby Assistance, CGA=Contact Guard Assistance,   Min=Minimal Assistance, Mod=Moderate Assistance, Max=Maximal Assistance, Total=Total Assistance, NT=Not Tested    PLAN:   FREQUENCY AND DURATION: 3 times/week for duration of hospital stay or until stated goals are met, whichever comes first.    THERAPY PROGNOSIS: Excellent    PROBLEM LIST:   (Skilled intervention is medically necessary to address:)  Decreased Activity Tolerance  Decreased Balance  Decreased Gait Ability  Decreased Transfer Abilities INTERVENTIONS PLANNED:   (Benefits and precautions of physical therapy have been discussed with the patient.)  Therapeutic Activity  Therapeutic Exercise/HEP  Neuromuscular Re-education  Gait Training  Education       TREATMENT:   EVALUATION: LOW COMPLEXITY: (Untimed Charge)    TREATMENT:   Therapeutic Activity (23 Minutes): Therapeutic activity included Transfer Training, Ambulation on level ground, and Standing balance to improve functional Activity tolerance, Balance, and Mobility.     TREATMENT GRID:  N/A    AFTER TREATMENT PRECAUTIONS: Call light within reach, Chair, Needs within reach, and RN notified    INTERDISCIPLINARY COLLABORATION:  RN/ PCT and PT/ PTA    EDUCATION: Education Given To: Patient  Education Provided: Role of Therapy;Plan of Care  Education Outcome: Verbalized understanding    TIME IN/OUT:  Time In: 0915  Time Out: [address]  Minutes: 9400 Joanna Alvarenga PT

## 2022-10-18 NOTE — PROGRESS NOTES
TRANSFER - OUT REPORT:    Verbal report given to Lino Cifuentes RN on [facility]  being transferred to [facility] for ordered procedure   (cholecystectomy)    Report consisted of patient's Situation, Background, Assessment and   Recommendations(SBAR). Information from the following report(s) Nurse Handoff Report was reviewed with the receiving nurse. Lines:   Peripheral IV 10/17/22 Right;Dorsal Hand (Active)   Site Assessment Clean, dry & intact 10/17/22 2015   Line Status Infusing 10/17/22 2015   Line Care Ports disinfected 10/17/22 2015   Phlebitis Assessment No symptoms 10/17/22 2015   Infiltration Assessment 0 10/17/22 2015   Alcohol Cap Used Yes 10/17/22 2015   Dressing Status Clean, dry & intact 10/17/22 2015   Dressing Type Transparent 10/17/22 2015        Opportunity for questions and clarification was provided.       Patient transported with:  [employer]

## 2022-10-18 NOTE — PROGRESS NOTES
Hospitalist Progress Note   Admit Date:  10/15/2022  6:14 PM   Name:  Wander Mata   Age:  [de-identified] y.o. Sex:  female  :  1940   MRN:  [id number]   Room:      Presenting Complaint: Chest Pain     Reason(s) for Admission: Generalized abdominal pain [R10.84]  Gallstone pancreatitis [K85.10]  Elevated liver enzymes [R74.8]  Acute gallstone pancreatitis [K85.10]  Chest pain, unspecified type [R07.9]     Hospital Course:   Wander Mata is a [de-identified] y.o. female with medical history of hypertension, hyperlipidemia, hypothyroidism, anemia due to folate deficiency, Parkinson's disease admitted with acute Pancreatitis. Procalcitonin elevated and worsening leukocytosis, patient started on Zosyn. UA ? UTI. GI consulted and patient status post ERCP with extraction of biliary stone on 10/17. LFTs started improving. General surgery consulted for cholecystectomy evaluation. Subjective & 24hr Events (10/18/22): Patient is seen at the bedside. Reports feeling better today. Reports she has not eaten for so long and is hungry. Denies chest pain, palpitation, nausea, vomiting or abdominal pain. Assessment & Plan:     Acute gallstone pancreatitis  Image noted  GI consulted patient s/p ERCP with extraction of biliary stone on 10/17  General surgery consulted for cholecystectomy evaluation  Continue supportive care     Leukocytosis  ? UTI  Procalcitonin elevated and worsening leukocytosis, patient started on empiric antibiotic coverage, possible intra-abdominal source  Follow-up on cultures  Leukocytosis resolved     Right adnexal cyst  Noted in CT  Pelvic ultrasound showed 3.9 cm right adnexal cyst, routine OB/GYN follow-up recommended     HTN:   Continue with hctz    Hypothyroidism:   Continue with synthroid    Parkinson's disease :   Continue with sinemet.        Anticipated discharge needs: Pending surgery evaluation    Diet:  Diet NPO Exceptions are: Sips of Water with Meds  DVT PPx: Lovenox on hold due to possible procedure tomorrow  Code status: Full Code    Hospital Problems:  Principal Problem:    Acute gallstone pancreatitis  Active Problems:    Anemia due to folate deficiency    Acquired hypothyroidism    Hypercholesterolemia    Gastroesophageal reflux disease with esophagitis  Resolved Problems:    * No resolved hospital problems. *      Objective:   Patient Vitals for the past 24 hrs:   Temp Pulse Resp BP SpO2   10/18/22 1054 98.4 °F (36.9 °C) 75 20 131/68 93 %   10/18/22 0729 98.4 °F (36.9 °C) 72 18 135/69 96 %   10/18/22 0419 97.8 °F (36.6 °C) 78 17 132/77 97 %   10/17/22 2345 97.9 °F (36.6 °C) 73 17 123/69 94 %   10/17/22 1913 97.5 °F (36.4 °C) 67 18 (!) 140/81 96 %   10/17/22 1612 98.1 °F (36.7 °C) 61 18 120/65 97 %   10/17/22 1504 -- 64 16 (!) 141/63 100 %   10/17/22 1458 98.4 °F (36.9 °C) 60 15 127/66 100 %   10/17/22 1455 -- 61 16 (!) 141/65 100 %   10/17/22 1450 -- 60 16 132/60 99 %   10/17/22 1445 -- 65 15 (!) 142/63 93 %   10/17/22 1440 -- 63 15 128/60 97 %   10/17/22 1435 -- 65 16 130/72 96 %   10/17/22 1429 98.2 °F (36.8 °C) 67 16 (!) 120/56 100 %   10/17/22 1427 98.3 °F (36.8 °C) -- 16 -- 100 %       Oxygen Therapy  SpO2: 93 %  Pulse via Oximetry: 60 beats per minute  Pulse Oximeter Device Mode: Continuous  Pulse Oximeter Device Location: Finger  O2 Device: None (Room air)  O2 Flow Rate (L/min): 4 L/min    Estimated body mass index is 28.84 kg/m² as calculated from the following:    Height as of this encounter: 5' 4\" (1.626 m). Weight as of this encounter: 168 lb (76.2 kg). Intake/Output Summary (Last 24 hours) at 10/18/2022 1242  Last data filed at 10/18/2022 1123  Gross per 24 hour   Intake 1790.08 ml   Output 1355 ml   Net 435.08 ml         Physical Exam:     Blood pressure 131/68, pulse 75, temperature 98.4 °F (36.9 °C), temperature source Oral, resp. rate 20, height 5' 4\" (1.626 m), weight 168 lb (76.2 kg), SpO2 93 %. General:    Well nourished.     Head:  Normocephalic, atraumatic  Eyes:  Sclerae appear normal.  Pupils equally round. ENT:  Nares appear normal, no drainage. Moist oral mucosa  Neck:  No restricted ROM. Trachea midline   CV:   RRR. No m/r/g. No jugular venous distension. Lungs:   CTAB. No wheezing, rhonchi, or rales. Symmetric expansion. Abdomen: Bowel sounds present. Soft, nontender, nondistended. Extremities: No cyanosis or clubbing. No edema  Skin:     No rashes and normal coloration. Warm and dry. Neuro:  CN II-XII grossly intact. Sensation intact. A&Ox3  Psych:  Normal mood and affect.       I have personally reviewed labs and tests showing:  Recent Labs:  Recent Results (from the past 48 hour(s))   Culture, Blood 1    Collection Time: 10/16/22  3:14 PM    Specimen: Blood   Result Value Ref Range    Special Requests LEFT  ARM        Culture NO GROWTH 2 DAYS     Procalcitonin    Collection Time: 10/16/22  3:14 PM   Result Value Ref Range    Procalcitonin 23.95 (H) 0.00 - 0.49 ng/mL   Urinalysis with Reflex to Culture    Collection Time: 10/16/22  6:05 PM    Specimen: Urine   Result Value Ref Range    Color, UA DARK YELLOW      Appearance CLOUDY      Specific Gravity, UA 1.020 1.001 - 1.023      pH, Urine 5.5 5.0 - 9.0      Protein, UA TRACE (A) NEG mg/dL    Glucose, UA Negative mg/dL    Ketones, Urine Negative NEG mg/dL    Bilirubin Urine LARGE (A) NEG      Blood, Urine SMALL (A) NEG      Urobilinogen, Urine 1.0 0.2 - 1.0 EU/dL    Nitrite, Urine Negative NEG      Leukocyte Esterase, Urine SMALL (A) NEG      WBC, UA 5-10 0 /hpf    RBC, UA 0 0 /hpf    BACTERIA, URINE 2+ (H) 0 /hpf    Urine Culture if Indicated CULTURE NOT INDICATED BY UA RESULT      Epithelial Cells UA 3-5 0 /hpf    Casts 0-3 0 /lpf    Crystals 0 0 /LPF    Mucus, UA 0 0 /lpf    OTHER OBSERVATIONS RESULTS VERIFIED MANUALLY     Basic Metabolic Panel w/ Reflex to MG    Collection Time: 10/17/22  7:07 AM   Result Value Ref Range    Sodium 140 133 - 143 mmol/L    Potassium 3.5 3.5 - 5.1 mmol/L    Chloride 110 101 - 110 mmol/L    CO2 23 21 - 32 mmol/L    Anion Gap 7 2 - 11 mmol/L    Glucose 79 65 - 100 mg/dL    BUN 15 8 - 23 MG/DL    Creatinine 0.60 0.6 - 1.0 MG/DL    Est, Glom Filt Rate >60 >60 ml/min/1.73m2    Calcium 8.3 8.3 - 10.4 MG/DL   CBC with Auto Differential    Collection Time: 10/17/22  7:07 AM   Result Value Ref Range    WBC 13.8 (H) 4.3 - 11.1 K/uL    RBC 3.71 (L) 4.05 - 5.2 M/uL    Hemoglobin 11.8 11.7 - 15.4 g/dL    Hematocrit 36.9 35.8 - 46.3 %    MCV 99.5 82 - 102 FL    MCH 31.8 26.1 - 32.9 PG    MCHC 32.0 31.4 - 35.0 g/dL    RDW 14.1 11.9 - 14.6 %    Platelets 773 254 - 869 K/uL    MPV 10.9 9.4 - 12.3 FL    nRBC 0.00 0.0 - 0.2 K/uL    Differential Type AUTOMATED      Seg Neutrophils 86 (H) 43 - 78 %    Lymphocytes 6 (L) 13 - 44 %    Monocytes 6 4.0 - 12.0 %    Eosinophils % 1 0.5 - 7.8 %    Basophils 0 0.0 - 2.0 %    Immature Granulocytes 1 0.0 - 5.0 %    Segs Absolute 12.0 (H) 1.7 - 8.2 K/UL    Absolute Lymph # 0.8 0.5 - 4.6 K/UL    Absolute Mono # 0.8 0.1 - 1.3 K/UL    Absolute Eos # 0.1 0.0 - 0.8 K/UL    Basophils Absolute 0.0 0.0 - 0.2 K/UL    Absolute Immature Granulocyte 0.1 0.0 - 0.5 K/UL   Hepatic Function Panel    Collection Time: 10/17/22  7:07 AM   Result Value Ref Range    Total Protein 6.0 (L) 6.3 - 8.2 g/dL    Albumin 2.8 (L) 3.2 - 4.6 g/dL    Globulin 3.2 2.8 - 4.5 g/dL    Albumin/Globulin Ratio 0.9 0.4 - 1.6      Total Bilirubin 5.5 (H) 0.2 - 1.1 MG/DL    Bilirubin, Direct 4.6 (H) <0.4 MG/DL    Alk Phosphatase 161 (H) 50 - 136 U/L     (H) 15 - 37 U/L    ALT 61 12 - 65 U/L   Lipase    Collection Time: 10/17/22  7:07 AM   Result Value Ref Range    Lipase 434 (H) 73 - 393 U/L   Magnesium    Collection Time: 10/17/22  7:07 AM   Result Value Ref Range    Magnesium 2.3 1.8 - 2.4 mg/dL   Basic Metabolic Panel w/ Reflex to MG    Collection Time: 10/18/22  6:15 AM   Result Value Ref Range    Sodium 144 (H) 133 - 143 mmol/L    Potassium 3.4 (L) 3.5 - 5.1 mmol/L Chloride 114 (H) 101 - 110 mmol/L    CO2 17 (L) 21 - 32 mmol/L    Anion Gap 13 (H) 2 - 11 mmol/L    Glucose 58 (L) 65 - 100 mg/dL    BUN 20 8 - 23 MG/DL    Creatinine 0.60 0.6 - 1.0 MG/DL    Est, Glom Filt Rate >60 >60 ml/min/1.73m2    Calcium 7.8 (L) 8.3 - 10.4 MG/DL   CBC with Auto Differential    Collection Time: 10/18/22  6:15 AM   Result Value Ref Range    WBC 10.6 4.3 - 11.1 K/uL    RBC 3.46 (L) 4.05 - 5.2 M/uL    Hemoglobin 10.8 (L) 11.7 - 15.4 g/dL    Hematocrit 34.9 (L) 35.8 - 46.3 %    .9 82 - 102 FL    MCH 31.2 26.1 - 32.9 PG    MCHC 30.9 (L) 31.4 - 35.0 g/dL    RDW 14.2 11.9 - 14.6 %    Platelets 931 683 - 291 K/uL    MPV 11.0 9.4 - 12.3 FL    nRBC 0.00 0.0 - 0.2 K/uL    Differential Type AUTOMATED      Seg Neutrophils 87 (H) 43 - 78 %    Lymphocytes 5 (L) 13 - 44 %    Monocytes 6 4.0 - 12.0 %    Eosinophils % 1 0.5 - 7.8 %    Basophils 0 0.0 - 2.0 %    Immature Granulocytes 1 0.0 - 5.0 %    Segs Absolute 9.3 (H) 1.7 - 8.2 K/UL    Absolute Lymph # 0.5 0.5 - 4.6 K/UL    Absolute Mono # 0.6 0.1 - 1.3 K/UL    Absolute Eos # 0.1 0.0 - 0.8 K/UL    Basophils Absolute 0.0 0.0 - 0.2 K/UL    Absolute Immature Granulocyte 0.1 0.0 - 0.5 K/UL   Hepatic Function Panel    Collection Time: 10/18/22  6:15 AM   Result Value Ref Range    Total Protein 5.8 (L) 6.3 - 8.2 g/dL    Albumin 2.6 (L) 3.2 - 4.6 g/dL    Globulin 3.2 2.8 - 4.5 g/dL    Albumin/Globulin Ratio 0.8 0.4 - 1.6      Total Bilirubin 4.2 (H) 0.2 - 1.1 MG/DL    Bilirubin, Direct 3.3 (H) <0.4 MG/DL    Alk Phosphatase 218 (H) 50 - 136 U/L     (H) 15 - 37 U/L    ALT 57 12 - 65 U/L   Magnesium    Collection Time: 10/18/22  6:15 AM   Result Value Ref Range    Magnesium 2.5 (H) 1.8 - 2.4 mg/dL       I have personally reviewed imaging studies showing: Other Studies:  FL ERCP BILIARY AND PANCREATIC S&I   Final Result   Comment bile duct stones appreciated. The stones were extracted   without consequence.       Total fluoroscopy time: 1.21 minutes; eight fluoroscopic spot image saved. US PELVIS COMPLETE   Final Result   3.9 cm right adnexal cyst. Routine OB/GYN follow-up is recommended. CT CHEST ABDOMEN PELVIS W CONTRAST Additional Contrast? None   Final Result   1. Intra and extrahepatic biliary ductal dilatation with distended gallbladder   and suggestion of hyperdense 0.8 cm focus at the distal common bile duct likely   cause of obstruction. Although favor noncalcified gallstone given the presence   of additional gallstones, obstructing mass lesion not excluded. No pancreatic   ductal dilatation. 2.  No evidence of pulmonary embolism or acute abnormality in the chest.   3.  Moderate coronary artery calcification. 4.  Right adnexal 3.6 cm cyst. Recommend nonurgent pelvic ultrasound for further   evaluation. XR CHEST PORTABLE   Final Result   No consolidation.           Current Meds:  Current Facility-Administered Medications   Medication Dose Route Frequency    lactated ringers infusion   IntraVENous Continuous    ceFAZolin (ANCEF) 2000 mg in sterile water 20 mL IV syringe  2,000 mg IntraVENous On Call to OR    piperacillin-tazobactam (ZOSYN) 3,375 mg in sodium chloride 0.9 % 50 mL IVPB (mini-bag)  3,375 mg IntraVENous q8h    glucagon (rDNA) injection 1 mg  1 mg IntraVENous PRN    iopamidol (ISOVUE-370) 76 % injection 50 mL  50 mL Other PRN    carbidopa-levodopa (SINEMET)  MG per tablet 1 tablet  1 tablet Oral Nightly    folic acid (FOLVITE) tablet 1 mg  1 mg Oral Daily    pantoprazole (PROTONIX) tablet 40 mg  40 mg Oral QAM AC    pravastatin (PRAVACHOL) tablet 40 mg  40 mg Oral Nightly    levothyroxine (SYNTHROID) tablet 75 mcg  75 mcg Oral QAM AC    [Held by provider] aspirin chewable tablet 81 mg  81 mg Oral Daily    sodium chloride flush 0.9 % injection 5-40 mL  5-40 mL IntraVENous 2 times per day    sodium chloride flush 0.9 % injection 5-40 mL  5-40 mL IntraVENous PRN    ondansetron (ZOFRAN-ODT) disintegrating tablet 4 mg  4 mg Oral Q8H PRN    Or    ondansetron (ZOFRAN) injection 4 mg  4 mg IntraVENous Q6H PRN    bisacodyl (DULCOLAX) suppository 10 mg  10 mg Rectal Daily PRN    famotidine (PEPCID) tablet 10 mg  10 mg Oral Daily PRN    acetaminophen (TYLENOL) tablet 650 mg  650 mg Oral Q6H PRN    Or    acetaminophen (TYLENOL) suppository 650 mg  650 mg Rectal Q6H PRN    [Held by provider] enoxaparin (LOVENOX) injection 40 mg  40 mg SubCUTAneous Q24H    traMADol (ULTRAM) tablet 50 mg  50 mg Oral Q6H PRN    HYDROcodone-acetaminophen (NORCO) 5-325 MG per tablet 1 tablet  1 tablet Oral Q6H PRN       Signed:  Nickie Inman MD    Part of this note may have been written by using a voice dictation software. The note has been proof read but may still contain some grammatical/other typographical errors.

## 2022-10-18 NOTE — PROGRESS NOTES
Patient refusing to go to surgery at this time. Would like to discuss more about surgery outpatient option. Dr Scott Booth and pre op notified.

## 2022-10-19 VITALS
HEART RATE: 76 BPM | HEIGHT: 64 IN | OXYGEN SATURATION: 92 % | BODY MASS INDEX: 28.68 KG/M2 | WEIGHT: 168 LBS | TEMPERATURE: 97.9 F | DIASTOLIC BLOOD PRESSURE: 63 MMHG | SYSTOLIC BLOOD PRESSURE: 127 MMHG | RESPIRATION RATE: 19 BRPM

## 2022-10-19 PROBLEM — R74.8 ELEVATED LIVER ENZYMES: Status: ACTIVE | Noted: 2022-10-19

## 2022-10-19 PROBLEM — N83.201 CYST OF RIGHT OVARY: Status: ACTIVE | Noted: 2022-10-19

## 2022-10-19 LAB
ALBUMIN SERPL-MCNC: 2.7 G/DL (ref 3.2–4.6)
ALBUMIN/GLOB SERPL: 0.8 {RATIO} (ref 0.4–1.6)
ALP SERPL-CCNC: 300 U/L (ref 50–136)
ALT SERPL-CCNC: 65 U/L (ref 12–65)
ANION GAP SERPL CALC-SCNC: 11 MMOL/L (ref 2–11)
AST SERPL-CCNC: 187 U/L (ref 15–37)
BASOPHILS # BLD: 0 K/UL (ref 0–0.2)
BASOPHILS NFR BLD: 0 % (ref 0–2)
BILIRUB SERPL-MCNC: 2.7 MG/DL (ref 0.2–1.1)
BUN SERPL-MCNC: 12 MG/DL (ref 8–23)
CALCIUM SERPL-MCNC: 8.3 MG/DL (ref 8.3–10.4)
CHLORIDE SERPL-SCNC: 113 MMOL/L (ref 101–110)
CO2 SERPL-SCNC: 18 MMOL/L (ref 21–32)
CREAT SERPL-MCNC: 0.5 MG/DL (ref 0.6–1)
DIFFERENTIAL METHOD BLD: ABNORMAL
EOSINOPHIL # BLD: 0.2 K/UL (ref 0–0.8)
EOSINOPHIL NFR BLD: 2 % (ref 0.5–7.8)
ERYTHROCYTE [DISTWIDTH] IN BLOOD BY AUTOMATED COUNT: 13.9 % (ref 11.9–14.6)
GLOBULIN SER CALC-MCNC: 3.5 G/DL (ref 2.8–4.5)
GLUCOSE SERPL-MCNC: 100 MG/DL (ref 65–100)
HCT VFR BLD AUTO: 34.9 % (ref 35.8–46.3)
HGB BLD-MCNC: 11.5 G/DL (ref 11.7–15.4)
IMM GRANULOCYTES # BLD AUTO: 0.1 K/UL (ref 0–0.5)
IMM GRANULOCYTES NFR BLD AUTO: 1 % (ref 0–5)
LYMPHOCYTES # BLD: 1 K/UL (ref 0.5–4.6)
LYMPHOCYTES NFR BLD: 10 % (ref 13–44)
MCH RBC QN AUTO: 31.3 PG (ref 26.1–32.9)
MCHC RBC AUTO-ENTMCNC: 33 G/DL (ref 31.4–35)
MCV RBC AUTO: 94.8 FL (ref 82–102)
MONOCYTES # BLD: 1 K/UL (ref 0.1–1.3)
MONOCYTES NFR BLD: 9 % (ref 4–12)
NEUTS SEG # BLD: 8.1 K/UL (ref 1.7–8.2)
NEUTS SEG NFR BLD: 79 % (ref 43–78)
NRBC # BLD: 0 K/UL (ref 0–0.2)
PLATELET # BLD AUTO: 263 K/UL (ref 150–450)
PMV BLD AUTO: 10.4 FL (ref 9.4–12.3)
POTASSIUM SERPL-SCNC: 3 MMOL/L (ref 3.5–5.1)
PROT SERPL-MCNC: 6.2 G/DL (ref 6.3–8.2)
RBC # BLD AUTO: 3.68 M/UL (ref 4.05–5.2)
SODIUM SERPL-SCNC: 142 MMOL/L (ref 133–143)
WBC # BLD AUTO: 10.3 K/UL (ref 4.3–11.1)

## 2022-10-19 PROCEDURE — 99232 SBSQ HOSP IP/OBS MODERATE 35: CPT | Performed by: SURGERY

## 2022-10-19 PROCEDURE — 6370000000 HC RX 637 (ALT 250 FOR IP): Performed by: INTERNAL MEDICINE

## 2022-10-19 PROCEDURE — 36415 COLL VENOUS BLD VENIPUNCTURE: CPT

## 2022-10-19 PROCEDURE — 2580000003 HC RX 258: Performed by: FAMILY MEDICINE

## 2022-10-19 PROCEDURE — 85025 COMPLETE CBC W/AUTO DIFF WBC: CPT

## 2022-10-19 PROCEDURE — 80053 COMPREHEN METABOLIC PANEL: CPT

## 2022-10-19 PROCEDURE — 6360000002 HC RX W HCPCS: Performed by: FAMILY MEDICINE

## 2022-10-19 PROCEDURE — 6370000000 HC RX 637 (ALT 250 FOR IP): Performed by: FAMILY MEDICINE

## 2022-10-19 RX ADMIN — LEVOTHYROXINE SODIUM 75 MCG: 0.07 TABLET ORAL at 06:04

## 2022-10-19 RX ADMIN — PIPERACILLIN AND TAZOBACTAM 3375 MG: 3; .375 INJECTION, POWDER, FOR SOLUTION INTRAVENOUS at 00:26

## 2022-10-19 RX ADMIN — FOLIC ACID 1 MG: 1 TABLET ORAL at 09:17

## 2022-10-19 RX ADMIN — PIPERACILLIN AND TAZOBACTAM 3375 MG: 3; .375 INJECTION, POWDER, FOR SOLUTION INTRAVENOUS at 09:17

## 2022-10-19 RX ADMIN — PANTOPRAZOLE SODIUM 40 MG: 40 TABLET, DELAYED RELEASE ORAL at 06:04

## 2022-10-19 ASSESSMENT — PAIN SCALES - GENERAL: PAINLEVEL_OUTOF10: 0

## 2022-10-19 NOTE — DISCHARGE SUMMARY
Hospitalist Discharge Summary   Admit Date:  10/15/2022  6:14 PM   DC Note date: 10/19/2022  Name:  Lupe Conteh   Age:  [de-identified] y.o. Sex:  female  :  1940   MRN:  [id number]   Room:    PCP:  Miriam Arevalo MD    Presenting Complaint: Chest Pain     Initial Admission Diagnosis: Generalized abdominal pain [R10.84]  Gallstone pancreatitis [K85.10]  Elevated liver enzymes [R74.8]  Acute gallstone pancreatitis [K85.10]  Chest pain, unspecified type [R07.9]     Problem List for this Hospitalization (present on admission):    Principal Problem:    Acute gallstone pancreatitis  Active Problems:    Anemia due to folate deficiency    Cyst of right ovary    Elevated liver enzymes    Acquired hypothyroidism    Hypercholesterolemia    Gastroesophageal reflux disease with esophagitis  Resolved Problems:    * No resolved hospital problems. *    HPI:  Lupe Conteh is a [de-identified] y.o. female with medical history of hypertension, hyperlipidemia, hypothyroidism, anemia due to folate deficiency, Parkinson's disease who presented to emergency room with acute onset of substernal chest.  Reports that she is in her normal state of health until this afternoon when she started having substernal chest pain radiating down to her abdomen and her back. Reports pain is severe, 10 out of 10, constant, pressure-like as well as sharp. Also reports associated nausea and vomiting. She had vomited 3 times. Work-up in the ED is remarkable for lipase of more than 1500, WBC of 13.0. CXR without any acute cardiopulmonary disease. CT of abdomen pelvis shows intra and extrahepatic biliary ductal dilatation with distended gallbladder with hyperdense 0.8 cm focus at the distal common bile duct.   Also noted to have right adnexal 3.6 cm cyst.    Hospital Course:  Lupe Conteh is a [de-identified] y.o. female with medical history of hypertension, hyperlipidemia, hypothyroidism, anemia due to folate deficiency, Parkinson's disease admitted with acute gallstone pancreatitis. Procalcitonin elevated and leukocytosis, patient started on Zosyn. UA ? UTI. Blood cultures negative to date. GI consulted and patient status post ERCP with extraction of biliary stone on 10/17. LFTs started improving. General surgery consulted for cholecystectomy evaluation and recommend laparoscopic cholecystectomy on 10/18 but patient refused. Patient opted to proceed with outpatient laparoscopic cholecystectomy on 10/25/2022 and general surgery will call patient for preop details. All other chronic conditions stable and we continued essential home meds. Patient to follow-up on Gyn outpatient for right adnexal cyst.  No new complaint on the day of discharge. Patient is stable to discharge home today with close follow-up with PCP and general surgery. Disposition: Home  Diet: ADULT DIET; Full Liquid  Code Status: Full Code    Follow Ups:   Follow-up Information     Gilbert Meyers MD Follow up on 10/24/2022. Specialty: Family Medicine  Why: 1:30  Contact information:  [id number]  Libby Peña MD Follow up on 10/26/2022. Specialty: General Surgery  Why: 8:15  Contact information:  [address] [place] [address] [address] [facility]  [phone]                       Time spent in patient discharge and coordination 37 minutes. Plan was discussed with patient. All questions answered. Patient was stable at time of discharge. Instructions given to call a physician or return if any concerns.     Discharge Medication List as of 10/19/2022 10:14 AM        CONTINUE these medications which have NOT CHANGED    Details   !! NONFORMULARY Preservision/Lutein oral cap 1 cap bidHistorical Med      Multiple Vitamins-Minerals (THERAPEUTIC MULTIVITAMIN-MINERALS) tablet Take 1 tablet by mouth dailyHistorical Med      Omega-3 Fatty Acids (FISH OIL) 1000 MG CPDR Take 1,000 mg by mouth dailyHistorical Med      !! NONFORMULARY Fiber oral tablet 1 capsule dailyHistorical Med      Hyoscyamine Sulfate SL (LEVSIN/SL) 0.125 MG SUBL Place 0.25 mg under the tongue 4 times daily as needed (abdominal pain or cramping), Disp-20 each, R-1Print      pantoprazole (PROTONIX) 40 MG tablet TAKE 1 TABLET BY MOUTH DAILY BEFORE DINNER, Disp-90 tablet, R-3Normal      pravastatin (PRAVACHOL) 40 MG tablet TAKE 1 TABLET BY MOUTH AT NIGHT, Disp-90 tablet, R-3Normal      hydroCHLOROthiazide (HYDRODIURIL) 12.5 MG tablet Take 1 tablet by mouth daily TAKE 1 TABLET BY MOUTH DAILY, Disp-90 tablet, R-3Normal      telmisartan (MICARDIS) 80 MG tablet Take 1 tablet by mouth in the morning., Disp-90 tablet, R-3Normal      levothyroxine (SYNTHROID) 75 MCG tablet Take 1 tablet by mouth in the morning.  TAKE 1 TABLET BY MOUTH DAILY BEFORE BREAKFAST., Disp-90 tablet, [id number]      folic acid (FOLVITE) 1 MG tablet Take 1 tablet by mouth in the morning., Disp-90 tablet, R-1Normal      Calcium Polycarbophil (FIBER) 625 MG TABS take 1 qdHistorical Med      coenzyme Q10 100 MG CAPS capsule Take by mouth every morningHistorical Med      acetaminophen (TYLENOL) 325 MG tablet Take 650 mg by mouth every 4 hours as neededHistorical Med      aspirin 81 MG EC tablet Take 81 mg by mouth every 12 hoursHistorical Med      carbidopa-levodopa (SINEMET)  MG per tablet TAKE 1 TABLET BY MOUTH AT NIGHTHistorical Med      cetirizine (ZYRTEC) 10 MG tablet Take 10 mg by mouth dailyHistorical Med      clobetasol (TEMOVATE) 0.05 % cream Apply topically 2 times daily as needed, Topical, 2 TIMES DAILY PRN Starting Thu 8/25/2016, Historical Med      diphenhydrAMINE-APAP, sleep, (TYLENOL PM EXTRA STRENGTH)  MG tablet Take 1-2 tablets by mouthHistorical Med      Glucosamine 500 MG CAPS Take 500 mg by mouth dailyHistorical Med      hydrocortisone 2.5 % cream Place rectally 4 times daily, Rectal, 4 TIMES DAILY Starting Fri 2/15/2019, Historical Med      ketoconazole (NIZORAL) 2 % cream Apply topically 2 times daily as needed, Topical, 2 TIMES DAILY PRN, Historical Med      prednisoLONE acetate (PRED FORTE) 1 % ophthalmic suspension Apply 1 drop to eye Twice a WeekHistorical Med      traMADol (ULTRAM) 50 MG tablet Take 50 mg by mouth every 6 hours as needed. Historical Med      triamcinolone (KENALOG) 0.1 % cream Apply topically 2 times daily as needed, Topical, 2 TIMES DAILY PRN Starting Fri 2/9/2018, Historical Med       !! - Potential duplicate medications found. Please discuss with provider. STOP taking these medications       clotrimazole-betamethasone (LOTRISONE) 1-0.05 % cream Comments:   Reason for Stopping:         doxycycline monohydrate (ADOXA) 100 MG tablet Comments:   Reason for Stopping:         fluticasone-salmeterol (ADVAIR) 250-50 MCG/ACT AEPB diskus inhaler Comments:   Reason for Stopping:         zoster recombinant adjuvanted vaccine (SHINGRIX) 50 MCG/0.5ML SUSR injection Comments:   Reason for Stopping:               Procedures done this admission:  Procedure(s):  ERCP ENDOSCOPIC RETROGRADE CHOLANGIOPANCREATOGRAPHY Balloon sweep    Consults this admission:  IP CONSULT TO GI  IP CONSULT TO GENERAL SURGERY  IP CONSULT TO GENERAL SURGERY    Echocardiogram results:  No results found for this or any previous visit. Diagnostic Imaging/Tests:   US PELVIS COMPLETE    Result Date: 10/17/2022  3.9 cm right adnexal cyst. Routine OB/GYN follow-up is recommended. FL ERCP BILIARY AND PANCREATIC S&I    Result Date: 10/17/2022  Comment bile duct stones appreciated. The stones were extracted without consequence. Total fluoroscopy time: 1.21 minutes; eight fluoroscopic spot image saved. XR CHEST PORTABLE    Result Date: 10/15/2022  No consolidation. CT CHEST ABDOMEN PELVIS W CONTRAST Additional Contrast? None    Result Date: 10/15/2022  1.   Intra and extrahepatic biliary ductal dilatation with distended gallbladder and suggestion of hyperdense 0.8 cm focus at the distal common bile duct likely cause of obstruction. Although favor noncalcified gallstone given the presence of additional gallstones, obstructing mass lesion not excluded. No pancreatic ductal dilatation. 2.  No evidence of pulmonary embolism or acute abnormality in the chest. 3.  Moderate coronary artery calcification. 4.  Right adnexal 3.6 cm cyst. Recommend nonurgent pelvic ultrasound for further evaluation.        Labs: Results:       BMP, Mg, Phos Recent Labs     10/17/22  0707 10/18/22  0615 10/19/22  0622    144* 142   K 3.5 3.4* 3.0*    114* 113*   CO2 23 17* 18*   ANIONGAP 7 13* 11   BUN 15 20 12   CREATININE 0.60 0.60 0.50*   LABGLOM >60 >60 >60   CALCIUM 8.3 7.8* 8.3   GLUCOSE 79 58* 100   MG 2.3 2.5*  --       CBC Recent Labs     10/17/22  0707 10/18/22  0615 10/19/22  0622   WBC 13.8* 10.6 10.3   RBC 3.71* 3.46* 3.68*   HGB 11.8 10.8* 11.5*   HCT 36.9 34.9* 34.9*   MCV 99.5 100.9 94.8   MCH 31.8 31.2 31.3   MCHC 32.0 30.9* 33.0   RDW 14.1 14.2 13.9    209 263   MPV 10.9 11.0 10.4   NRBC 0.00 0.00 0.00   SEGS 86* 87* 79*   LYMPHOPCT 6* 5* 10*   EOSRELPCT 1 1 2   MONOPCT 6 6 9   BASOPCT 0 0 0   IMMGRAN 1 1 1   SEGSABS 12.0* 9.3* 8.1   LYMPHSABS 0.8 0.5 1.0   EOSABS 0.1 0.1 0.2   MONOSABS 0.8 0.6 1.0   BASOSABS 0.0 0.0 0.0   ABSIMMGRAN 0.1 0.1 0.1      LFT Recent Labs     10/17/22  0707 10/18/22  0615 10/19/22  0622   BILITOT 5.5* 4.2* 2.7*   BILIDIR 4.6* 3.3*  --    ALKPHOS 161* 218* 300*   * 154* 187*   ALT 61 57 65   PROT 6.0* 5.8* 6.2*   LABALBU 2.8* 2.6* 2.7*   GLOB 3.2 3.2 3.5      Cardiac  Lab Results   Component Value Date/Time    TROPHS 8.4 10/15/2022 10:49 PM    TROPHS 5.6 10/15/2022 06:14 PM    TROPHS 5.9 09/16/2022 09:24 PM      Coags Lab Results   Component Value Date/Time    PROTIME 12.6 11/19/2019 08:55 AM    INR 0.9 11/19/2019 08:55 AM    APTT 30.8 11/19/2019 08:55 AM      A1c Lab Results   Component Value Date/Time    LABA1C 5.2 04/21/2022 11:30 AM    LABA1C 5.1 11/19/2019 08:55 AM     04/21/2022 11:30 AM     11/19/2019 08:55 AM      Lipids Lab Results   Component Value Date/Time    CHOL 198 04/21/2022 11:30 AM    LDLCALC 110 04/21/2022 11:30 AM    LABVLDL 28 09/24/2019 09:33 AM    HDL 57 04/21/2022 11:30 AM    TRIG 181 04/21/2022 11:30 AM      Thyroid  No results found for: TSHELE, [id number]     Most Recent UA Lab Results   Component Value Date/Time    COLORU DARK YELLOW 10/16/2022 06:05 PM    APPEARANCE CLOUDY 10/16/2022 06:05 PM    SPECGRAV 1.020 10/16/2022 06:05 PM    LABPH 5.5 10/16/2022 06:05 PM    PROTEINU TRACE 10/16/2022 06:05 PM    GLUCOSEU Negative 10/16/2022 06:05 PM    KETUA Negative 10/16/2022 06:05 PM    BILIRUBINUR LARGE 10/16/2022 06:05 PM    BLOODU SMALL 10/16/2022 06:05 PM    UROBILINOGEN 1.0 10/16/2022 06:05 PM    NITRU Negative 10/16/2022 06:05 PM    LEUKOCYTESUR SMALL 10/16/2022 06:05 PM    WBCUA 5-10 10/16/2022 06:05 PM    RBCUA 0 10/16/2022 06:05 PM    EPITHUA 3-5 10/16/2022 06:05 PM    BACTERIA 2+ 10/16/2022 06:05 PM    LABCAST 0-3 10/16/2022 06:05 PM    MUCUS 0 10/16/2022 06:05 PM        Recent Labs     10/16/22  1514   CULTURE NO GROWTH 3 DAYS       All Labs from Last 24 Hrs:  Recent Results (from the past 24 hour(s))   Comprehensive Metabolic Panel    Collection Time: 10/19/22  6:22 AM   Result Value Ref Range    Sodium 142 133 - 143 mmol/L    Potassium 3.0 (L) 3.5 - 5.1 mmol/L    Chloride 113 (H) 101 - 110 mmol/L    CO2 18 (L) 21 - 32 mmol/L    Anion Gap 11 2 - 11 mmol/L    Glucose 100 65 - 100 mg/dL    BUN 12 8 - 23 MG/DL    Creatinine 0.50 (L) 0.6 - 1.0 MG/DL    Est, Glom Filt Rate >60 >60 ml/min/1.73m2    Calcium 8.3 8.3 - 10.4 MG/DL    Total Bilirubin 2.7 (H) 0.2 - 1.1 MG/DL    ALT 65 12 - 65 U/L     (H) 15 - 37 U/L    Alk Phosphatase 300 (H) 50 - 136 U/L    Total Protein 6.2 (L) 6.3 - 8.2 g/dL    Albumin 2.7 (L) 3.2 - 4.6 g/dL    Globulin 3.5 2.8 - 4.5 g/dL    Albumin/Globulin Ratio 0.8 0.4 - 1.6     CBC with Auto Differential    Collection Time: 10/19/22  6:22 AM   Result Value Ref Range    WBC 10.3 4.3 - 11.1 K/uL    RBC 3.68 (L) 4.05 - 5.2 M/uL    Hemoglobin 11.5 (L) 11.7 - 15.4 g/dL    Hematocrit 34.9 (L) 35.8 - 46.3 %    MCV 94.8 82 - 102 FL    MCH 31.3 26.1 - 32.9 PG    MCHC 33.0 31.4 - 35.0 g/dL    RDW 13.9 11.9 - 14.6 %    Platelets 789 637 - 743 K/uL    MPV 10.4 9.4 - 12.3 FL    nRBC 0.00 0.0 - 0.2 K/uL    Differential Type AUTOMATED      Seg Neutrophils 79 (H) 43 - 78 %    Lymphocytes 10 (L) 13 - 44 %    Monocytes 9 4.0 - 12.0 %    Eosinophils % 2 0.5 - 7.8 %    Basophils 0 0.0 - 2.0 %    Immature Granulocytes 1 0.0 - 5.0 %    Segs Absolute 8.1 1.7 - 8.2 K/UL    Absolute Lymph # 1.0 0.5 - 4.6 K/UL    Absolute Mono # 1.0 0.1 - 1.3 K/UL    Absolute Eos # 0.2 0.0 - 0.8 K/UL    Basophils Absolute 0.0 0.0 - 0.2 K/UL    Absolute Immature Granulocyte 0.1 0.0 - 0.5 K/UL       Allergies   Allergen Reactions    Ace Inhibitors Angioedema and Swelling     Pt stated \"ace blockers\" can't remember name for htn  Facial swelling     Immunization History   Administered Date(s) Administered    COVID-19, PFIZER PURPLE top, DILUTE for use, (age 15 y+), 30mcg/0.3mL 01/26/2021, 02/16/2021    Influenza Virus Vaccine 10/14/2015    Influenza, FLUAD, (age 72 y+), Adjuvanted, 0.5mL 10/14/2020, 10/14/2021    PPD Test 02/07/2019    Pneumococcal Conjugate 13-valent ([id number]) 04/19/2016    Pneumococcal Polysaccharide ([id number]) 08/25/2008    Pneumococcal Vaccine 12/18/2008    Td (Adult), 5 Lf Tetanus Toxoid, Pf (Tenivac, Decavac) 07/23/2001    Tdap (Boostrix, Adacel) 06/01/2018       Recent Vital Data:  Patient Vitals for the past 24 hrs:   Temp Pulse Resp BP SpO2   10/19/22 1043 97.9 °F (36.6 °C) 76 19 127/63 92 %   10/19/22 0713 98.4 °F (36.9 °C) 72 18 126/80 92 %   10/19/22 0300 98.2 °F (36.8 °C) 77 18 136/72 94 %   10/18/22 2315 98 °F (36.7 °C) 78 17 126/68 94 %   10/18/22 1957 98.2 °F (36.8 °C) 74 17 139/70 92 %   10/18/22 1948 -- 74 -- -- 100 %   10/18/22 1454 97.5 °F (36.4 °C) 79 20 113/62 100 %       Oxygen Therapy  SpO2: 92 %  Pulse via Oximetry: 60 beats per minute  Pulse Oximeter Device Mode: Continuous  Pulse Oximeter Device Location: Finger  O2 Device: None (Room air)  O2 Flow Rate (L/min): 4 L/min    Estimated body mass index is 28.84 kg/m² as calculated from the following:    Height as of this encounter: 5' 4\" (1.626 m). Weight as of this encounter: 168 lb (76.2 kg). Intake/Output Summary (Last 24 hours) at 10/19/2022 1253  Last data filed at 10/19/2022 0857  Gross per 24 hour   Intake 2715 ml   Output 1875 ml   Net 840 ml         Physical Exam:    General:    No overt distress  Head:  Normocephalic, atraumatic  Eyes:  Sclerae appear normal.  Pupils equally round. HENT:  Nares appear normal, no drainage. Moist mucous membranes  Neck:  No restricted ROM. Trachea midline  CV:   RRR. No m/r/g. No JVD  Lungs:   CTAB. No wheezing, rhonchi, or rales. Respirations even, unlabored  Abdomen:   Soft, nontender, nondistended. Extremities: Warm and dry. No cyanosis or clubbing. No edema. Skin:     No rashes. Normal coloration  Neuro:  CN II-XII grossly intact. Psych:  Normal mood and affect. Signed:  Na Milan MD    Part of this note may have been written by using a voice dictation software. The note has been proof read but may still contain some grammatical/other typographical errors.

## 2022-10-19 NOTE — PROGRESS NOTES
END OF SHIFT NOTE:    INTAKE/OUTPUT  10/18 0701 - 10/19 0700  In: 2395 [P.O.:702; I.V.:1693]  Out: 1475 [Urine:1475]  Voiding: Yes  Catheter: No          Flatus: Patient does have flatus present. Stool:  0 occurrences. Last BM (including prior to admit): 10/18/2022     Emesis: 0 occurrences. VITAL SIGNS  Patient Vitals for the past 12 hrs:   Temp Pulse Resp BP SpO2   10/19/22 0300 98.2 °F (36.8 °C) 77 18 136/72 94 %   10/18/22 2315 98 °F (36.7 °C) 78 17 126/68 94 %   10/18/22 1957 98.2 °F (36.8 °C) 74 17 139/70 92 %   10/18/22 1948 -- 74 -- -- 100 %         Ambulating  Yes    Shift report given to oncoming nurse at the bedside.     Maria Isabel Cantu RN

## 2022-10-19 NOTE — PROGRESS NOTES
H&P/Consult Note/Progress Note/Office Note:   Greg Villarreal  MRN: [id number]  BDT:5/0/9864  Age:82 y.o.    HPI: Greg Villarreal is a [de-identified] y.o. female who was admitted by the hospitalist on 10/15/22   after she presented to the ER with a 1 day h/o constant and severe epigastric pain with radiation to her back. Nothing in particular made her symptoms better or worse. Wbc 13k-->20.9k  Lipase >1500  T Bili 1.7--->5.5  AST//134     She was diagnosed with gallstone pancreatitis and had imaging as shown below    She is s/p ERCP on 10/17/22 by Dr. Jensen Díaz with clearance of her common bile duct  General surgery was consulted. She denies prior abd surgery. 10/15/22 CXR  No lobar consolidation, pleural effusions or pulmonary edema. 10/15/22 CT chest/abd/pelvis and with IV contrast  FINDINGS: AIRWAYS: The central airways are patent. LUNGS: Dependent changes and scarring within the lung bases. Pleural parenchymal scarring within the lung apices. No suspicious pulmonary nodules. PLEURA: No pleural effusion or pneumothorax. HEART: The heart is not enlarged. Moderate calcified coronary atherosclerosis. No pericardial effusion. THORACIC AORTA: The aorta is normal in caliber. PULMONARY ARTERY: The main pulmonary artery is normal in caliber. No evidence of pulmonary embolism. MEDIASTINUM/KEREN: No mediastinal mass or lymphadenopathy. Moderate sized hiatal hernia. CHEST WALL: No mass or axillary lymphadenopathy. LIVER: The liver contour is normal. No suspicious liver lesion. BILIARY TREE: Gallbladder is distended. There are calcified gallstones dependently within the gallbladder. Prominent intra and extrahepatic biliary ductal dilatation with common bile duct measuring up to 1.6 cm diameter. There is suggestion of rounded hypodense 0.8 cm focus within the distal common bile duct       SPLEEN: Normal.     PANCREAS: No pancreatic mass or ductal dilation.      ADRENALS: Normal. KIDNEYS/BLADDER: The kidneys are symmetric in size. No renal calculus or hydronephrosis. Several renal cysts and too small to characterize hypodensities are present within the kidneys. The urinary bladder is unremarkable. BOWEL: The colon is unremarkable. The small bowel is normal in caliber. No bowel wall thickening. Small duodenal diverticulum is present. Sigmoid diverticulosis without diverticulitis. APPENDIX: Appendix not definitely seen however no inflammatory changes in the right lower quadrant to suggest appendicitis. PERITONEUM/RETROPERITONEUM: No ascites or free air. No pelvic or retroperitoneal lymphadenopathy. VESSELS: No abdominal aortic aneurysm. ABDOMINAL WALL: No hernia or mass. REPRODUCTIVE: 3.6 cm cyst within the right adnexa. BONES: No suspicious osseous lesion. Impression:  1. Intra and extrahepatic biliary ductal dilatation with distended gallbladder and suggestion of hyperdense 0.8 cm focus at the distal common bile duct likely cause of obstruction. Although favor noncalcified gallstone given the presence of additional gallstones, obstructing mass lesion not excluded. No pancreatic ductal dilatation. 2. No evidence of pulmonary embolism or acute abnormality in the chest.   3. Moderate coronary artery calcification. 4. Right adnexal 3.6 cm cyst. Recommend pelvic US.           10/16/22 pelvic US  Hx: 83yo with right adnexal mass on CT        The uterus is 6 cm in length with an endometrial stripe thickness measuring 2 mm. The right ovary is 4.9 x 3.9 x 3.9 cm. There is a 3.9 cm right adnexal cyst. Doppler flow is present in the right ovary. The left ovary is not visible. There is no free pelvic fluid. The bladder is normal in appearance. Impression:  3.9 cm right adnexal cyst.   Routine OB/GYN follow-up is recommended. 10/17/22 ERCP; Dr Kelby Rasheed  Single large mobile mobile filling defect consistent with a stone.    The CBD was mildly dilated, ~12 mm. There was partial filling of the cystic duct,, but gallbladder was not well visualized. An 8mm biliary sphincterotomy was performed over a guidewire without complications. The tome was exchanged for a 9/12mm extraction balloon. A large brown stone was removed from the duct. Repeat occlusion cholangiogram was clear. Xray FINDINGS: The common bile duct was cannulated at the ampulla. The CBD was mildly dilated. There was a clear intraluminal filling defect indicative of a common bile duct stone. A balloon sweep was performed and the stone was extracted. Impression: CBD stones appreciated. The stones were extracted without consequence.            Past Medical History:   Diagnosis Date    Allergic rhinitis due to pollen     Arthritis     Asthma as a child    Constipation 02/2019    post-op after TKA    Ear problems     Fibrocystic breast     GERD (gastroesophageal reflux disease)     Managed with meds     H/O colonoscopy 2009    Normal ([facility])    Hashimoto's thyroiditis     Hearing reduced     Hypertension     Managed with meds     Macular degeneration     Mixed hyperlipidemia     Daily meds     Osteoporosis 1/2013    GYN Dr. Vania Carr    Restless leg     managed with medication    Unspecified hypothyroidism     Varicella      Past Surgical History:   Procedure Laterality Date    ADENOIDECTOMY  as a child    APPENDECTOMY  1973    CATARACT REMOVAL Bilateral 2007    CATARACT REMOVAL Bilateral     COLONOSCOPY      Dr. Soniya Hook 2009-- no more per pt    ERCP N/A 10/17/2022    ERCP ENDOSCOPIC RETROGRADE CHOLANGIOPANCREATOGRAPHY Balloon sweep performed by Starla Wayne MD at [facility] ENDOSCOPY    GYN      ovarian cyst    HERNIA REPAIR  06/16/2018    OVARIAN CYST REMOVAL  1973    TONSILLECTOMY  as a child    TOTAL KNEE ARTHROPLASTY Left 02/07/2019     Current Facility-Administered Medications   Medication Dose Route Frequency    piperacillin-tazobactam (ZOSYN) 3,375 mg in sodium chloride 0.9 % 50 mL IVPB (mini-bag)  3,375 mg IntraVENous q8h    glucagon (rDNA) injection 1 mg  1 mg IntraVENous PRN    iopamidol (ISOVUE-370) 76 % injection 50 mL  50 mL Other PRN    carbidopa-levodopa (SINEMET)  MG per tablet 1 tablet  1 tablet Oral Nightly    folic acid (FOLVITE) tablet 1 mg  1 mg Oral Daily    pantoprazole (PROTONIX) tablet 40 mg  40 mg Oral QAM AC    pravastatin (PRAVACHOL) tablet 40 mg  40 mg Oral Nightly    levothyroxine (SYNTHROID) tablet 75 mcg  75 mcg Oral QAM AC    [Held by provider] aspirin chewable tablet 81 mg  81 mg Oral Daily    sodium chloride flush 0.9 % injection 5-40 mL  5-40 mL IntraVENous 2 times per day    sodium chloride flush 0.9 % injection 5-40 mL  5-40 mL IntraVENous PRN    ondansetron (ZOFRAN-ODT) disintegrating tablet 4 mg  4 mg Oral Q8H PRN    Or    ondansetron (ZOFRAN) injection 4 mg  4 mg IntraVENous Q6H PRN    bisacodyl (DULCOLAX) suppository 10 mg  10 mg Rectal Daily PRN    famotidine (PEPCID) tablet 10 mg  10 mg Oral Daily PRN    acetaminophen (TYLENOL) tablet 650 mg  650 mg Oral Q6H PRN    Or    acetaminophen (TYLENOL) suppository 650 mg  650 mg Rectal Q6H PRN    [Held by provider] enoxaparin (LOVENOX) injection 40 mg  40 mg SubCUTAneous Q24H    traMADol (ULTRAM) tablet 50 mg  50 mg Oral Q6H PRN    HYDROcodone-acetaminophen (NORCO) 5-325 MG per tablet 1 tablet  1 tablet Oral Q6H PRN     ALLERGIES:  Ace inhibitors    Social History     Socioeconomic History    Marital status:    Tobacco Use    Smoking status: Former     Packs/day: 0.50     Types: Cigarettes     Quit date: 1975     Years since quittin.7    Smokeless tobacco: Never    Tobacco comments:     Quit smoking: quit age of 30   Substance and Sexual Activity    Alcohol use:  Yes     Alcohol/week: 1.0 standard drink    Drug use: No     Social History     Tobacco Use   Smoking Status Former    Packs/day: 0.50    Types: Cigarettes    Quit date: 1975    Years since quittin.7   Smokeless Tobacco Never   Tobacco Comments    Quit smoking: quit age of 30     Family History   Problem Relation Age of Onset    Coronary Art Dis Mother     COPD Father     Other Father         smoker    Heart Disease Brother     Cancer Maternal Grandmother     Breast Cancer Mother     Cancer Mother         breast, lung    Heart Disease Mother     Heart Attack Mother      ROS: The patient has no difficulty with chest pain or shortness of breath. No fever or chills. Comprehensive review of systems was otherwise unremarkable except as noted above. Physical Exam:   /72   Pulse 77   Temp 98.2 °F (36.8 °C) (Oral)   Resp 18   Ht 5' 4\" (1.626 m)   Wt 168 lb (76.2 kg)   SpO2 94%   BMI 28.84 kg/m²   Vitals:    10/18/22 1948 10/18/22 1957 10/18/22 2315 10/19/22 0300   BP:  139/70 126/68 136/72   Pulse: 74 74 78 77   Resp:  17 17 18   Temp:  98.2 °F (36.8 °C) 98 °F (36.7 °C) 98.2 °F (36.8 °C)   TempSrc:  Oral Oral Oral   SpO2: 100% 92% 94% 94%   Weight:       Height:         10/18 1901 - 10/19 0700  In: -   Out: 450 [Urine:450]  10/17 0701 - 10/18 1900  In: 4185.1 [P.O.:702; I.V.:3483.1]  Out: 2180 [Urine:2175]    Constitutional: Alert, oriented, cooperative patient in no acute distress; appears stated age    Eyes:Sclera are clear. EOMs intact  ENMT: no external lesions gross hearing normal; no obvious neck masses, no ear or lip lesions, nares normal  CV: RRR. Normal perfusion  Resp: No JVD. Breathing is  non-labored; no audible wheezing. GI: soft and non-distended; non-tender     Musculoskeletal: unremarkable with normal function. No embolic signs or cyanosis.    Neuro:  Oriented; moves all 4; no focal deficits  Psychiatric: normal affect and mood, no memory impairment    Recent vitals (if inpt):  Patient Vitals for the past 24 hrs:   BP Temp Temp src Pulse Resp SpO2   10/19/22 0300 136/72 98.2 °F (36.8 °C) Oral 77 18 94 %   10/18/22 2315 126/68 98 °F (36.7 °C) Oral 78 17 94 %   10/18/22 1957 139/70 98.2 °F (36.8 °C) Oral 74 17 92 %   10/18/22 1948 -- -- -- 74 -- 100 %   10/18/22 1454 113/62 97.5 °F (36.4 °C) Oral 79 20 100 %   10/18/22 1054 131/68 98.4 °F (36.9 °C) Oral 75 20 93 %   10/18/22 0729 135/69 98.4 °F (36.9 °C) Oral 72 18 96 %       Amount and/or Complexity of Data Reviewed and Analyzed:  I reviewed and analyzed all of the unique labs and radiologic studies that are shown below as well as any that are in the HPI, and any that are in the expanded problem list below  *Each unique test, order, or document contributes to the combination of 2 or combination of 3 in Category 1 below. For this visit I also reviewed old records and prior notes.       Recent Labs     10/18/22  0615   WBC 10.6   HGB 10.8*      *   K 3.4*   *   CO2 17*   BUN 20   ALT 57     Review of most recent CBC  Lab Results   Component Value Date    WBC 10.6 10/18/2022    HGB 10.8 (L) 10/18/2022    HCT 34.9 (L) 10/18/2022    .9 10/18/2022     10/18/2022       Review of most recent BMP  Lab Results   Component Value Date/Time     10/18/2022 06:15 AM    K 3.4 10/18/2022 06:15 AM     10/18/2022 06:15 AM    CO2 17 10/18/2022 06:15 AM    BUN 20 10/18/2022 06:15 AM    CREATININE 0.60 10/18/2022 06:15 AM    GLUCOSE 58 10/18/2022 06:15 AM    CALCIUM 7.8 10/18/2022 06:15 AM        Review of most recent LFTs (and lipase if done)  Lab Results   Component Value Date    ALT 57 10/18/2022     (H) 10/18/2022    ALKPHOS 218 (H) 10/18/2022    BILITOT 4.2 (H) 10/18/2022     Lab Results   Component Value Date    LIPASE 434 (H) 10/17/2022       Lab Results   Component Value Date/Time    INR 0.9 11/19/2019 08:55 AM    APTT 30.8 11/19/2019 08:55 AM       Review of most recent HgbA1c  Hemoglobin A1C   Date Value Ref Range Status   04/21/2022 5.2 4.8 - 5.6 % Final     Comment:              Prediabetes: 5.7 - 6.4           Diabetes: >6.4           Glycemic control for adults with diabetes: <7.0         Nutritional assessment screen for wound healing issues:  Lab Results   Component Value Date    LABALBU 2.6 (L) 10/18/2022       @lastcovr@    Xray Result (most recent):  XR CHEST PORTABLE 10/15/2022    Narrative  AP PORTABLE CHEST X-RAY 10/15/2022 6:32 PM    HISTORY: Midsternal chest pain that began suddenly today and radiates into back    COMPARISON: 9/16/2022    FINDINGS:  EKG leads are present. There is minimal retrocardiac atelectasis or  scarring. There is no lobar consolidation, pleural effusions or pulmonary edema. Impression  No consolidation. CT Result (most recent):  CT CHEST ABDOMEN PELVIS W CONTRAST 10/15/2022    Narrative  EXAMINATION: CT CHEST ABDOMEN PELVIS W CONTRAST 10/15/2022 7:14 PM    ACCESSION NUMBER: [id number]    COMPARISON: CT abdomen/pelvis January 4, 2013    INDICATION: Chest pain, unspecified; Generalized abdominal pain    TECHNIQUE: Multiple contiguous axial CT images of the chest, abdomen, and pelvis  were obtained from the lung apices to the symphysis pubis after the intravenous  administration of 100mL Iso-gia 370. Reformats are provided. Radiation dose reduction techniques were used for this study. Our CT scanners  use one or all of the following: Automated exposure control, adjustment of the  mA and/or kV according to patient size, iterative reconstruction. FINDINGS:  AIRWAYS: The central airways are patent. LUNGS: Dependent changes and scarring within the lung bases. Pleural parenchymal  scarring within the lung apices. No suspicious pulmonary nodules. PLEURA: No pleural effusion or pneumothorax. HEART: The heart is not enlarged. Moderate calcified coronary atherosclerosis. No pericardial effusion. THORACIC AORTA: The aorta is normal in caliber. PULMONARY ARTERY: The main pulmonary artery is normal in caliber. No evidence of  pulmonary embolism. MEDIASTINUM/KEREN: No mediastinal mass or lymphadenopathy. Moderate sized hiatal  hernia.     CHEST WALL: No mass or axillary lymphadenopathy. LIVER: The liver contour is normal. No suspicious liver lesion. BILIARY TREE: Gallbladder is distended. There are calcified gallstones  dependently within the gallbladder. Prominent intra and extrahepatic biliary  ductal dilatation with common bile duct measuring up to 1.6 cm diameter. There  is suggestion of rounded hypodense 0.8 cm focus within the distal common bile  duct    SPLEEN: Normal.    PANCREAS: No pancreatic mass or ductal dilation. ADRENALS: Normal.    KIDNEYS/BLADDER: The kidneys are symmetric in size. No renal calculus or  hydronephrosis. Several renal cysts and too small to characterize hypodensities  are present within the kidneys. The urinary bladder is unremarkable. BOWEL: The colon is unremarkable. The small bowel is normal in caliber. No bowel  wall thickening. Small duodenal diverticulum is present. Sigmoid diverticulosis  without diverticulitis. APPENDIX: Appendix not definitely seen however no inflammatory changes in the  right lower quadrant to suggest appendicitis. PERITONEUM/RETROPERITONEUM: No ascites or free air. No pelvic or retroperitoneal  lymphadenopathy. VESSELS: No abdominal aortic aneurysm. ABDOMINAL WALL: No hernia or mass. REPRODUCTIVE: 3.6 cm cyst within the right adnexa. BONES: No suspicious osseous lesion. Impression  1. Intra and extrahepatic biliary ductal dilatation with distended gallbladder  and suggestion of hyperdense 0.8 cm focus at the distal common bile duct likely  cause of obstruction. Although favor noncalcified gallstone given the presence  of additional gallstones, obstructing mass lesion not excluded. No pancreatic  ductal dilatation. 2.  No evidence of pulmonary embolism or acute abnormality in the chest.  3.  Moderate coronary artery calcification. 4.  Right adnexal 3.6 cm cyst. Recommend nonurgent pelvic ultrasound for further  evaluation.     US Result (most recent):  US PELVIS COMPLETE 10/16/2022    Narrative  PELVIC ULTRASOUND 10/16/2022    HISTORY: 27-year-old patient with Right adnexal mass on CT earlier today. TECHNIQUE: Sonographic imaging of the pelvis was performed from a transabdominal  approach. COMPARISON: CT abdomen and pelvis from yesterday. FINDINGS: The uterus is 6 cm in length with an endometrial stripe thickness  measuring 2 mm. The right ovary is 4.9 x 3.9 x 3.9 cm. There is a 3.9 cm right adnexal cyst.  Doppler flow is present in the right ovary. The left ovary is not visible. There  is no free pelvic fluid. The bladder is normal in appearance. Impression  3.9 cm right adnexal cyst. Routine OB/GYN follow-up is recommended. Admission date (for inpatients): 10/15/2022   2 Days Post-Op  Procedure(s):  ERCP ENDOSCOPIC RETROGRADE CHOLANGIOPANCREATOGRAPHY Balloon sweep        ASSESSMENT/PLAN:  [unfilled]  Principal Problem:    Acute gallstone pancreatitis  Active Problems:    Anemia due to folate deficiency    Acquired hypothyroidism    Hypercholesterolemia    Gastroesophageal reflux disease with esophagitis  Resolved Problems:    * No resolved hospital problems.  *     Patient Active Problem List    Diagnosis Date Noted    Acute gallstone pancreatitis 10/15/2022     Priority: Medium    Anemia due to folate deficiency 10/15/2022     Priority: Medium    Vitamin D deficiency 10/14/2020    Restless leg syndrome 10/14/2020    Presence of left artificial knee joint 03/08/2020    Contusion of elbow and forearm, left, initial encounter 03/08/2020    Presence of right artificial knee joint 03/08/2020    Arthritis of right knee 12/10/2019    Osteoarthritis 10/01/2019    Arthritis of left knee 02/07/2019    Acquired hypothyroidism 04/19/2016    Gastroesophageal reflux disease with esophagitis 04/19/2016    GERD (gastroesophageal reflux disease)     Allergic rhinitis due to pollen 06/17/2015    Essential hypertension, benign 06/17/2015    Hypercholesterolemia     Thyroid disease Abdominal pain 01/07/2013    Sigmoid diverticulitis 01/07/2013          Number and Complexity of Problems addressed and   Risks of complications and/or morbidity of management        Gallstone pancreatitis  S/p ERCP on 10/18/22  LFTs and T Bili improving  Am labs pending    We discussed her condition at length. Operative risks were discussed including bleeding, infection, recurrent pancreatitis, injury to bowel or bile duct, the need for open surgery, blood clots, risk of anesthesia, etc.. We had an opening in the operating room schedule for today and I encouraged her to take it to lower the risks of developing recurrent pancreatitis while waiting for another date. Risk of not having surgery was also discussed including recurrent pancreatitis and cholecystitis. All her questions were answered. I offered and recommended laparoscopic cholecystectomy on 10/18/22  She absolutely refused to proceed with surgery (said she was too tired and had a headache) and wanted to do the surgery as an outpatient. She acknowledged understanding that delay of cholecystectomy could lead to increased risk of recurrent pancreatitis or recurrent CBD stones    The next available date I offered was on Tuesday 10/25/22. She is agreeable to this and we will bring her in as an outpatient for laparoscopic cholecystectomy that day. Everything is all arranged  --The preoperative staff will contact her regarding details of her surgery. She can be discharged today if labs OK    Surgery will sign off for now    3.9cm right adnexal cystic mass on pelvic US (And CT)  Recommend outpatient GYN follow-up        Level of MDM (2/3 elements below)  Number and Complexity of Problems Addressed Amount and/or Complexity of Data to be Reviewed and Analyzed  *Each unique test, order, or document contributes to the combination of 2 or combination of 3 in Category 1 below.  Risk of Complications and/or Morbidity or Mortality of pt Management 19842  40272  Minimal  ?1self-limited or minor problem Minimal or none Minimal risk of morbidity from additional diagnostic testing or Rx   38829  98203 Low Low  ? 2or more self-limited or minor problems;    or  ? 1stable chronic illness;    or  ?1acute, uncomplicated illness or injury   Limited  (Must meet the requirements of at least 1 of the 2 categories)  Category 1: Tests and documents   ? Any combination of 2 from the following:  ?Review of prior external note(s) from each unique source*;  ?review of the result(s) of each unique test*;   ?ordering of each unique test*    or   Category 2: Assessment requiring an independent historian(s)  (For the categories of independent interpretation of tests and discussion of management or test interpretation, see moderate or high) Low risk of morbidity from additional diagnostic testing or treatment     32891  89647 Mod Moderate  ? 1or more chronic illnesses with exacerbation, progression, or side effects of treatment;    or  ?2or more stable chronic illnesses;    or  ?1undiagnosed new problem with uncertain prognosis;    or  ?1acute illness with systemic symptoms;    or  ?1acute complicated injury   Moderate  (Must meet the requirements of at least 1 out of 3 categories)  Category 1: Tests, documents, or independent historian(s)  ? Any combination of 3 from the following:   ?Review of prior external note(s) from each unique source*;  ?Review of the result(s) of each unique test*;  ?Ordering of each unique test*;  ?Assessment requiring an independent historian(s)    or  Category 2: Independent interpretation of tests   ? Independent interpretation of a test performed by another physician/other qualified health care professional (not separately reported);     or  Category 3: Discussion of management or test interpretation  ? Discussion of management or test interpretation with external physician/other qualified health care professional/appropriate source (not separately reported)   Moderate risk of morbidity from additional diagnostic testing or treatment  Examples only:  ?Prescription drug management   ? Decision regarding minor surgery with identified patient or procedure risk factors  ? Decision regarding elective major surgery without identified patient or procedure risk factors   ? Diagnosis or treatment significantly limited by social determinants of health       66784  74145 High High  ? 1or more chronic illnesses with severe exacerbation, progression, or side effects of treatment;    or  ?1 acute or chronic illness or injury that poses a threat to life or bodily function   Extensive  (Must meet the requirements of at least 2 out of 3 categories)  Category 1: Tests, documents, or independent historian(s)  ? Any combination of 3 from the following:   ?Review of prior external note(s) from each unique source*;  ?Review of the result(s) of each unique test*;   ?Ordering of each unique test*;   ?Assessment requiring an independent historian(s)    or   Category 2: Independent interpretation of tests   ? Independent interpretation of a test performed by another physician/other qualified health care professional (not separately reported);     or  Category 3: Discussion of management or test interpretation  ? Discussion of management or test interpretation with external physician/other qualified health care professional/appropriate source (not separately reported)   High risk of morbidity from additional diagnostic testing or treatment  Examples only:  ?Drug therapy requiring intensive monitoring for toxicity  ? Decision regarding elective major surgery with identified patient or procedure risk factors  ? Decision regarding emergency major surgery  ? Decision regarding hospitalization  ? Decision not to resuscitate or to de-escalate care because of poor prognosis             I have personally performed a face-to-face diagnostic evaluation and management  service on this patient.       I have independently seen the patient. I have independently obtained the above history from the patient/family. I have independently examined the patient with above findings. I have independently reviewed data/labs for this patient and developed the above plan of care (MDM).       Signed: Lorna Balderas MD  10/19/2022    5:08 AM

## 2022-10-19 NOTE — CARE COORDINATION
Patient with discharge orders today. Patient denies supportive needs. Patient's family to provide transportation. Patient has met all treatment goals and milestones. CM following until discharged.        ASSESSMENT NOTE    Attending Physician: Mercedes Chowdary MD  Admit Problem: Generalized abdominal pain [R10.84]  Gallstone pancreatitis [K85.10]  Elevated liver enzymes [R74.8]  Acute gallstone pancreatitis [K85.10]  Chest pain, unspecified type [R07.9]  Date/Time of Admission: 10/15/2022  6:14 PM  Problem List:  Patient Active Problem List   Diagnosis    Allergic rhinitis due to pollen    Acquired hypothyroidism    Vitamin D deficiency    Arthritis of left knee    Abdominal pain    Hypercholesterolemia    Thyroid disease    Gastroesophageal reflux disease with esophagitis    Presence of left artificial knee joint    Contusion of elbow and forearm, left, initial encounter    Sigmoid diverticulitis    Arthritis of right knee    GERD (gastroesophageal reflux disease)    Essential hypertension, benign    Osteoarthritis    Presence of right artificial knee joint    Restless leg syndrome    Acute gallstone pancreatitis    Anemia due to folate deficiency    Cyst of right ovary    Elevated liver enzymes       Service Assessment  Patient Orientation Alert and Oriented   Cognition Alert   History Provided By Patient   Primary Caregiver Self   Accompanied By/Relationship     Support Systems Spouse/Significant Other   Patient's Healthcare Decision Maker is: Legal Next of Kin (Spouse: Balaji Herzog  [phone])   PCP Verified by CM Yes (Dr Melia Joyce  [phone])   Last Visit to PCP Within last 3 months   Prior Functional Level Independent in ADLs/IADLs   Current Functional Level Independent in ADLs/IADLs   Can patient return to prior living arrangement Yes   Ability to make needs known: Good   Family able to assist with home care needs: Yes   Would you like for me to discuss the discharge plan with any other family members/significant others, and if so, who? Financial Resources [facility])   Community Resources     CM/SW Referral       Social/Functional History  Lives With Spouse   Type of [address] One level   [address] to enter without rails   Entrance Stairs - Number of Steps 1   Entrance Stairs - Rails     [place] Shower/Tub Walk-in shower   Bathroom Toilet     Bathroom Equipment Built-in shower seat, Grab bars in [address] [employer] Work     Driving     Shopping          Other ([place])     [address] [employer] Paying/Finance [facility] Management     Other (Comment)     Ambulation Assistance Independent   Transfer Assistance Independent   Active  Yes   Patient's  Info     Mode of Transportation Car   Education     Occupation Retired   Type of Occupation       Discharge [address] Prior To Admission None   Potential Assistance Needed N/A   DME     DME     DME Ordered? No   Potential Assistance Purchasing Medications No   Meds-to-Beds: Does the patient want to have any new prescriptions delivered to bedside prior to discharge?      Type of Home Care Services None   Patient expects to be discharged to: House   Follow Up Appointment: Best Day/Time     One/Two Story Residence: One story   # of Interior Steps     Height of Each Step (in)     [employer] Available     History of Falls? Services At/After Discharge  Transition of Care Consult (CM Consult): Discharge Planning   Internal Home Health     Internal Hospice     Reason Outside Agency [address]     Partner SNF     Reason Why Partner SNF Not Chosen     Internal Comfort Care     Reason Outside [address] Discharge None   [place] Resource Information Provided? No   Mode of Transport at Discharge [address] [facility] Time of Discharge     Confirm Follow Up Transport Self     Condition of Participation: Discharge Planning  The plan for Transition of Care is related to the following treatment goals: return to baseline   The Patient and/or Patient Representative was provided with a Choice of Provider? Patient   Name of the Patient Representative who was provided with the Choice of Provider and agrees with the Discharge Plan? The Patient and/or Patient Representative Agree with the Discharge Plan? Yes   Freedom of Choice list was provided with basic dialogue that supports the individualized plan of care/goals, treatment preferences, and shares the quality data associated with the providers?  Yes     Documentation for Discharge Appeal  Discharge Appealed by     Date notified by [employer] of appeal request:     Time notified by [employer]IO of appeal request:     Detailed Notice of Discharge given to:     Date Notice of Discharge given:     Time Notice of Discharge given:     Date records sent to [email or web address] [facility] Garay     Time records sent to [email or web address] [facility]     Date Notified of Outcome     Time Notified of Outcome     Outcome of appeal           Oliver Lorenzo RN 10/19/22 10:48 AM

## 2022-10-20 ENCOUNTER — TELEPHONE (OUTPATIENT)
Dept: PRIMARY CARE CLINIC | Facility: CLINIC | Age: 82
End: 2022-10-20

## 2022-10-20 DIAGNOSIS — R05.9 COUGH, UNSPECIFIED TYPE: Primary | ICD-10-CM

## 2022-10-20 RX ORDER — GUAIFENESIN AND CODEINE PHOSPHATE 100; 10 MG/5ML; MG/5ML
5 SOLUTION ORAL EVERY 4 HOURS PRN
Qty: 120 ML | Refills: 0 | Status: SHIPPED | OUTPATIENT
Start: 2022-10-20 | End: 2022-10-27

## 2022-10-20 NOTE — TELEPHONE ENCOUNTER
Please call patient she has coughing and trouble breathing. He can be reached at [phone].   She was just released from the hospital.

## 2022-10-21 LAB
BACTERIA SPEC CULT: NORMAL
SERVICE CMNT-IMP: NORMAL

## 2022-10-22 ENCOUNTER — HOSPITAL ENCOUNTER (EMERGENCY)
Dept: GENERAL RADIOLOGY | Age: 82
Discharge: HOME | End: 2022-10-25
Payer: MEDICARE

## 2022-10-22 ENCOUNTER — HOSPITAL ENCOUNTER (EMERGENCY)
Age: 82
Discharge: HOME | End: 2022-10-22
Attending: EMERGENCY MEDICINE
Payer: MEDICARE

## 2022-10-22 VITALS
BODY MASS INDEX: 27.49 KG/M2 | HEART RATE: 80 BPM | HEIGHT: 64 IN | WEIGHT: 161 LBS | OXYGEN SATURATION: 96 % | DIASTOLIC BLOOD PRESSURE: 92 MMHG | RESPIRATION RATE: 20 BRPM | SYSTOLIC BLOOD PRESSURE: 141 MMHG | TEMPERATURE: 97.9 F

## 2022-10-22 DIAGNOSIS — J81.0 ACUTE PULMONARY EDEMA (HCC): ICD-10-CM

## 2022-10-22 DIAGNOSIS — R06.01 ORTHOPNEA: Primary | ICD-10-CM

## 2022-10-22 LAB
ALBUMIN SERPL-MCNC: 2.9 G/DL (ref 3.2–4.6)
ALBUMIN/GLOB SERPL: 0.7 {RATIO} (ref 0.4–1.6)
ALP SERPL-CCNC: 276 U/L (ref 50–136)
ALT SERPL-CCNC: 167 U/L (ref 12–65)
ANION GAP SERPL CALC-SCNC: 5 MMOL/L (ref 2–11)
AST SERPL-CCNC: 67 U/L (ref 15–37)
BASOPHILS # BLD: 0.1 K/UL (ref 0–0.2)
BASOPHILS NFR BLD: 1 % (ref 0–2)
BILIRUB SERPL-MCNC: 1.1 MG/DL (ref 0.2–1.1)
BUN SERPL-MCNC: 6 MG/DL (ref 8–23)
CALCIUM SERPL-MCNC: 9.3 MG/DL (ref 8.3–10.4)
CHLORIDE SERPL-SCNC: 107 MMOL/L (ref 101–110)
CO2 SERPL-SCNC: 27 MMOL/L (ref 21–32)
CREAT SERPL-MCNC: 0.6 MG/DL (ref 0.6–1)
DIFFERENTIAL METHOD BLD: ABNORMAL
EOSINOPHIL # BLD: 0.5 K/UL (ref 0–0.8)
EOSINOPHIL NFR BLD: 4 % (ref 0.5–7.8)
ERYTHROCYTE [DISTWIDTH] IN BLOOD BY AUTOMATED COUNT: 14.5 % (ref 11.9–14.6)
GLOBULIN SER CALC-MCNC: 3.9 G/DL (ref 2.8–4.5)
GLUCOSE SERPL-MCNC: 94 MG/DL (ref 65–100)
HCT VFR BLD AUTO: 37.2 % (ref 35.8–46.3)
HGB BLD-MCNC: 12.2 G/DL (ref 11.7–15.4)
IMM GRANULOCYTES # BLD AUTO: 0.2 K/UL (ref 0–0.5)
IMM GRANULOCYTES NFR BLD AUTO: 2 % (ref 0–5)
LYMPHOCYTES # BLD: 1.8 K/UL (ref 0.5–4.6)
LYMPHOCYTES NFR BLD: 17 % (ref 13–44)
MAGNESIUM SERPL-MCNC: 1.9 MG/DL (ref 1.8–2.4)
MCH RBC QN AUTO: 31 PG (ref 26.1–32.9)
MCHC RBC AUTO-ENTMCNC: 32.8 G/DL (ref 31.4–35)
MCV RBC AUTO: 94.4 FL (ref 82–102)
MONOCYTES # BLD: 1 K/UL (ref 0.1–1.3)
MONOCYTES NFR BLD: 10 % (ref 4–12)
NEUTS SEG # BLD: 6.8 K/UL (ref 1.7–8.2)
NEUTS SEG NFR BLD: 66 % (ref 43–78)
NRBC # BLD: 0 K/UL (ref 0–0.2)
PLATELET # BLD AUTO: 369 K/UL (ref 150–450)
PMV BLD AUTO: 9.9 FL (ref 9.4–12.3)
POTASSIUM SERPL-SCNC: 3.3 MMOL/L (ref 3.5–5.1)
PROT SERPL-MCNC: 6.8 G/DL (ref 6.3–8.2)
RBC # BLD AUTO: 3.94 M/UL (ref 4.05–5.2)
SODIUM SERPL-SCNC: 139 MMOL/L (ref 133–143)
WBC # BLD AUTO: 10.3 K/UL (ref 4.3–11.1)

## 2022-10-22 PROCEDURE — 96374 THER/PROPH/DIAG INJ IV PUSH: CPT

## 2022-10-22 PROCEDURE — 99285 EMERGENCY DEPT VISIT HI MDM: CPT

## 2022-10-22 PROCEDURE — 6360000002 HC RX W HCPCS: Performed by: EMERGENCY MEDICINE

## 2022-10-22 PROCEDURE — 80053 COMPREHEN METABOLIC PANEL: CPT

## 2022-10-22 PROCEDURE — 85025 COMPLETE CBC W/AUTO DIFF WBC: CPT

## 2022-10-22 PROCEDURE — 83735 ASSAY OF MAGNESIUM: CPT

## 2022-10-22 PROCEDURE — 71045 X-RAY EXAM CHEST 1 VIEW: CPT

## 2022-10-22 RX ORDER — FUROSEMIDE 10 MG/ML
40 INJECTION INTRAMUSCULAR; INTRAVENOUS ONCE
Status: COMPLETED | OUTPATIENT
Start: 2022-10-22 | End: 2022-10-22

## 2022-10-22 RX ORDER — SODIUM CHLORIDE 0.9 % (FLUSH) 0.9 %
5 SYRINGE (ML) INJECTION EVERY 8 HOURS
Status: DISCONTINUED | OUTPATIENT
Start: 2022-10-22 | End: 2022-10-22 | Stop reason: HOSPADM

## 2022-10-22 RX ORDER — SODIUM CHLORIDE 0.9 % (FLUSH) 0.9 %
5 SYRINGE (ML) INJECTION AS NEEDED
Status: DISCONTINUED | OUTPATIENT
Start: 2022-10-22 | End: 2022-10-22 | Stop reason: HOSPADM

## 2022-10-22 RX ORDER — FUROSEMIDE 40 MG/1
40 TABLET ORAL EVERY MORNING
Qty: 3 TABLET | Refills: 0 | Status: SHIPPED | OUTPATIENT
Start: 2022-10-22 | End: 2022-10-28 | Stop reason: ALTCHOICE

## 2022-10-22 RX ADMIN — FUROSEMIDE 40 MG: 10 INJECTION, SOLUTION INTRAMUSCULAR; INTRAVENOUS at 17:12

## 2022-10-22 ASSESSMENT — ENCOUNTER SYMPTOMS
COUGH: 1
VOMITING: 0
NAUSEA: 0
TROUBLE SWALLOWING: 0
SORE THROAT: 0
SHORTNESS OF BREATH: 1
VOICE CHANGE: 0
BACK PAIN: 0
FACIAL SWELLING: 0
EYE PAIN: 0
CHEST TIGHTNESS: 0
ABDOMINAL PAIN: 0

## 2022-10-22 ASSESSMENT — PAIN - FUNCTIONAL ASSESSMENT: PAIN_FUNCTIONAL_ASSESSMENT: 0-10

## 2022-10-22 ASSESSMENT — PAIN SCALES - GENERAL: PAINLEVEL_OUTOF10: 0

## 2022-10-22 NOTE — DISCHARGE INSTRUCTIONS
3 days of lasix was written for home. Follow up with the cardiologist. Please return for worsening symptoms.

## 2022-10-22 NOTE — ED PROVIDER NOTES
Emergency Department Provider Note                   PCP:                Tricia Loomis MD               Age: [de-identified] y.o. Sex: female       ICD-10-CM    1. Orthopnea  R06.01       2. Acute pulmonary edema ([address]  J81.0           DISPOSITION Discharge - Pending Orders Complete 10/22/2022 05:06:56 PM        MDM  Number of Diagnoses or Management Options  Acute pulmonary edema ([address]  Orthopnea  Diagnosis management comments: 53-year-old female presents for shortness of breath and cough. Vital signs are reviewed. Patient is clinically nontoxic in appearance she is in no acute respiratory distress. Lab work from triage was reviewed, CBC is unremarkable, CMP shows a slightly low potassium at 3.3. Her chest x-ray showed evidence of early/mild fluid overload. I think clinically she has the symptoms. Patient will be given a dose of IV Lasix here she is satting 99% on room air. Patient be written for 3 days of Lasix for home. She was given cardiology follow-up for evaluation as to why she has this that could be potentially from her hospital stay and just be volume overloaded. However patient remains clinically stable. She was given return precautions. Patient stable discharge respiratory examination.                Orders Placed This Encounter   Procedures    XR CHEST PORTABLE    CBC with Auto Differential    Comprehensive Metabolic Panel    Magnesium    Cardiac Monitor - ED Only    Continuous Pulse Oximetry    EKG 12 Lead    Saline lock IV        Medications   sodium chloride flush 0.9 % injection 5 mL (has no administration in time range)   sodium chloride flush 0.9 % injection 5 mL (has no administration in time range)   furosemide (LASIX) injection 40 mg (40 mg IntraVENous Given 10/22/22 1712)       New Prescriptions    FUROSEMIDE (LASIX) 40 MG TABLET    Take 1 tablet by mouth every morning        Ele Koenig is a [de-identified] y.o. female who presents to the Emergency Department with chief complaint of    Chief Complaint   Patient presents with    Cough      82-year-old female presents emerged department via private vehicle with chief complaint of cough and shortness of breath. Patient reports 3-day history of dry cough with associated orthopnea and exertional shortness of breath. Patient reports that she had recently had a gallbladder procedure done earlier this week. She had presented to an urgent care and they had done an x-ray and were concerned for potential aspiration pneumonia. Patient was given a shot of IM Rocephin and started on Levaquin. Patient reports having some slight association lower extremity edema. She denies any history of heart failure she does not followed by cardiologist.  She denies being on any diuretics. She denies any fevers or vomiting at home. Review of Systems   Constitutional:  Negative for activity change, chills and fever. HENT:  Negative for dental problem, drooling, facial swelling, sore throat, trouble swallowing and voice change. Eyes:  Negative for pain. Respiratory:  Positive for cough and shortness of breath. Negative for chest tightness. Cardiovascular:  Negative for chest pain and palpitations. Gastrointestinal:  Negative for abdominal pain, nausea and vomiting. Endocrine: Negative for polydipsia. Genitourinary:  Negative for difficulty urinating, dysuria and hematuria. Musculoskeletal:  Negative for back pain and neck pain. Skin:  Negative for rash and wound. Neurological:  Negative for dizziness, seizures, facial asymmetry, speech difficulty, numbness and headaches. Psychiatric/Behavioral:  Negative for agitation and behavioral problems.       Past Medical History:   Diagnosis Date    Allergic rhinitis due to pollen     Arthritis     Asthma as a child    Constipation 02/2019    post-op after TKA    Ear problems     Fibrocystic breast     GERD (gastroesophageal reflux disease)     Managed with meds     H/O colonoscopy 2009    Normal ([facility]) Hashimoto's thyroiditis     Hearing reduced     Hypertension     Managed with meds     Macular degeneration     Mixed hyperlipidemia     Daily meds     Osteoporosis 2013    GYN Dr. Mona Reis    Restless leg     managed with medication    Unspecified hypothyroidism     Varicella         Past Surgical History:   Procedure Laterality Date    ADENOIDECTOMY  as a child    APPENDECTOMY  1973    CATARACT REMOVAL Bilateral 2007    CATARACT REMOVAL Bilateral     COLONOSCOPY      Dr. Emerson Weiss -- no more per pt    ERCP N/A 10/17/2022    ERCP ENDOSCOPIC RETROGRADE CHOLANGIOPANCREATOGRAPHY Balloon sweep performed by Valentín Vicente MD at [facility] ENDOSCOPY    GYN      ovarian cyst    HERNIA REPAIR  2018    OVARIAN CYST REMOVAL  1973    TONSILLECTOMY  as a child    TOTAL KNEE ARTHROPLASTY Left 2019        Family History   Problem Relation Age of Onset    Coronary Art Dis Mother     COPD Father     Other Father         smoker    Heart Disease Brother     Cancer Maternal Grandmother     Breast Cancer Mother     Cancer Mother         breast, lung    Heart Disease Mother     Heart Attack Mother         Social History     Socioeconomic History    Marital status:    Tobacco Use    Smoking status: Former     Packs/day: 0.50     Types: Cigarettes     Quit date: 1975     Years since quittin.7    Smokeless tobacco: Never    Tobacco comments:     Quit smoking: quit age of 30   Substance and Sexual Activity    Alcohol use:  Yes     Alcohol/week: 1.0 standard drink    Drug use: No         Ace inhibitors     Previous Medications    ACETAMINOPHEN (TYLENOL) 325 MG TABLET    Take 650 mg by mouth every 4 hours as needed    ASPIRIN 81 MG EC TABLET    Take 81 mg by mouth every 12 hours    CALCIUM POLYCARBOPHIL (FIBER) 625 MG TABS    take 1 qd    CARBIDOPA-LEVODOPA (SINEMET)  MG PER TABLET    TAKE 1 TABLET BY MOUTH AT NIGHT    CETIRIZINE (ZYRTEC) 10 MG TABLET    Take 10 mg by mouth daily    CLOBETASOL (TEMOVATE) 0.05 % CREAM    Apply topically 2 times daily as needed    COENZYME Q10 100 MG CAPS CAPSULE    Take by mouth every morning    DIPHENHYDRAMINE-APAP, SLEEP, (TYLENOL PM EXTRA STRENGTH)  MG TABLET    Take 1-2 tablets by mouth    FOLIC ACID (FOLVITE) 1 MG TABLET    Take 1 tablet by mouth in the morning. GLUCOSAMINE 500 MG CAPS    Take 500 mg by mouth daily    GUAIFENESIN-CODEINE (CHERATUSSIN AC) 100-10 MG/5ML SYRUP    Take 5 mLs by mouth every 4 hours as needed for Cough for up to 7 days. HYDROCHLOROTHIAZIDE (HYDRODIURIL) 12.5 MG TABLET    Take 1 tablet by mouth daily TAKE 1 TABLET BY MOUTH DAILY    HYDROCORTISONE 2.5 % CREAM    Place rectally 4 times daily    HYOSCYAMINE SULFATE SL (LEVSIN/SL) 0.125 MG SUBL    Place 0.25 mg under the tongue 4 times daily as needed (abdominal pain or cramping)    KETOCONAZOLE (NIZORAL) 2 % CREAM    Apply topically 2 times daily as needed    LEVOTHYROXINE (SYNTHROID) 75 MCG TABLET    Take 1 tablet by mouth in the morning. TAKE 1 TABLET BY MOUTH DAILY BEFORE BREAKFAST. MULTIPLE VITAMINS-MINERALS (THERAPEUTIC MULTIVITAMIN-MINERALS) TABLET    Take 1 tablet by mouth daily    NONFORMULARY    Preservision/Lutein oral cap 1 cap bid    NONFORMULARY    Fiber oral tablet 1 capsule daily    OMEGA-3 FATTY ACIDS (FISH OIL) 1000 MG CPDR    Take 1,000 mg by mouth daily    PANTOPRAZOLE (PROTONIX) 40 MG TABLET    TAKE 1 TABLET BY MOUTH DAILY BEFORE DINNER    PRAVASTATIN (PRAVACHOL) 40 MG TABLET    TAKE 1 TABLET BY MOUTH AT NIGHT    PREDNISOLONE ACETATE (PRED FORTE) 1 % OPHTHALMIC SUSPENSION    Apply 1 drop to eye Twice a Week    TELMISARTAN (MICARDIS) 80 MG TABLET    Take 1 tablet by mouth in the morning. TRAMADOL (ULTRAM) 50 MG TABLET    Take 50 mg by mouth every 6 hours as needed. TRIAMCINOLONE (KENALOG) 0.1 % CREAM    Apply topically 2 times daily as needed        Vitals signs and nursing note reviewed.    Patient Vitals for the past 4 hrs:   Temp Pulse Resp BP SpO2 10/22/22 1517 97.9 °F (36.6 °C) 80 20 (!) 143/73 98 %          Physical Exam  Vitals and nursing note reviewed. Constitutional:       General: She is not in acute distress. Appearance: Normal appearance. She is not ill-appearing. HENT:      Head: Normocephalic and atraumatic. Right Ear: Tympanic membrane normal.      Left Ear: Tympanic membrane normal.      Mouth/Throat:      Mouth: Mucous membranes are moist.      Pharynx: Oropharynx is clear. Eyes:      Extraocular Movements: Extraocular movements intact. Pupils: Pupils are equal, round, and reactive to light. Cardiovascular:      Rate and Rhythm: Normal rate and regular rhythm. Heart sounds: No murmur heard. Pulmonary:      Effort: No respiratory distress. Breath sounds: No wheezing or rhonchi. Comments: Crackles in the bilateral bases  Abdominal:      Palpations: Abdomen is soft. There is no mass. Tenderness: There is no abdominal tenderness. There is no guarding. Musculoskeletal:         General: Swelling present. No tenderness. Cervical back: Normal range of motion and neck supple. No rigidity or tenderness. Comments: 1+ pitting edema in the bilateral lower extremities   Skin:     General: Skin is warm and dry. Capillary Refill: Capillary refill takes less than 2 seconds. Neurological:      General: No focal deficit present. Mental Status: She is alert and oriented to person, place, and time. Mental status is at baseline. Psychiatric:         Mood and Affect: Mood normal.         Behavior: Behavior normal.        Procedures    Results for orders placed or performed during the hospital encounter of 10/22/22   XR CHEST PORTABLE    Narrative    CHEST X-RAY, single portable view  10/22/2022    History: Shortness of breath. Technique: Single frontal view of the chest.    Comparison: Chest x-ray 9/16/2020    Findings: The cardiac silhouette is normal in respect to size.   The lungs are expanded  without evidence for pneumothorax. The small basilar effusions are seen. No  evolving pulmonary consolidation is seen. Impression    1. New small basilar effusions suggesting early or mild fluid overload. This report was made using voice transcription. Despite my best efforts to avoid  any, transcription errors may persist. If there is any question about the  accuracy of the report or need for clarification, then please call [phone], or text me through [email or web address] for clarification or correction.      CBC with Auto Differential   Result Value Ref Range    WBC 10.3 4.3 - 11.1 K/uL    RBC 3.94 (L) 4.05 - 5.2 M/uL    Hemoglobin 12.2 11.7 - 15.4 g/dL    Hematocrit 37.2 35.8 - 46.3 %    MCV 94.4 82 - 102 FL    MCH 31.0 26.1 - 32.9 PG    MCHC 32.8 31.4 - 35.0 g/dL    RDW 14.5 11.9 - 14.6 %    Platelets 356 688 - 521 K/uL    MPV 9.9 9.4 - 12.3 FL    nRBC 0.00 0.0 - 0.2 K/uL    Differential Type AUTOMATED      Seg Neutrophils 66 43 - 78 %    Lymphocytes 17 13 - 44 %    Monocytes 10 4.0 - 12.0 %    Eosinophils % 4 0.5 - 7.8 %    Basophils 1 0.0 - 2.0 %    Immature Granulocytes 2 0.0 - 5.0 %    Segs Absolute 6.8 1.7 - 8.2 K/UL    Absolute Lymph # 1.8 0.5 - 4.6 K/UL    Absolute Mono # 1.0 0.1 - 1.3 K/UL    Absolute Eos # 0.5 0.0 - 0.8 K/UL    Basophils Absolute 0.1 0.0 - 0.2 K/UL    Absolute Immature Granulocyte 0.2 0.0 - 0.5 K/UL   Comprehensive Metabolic Panel   Result Value Ref Range    Sodium 139 133 - 143 mmol/L    Potassium 3.3 (L) 3.5 - 5.1 mmol/L    Chloride 107 101 - 110 mmol/L    CO2 27 21 - 32 mmol/L    Anion Gap 5 2 - 11 mmol/L    Glucose 94 65 - 100 mg/dL    BUN 6 (L) 8 - 23 MG/DL    Creatinine 0.60 0.6 - 1.0 MG/DL    Est, Glom Filt Rate >60 >60 ml/min/1.73m2    Calcium 9.3 8.3 - 10.4 MG/DL    Total Bilirubin 1.1 0.2 - 1.1 MG/DL     (H) 12 - 65 U/L    AST 67 (H) 15 - 37 U/L    Alk Phosphatase 276 (H) 50 - 136 U/L    Total Protein 6.8 6.3 - 8.2 g/dL    Albumin 2.9 (L) 3.2 - 4.6 g/dL    Globulin 3.9 2.8 - 4.5 g/dL    Albumin/Globulin Ratio 0.7 0.4 - 1.6     Magnesium   Result Value Ref Range    Magnesium 1.9 1.8 - 2.4 mg/dL        XR CHEST PORTABLE   Final Result   1. New small basilar effusions suggesting early or mild fluid overload. This report was made using voice transcription. Despite my best efforts to avoid   any, transcription errors may persist. If there is any question about the   accuracy of the report or need for clarification, then please call [phone], or text me through [employer] for clarification or correction. Voice dictation software was used during the making of this note. This software is not perfect and grammatical and other typographical errors may be present. This note has not been completely proofread for errors.      Khris Haider, DO  10/22/22 1739

## 2022-10-22 NOTE — ED NOTES
I have reviewed discharge instructions with the patient and spouse. The patient and spouse verbalized understanding. Patient left ED via Discharge Method: ambulatory to Home with spouse. Opportunity for questions and clarification provided. Patient given 1 scripts. To continue your aftercare when you leave the hospital, you may receive an automated call from our care team to check in on how you are doing. This is a free service and part of our promise to provide the best care and service to meet your aftercare needs.  If you have questions, or wish to unsubscribe from this service please call [phone]. Thank you for Choosing our [facility] Emergency Department.         Ekaterina Jones RN  10/22/22 4675

## 2022-10-26 ENCOUNTER — OFFICE VISIT (OUTPATIENT)
Dept: SURGERY | Age: 82
End: 2022-10-26
Payer: MEDICARE

## 2022-10-26 ENCOUNTER — HOSPITAL ENCOUNTER (OUTPATIENT)
Dept: GENERAL RADIOLOGY | Age: 82
Discharge: HOME | End: 2022-10-29
Payer: MEDICARE

## 2022-10-26 DIAGNOSIS — R06.02 SOB (SHORTNESS OF BREATH): ICD-10-CM

## 2022-10-26 DIAGNOSIS — R74.8 ELEVATED LIVER ENZYMES: ICD-10-CM

## 2022-10-26 DIAGNOSIS — K85.10 ACUTE GALLSTONE PANCREATITIS: Primary | ICD-10-CM

## 2022-10-26 PROCEDURE — 1036F TOBACCO NON-USER: CPT | Performed by: SURGERY

## 2022-10-26 PROCEDURE — G8400 PT W/DXA NO RESULTS DOC: HCPCS | Performed by: SURGERY

## 2022-10-26 PROCEDURE — 1111F DSCHRG MED/CURRENT MED MERGE: CPT | Performed by: SURGERY

## 2022-10-26 PROCEDURE — 71046 X-RAY EXAM CHEST 2 VIEWS: CPT

## 2022-10-26 PROCEDURE — 1123F ACP DISCUSS/DSCN MKR DOCD: CPT | Performed by: SURGERY

## 2022-10-26 PROCEDURE — G8427 DOCREV CUR MEDS BY ELIG CLIN: HCPCS | Performed by: SURGERY

## 2022-10-26 PROCEDURE — 99214 OFFICE O/P EST MOD 30 MIN: CPT | Performed by: SURGERY

## 2022-10-26 PROCEDURE — G8417 CALC BMI ABV UP PARAM F/U: HCPCS | Performed by: SURGERY

## 2022-10-26 PROCEDURE — 1090F PRES/ABSN URINE INCON ASSESS: CPT | Performed by: SURGERY

## 2022-10-26 PROCEDURE — G8484 FLU IMMUNIZE NO ADMIN: HCPCS | Performed by: SURGERY

## 2022-10-26 NOTE — PROGRESS NOTES
H&P/Consult Note/Progress Note/Office Note:   Wander Mata  MRN: [id number]  JSN:[id number]  Age:82 y.o.    HPI: Wander Mata is a [de-identified] y.o. female who was admitted by the hospitalist on 10/15/22   after she presented to the ER with a 1 day h/o constant and severe epigastric pain with radiation to her back. Nothing in particular made her symptoms better or worse. Wbc 13k-->20.9k  Lipase >1500  T Bili 1.7--->5.5  AST//134     She was diagnosed with gallstone pancreatitis and had imaging as shown below    She is s/p ERCP on 10/17/22 by Dr. Frances Daniel with clearance of her common bile duct  General surgery was consulted. She is s/p ovarian cystectomy in the 1970s with a low transverse incision. She is s/p right inguinal hernia repair with mesh at [facility] in 2018. She was seen while hospitalized and recommended to have surgery prior to discharge but she refused surgery and wanted to go home and rest for a few days. Surgery was scheduled on the following Tuesday which was Tuesday, 10/25/2022. She canceled the surgery because she had been in the urgent treatment center a few days prior with shortness of breath  She reports she was found to have fluid on her lungs and possible pneumonia and was given Levaquin and discharge. A few days later on Saturday, October 22 she returned to the ER with worsening cough and was treated with IV diuretic and placed on p.o. diuretic. She came to my office on 10/26/2022 reporting that she was unable to walk short distances secondary to shortness of breath. She is followed by Dr. Nereida Lara of cardiology but does not have an appointment until November 21. She is currently on the OR schedule for Tuesday, November 1        10/15/22 CXR  No lobar consolidation, pleural effusions or pulmonary edema. 10/15/22 CT chest/abd/pelvis and with IV contrast  FINDINGS: AIRWAYS: The central airways are patent. LUNGS: Dependent changes and scarring within the lung bases.  Pleural parenchymal scarring within the lung apices. No suspicious pulmonary nodules. PLEURA: No pleural effusion or pneumothorax. HEART: The heart is not enlarged. Moderate calcified coronary atherosclerosis. No pericardial effusion. THORACIC AORTA: The aorta is normal in caliber. PULMONARY ARTERY: The main pulmonary artery is normal in caliber. No evidence of pulmonary embolism. MEDIASTINUM/KEREN: No mediastinal mass or lymphadenopathy. Moderate sized hiatal hernia. CHEST WALL: No mass or axillary lymphadenopathy. LIVER: The liver contour is normal. No suspicious liver lesion. BILIARY TREE: Gallbladder is distended. There are calcified gallstones dependently within the gallbladder. Prominent intra and extrahepatic biliary ductal dilatation with common bile duct measuring up to 1.6 cm diameter. There is suggestion of rounded hypodense 0.8 cm focus within the distal common bile duct       SPLEEN: Normal.     PANCREAS: No pancreatic mass or ductal dilation. ADRENALS: Normal.     KIDNEYS/BLADDER: The kidneys are symmetric in size. No renal calculus or hydronephrosis. Several renal cysts and too small to characterize hypodensities are present within the kidneys. The urinary bladder is unremarkable. BOWEL: The colon is unremarkable. The small bowel is normal in caliber. No bowel wall thickening. Small duodenal diverticulum is present. Sigmoid diverticulosis without diverticulitis. APPENDIX: Appendix not definitely seen however no inflammatory changes in the right lower quadrant to suggest appendicitis. PERITONEUM/RETROPERITONEUM: No ascites or free air. No pelvic or retroperitoneal lymphadenopathy. VESSELS: No abdominal aortic aneurysm. ABDOMINAL WALL: No hernia or mass. REPRODUCTIVE: 3.6 cm cyst within the right adnexa. BONES: No suspicious osseous lesion. Impression:  1.   Intra and extrahepatic biliary ductal dilatation with distended gallbladder and suggestion of hyperdense 0.8 cm focus at the distal common bile duct likely cause of obstruction. Although favor noncalcified gallstone given the presence of additional gallstones, obstructing mass lesion not excluded. No pancreatic ductal dilatation. 2. No evidence of pulmonary embolism or acute abnormality in the chest.   3. Moderate coronary artery calcification. 4. Right adnexal 3.6 cm cyst. Recommend pelvic US.           10/16/22 pelvic US  Hx: 81yo with right adnexal mass on CT        The uterus is 6 cm in length with an endometrial stripe thickness measuring 2 mm. The right ovary is 4.9 x 3.9 x 3.9 cm. There is a 3.9 cm right adnexal cyst. Doppler flow is present in the right ovary. The left ovary is not visible. There is no free pelvic fluid. The bladder is normal in appearance. Impression:  3.9 cm right adnexal cyst.   Routine OB/GYN follow-up is recommended. 10/17/22 ERCP; Dr Simba Will  Single large mobile mobile filling defect consistent with a stone. The CBD was mildly dilated, ~12 mm. There was partial filling of the cystic duct,, but gallbladder was not well visualized. An 8mm biliary sphincterotomy was performed over a guidewire without complications. The tome was exchanged for a 9/12mm extraction balloon. A large brown stone was removed from the duct. Repeat occlusion cholangiogram was clear. Xray FINDINGS: The common bile duct was cannulated at the ampulla. The CBD was mildly dilated. There was a clear intraluminal filling defect indicative of a common bile duct stone. A balloon sweep was performed and the stone was extracted. Impression: CBD stones appreciated. The stones were extracted without consequence.            Past Medical History:   Diagnosis Date    Allergic rhinitis due to pollen     Arthritis     Asthma as a child    Constipation 02/2019    post-op after TKA    Ear problems     Fibrocystic breast     GERD (gastroesophageal reflux disease) Managed with meds     H/O colonoscopy 2009    Normal ([facility])    Hashimoto's thyroiditis     Hearing reduced     Hypertension     Managed with meds     Macular degeneration     Mixed hyperlipidemia     Daily meds     Osteoporosis 1/2013    GYN Dr. Valentino Solders    Restless leg     managed with medication    Unspecified hypothyroidism     Varicella      Past Surgical History:   Procedure Laterality Date    ADENOIDECTOMY  as a child    APPENDECTOMY  1973    CATARACT REMOVAL Bilateral 2007    CATARACT REMOVAL Bilateral     COLONOSCOPY      Dr. Yani De Leon 2009-- no more per pt    ERCP N/A 10/17/2022    ERCP ENDOSCOPIC RETROGRADE CHOLANGIOPANCREATOGRAPHY Balloon sweep performed by Nickolas Haddad MD at [facility] ENDOSCOPY    GYN      ovarian cyst    HERNIA REPAIR  06/16/2018    OVARIAN CYST REMOVAL  1973    TONSILLECTOMY  as a child    TOTAL KNEE ARTHROPLASTY Left 02/07/2019     Current Outpatient Medications   Medication Sig    furosemide (LASIX) 40 MG tablet Take 1 tablet by mouth every morning    guaiFENesin-codeine (CHERATUSSIN AC) 100-10 MG/5ML syrup Take 5 mLs by mouth every 4 hours as needed for Cough for up to 7 days. NONFORMULARY Preservision/Lutein oral cap 1 cap bid    Multiple Vitamins-Minerals (THERAPEUTIC MULTIVITAMIN-MINERALS) tablet Take 1 tablet by mouth daily    Omega-3 Fatty Acids (FISH OIL) 1000 MG CPDR Take 1,000 mg by mouth daily    NONFORMULARY Fiber oral tablet 1 capsule daily    Hyoscyamine Sulfate SL (LEVSIN/SL) 0.125 MG SUBL Place 0.25 mg under the tongue 4 times daily as needed (abdominal pain or cramping)    pantoprazole (PROTONIX) 40 MG tablet TAKE 1 TABLET BY MOUTH DAILY BEFORE DINNER    pravastatin (PRAVACHOL) 40 MG tablet TAKE 1 TABLET BY MOUTH AT NIGHT    hydroCHLOROthiazide (HYDRODIURIL) 12.5 MG tablet Take 1 tablet by mouth daily TAKE 1 TABLET BY MOUTH DAILY    telmisartan (MICARDIS) 80 MG tablet Take 1 tablet by mouth in the morning.     levothyroxine (SYNTHROID) 75 MCG tablet Take 1 tablet by mouth in the morning. TAKE 1 TABLET BY MOUTH DAILY BEFORE BREAKFAST. folic acid (FOLVITE) 1 MG tablet Take 1 tablet by mouth in the morning. Calcium Polycarbophil (FIBER) 625 MG TABS take 1 qd    coenzyme Q10 100 MG CAPS capsule Take by mouth every morning    acetaminophen (TYLENOL) 325 MG tablet Take 650 mg by mouth every 4 hours as needed    aspirin 81 MG EC tablet Take 81 mg by mouth every 12 hours    carbidopa-levodopa (SINEMET)  MG per tablet TAKE 1 TABLET BY MOUTH AT NIGHT    cetirizine (ZYRTEC) 10 MG tablet Take 10 mg by mouth daily    clobetasol (TEMOVATE) 0.05 % cream Apply topically 2 times daily as needed    diphenhydrAMINE-APAP, sleep, (TYLENOL PM EXTRA STRENGTH)  MG tablet Take 1-2 tablets by mouth    Glucosamine 500 MG CAPS Take 500 mg by mouth daily    hydrocortisone 2.5 % cream Place rectally 4 times daily    ketoconazole (NIZORAL) 2 % cream Apply topically 2 times daily as needed    prednisoLONE acetate (PRED FORTE) 1 % ophthalmic suspension Apply 1 drop to eye Twice a Week    traMADol (ULTRAM) 50 MG tablet Take 50 mg by mouth every 6 hours as needed. triamcinolone (KENALOG) 0.1 % cream Apply topically 2 times daily as needed     No current facility-administered medications for this visit. ALLERGIES:  Ace inhibitors    Social History     Socioeconomic History    Marital status:    Tobacco Use    Smoking status: Former     Packs/day: 0.50     Types: Cigarettes     Quit date: 1975     Years since quittin.7    Smokeless tobacco: Never    Tobacco comments:     Quit smoking: quit age of 30   Substance and Sexual Activity    Alcohol use:  Yes     Alcohol/week: 1.0 standard drink    Drug use: No     Social History     Tobacco Use   Smoking Status Former    Packs/day: 0.50    Types: Cigarettes    Quit date: 1975    Years since quittin.7   Smokeless Tobacco Never   Tobacco Comments    Quit smoking: quit age of 30     Family History   Problem Relation Age of Onset    Coronary Art Dis Mother     COPD Father     Other Father         smoker    Heart Disease Brother     Cancer Maternal Grandmother     Breast Cancer Mother     Cancer Mother         breast, lung    Heart Disease Mother     Heart Attack Mother      ROS: The patient has no difficulty with chest pain or shortness of breath. No fever or chills. Comprehensive review of systems was otherwise unremarkable except as noted above. Physical Exam:   There were no vitals taken for this visit. There were no vitals filed for this visit. 10/18 1901 - 10/19 0700  In: -   Out: 450 [Urine:450]  10/17 0701 - 10/18 1900  In: 4185.1 [P.O.:702; I.V.:3483.1]  Out: 2180 [Urine:2175]    Constitutional: Alert, oriented, cooperative patient in no acute distress; appears stated age    Eyes:Sclera are clear. EOMs intact  ENMT: no external lesions gross hearing normal; no obvious neck masses, no ear or lip lesions, nares normal  CV: RRR. Normal perfusion  Resp: No JVD. Breathing is  non-labored; no audible wheezing. GI: soft and non-distended; non-tender     Musculoskeletal: unremarkable with normal function. No embolic signs or cyanosis.    Neuro:  Oriented; moves all 4; no focal deficits  Psychiatric: normal affect and mood, no memory impairment    Recent vitals (if inpt):  Patient Vitals for the past 24 hrs:   BP Temp Temp src Pulse Resp SpO2   10/19/22 0300 136/72 98.2 °F (36.8 °C) Oral 77 18 94 %   10/18/22 2315 126/68 98 °F (36.7 °C) Oral 78 17 94 %   10/18/22 1957 139/70 98.2 °F (36.8 °C) Oral 74 17 92 %   10/18/22 1948 -- -- -- 74 -- 100 %   10/18/22 1454 113/62 97.5 °F (36.4 °C) Oral 79 20 100 %   10/18/22 1054 131/68 98.4 °F (36.9 °C) Oral 75 20 93 %   10/18/22 0729 135/69 98.4 °F (36.9 °C) Oral 72 18 96 %       Amount and/or Complexity of Data Reviewed and Analyzed:  I reviewed and analyzed all of the unique labs and radiologic studies that are shown below as well as any that are in the HPI, and any that are in the expanded problem list below  *Each unique test, order, or document contributes to the combination of 2 or combination of 3 in Category 1 below. For this visit I also reviewed old records and prior notes. No results for input(s): WBC, HGB, PLT, NA, K, CL, CO2, BUN, CREA, GLU, INR, APTT, ALT, AML, AML, LCAD, PCO2, PO2, HCO3 in the last 72 hours. Invalid input(s): PTP, TBIL, TBILI, CBIL, SGOT, GPT, AP, LPSE, NH4, TROPT, TROIQ,  PH    Review of most recent CBC  Lab Results   Component Value Date    WBC 10.3 10/22/2022    HGB 12.2 10/22/2022    HCT 37.2 10/22/2022    MCV 94.4 10/22/2022     10/22/2022       Review of most recent BMP  Lab Results   Component Value Date/Time     10/22/2022 03:22 PM    K 3.3 10/22/2022 03:22 PM     10/22/2022 03:22 PM    CO2 27 10/22/2022 03:22 PM    BUN 6 10/22/2022 03:22 PM    CREATININE 0.60 10/22/2022 03:22 PM    GLUCOSE 94 10/22/2022 03:22 PM    CALCIUM 9.3 10/22/2022 03:22 PM        Review of most recent LFTs (and lipase if done)  Lab Results   Component Value Date     (H) 10/22/2022    AST 67 (H) 10/22/2022    ALKPHOS 276 (H) 10/22/2022    BILITOT 1.1 10/22/2022     Lab Results   Component Value Date    LIPASE 434 (H) 10/17/2022       Lab Results   Component Value Date/Time    INR 0.9 11/19/2019 08:55 AM    APTT 30.8 11/19/2019 08:55 AM       Review of most recent HgbA1c  Hemoglobin A1C   Date Value Ref Range Status   04/21/2022 5.2 4.8 - 5.6 % Final     Comment:              Prediabetes: 5.7 - 6.4           Diabetes: >6.4           Glycemic control for adults with diabetes: <7.0         Nutritional assessment screen for wound healing issues:  Lab Results   Component Value Date    LABALBU 2.9 (L) 10/22/2022       @lastcovr@    Xray Result (most recent):  XR CHEST PORTABLE 10/22/2022    Narrative  CHEST X-RAY, single portable view  10/22/2022    History: Shortness of breath.     Technique: Single frontal view of the chest.    Comparison: Chest x-ray 9/16/2020    Findings: The cardiac silhouette is normal in respect to size. The lungs are expanded  without evidence for pneumothorax. The small basilar effusions are seen. No  evolving pulmonary consolidation is seen. Impression  1. New small basilar effusions suggesting early or mild fluid overload. This report was made using voice transcription. Despite my best efforts to avoid  any, transcription errors may persist. If there is any question about the  accuracy of the report or need for clarification, then please call [phone], or text me through perfectserv for clarification or correction. CT Result (most recent):  CT CHEST ABDOMEN PELVIS W CONTRAST 10/15/2022    Narrative  EXAMINATION: CT CHEST ABDOMEN PELVIS W CONTRAST 10/15/2022 7:14 PM    ACCESSION NUMBER: [id number]    COMPARISON: CT abdomen/pelvis January 4, 2013    INDICATION: Chest pain, unspecified; Generalized abdominal pain    TECHNIQUE: Multiple contiguous axial CT images of the chest, abdomen, and pelvis  were obtained from the lung apices to the symphysis pubis after the intravenous  administration of 100mL Iso-gia 370. Reformats are provided. Radiation dose reduction techniques were used for this study. Our CT scanners  use one or all of the following: Automated exposure control, adjustment of the  mA and/or kV according to patient size, iterative reconstruction. FINDINGS:  AIRWAYS: The central airways are patent. LUNGS: Dependent changes and scarring within the lung bases. Pleural parenchymal  scarring within the lung apices. No suspicious pulmonary nodules. PLEURA: No pleural effusion or pneumothorax. HEART: The heart is not enlarged. Moderate calcified coronary atherosclerosis. No pericardial effusion. THORACIC AORTA: The aorta is normal in caliber. PULMONARY ARTERY: The main pulmonary artery is normal in caliber.  No evidence of  pulmonary embolism. MEDIASTINUM/KEREN: No mediastinal mass or lymphadenopathy. Moderate sized hiatal  hernia. CHEST WALL: No mass or axillary lymphadenopathy. LIVER: The liver contour is normal. No suspicious liver lesion. BILIARY TREE: Gallbladder is distended. There are calcified gallstones  dependently within the gallbladder. Prominent intra and extrahepatic biliary  ductal dilatation with common bile duct measuring up to 1.6 cm diameter. There  is suggestion of rounded hypodense 0.8 cm focus within the distal common bile  duct    SPLEEN: Normal.    PANCREAS: No pancreatic mass or ductal dilation. ADRENALS: Normal.    KIDNEYS/BLADDER: The kidneys are symmetric in size. No renal calculus or  hydronephrosis. Several renal cysts and too small to characterize hypodensities  are present within the kidneys. The urinary bladder is unremarkable. BOWEL: The colon is unremarkable. The small bowel is normal in caliber. No bowel  wall thickening. Small duodenal diverticulum is present. Sigmoid diverticulosis  without diverticulitis. APPENDIX: Appendix not definitely seen however no inflammatory changes in the  right lower quadrant to suggest appendicitis. PERITONEUM/RETROPERITONEUM: No ascites or free air. No pelvic or retroperitoneal  lymphadenopathy. VESSELS: No abdominal aortic aneurysm. ABDOMINAL WALL: No hernia or mass. REPRODUCTIVE: 3.6 cm cyst within the right adnexa. BONES: No suspicious osseous lesion. Impression  1. Intra and extrahepatic biliary ductal dilatation with distended gallbladder  and suggestion of hyperdense 0.8 cm focus at the distal common bile duct likely  cause of obstruction. Although favor noncalcified gallstone given the presence  of additional gallstones, obstructing mass lesion not excluded. No pancreatic  ductal dilatation. 2.  No evidence of pulmonary embolism or acute abnormality in the chest.  3.  Moderate coronary artery calcification.   4.  Right adnexal 3.6 cm cyst. Recommend nonurgent pelvic ultrasound for further  evaluation. US Result (most recent):  US PELVIS COMPLETE 10/16/2022    Narrative  PELVIC ULTRASOUND 10/16/2022    HISTORY: 82-year-old patient with Right adnexal mass on CT earlier today. TECHNIQUE: Sonographic imaging of the pelvis was performed from a transabdominal  approach. COMPARISON: CT abdomen and pelvis from yesterday. FINDINGS: The uterus is 6 cm in length with an endometrial stripe thickness  measuring 2 mm. The right ovary is 4.9 x 3.9 x 3.9 cm. There is a 3.9 cm right adnexal cyst.  Doppler flow is present in the right ovary. The left ovary is not visible. There  is no free pelvic fluid. The bladder is normal in appearance. Impression  3.9 cm right adnexal cyst. Routine OB/GYN follow-up is recommended. Admission date (for inpatients): (Not on file)   * No surgery found *  Procedure(s):  ERCP ENDOSCOPIC RETROGRADE CHOLANGIOPANCREATOGRAPHY Balloon sweep        ASSESSMENT/PLAN:  [unfilled]  Active Problems:    * No active hospital problems. *  Resolved Problems:    * No resolved hospital problems.  *     Patient Active Problem List    Diagnosis Date Noted    Orthopnea 10/22/2022     Priority: Medium    Acute pulmonary edema ([address] 10/22/2022     Priority: Medium    Cyst of right ovary 10/19/2022     Priority: Medium    Elevated liver enzymes 10/19/2022     Priority: Medium    Acute gallstone pancreatitis 10/15/2022     Priority: Medium    Anemia due to folate deficiency 10/15/2022     Priority: Medium    Vitamin D deficiency 10/14/2020    Restless leg syndrome 10/14/2020    Presence of left artificial knee joint 03/08/2020    Contusion of elbow and forearm, left, initial encounter 03/08/2020    Presence of right artificial knee joint 03/08/2020    Arthritis of right knee 12/10/2019    Osteoarthritis 10/01/2019    Arthritis of left knee 02/07/2019    Acquired hypothyroidism 04/19/2016    Gastroesophageal reflux disease with esophagitis 04/19/2016    GERD (gastroesophageal reflux disease)     Allergic rhinitis due to pollen 06/17/2015    Essential hypertension, benign 06/17/2015    Hypercholesterolemia     Thyroid disease     Abdominal pain 01/07/2013    Sigmoid diverticulitis 01/07/2013          Number and Complexity of Problems addressed and   Risks of complications and/or morbidity of management          Shortness of breath with concern for fluid overload in the emergency room recently  She reports she is unable to walk short distances without significant shortness of breath. We need cardiology clearance to proceed with surgery  Her next cardiology appointment is not until November 21. I sent a request to see if they could work her in sooner given that she is on the OR schedule for Tuesday, November 1 and we do not want to delay her surgery any further given her risk of recurrent pancreatitis the fact we have already delayed her surgery long enough. Gallstone pancreatitis  S/p ERCP on 10/18/22  LFTs and T Bili improving    We discussed her condition at length. Operative risks were discussed including bleeding, infection, recurrent pancreatitis, injury to bowel or bile duct, the need for open surgery, blood clots, risk of anesthesia, etc.. We had an opening in the operating room schedule for today and I encouraged her to take it to lower the risks of developing recurrent pancreatitis while waiting for another date. Risk of not having surgery was also discussed including recurrent pancreatitis and cholecystitis. All her questions were answered. I offered and recommended laparoscopic cholecystectomy on 10/18/22  She absolutely refused to proceed with surgery (said she was too tired and had a headache) and wanted to do the surgery as an outpatient.   She acknowledged understanding that delay of cholecystectomy could lead to increased risk of recurrent pancreatitis or recurrent CBD stones    The next available date I offered was on Tuesday 10/25/22. She is agreeable to this and we were to bring her in as an outpatient for laparoscopic cholecystectomy that day. This was canceled and moved to November 1 when she developed respiratory complications and shortness of breath described above. 3.9cm right adnexal cystic mass on pelvic US (And CT)  Recommend outpatient GYN follow-up        Level of MDM (2/3 elements below)  Number and Complexity of Problems Addressed Amount and/or Complexity of Data to be Reviewed and Analyzed  *Each unique test, order, or document contributes to the combination of 2 or combination of 3 in Category 1 below. Risk of Complications and/or Morbidity or Mortality of pt Management     45800  07196 SF Minimal  ?1self-limited or minor problem Minimal or none Minimal risk of morbidity from additional diagnostic testing or Rx   90842  01602 Low Low  ? 2or more self-limited or minor problems;    or  ? 1stable chronic illness;    or  ?1acute, uncomplicated illness or injury   Limited  (Must meet the requirements of at least 1 of the 2 categories)  Category 1: Tests and documents   ? Any combination of 2 from the following:  ?Review of prior external note(s) from each unique source*;  ?review of the result(s) of each unique test*;   ?ordering of each unique test*    or   Category 2: Assessment requiring an independent historian(s)  (For the categories of independent interpretation of tests and discussion of management or test interpretation, see moderate or high) Low risk of morbidity from additional diagnostic testing or treatment     89031  04408 Mod Moderate  ? 1or more chronic illnesses with exacerbation, progression, or side effects of treatment;    or  ?2or more stable chronic illnesses;    or  ?1undiagnosed new problem with uncertain prognosis;    or  ?1acute illness with systemic symptoms;    or  ?1acute complicated injury   Moderate  (Must meet the requirements of at least 1 out of 3 categories)  Category 1: Tests, documents, or independent historian(s)  ? Any combination of 3 from the following:   ?Review of prior external note(s) from each unique source*;  ?Review of the result(s) of each unique test*;  ?Ordering of each unique test*;  ?Assessment requiring an independent historian(s)    or  Category 2: Independent interpretation of tests   ? Independent interpretation of a test performed by another physician/other qualified health care professional (not separately reported);     or  Category 3: Discussion of management or test interpretation  ? Discussion of management or test interpretation with external physician/other qualified health care professional/appropriate source (not separately reported)   Moderate risk of morbidity from additional diagnostic testing or treatment  Examples only:  ?Prescription drug management   ? Decision regarding minor surgery with identified patient or procedure risk factors  ? Decision regarding elective major surgery without identified patient or procedure risk factors   ? Diagnosis or treatment significantly limited by social determinants of health       [address]47  15665 High High  ? 1or more chronic illnesses with severe exacerbation, progression, or side effects of treatment;    or  ?1 acute or chronic illness or injury that poses a threat to life or bodily function   Extensive  (Must meet the requirements of at least 2 out of 3 categories)  Category 1: Tests, documents, or independent historian(s)  ? Any combination of 3 from the following:   ?Review of prior external note(s) from each unique source*;  ?Review of the result(s) of each unique test*;   ?Ordering of each unique test*;   ?Assessment requiring an independent historian(s)    or   Category 2: Independent interpretation of tests   ? Independent interpretation of a test performed by another physician/other qualified health care professional (not separately reported);     or  Category 3: Discussion of management or test interpretation  ? Discussion of management or test interpretation with external physician/other qualified health care professional/appropriate source (not separately reported)   High risk of morbidity from additional diagnostic testing or treatment  Examples only:  ?Drug therapy requiring intensive monitoring for toxicity  ? Decision regarding elective major surgery with identified patient or procedure risk factors  ? Decision regarding emergency major surgery  ? Decision regarding hospitalization  ? Decision not to resuscitate or to de-escalate care because of poor prognosis             I have personally performed a face-to-face diagnostic evaluation and management  service on this patient. I have independently seen the patient. I have independently obtained the above history from the patient/family. I have independently examined the patient with above findings. I have independently reviewed data/labs for this patient and developed the above plan of care (MDM).       Signed: Selvin Perry MD  10/26/2022    11:57 AM

## 2022-10-28 ENCOUNTER — OFFICE VISIT (OUTPATIENT)
Dept: CARDIOLOGY CLINIC | Age: 82
End: 2022-10-28
Payer: MEDICARE

## 2022-10-28 VITALS
HEIGHT: 64 IN | BODY MASS INDEX: 26.63 KG/M2 | SYSTOLIC BLOOD PRESSURE: 124 MMHG | DIASTOLIC BLOOD PRESSURE: 80 MMHG | WEIGHT: 156 LBS | HEART RATE: 76 BPM

## 2022-10-28 DIAGNOSIS — R06.02 SOB (SHORTNESS OF BREATH): Primary | ICD-10-CM

## 2022-10-28 DIAGNOSIS — R06.02 SOB (SHORTNESS OF BREATH): ICD-10-CM

## 2022-10-28 LAB — NT PRO BNP: 124 PG/ML

## 2022-10-28 PROCEDURE — 1036F TOBACCO NON-USER: CPT | Performed by: INTERNAL MEDICINE

## 2022-10-28 PROCEDURE — G8427 DOCREV CUR MEDS BY ELIG CLIN: HCPCS | Performed by: INTERNAL MEDICINE

## 2022-10-28 PROCEDURE — 1111F DSCHRG MED/CURRENT MED MERGE: CPT | Performed by: INTERNAL MEDICINE

## 2022-10-28 PROCEDURE — 3074F SYST BP LT 130 MM HG: CPT | Performed by: INTERNAL MEDICINE

## 2022-10-28 PROCEDURE — 1090F PRES/ABSN URINE INCON ASSESS: CPT | Performed by: INTERNAL MEDICINE

## 2022-10-28 PROCEDURE — G8417 CALC BMI ABV UP PARAM F/U: HCPCS | Performed by: INTERNAL MEDICINE

## 2022-10-28 PROCEDURE — G8484 FLU IMMUNIZE NO ADMIN: HCPCS | Performed by: INTERNAL MEDICINE

## 2022-10-28 PROCEDURE — 93000 ELECTROCARDIOGRAM COMPLETE: CPT | Performed by: INTERNAL MEDICINE

## 2022-10-28 PROCEDURE — 3078F DIAST BP <80 MM HG: CPT | Performed by: INTERNAL MEDICINE

## 2022-10-28 PROCEDURE — G8400 PT W/DXA NO RESULTS DOC: HCPCS | Performed by: INTERNAL MEDICINE

## 2022-10-28 PROCEDURE — 1123F ACP DISCUSS/DSCN MKR DOCD: CPT | Performed by: INTERNAL MEDICINE

## 2022-10-28 PROCEDURE — 99203 OFFICE O/P NEW LOW 30 MIN: CPT | Performed by: INTERNAL MEDICINE

## 2022-10-28 ASSESSMENT — ENCOUNTER SYMPTOMS
EYE PAIN: 0
BACK PAIN: 0
GASTROINTESTINAL NEGATIVE: 1
PHOTOPHOBIA: 0
SHORTNESS OF BREATH: 1
COUGH: 1
ALLERGIC/IMMUNOLOGIC NEGATIVE: 1
ABDOMINAL PAIN: 0
CHEST TIGHTNESS: 0
EYES NEGATIVE: 1

## 2022-10-28 NOTE — PROGRESS NOTES
[address], [address] [employer] [facility], [address] [place] [address]  PHONE: [phone]    Sydnee Amanda  1940    SUBJECTIVE:   Sydnee Amanda is a [de-identified] y.o. female seen for initial evaluation of:      Chief Complaint   Patient presents with    New Patient    Shortness of Breath    Cardiac Clearance     Gallbladder surgery with Dr. Karl Galindo on 11. 1.22        Cardiac Hx (Reviewed and summarized by me):  None    HPI:  26-year-old female whose  also follows with me presents for evaluation prior to gallbladder surgery. She had a gallstone in her pancreatic duct that had to be removed via ERCP last month. Post procedure she developed a very severe cough. She went to an urgent care because of this cough and her inability to get in touch with either the GI doctors or her primary care doctor. At the urgent care she was found to have a very small bilateral pleural effusions. The emergency MD talk to her and then told her that she had heart failure and put her on Lasix for a few days. Her cough persisted and she was given some antibiotics as well. She is doing better her cough is improving. She went back to the emergency room had a second x-ray that showed resolution of the effusion. She has no cardiac history. No significant family history. ECG: NSR with normal axis occ PAC (Independent review/interpretation by me)      Past Medical History, Past Surgical History, Family history, Social History, and Medications were all reviewed with the patient today and updated as necessary.        Current Outpatient Medications:     NONFORMULARY, Preservision/Lutein oral cap 1 cap bid, Disp: , Rfl:     Multiple Vitamins-Minerals (THERAPEUTIC MULTIVITAMIN-MINERALS) tablet, Take 1 tablet by mouth daily, Disp: , Rfl:     Omega-3 Fatty Acids (FISH OIL) 1000 MG CPDR, Take 1,000 mg by mouth daily, Disp: , Rfl:     NONFORMULARY, Fiber oral tablet 1 capsule daily, Disp: , Rfl:     Hyoscyamine Sulfate SL (LEVSIN/SL) 0.125 MG SUBL, Place 0.25 mg under the tongue 4 times daily as needed (abdominal pain or cramping), Disp: 20 each, Rfl: 1    pantoprazole (PROTONIX) 40 MG tablet, TAKE 1 TABLET BY MOUTH DAILY BEFORE DINNER, Disp: 90 tablet, Rfl: 3    pravastatin (PRAVACHOL) 40 MG tablet, TAKE 1 TABLET BY MOUTH AT NIGHT, Disp: 90 tablet, Rfl: 3    hydroCHLOROthiazide (HYDRODIURIL) 12.5 MG tablet, Take 1 tablet by mouth daily TAKE 1 TABLET BY MOUTH DAILY, Disp: 90 tablet, Rfl: 3    telmisartan (MICARDIS) 80 MG tablet, Take 1 tablet by mouth in the morning., Disp: 90 tablet, Rfl: 3    levothyroxine (SYNTHROID) 75 MCG tablet, Take 1 tablet by mouth in the morning. TAKE 1 TABLET BY MOUTH DAILY BEFORE BREAKFAST., Disp: 90 tablet, Rfl: 1    folic acid (FOLVITE) 1 MG tablet, Take 1 tablet by mouth in the morning., Disp: 90 tablet, Rfl: 1    Calcium Polycarbophil (FIBER) 625 MG TABS, take 1 qd, Disp: , Rfl:     coenzyme Q10 100 MG CAPS capsule, Take by mouth every morning, Disp: , Rfl:     acetaminophen (TYLENOL) 325 MG tablet, Take 650 mg by mouth every 4 hours as needed, Disp: , Rfl:     aspirin 81 MG EC tablet, Take 81 mg by mouth every 12 hours, Disp: , Rfl:     carbidopa-levodopa (SINEMET)  MG per tablet, TAKE 1 TABLET BY MOUTH AT NIGHT, Disp: , Rfl:     cetirizine (ZYRTEC) 10 MG tablet, Take 10 mg by mouth daily, Disp: , Rfl:     clobetasol (TEMOVATE) 0.05 % cream, Apply topically 2 times daily as needed, Disp: , Rfl:     Glucosamine 500 MG CAPS, Take 500 mg by mouth daily, Disp: , Rfl:     hydrocortisone 2.5 % cream, Place rectally 4 times daily, Disp: , Rfl:     ketoconazole (NIZORAL) 2 % cream, Apply topically 2 times daily as needed, Disp: , Rfl:     prednisoLONE acetate (PRED FORTE) 1 % ophthalmic suspension, Apply 1 drop to eye Twice a Week, Disp: , Rfl:     traMADol (ULTRAM) 50 MG tablet, Take 50 mg by mouth every 6 hours as needed. , Disp: , Rfl:     triamcinolone (KENALOG) 0.1 % cream, Apply topically 2 times daily as needed, Disp: , Rfl: furosemide (LASIX) 40 MG tablet, Take 1 tablet by mouth every morning (Patient not taking: Reported on 10/28/2022), Disp: 3 tablet, Rfl: 0    diphenhydrAMINE-APAP, sleep, (TYLENOL PM EXTRA STRENGTH)  MG tablet, Take 1-2 tablets by mouth (Patient not taking: Reported on 10/28/2022), Disp: , Rfl:   Allergies   Allergen Reactions    Ace Inhibitors Angioedema and Swelling     Pt stated \"ace blockers\" can't remember name for htn  Facial swelling     Past Medical History:   Diagnosis Date    Allergic rhinitis due to pollen     Arthritis     Asthma as a child    Constipation 02/2019    post-op after TKA    Ear problems     Fibrocystic breast     GERD (gastroesophageal reflux disease)     Managed with meds     H/O colonoscopy 2009    Normal (Enrique)    Hashimoto's thyroiditis     Hearing reduced     Hypertension     Managed with meds     Macular degeneration     Mixed hyperlipidemia     Daily meds     Osteoporosis 1/2013    GYN Dr. Javier Few    Restless leg     managed with medication    Unspecified hypothyroidism     Varicella      Past Surgical History:   Procedure Laterality Date    ADENOIDECTOMY  as a child    APPENDECTOMY  1973    CATARACT REMOVAL Bilateral 2007    CATARACT REMOVAL Bilateral     COLONOSCOPY      Dr. Gill Farrell 2009-- no more per pt    ERCP N/A 10/17/2022    ERCP ENDOSCOPIC RETROGRADE CHOLANGIOPANCREATOGRAPHY Balloon sweep performed by Christofer Kitchen MD at [facility] ENDOSCOPY    GYN      ovarian cyst    HERNIA REPAIR  06/16/2018    OVARIAN CYST [address]  as a child    TOTAL KNEE ARTHROPLASTY Left 02/07/2019     Family History   Problem Relation Age of Onset    Coronary Art Dis Mother     COPD Father     Other Father         smoker    Heart Disease Brother     Cancer Maternal Grandmother     Breast Cancer Mother     Cancer Mother         breast, lung    Heart Disease Mother     Heart Attack Mother      Social History     Tobacco Use    Smoking status: Former     Packs/day: 0.50 Types: Cigarettes     Quit date: 1975     Years since quittin.7    Smokeless tobacco: Never    Tobacco comments:     Quit smoking: quit age of 30   Substance Use Topics    Alcohol use: Yes     Alcohol/week: 1.0 standard drink       ROS:  Review of Systems   Constitutional: Negative. Negative for fever. HENT:  Negative for hearing loss, nosebleeds and tinnitus. Eyes: Negative. Negative for photophobia and pain. Respiratory:  Positive for cough and shortness of breath. Negative for chest tightness. Cardiovascular: Negative. Negative for chest pain, palpitations and leg swelling. Gastrointestinal: Negative. Negative for abdominal pain. Endocrine: Negative. Negative for cold intolerance and heat intolerance. Genitourinary: Negative. Negative for dysuria. Musculoskeletal: Negative. Negative for back pain and joint swelling. Skin: Negative. Negative for rash. Allergic/Immunologic: Negative. Negative for immunocompromised state. Neurological: Negative. Negative for dizziness, syncope and light-headedness. Hematological: Negative. Does not bruise/bleed easily. Psychiatric/Behavioral: Negative. Negative for suicidal ideas. PHYSICAL EXAM:  Physical Exam  Constitutional:       General: She is not in acute distress. Appearance: She is not ill-appearing. HENT:      Head: Normocephalic and atraumatic. Nose: No congestion. Mouth/Throat:      Mouth: Mucous membranes are moist.   Eyes:      Extraocular Movements: Extraocular movements intact. Pupils: Pupils are equal, round, and reactive to light. Cardiovascular:      Rate and Rhythm: Normal rate and regular rhythm. Heart sounds: No murmur heard. No friction rub. No gallop. Pulmonary:      Effort: No respiratory distress. Breath sounds: No wheezing or rhonchi. Musculoskeletal:         General: No swelling. Cervical back: Normal range of motion. Right lower leg: No edema. Left lower leg: No edema. Skin:     General: Skin is warm and dry. Findings: No rash. Neurological:      General: No focal deficit present. Mental Status: She is oriented to person, place, and time. Psychiatric:         Mood and Affect: Mood normal.         Behavior: Behavior normal.         Judgment: Judgment normal.        /80   Pulse 76   Ht 5' 4\" (1.626 m)   Wt 156 lb (70.8 kg)   BMI 26.78 kg/m²      Wt Readings from Last 10 Encounters:   10/28/22 156 lb (70.8 kg)   10/22/22 161 lb (73 kg)   10/17/22 168 lb (76.2 kg)   09/16/22 163 lb (73.9 kg)   07/21/22 162 lb 12.8 oz (73.8 kg)   06/09/22 162 lb (73.5 kg)   04/21/22 160 lb 12.8 oz (72.9 kg)   10/14/21 163 lb 6.4 oz (74.1 kg)   08/24/21 164 lb 9.6 oz (74.7 kg)   04/14/21 166 lb (75.3 kg)           Medical problems and test results were reviewed with the patient today. Lab Results   Component Value Date/Time    BUN 6 10/22/2022 03:22 PM     No results found for: BRIAN, CREAPOC, CREA  Lab Results   Component Value Date/Time    K 3.3 10/22/2022 03:22 PM       Lab Results   Component Value Date/Time    CHOL 198 04/21/2022 11:30 AM    HDL 57 04/21/2022 11:30 AM    VLDL 31 04/21/2022 11:30 AM       ASSESSMENT and PLAN    ICD-10-CM    1. SOB (shortness of breath)  R06.02 EKG 12 Lead     Transthoracic echocardiogram (TTE) complete with contrast, bubble, strain, and 3D PRN     Brain Natriuretic Peptide          IMPRESSION:  She has planned gallbladder surgery on Tuesday and her surgeon is requesting cardiac clearance prior to the procedure. We will check a stat echocardiogram today in our office. I will send her downstairs for natruretic peptide. However following her clinical story it is unlikely that she has significant heart failure. Further recommendations based on the result of the above testing.     ORDERS  Orders Placed This Encounter    Brain Natriuretic Peptide     Standing Status:   Future     Standing Expiration Date: 10/28/2023    EKG 12 Lead     Order Specific Question:   Reason for Exam?     Answer: Other       Follow up in 1 months. Thank you for allowing me to participate in this patient's care. Please call or contact me if there are any questions or concerns regarding the above.       Noah Doherty MD  10/28/22  11:31 AM

## 2022-10-28 NOTE — PERIOP NOTE
Patient verified name and . Order for consent not found in EHR and matches case posting; patient verifies procedure. Type 2 surgery, phone assessment complete. Orders not received. Labs per surgeon: none  Labs per anesthesia protocol: Hgb=12.2 on 10/22/22; EKG completed on 10/28/22 as part of cardiac clearance work up by Dr Jl Sheppard. No intervention per protocol    E-mail sent to [email or web address] [place] with PTA medicine instruction per patient request as they were driving. Patient answered medical/surgical history questions at their best of ability. All prior to admission medications documented in [facility]. Patient instructed to take the following medications the day of surgery according to anesthesia guidelines with a small sip of water: Carbidopa-levodopa (Sinemet)  Zyrtec  Levothyroxine (synthroid)  Pantoprazole  Eye drops as you would normally take them   On the day before surgery please take Acetaminophen 1000mg in the morning and then again before bed. You may substitute for Tylenol 650 mg. Hold all vitamins 7 days prior to surgery and NSAIDS 5 days prior to surgery. Prescription meds to hold:Telmisartan (MICARDIS), Hydrochlorothiazide (Hydrodiuril)  Patient instructed on the following:    > Arrive at [address] [place] [address], time of arrival to be called the day before by 1700  > NPO after midnight, unless otherwise indicated, including gum, mints, and ice chips  > Responsible adult must drive patient to the hospital, stay during surgery, and patient will need supervision 24 hours after anesthesia  > Use antibacterial soap in shower the night before surgery and on the morning of surgery  > All piercings must be removed prior to arrival.    > Leave all valuables (money and jewelry) at home but bring insurance card and ID on DOS.   > You may be required to pay a deductible or co-pay on the day of your procedure.  You can pre-pay by calling [phone] if your surgery is at the [facility] or [phone] if your

## 2022-10-31 ENCOUNTER — TELEPHONE (OUTPATIENT)
Dept: CARDIOLOGY CLINIC | Age: 82
End: 2022-10-31

## 2022-10-31 ENCOUNTER — ANESTHESIA EVENT (OUTPATIENT)
Dept: SURGERY | Age: 82
End: 2022-10-31
Payer: MEDICARE

## 2022-10-31 NOTE — RESULT ENCOUNTER NOTE
Lab and echo were normal, I have sent a note to Dr Sharita Salvador you should be fine for surgery tomorrow, your heart looks great!     Bob Stephenson MD

## 2022-10-31 NOTE — TELEPHONE ENCOUNTER
----- Message from Chadwick Walker MD sent at 10/31/2022  8:10 AM EDT -----  Lab and echo were normal, I have sent a note to Dr Becky Arce you should be fine for surgery tomorrow, your heart looks great!     Cayla Hicks MD

## 2022-11-01 ENCOUNTER — ANESTHESIA (OUTPATIENT)
Dept: SURGERY | Age: 82
End: 2022-11-01
Payer: MEDICARE

## 2022-11-01 ENCOUNTER — HOSPITAL ENCOUNTER (OUTPATIENT)
Age: 82
Setting detail: OUTPATIENT SURGERY
Discharge: HOME | End: 2022-11-01
Attending: SURGERY | Admitting: SURGERY
Payer: MEDICARE

## 2022-11-01 VITALS
WEIGHT: 160 LBS | HEIGHT: 64 IN | RESPIRATION RATE: 16 BRPM | HEART RATE: 67 BPM | DIASTOLIC BLOOD PRESSURE: 67 MMHG | OXYGEN SATURATION: 93 % | TEMPERATURE: 97.6 F | SYSTOLIC BLOOD PRESSURE: 102 MMHG | BODY MASS INDEX: 27.31 KG/M2

## 2022-11-01 DIAGNOSIS — R74.8 ELEVATED LIVER ENZYMES: Primary | ICD-10-CM

## 2022-11-01 DIAGNOSIS — K85.10 ACUTE GALLSTONE PANCREATITIS: ICD-10-CM

## 2022-11-01 DIAGNOSIS — K85.90 ACUTE PANCREATITIS, UNSPECIFIED COMPLICATION STATUS, UNSPECIFIED PANCREATITIS TYPE: ICD-10-CM

## 2022-11-01 DIAGNOSIS — K80.20 CALCULUS OF GALLBLADDER WITHOUT CHOLECYSTITIS WITHOUT OBSTRUCTION: ICD-10-CM

## 2022-11-01 LAB
ALBUMIN SERPL-MCNC: 3.4 G/DL (ref 3.2–4.6)
ALBUMIN/GLOB SERPL: 0.9 {RATIO} (ref 0.4–1.6)
ALP SERPL-CCNC: 138 U/L (ref 50–136)
ALT SERPL-CCNC: 32 U/L (ref 12–65)
ANION GAP SERPL CALC-SCNC: 5 MMOL/L (ref 2–11)
AST SERPL-CCNC: 38 U/L (ref 15–37)
BASOPHILS # BLD: 0 K/UL (ref 0–0.2)
BASOPHILS NFR BLD: 1 % (ref 0–2)
BILIRUB SERPL-MCNC: 0.9 MG/DL (ref 0.2–1.1)
BUN SERPL-MCNC: 17 MG/DL (ref 8–23)
CALCIUM SERPL-MCNC: 9.1 MG/DL (ref 8.3–10.4)
CHLORIDE SERPL-SCNC: 103 MMOL/L (ref 101–110)
CO2 SERPL-SCNC: 26 MMOL/L (ref 21–32)
CREAT SERPL-MCNC: 0.7 MG/DL (ref 0.6–1)
DIFFERENTIAL METHOD BLD: ABNORMAL
EOSINOPHIL # BLD: 0.2 K/UL (ref 0–0.8)
EOSINOPHIL NFR BLD: 3 % (ref 0.5–7.8)
ERYTHROCYTE [DISTWIDTH] IN BLOOD BY AUTOMATED COUNT: 14.6 % (ref 11.9–14.6)
GLOBULIN SER CALC-MCNC: 3.7 G/DL (ref 2.8–4.5)
GLUCOSE SERPL-MCNC: 92 MG/DL (ref 65–100)
HCT VFR BLD AUTO: 39.2 % (ref 35.8–46.3)
HGB BLD-MCNC: 12.8 G/DL (ref 11.7–15.4)
IMM GRANULOCYTES # BLD AUTO: 0 K/UL (ref 0–0.5)
IMM GRANULOCYTES NFR BLD AUTO: 0 % (ref 0–5)
LYMPHOCYTES # BLD: 1.5 K/UL (ref 0.5–4.6)
LYMPHOCYTES NFR BLD: 19 % (ref 13–44)
MCH RBC QN AUTO: 31.5 PG (ref 26.1–32.9)
MCHC RBC AUTO-ENTMCNC: 32.7 G/DL (ref 31.4–35)
MCV RBC AUTO: 96.6 FL (ref 82–102)
MONOCYTES # BLD: 0.8 K/UL (ref 0.1–1.3)
MONOCYTES NFR BLD: 11 % (ref 4–12)
NEUTS SEG # BLD: 5.2 K/UL (ref 1.7–8.2)
NEUTS SEG NFR BLD: 66 % (ref 43–78)
NRBC # BLD: 0 K/UL (ref 0–0.2)
PLATELET # BLD AUTO: 487 K/UL (ref 150–450)
PMV BLD AUTO: 9.2 FL (ref 9.4–12.3)
POTASSIUM SERPL-SCNC: 3.9 MMOL/L (ref 3.5–5.1)
PROT SERPL-MCNC: 7.1 G/DL (ref 6.3–8.2)
RBC # BLD AUTO: 4.06 M/UL (ref 4.05–5.2)
SODIUM SERPL-SCNC: 134 MMOL/L (ref 133–143)
WBC # BLD AUTO: 7.7 K/UL (ref 4.3–11.1)

## 2022-11-01 PROCEDURE — 80053 COMPREHEN METABOLIC PANEL: CPT

## 2022-11-01 PROCEDURE — 3700000001 HC ADD 15 MINUTES (ANESTHESIA): Performed by: SURGERY

## 2022-11-01 PROCEDURE — 7100000000 HC PACU RECOVERY - FIRST 15 MIN: Performed by: SURGERY

## 2022-11-01 PROCEDURE — 3700000000 HC ANESTHESIA ATTENDED CARE: Performed by: SURGERY

## 2022-11-01 PROCEDURE — 6360000002 HC RX W HCPCS: Performed by: ANESTHESIOLOGY

## 2022-11-01 PROCEDURE — 7100000011 HC PHASE II RECOVERY - ADDTL 15 MIN: Performed by: SURGERY

## 2022-11-01 PROCEDURE — 2500000003 HC RX 250 WO HCPCS: Performed by: NURSE ANESTHETIST, CERTIFIED REGISTERED

## 2022-11-01 PROCEDURE — 3600000004 HC SURGERY LEVEL 4 BASE: Performed by: SURGERY

## 2022-11-01 PROCEDURE — 47562 LAPAROSCOPIC CHOLECYSTECTOMY: CPT | Performed by: SURGERY

## 2022-11-01 PROCEDURE — 2709999900 HC NON-CHARGEABLE SUPPLY: Performed by: SURGERY

## 2022-11-01 PROCEDURE — 88304 TISSUE EXAM BY PATHOLOGIST: CPT

## 2022-11-01 PROCEDURE — 2580000003 HC RX 258: Performed by: ANESTHESIOLOGY

## 2022-11-01 PROCEDURE — 6370000000 HC RX 637 (ALT 250 FOR IP): Performed by: ANESTHESIOLOGY

## 2022-11-01 PROCEDURE — 99024 POSTOP FOLLOW-UP VISIT: CPT | Performed by: SURGERY

## 2022-11-01 PROCEDURE — 2500000003 HC RX 250 WO HCPCS: Performed by: SURGERY

## 2022-11-01 PROCEDURE — 2720000010 HC SURG SUPPLY STERILE: Performed by: SURGERY

## 2022-11-01 PROCEDURE — 6360000002 HC RX W HCPCS: Performed by: SURGERY

## 2022-11-01 PROCEDURE — 6360000002 HC RX W HCPCS: Performed by: NURSE ANESTHETIST, CERTIFIED REGISTERED

## 2022-11-01 PROCEDURE — 7100000010 HC PHASE II RECOVERY - FIRST 15 MIN: Performed by: SURGERY

## 2022-11-01 PROCEDURE — 3600000014 HC SURGERY LEVEL 4 ADDTL 15MIN: Performed by: SURGERY

## 2022-11-01 PROCEDURE — 85025 COMPLETE CBC W/AUTO DIFF WBC: CPT

## 2022-11-01 RX ORDER — ONDANSETRON 2 MG/ML
INJECTION INTRAMUSCULAR; INTRAVENOUS PRN
Status: DISCONTINUED | OUTPATIENT
Start: 2022-11-01 | End: 2022-11-01 | Stop reason: SDUPTHER

## 2022-11-01 RX ORDER — FENTANYL CITRATE 50 UG/ML
100 INJECTION, SOLUTION INTRAMUSCULAR; INTRAVENOUS
Status: DISCONTINUED | OUTPATIENT
Start: 2022-11-01 | End: 2022-11-01 | Stop reason: HOSPADM

## 2022-11-01 RX ORDER — NEOSTIGMINE METHYLSULFATE 1 MG/ML
INJECTION, SOLUTION INTRAVENOUS PRN
Status: DISCONTINUED | OUTPATIENT
Start: 2022-11-01 | End: 2022-11-01 | Stop reason: SDUPTHER

## 2022-11-01 RX ORDER — BUPIVACAINE HYDROCHLORIDE 2.5 MG/ML
INJECTION, SOLUTION EPIDURAL; INFILTRATION; INTRACAUDAL PRN
Status: DISCONTINUED | OUTPATIENT
Start: 2022-11-01 | End: 2022-11-01 | Stop reason: HOSPADM

## 2022-11-01 RX ORDER — FENTANYL CITRATE 50 UG/ML
INJECTION, SOLUTION INTRAMUSCULAR; INTRAVENOUS PRN
Status: DISCONTINUED | OUTPATIENT
Start: 2022-11-01 | End: 2022-11-01 | Stop reason: SDUPTHER

## 2022-11-01 RX ORDER — SODIUM CHLORIDE, SODIUM LACTATE, POTASSIUM CHLORIDE, CALCIUM CHLORIDE 600; 310; 30; 20 MG/100ML; MG/100ML; MG/100ML; MG/100ML
INJECTION, SOLUTION INTRAVENOUS CONTINUOUS
Status: DISCONTINUED | OUTPATIENT
Start: 2022-11-01 | End: 2022-11-01 | Stop reason: HOSPADM

## 2022-11-01 RX ORDER — GLYCOPYRROLATE 0.2 MG/ML
INJECTION INTRAMUSCULAR; INTRAVENOUS PRN
Status: DISCONTINUED | OUTPATIENT
Start: 2022-11-01 | End: 2022-11-01 | Stop reason: SDUPTHER

## 2022-11-01 RX ORDER — PROPOFOL 10 MG/ML
INJECTION, EMULSION INTRAVENOUS PRN
Status: DISCONTINUED | OUTPATIENT
Start: 2022-11-01 | End: 2022-11-01 | Stop reason: SDUPTHER

## 2022-11-01 RX ORDER — PHENYLEPHRINE HYDROCHLORIDE 10 MG/ML
INJECTION INTRAVENOUS PRN
Status: DISCONTINUED | OUTPATIENT
Start: 2022-11-01 | End: 2022-11-01 | Stop reason: SDUPTHER

## 2022-11-01 RX ORDER — MIDAZOLAM HYDROCHLORIDE 2 MG/2ML
2 INJECTION, SOLUTION INTRAMUSCULAR; INTRAVENOUS
Status: DISCONTINUED | OUTPATIENT
Start: 2022-11-01 | End: 2022-11-01 | Stop reason: HOSPADM

## 2022-11-01 RX ORDER — DEXAMETHASONE SODIUM PHOSPHATE 10 MG/ML
INJECTION INTRAMUSCULAR; INTRAVENOUS PRN
Status: DISCONTINUED | OUTPATIENT
Start: 2022-11-01 | End: 2022-11-01 | Stop reason: SDUPTHER

## 2022-11-01 RX ORDER — SODIUM CHLORIDE 0.9 % (FLUSH) 0.9 %
5-40 SYRINGE (ML) INJECTION EVERY 12 HOURS SCHEDULED
Status: DISCONTINUED | OUTPATIENT
Start: 2022-11-01 | End: 2022-11-01 | Stop reason: HOSPADM

## 2022-11-01 RX ORDER — ACETAMINOPHEN 500 MG
1000 TABLET ORAL ONCE
Status: COMPLETED | OUTPATIENT
Start: 2022-11-01 | End: 2022-11-01

## 2022-11-01 RX ORDER — EPHEDRINE SULFATE/0.9% NACL/PF 50 MG/5 ML
SYRINGE (ML) INTRAVENOUS PRN
Status: DISCONTINUED | OUTPATIENT
Start: 2022-11-01 | End: 2022-11-01 | Stop reason: SDUPTHER

## 2022-11-01 RX ORDER — DIPHENHYDRAMINE HYDROCHLORIDE 50 MG/ML
12.5 INJECTION INTRAMUSCULAR; INTRAVENOUS
Status: DISCONTINUED | OUTPATIENT
Start: 2022-11-01 | End: 2022-11-01 | Stop reason: HOSPADM

## 2022-11-01 RX ORDER — HYDROMORPHONE HYDROCHLORIDE 2 MG/ML
0.5 INJECTION, SOLUTION INTRAMUSCULAR; INTRAVENOUS; SUBCUTANEOUS EVERY 5 MIN PRN
Status: DISCONTINUED | OUTPATIENT
Start: 2022-11-01 | End: 2022-11-01 | Stop reason: HOSPADM

## 2022-11-01 RX ORDER — SODIUM CHLORIDE 0.9 % (FLUSH) 0.9 %
5-40 SYRINGE (ML) INJECTION PRN
Status: DISCONTINUED | OUTPATIENT
Start: 2022-11-01 | End: 2022-11-01 | Stop reason: HOSPADM

## 2022-11-01 RX ORDER — ONDANSETRON 2 MG/ML
4 INJECTION INTRAMUSCULAR; INTRAVENOUS
Status: DISCONTINUED | OUTPATIENT
Start: 2022-11-01 | End: 2022-11-01 | Stop reason: HOSPADM

## 2022-11-01 RX ORDER — HYDROCODONE BITARTRATE AND ACETAMINOPHEN 5; 325 MG/1; MG/1
1-2 TABLET ORAL EVERY 8 HOURS PRN
Qty: 28 TABLET | Refills: 0 | Status: SHIPPED | OUTPATIENT
Start: 2022-11-01 | End: 2022-11-08

## 2022-11-01 RX ORDER — ROCURONIUM BROMIDE 10 MG/ML
INJECTION, SOLUTION INTRAVENOUS PRN
Status: DISCONTINUED | OUTPATIENT
Start: 2022-11-01 | End: 2022-11-01 | Stop reason: SDUPTHER

## 2022-11-01 RX ORDER — OXYCODONE HYDROCHLORIDE 5 MG/1
10 TABLET ORAL PRN
Status: COMPLETED | OUTPATIENT
Start: 2022-11-01 | End: 2022-11-01

## 2022-11-01 RX ORDER — LIDOCAINE HYDROCHLORIDE 20 MG/ML
INJECTION, SOLUTION EPIDURAL; INFILTRATION; INTRACAUDAL; PERINEURAL PRN
Status: DISCONTINUED | OUTPATIENT
Start: 2022-11-01 | End: 2022-11-01 | Stop reason: SDUPTHER

## 2022-11-01 RX ORDER — OXYCODONE HYDROCHLORIDE 5 MG/1
5 TABLET ORAL PRN
Status: COMPLETED | OUTPATIENT
Start: 2022-11-01 | End: 2022-11-01

## 2022-11-01 RX ADMIN — FENTANYL CITRATE 50 MCG: 50 INJECTION, SOLUTION INTRAMUSCULAR; INTRAVENOUS at 08:25

## 2022-11-01 RX ADMIN — Medication 10 MG: at 08:16

## 2022-11-01 RX ADMIN — ONDANSETRON 4 MG: 2 INJECTION INTRAMUSCULAR; INTRAVENOUS at 08:07

## 2022-11-01 RX ADMIN — FENTANYL CITRATE 50 MCG: 50 INJECTION, SOLUTION INTRAMUSCULAR; INTRAVENOUS at 07:57

## 2022-11-01 RX ADMIN — DEXAMETHASONE SODIUM PHOSPHATE 10 MG: 10 INJECTION INTRAMUSCULAR; INTRAVENOUS at 08:07

## 2022-11-01 RX ADMIN — PROPOFOL 100 MG: 10 INJECTION, EMULSION INTRAVENOUS at 07:57

## 2022-11-01 RX ADMIN — SODIUM CHLORIDE, POTASSIUM CHLORIDE, SODIUM LACTATE AND CALCIUM CHLORIDE: 600; 310; 30; 20 INJECTION, SOLUTION INTRAVENOUS at 06:11

## 2022-11-01 RX ADMIN — PHENYLEPHRINE HYDROCHLORIDE 100 MCG: 10 INJECTION INTRAVENOUS at 08:08

## 2022-11-01 RX ADMIN — LIDOCAINE HYDROCHLORIDE 60 MG: 20 INJECTION, SOLUTION EPIDURAL; INFILTRATION; INTRACAUDAL; PERINEURAL at 07:57

## 2022-11-01 RX ADMIN — ROCURONIUM BROMIDE 30 MG: 50 INJECTION, SOLUTION INTRAVENOUS at 07:57

## 2022-11-01 RX ADMIN — ACETAMINOPHEN 1000 MG: 500 TABLET, FILM COATED ORAL at 06:11

## 2022-11-01 RX ADMIN — PROPOFOL 20 MG: 10 INJECTION, EMULSION INTRAVENOUS at 08:53

## 2022-11-01 RX ADMIN — HYDROMORPHONE HYDROCHLORIDE 0.5 MG: 2 INJECTION INTRAMUSCULAR; INTRAVENOUS; SUBCUTANEOUS at 09:34

## 2022-11-01 RX ADMIN — GLYCOPYRROLATE 0.4 MG: 0.2 INJECTION, SOLUTION INTRAMUSCULAR; INTRAVENOUS at 08:56

## 2022-11-01 RX ADMIN — OXYCODONE 10 MG: 5 TABLET ORAL at 09:35

## 2022-11-01 RX ADMIN — Medication 2000 MG: at 08:05

## 2022-11-01 RX ADMIN — Medication 3 MG: at 08:56

## 2022-11-01 RX ADMIN — PROPOFOL 30 MG: 10 INJECTION, EMULSION INTRAVENOUS at 08:04

## 2022-11-01 ASSESSMENT — PAIN - FUNCTIONAL ASSESSMENT: PAIN_FUNCTIONAL_ASSESSMENT: 0-10

## 2022-11-01 ASSESSMENT — PAIN DESCRIPTION - LOCATION: LOCATION: ABDOMEN

## 2022-11-01 ASSESSMENT — PAIN SCALES - GENERAL
PAINLEVEL_OUTOF10: 0
PAINLEVEL_OUTOF10: 7
PAINLEVEL_OUTOF10: 4
PAINLEVEL_OUTOF10: 5

## 2022-11-01 ASSESSMENT — PAIN DESCRIPTION - DESCRIPTORS: DESCRIPTORS: DISCOMFORT;ACHING

## 2022-11-01 NOTE — ANESTHESIA POSTPROCEDURE EVALUATION
Department of Anesthesiology  Postprocedure Note    Patient: Autumn Zuleta  MRN: [id number]  YOB: 1940  Date of evaluation: 11/1/2022      Procedure Summary     Date: 11/01/22 Room / Location: D OP OR 05 / [facility]    Anesthesia Start: 6743 Anesthesia Stop: 0910    Procedure: CHOLECYSTECTOMY LAPAROSCOPIC (Abdomen) Diagnosis:       Calculus of gallbladder without cholecystitis without obstruction      Acute pancreatitis, unspecified complication status, unspecified pancreatitis type      (Calculus of gallbladder without cholecystitis without obstruction [K80.20)      (Acute pancreatitis, unspecified complication status, unspecified pancreatitis type [K85.90])    Surgeons: Whitney Brito MD Responsible Provider: Jony Ching MD    Anesthesia Type: general ASA Status: 2          Anesthesia Type: No value filed.     Perfecto Phase I: Perfecto Score: 8    Perfecto Phase II: Perfecto Score: 10      Anesthesia Post Evaluation    Patient location during evaluation: PACU  Patient participation: complete - patient participated  Level of consciousness: awake and alert  Airway patency: patent  Nausea & Vomiting: no nausea and no vomiting  Complications: no  Cardiovascular status: hemodynamically stable  Respiratory status: acceptable, nonlabored ventilation and spontaneous ventilation  Hydration status: euvolemic  Comments: /67   Pulse 67   Temp 97.6 °F (36.4 °C)   Resp 16   Ht 5' 4\" (1.626 m)   Wt 160 lb (72.6 kg)   SpO2 93%   BMI 27.46 kg/m²     Multimodal analgesia pain management approach

## 2022-11-01 NOTE — ANESTHESIA PRE PROCEDURE
Department of Anesthesiology  Preprocedure Note       Name:  Annie Wilder   Age:  [de-identified] y.o.  :  1940                                          MRN:  [id number]         Date:  2022      Surgeon: Margarita Christianson):  Pedro Choi MD    Procedure: Procedure(s):  CHOLECYSTECTOMY LAPAROSCOPIC    Medications prior to admission:   Prior to Admission medications    Medication Sig Start Date End Date Taking? Authorizing Provider   NONFORMULARY Preservision/Lutein oral cap 1 cap bid    Historical Provider, MD   Multiple Vitamins-Minerals (THERAPEUTIC MULTIVITAMIN-MINERALS) tablet Take 1 tablet by mouth daily    Historical Provider, MD   Omega-3 Fatty Acids (FISH OIL) 1000 MG CPDR Take 1,000 mg by mouth daily    Historical Provider, MD   NONFORMULARY Fiber oral tablet 1 capsule daily    Historical Provider, MD   Hyoscyamine Sulfate SL (LEVSIN/SL) 0.125 MG SUBL Place 0.25 mg under the tongue 4 times daily as needed (abdominal pain or cramping) 22   Latisha Peralta MD   pantoprazole (PROTONIX) 40 MG tablet TAKE 1 TABLET BY MOUTH DAILY BEFORE DINNER 22   Katherine Carlson MD   pravastatin (PRAVACHOL) 40 MG tablet TAKE 1 TABLET BY MOUTH AT NIGHT  Patient taking differently: Take 40 mg by mouth at bedtime TAKE 1 TABLET BY MOUTH AT NIGHT 22   Katherine Carlson MD   hydroCHLOROthiazide (HYDRODIURIL) 12.5 MG tablet Take 1 tablet by mouth daily TAKE 1 TABLET BY MOUTH DAILY 22   Katherine Carlson MD   telmisartan (MICARDIS) 80 MG tablet Take 1 tablet by mouth in the morning. 22   Katherine Carlson MD   levothyroxine (SYNTHROID) 75 MCG tablet Take 1 tablet by mouth in the morning.  TAKE 1 TABLET BY MOUTH DAILY BEFORE BREAKFAST. 22   Katherine Carlson MD   folic acid (FOLVITE) 1 MG tablet Take 1 tablet by mouth in the morning. 22   Katherine Carlson MD   Calcium Polycarbophil (FIBER) 625 MG TABS take 1 qd    Historical Provider, MD   coenzyme Q10 100 MG CAPS capsule Take by mouth every morning    Historical Provider, MD   acetaminophen (TYLENOL) 325 MG tablet Take 650 mg by mouth every 4 hours as needed    Ar Automatic Reconciliation   aspirin 81 MG EC tablet Take 81 mg by mouth every 12 hours 12/11/19   Ar Automatic Reconciliation   carbidopa-levodopa (SINEMET)  MG per tablet TAKE 1 TABLET BY MOUTH AT NIGHT 11/23/21   Ar Automatic Reconciliation   cetirizine (ZYRTEC) 10 MG tablet Take 10 mg by mouth daily    Ar Automatic Reconciliation   clobetasol (TEMOVATE) 0.05 % cream Apply topically 2 times daily as needed 8/25/16   Ar Automatic Reconciliation   Glucosamine 500 MG CAPS Take 500 mg by mouth daily    Ar Automatic Reconciliation   hydrocortisone 2.5 % cream Place rectally 4 times daily 2/15/19   Ar Automatic Reconciliation   ketoconazole (NIZORAL) 2 % cream Apply topically 2 times daily as needed    Ar Automatic Reconciliation   prednisoLONE acetate (PRED FORTE) 1 % ophthalmic suspension Apply 1 drop to eye Twice a Week    Ar Automatic Reconciliation   traMADol (ULTRAM) 50 MG tablet Take 50 mg by mouth every 6 hours as needed. Ar Automatic Reconciliation   triamcinolone (KENALOG) 0.1 % cream Apply topically 2 times daily as needed 2/9/18   Ar Automatic Reconciliation       Current medications:    No current facility-administered medications for this visit. No current outpatient medications on file.      Facility-Administered Medications Ordered in Other Visits   Medication Dose Route Frequency Provider Last Rate Last Admin    fentaNYL (SUBLIMAZE) injection 100 mcg  100 mcg IntraVENous Once PRN Arias Asencio MD        lactated ringers infusion   IntraVENous Continuous Arias Asencio  mL/hr at 11/01/22 0611 New Bag at 11/01/22 0611    sodium chloride flush 0.9 % injection 5-40 mL  5-40 mL IntraVENous 2 times per day Arias Asencio MD        sodium chloride flush 0.9 % injection 5-40 mL  5-40 mL IntraVENous PRN Arias Asencio MD        midazolam PF (VERSED) injection 2 mg  2 mg IntraVENous Once PRN Nadja Scott MD        ceFAZolin (ANCEF) 2000 mg in sterile water 20 mL IV syringe  2,000 mg IntraVENous Q8H Kelsie Pederson MD           Allergies: Allergies   Allergen Reactions    Ace Inhibitors Angioedema and Swelling     Pt stated \"ace blockers\" can't remember name for htn  Facial swelling       Problem List:    Patient Active Problem List   Diagnosis Code    Allergic rhinitis due to pollen J30.1    Acquired hypothyroidism E03.9    Vitamin D deficiency E55.9    Arthritis of left knee M17.12    Abdominal pain R10.9    Hypercholesterolemia E78.00    Thyroid disease E07.9    Gastroesophageal reflux disease with esophagitis K21.00    Presence of left artificial knee joint Z96.652    Contusion of elbow and forearm, left, initial encounter S50. 14XA    Sigmoid diverticulitis K57.32    Arthritis of right knee M17.11    GERD (gastroesophageal reflux disease) K21.9    Essential hypertension, benign I10    Osteoarthritis M19.90    Presence of right artificial knee joint Z96.651    Restless leg syndrome G25.81    Acute gallstone pancreatitis K85.10    Anemia due to folate deficiency D52.9    Cyst of right ovary N83.201    Elevated liver enzymes R74.8    Orthopnea R06.01    Acute pulmonary edema (HCC) J81.0       Past Medical History:        Diagnosis Date    Allergic rhinitis due to pollen     Arthritis     Asthma as a child    Constipation 02/2019    post-op after TKA    Ear problems     Fibrocystic breast     GERD (gastroesophageal reflux disease)     Managed with meds     H/O colonoscopy 2009    Normal (Enrique)    Hashimoto's thyroiditis     Hearing reduced     Hypertension     Managed with meds     Macular degeneration     Mixed hyperlipidemia     Daily meds     Osteoporosis 1/2013    GYN Dr. Lore Rock    Pleural effusion, bilateral 10/22/2022    post ERCP; since resolved after med tx    Restless leg     managed with medication    Unspecified hypothyroidism     Varicella        Past Surgical History:        Procedure Laterality Date    ADENOIDECTOMY  as a child    APPENDECTOMY  1973    CATARACT REMOVAL Bilateral 2007    CATARACT REMOVAL Bilateral     COLONOSCOPY      Dr. Gill Farrell -- no more per pt    ERCP N/A 10/17/2022    ERCP ENDOSCOPIC RETROGRADE CHOLANGIOPANCREATOGRAPHY Balloon sweep performed by Christofer Kitchen MD at [facility] ENDOSCOPY    GYN      ovarian cyst    HERNIA REPAIR  2018    OVARIAN CYST REMOVAL  1973    TONSILLECTOMY  as a child    TOTAL KNEE ARTHROPLASTY Left 2019       Social History:    Social History     Tobacco Use    Smoking status: Former     Packs/day: 0.50     Types: Cigarettes     Quit date: 1975     Years since quittin.7    Smokeless tobacco: Never    Tobacco comments:     Quit smoking: quit age of 30   Substance Use Topics    Alcohol use: Not Currently     Alcohol/week: 1.0 standard drink                                Counseling given: Not Answered  Tobacco comments: Quit smoking: quit age of 30      Vital Signs (Current): There were no vitals filed for this visit.                                            BP Readings from Last 3 Encounters:   22 116/68   10/28/22 124/80   10/28/22 124/80       NPO Status:                                                                                 BMI:   Wt Readings from Last 3 Encounters:   22 160 lb (72.6 kg)   10/28/22 156 lb (70.8 kg)   10/28/22 156 lb (70.8 kg)     There is no height or weight on file to calculate BMI.    CBC:   Lab Results   Component Value Date/Time    WBC 10.3 10/22/2022 03:22 PM    RBC 3.94 10/22/2022 03:22 PM    HGB 12.2 10/22/2022 03:22 PM    HCT 37.2 10/22/2022 03:22 PM    MCV 94.4 10/22/2022 03:22 PM    RDW 14.5 10/22/2022 03:22 PM     10/22/2022 03:22 PM       CMP:   Lab Results   Component Value Date/Time     10/22/2022 03:22 PM    K 3.3 10/22/2022 03:22 PM     10/22/2022 03:22 PM    CO2 27 10/22/2022 03:22 PM    BUN 6 10/22/2022 03:22 PM    CREATININE 0.60 10/22/2022 03:22 PM    GFRAA >60 09/16/2022 07:13 PM    AGRATIO 1.3 04/21/2022 11:30 AM    LABGLOM >60 10/22/2022 03:22 PM    GLUCOSE 94 10/22/2022 03:22 PM    PROT 6.8 10/22/2022 03:22 PM    CALCIUM 9.3 10/22/2022 03:22 PM    BILITOT 1.1 10/22/2022 03:22 PM    ALKPHOS 276 10/22/2022 03:22 PM    ALKPHOS 71 04/21/2022 11:30 AM    AST 67 10/22/2022 03:22 PM     10/22/2022 03:22 PM       POC Tests: No results for input(s): POCGLU, POCNA, POCK, POCCL, POCBUN, POCHEMO, POCHCT in the last 72 hours. Coags:   Lab Results   Component Value Date/Time    PROTIME 12.6 11/19/2019 08:55 AM    INR 0.9 11/19/2019 08:55 AM    APTT 30.8 11/19/2019 08:55 AM       HCG (If Applicable): No results found for: PREGTESTUR, PREGSERUM, HCG, HCGQUANT     ABGs: No results found for: PHART, PO2ART, SXP0XTK, NNL5APN, BEART, A6YWTWRW     Type & Screen (If Applicable):  No results found for: LABABO, LABRH    Drug/Infectious Status (If Applicable):  No results found for: HIV, HEPCAB    COVID-19 Screening (If Applicable): No results found for: COVID19        Anesthesia Evaluation  Patient summary reviewed and Nursing notes reviewed no history of anesthetic complications:   Airway: Mallampati: III     Neck ROM: full  Mouth opening: > = 3 FB   Dental: normal exam         Pulmonary:Negative Pulmonary ROS breath sounds clear to auscultation                             Cardiovascular:    (+) hypertension:, hyperlipidemia        Rhythm: regular                      Neuro/Psych:   Negative Neuro/Psych ROS              GI/Hepatic/Renal:   (+) GERD:,          ROS comment: Admitted with gallstone pancreatitis. Endo/Other:    (+) hypothyroidism::., .                 Abdominal:             Vascular: negative vascular ROS.          Other Findings: Very hard of hearing            Anesthesia Plan      general     ASA 2 Induction: intravenous. Anesthetic plan and risks discussed with patient and spouse.                         Kathy Pleitez MD   11/1/2022

## 2022-11-01 NOTE — PERIOP NOTE
Pt received from OR on NC tolerating RA. Educated on incentive spirometer and doing well with use.  at bedside, discharge instructions reviewed and questions answered. VSS. Medicated for pain x2 + effect. No further needs at this time.

## 2022-11-01 NOTE — H&P
H&P/Consult Note/Progress Note/Office Note:   Chicho Nair  MRN: [id number]  PGW:5/[id number][birth year]346  Age:82 y.o.    HPI: Chicho Nair is a [de-identified] y.o. female who is here for lap cholecystectomy on 11/1/22        Prior to surgery she was admitted by the hospitalists on 10/15/22   after she presented to the ER with a 1 day h/o constant and severe epigastric pain with radiation to her back. Nothing in particular made her symptoms better or worse. Wbc 13k-->20.9k  Lipase >1500  T Bili 1.7--->5.5  AST//134     She was diagnosed with gallstone pancreatitis and had imaging as shown below    She is s/p ERCP on 10/17/22 by Dr. Nilton Jarvis with clearance of her common bile duct  General surgery was consulted. She is s/p ovarian cystectomy in the 1970s with a low transverse incision. She is s/p right inguinal hernia repair with mesh at [facility] in 2018. She was seen while hospitalized and recommended to have surgery prior to discharge but she refused surgery and wanted to go home and rest for a few days. Surgery was scheduled on the following Tuesday which was Tuesday, 10/25/2022. She canceled the surgery because she had been in the urgent treatment center a few days prior with shortness of breath  She reports she was found to have fluid on her lungs and possible pneumonia and was given Levaquin and discharge. A few days later on Saturday, October 22 she returned to the ER with worsening cough and was treated with IV diuretic and placed on p.o. diuretic. She saw Dr Bhumika Henao on 10/26/22 reporting that she was unable to walk short distances secondary to shortness of breath. On 10/28/22 Dr Erendira Helms noted her labs and echo were normal and she was cleared for surgery          10/15/22 CXR  No lobar consolidation, pleural effusions or pulmonary edema. 10/15/22 CT chest/abd/pelvis and with IV contrast  FINDINGS: AIRWAYS: The central airways are patent. LUNGS: Dependent changes and scarring within the lung bases. Pleural parenchymal scarring within the lung apices. No suspicious pulmonary nodules. PLEURA: No pleural effusion or pneumothorax. HEART: The heart is not enlarged. Moderate calcified coronary atherosclerosis. No pericardial effusion. THORACIC AORTA: The aorta is normal in caliber. PULMONARY ARTERY: The main pulmonary artery is normal in caliber. No evidence of pulmonary embolism. MEDIASTINUM/KEREN: No mediastinal mass or lymphadenopathy. Moderate sized hiatal hernia. CHEST WALL: No mass or axillary lymphadenopathy. LIVER: The liver contour is normal. No suspicious liver lesion. BILIARY TREE: Gallbladder is distended. There are calcified gallstones dependently within the gallbladder. Prominent intra and extrahepatic biliary ductal dilatation with common bile duct measuring up to 1.6 cm diameter. There is suggestion of rounded hypodense 0.8 cm focus within the distal common bile duct       SPLEEN: Normal.     PANCREAS: No pancreatic mass or ductal dilation. ADRENALS: Normal.     KIDNEYS/BLADDER: The kidneys are symmetric in size. No renal calculus or hydronephrosis. Several renal cysts and too small to characterize hypodensities are present within the kidneys. The urinary bladder is unremarkable. BOWEL: The colon is unremarkable. The small bowel is normal in caliber. No bowel wall thickening. Small duodenal diverticulum is present. Sigmoid diverticulosis without diverticulitis. APPENDIX: Appendix not definitely seen however no inflammatory changes in the right lower quadrant to suggest appendicitis. PERITONEUM/RETROPERITONEUM: No ascites or free air. No pelvic or retroperitoneal lymphadenopathy. VESSELS: No abdominal aortic aneurysm. ABDOMINAL WALL: No hernia or mass. REPRODUCTIVE: 3.6 cm cyst within the right adnexa. BONES: No suspicious osseous lesion. Impression:  1.   Intra and extrahepatic biliary ductal dilatation with distended gallbladder and suggestion of hyperdense 0.8 cm focus at the distal common bile duct likely cause of obstruction. Although favor noncalcified gallstone given the presence of additional gallstones, obstructing mass lesion not excluded. No pancreatic ductal dilatation. 2. No evidence of pulmonary embolism or acute abnormality in the chest.   3. Moderate coronary artery calcification. 4. Right adnexal 3.6 cm cyst. Recommend pelvic US.           10/16/22 pelvic US  Hx: 81yo with right adnexal mass on CT        The uterus is 6 cm in length with an endometrial stripe thickness measuring 2 mm. The right ovary is 4.9 x 3.9 x 3.9 cm. There is a 3.9 cm right adnexal cyst. Doppler flow is present in the right ovary. The left ovary is not visible. There is no free pelvic fluid. The bladder is normal in appearance. Impression:  3.9 cm right adnexal cyst.   Routine OB/GYN follow-up is recommended. 10/17/22 ERCP; Dr Darell Townsend  Single large mobile mobile filling defect consistent with a stone. The CBD was mildly dilated, ~12 mm. There was partial filling of the cystic duct,, but gallbladder was not well visualized. An 8mm biliary sphincterotomy was performed over a guidewire without complications. The tome was exchanged for a 9/12mm extraction balloon. A large brown stone was removed from the duct. Repeat occlusion cholangiogram was clear. Xray FINDINGS: The common bile duct was cannulated at the ampulla. The CBD was mildly dilated. There was a clear intraluminal filling defect indicative of a common bile duct stone. A balloon sweep was performed and the stone was extracted. Impression: CBD stones appreciated. The stones were extracted without consequence.            Past Medical History:   Diagnosis Date    Allergic rhinitis due to pollen     Arthritis     Asthma as a child    Constipation 02/2019    post-op after TKA    Ear problems     Fibrocystic breast     GERD (gastroesophageal reflux disease)     Managed with meds     H/O colonoscopy 2009    Normal (Dain Benjamin)    Hashimoto's thyroiditis     Hearing reduced     Hypertension     Managed with meds     Macular degeneration     Mixed hyperlipidemia     Daily meds     Osteoporosis 1/2013    GYN Dr. Johana Garcia    Pleural effusion, bilateral 10/22/2022    post ERCP; since resolved after med tx    Restless leg     managed with medication    Unspecified hypothyroidism     Varicella      Past Surgical History:   Procedure Laterality Date    ADENOIDECTOMY  as a child    APPENDECTOMY  1973    CATARACT REMOVAL Bilateral 2007    CATARACT REMOVAL Bilateral     COLONOSCOPY      Dr. Dain Benjamin 2009-- no more per pt    ERCP N/A 10/17/2022    ERCP ENDOSCOPIC RETROGRADE CHOLANGIOPANCREATOGRAPHY Balloon sweep performed by George Oconnor MD at [facility] ENDOSCOPY    GYN      ovarian cyst    HERNIA REPAIR  06/16/2018    OVARIAN CYST REMOVAL  1973    TONSILLECTOMY  as a child    TOTAL KNEE ARTHROPLASTY Left 02/07/2019     Current Facility-Administered Medications   Medication Dose Route Frequency    fentaNYL (SUBLIMAZE) injection 100 mcg  100 mcg IntraVENous Once PRN    lactated ringers infusion   IntraVENous Continuous    sodium chloride flush 0.9 % injection 5-40 mL  5-40 mL IntraVENous 2 times per day    sodium chloride flush 0.9 % injection 5-40 mL  5-40 mL IntraVENous PRN    midazolam PF (VERSED) injection 2 mg  2 mg IntraVENous Once PRN    ceFAZolin (ANCEF) 2000 mg in sterile water 20 mL IV syringe  2,000 mg IntraVENous Q8H    bupivacaine (PF) (MARCAINE) 0.25 % injection    PRN     Facility-Administered Medications Ordered in Other Encounters   Medication Dose Route Frequency    propofol injection   IntraVENous PRN    lidocaine PF 2 % injection   IntraVENous PRN    rocuronium (ZEMURON) injection   IntraVENous PRN    fentaNYL (SUBLIMAZE) injection   IntraVENous PRN    dexamethasone (DECADRON) injection   IntraVENous PRN    ondansetron (ZOFRAN) injection   IntraVENous PRN phenylephrine (VAZCULEP) injection   IntraVENous PRN    ePHEDrine injection   IntraVENous PRN    neostigmine (PROSTIGMINE) injection   IntraVENous PRN    glycopyrrolate injection   IntraVENous PRN     ALLERGIES:  Ace inhibitors    Social History     Socioeconomic History    Marital status:      Spouse name: None    Number of children: None    Years of education: None    Highest education level: None   Tobacco Use    Smoking status: Former     Packs/day: 0.50     Types: Cigarettes     Quit date: 1975     Years since quittin.7    Smokeless tobacco: Never    Tobacco comments:     Quit smoking: quit age of 30   Vaping Use    Vaping Use: Never used   Substance and Sexual Activity    Alcohol use: Not Currently     Alcohol/week: 1.0 standard drink    Drug use: No     Social History     Tobacco Use   Smoking Status Former    Packs/day: 0.50    Types: Cigarettes    Quit date: 1975    Years since quittin.7   Smokeless Tobacco Never   Tobacco Comments    Quit smoking: quit age of 30     Family History   Problem Relation Age of Onset    Coronary Art Dis Mother     COPD Father     Other Father         smoker    Heart Disease Brother     Cancer Maternal Grandmother     Breast Cancer Mother     Cancer Mother         breast, lung    Heart Disease Mother     Heart Attack Mother      ROS: The patient has no difficulty with chest pain or shortness of breath. No fever or chills. Comprehensive review of systems was otherwise unremarkable except as noted above.     Physical Exam:   /68   Pulse 79   Temp 98.2 °F (36.8 °C) (Oral)   Resp 18   Ht 5' 4\" (1.626 m)   Wt 160 lb (72.6 kg)   SpO2 100%   BMI 27.46 kg/m²   Vitals:    10/28/22 1301 22 0609   BP:  116/68   Pulse:  79   Resp:  18   Temp:  98.2 °F (36.8 °C)   TempSrc:  Oral   SpO2:  100%   Weight: 160 lb (72.6 kg) 160 lb (72.6 kg)   Height: 5' 4\" (1.626 m)        10/18 1901 - 10/19 0700  In: -   Out: 450 [Urine:450]  10/17 07 - 10/18 1900  In: 4185.1 [P.O.:702; I.V.:3483.1]  Out: 2180 [Urine:2175]    Constitutional: Alert, oriented, cooperative patient in no acute distress; appears stated age    Eyes:Sclera are clear. EOMs intact  ENMT: no external lesions gross hearing normal; no obvious neck masses, no ear or lip lesions, nares normal  CV: RRR. Normal perfusion  Resp: No JVD. Breathing is  non-labored; no audible wheezing. GI: soft and non-distended; non-tender     Musculoskeletal: unremarkable with normal function. No embolic signs or cyanosis. Neuro:  Oriented; moves all 4; no focal deficits  Psychiatric: normal affect and mood, no memory impairment    Recent vitals (if inpt):  Patient Vitals for the past 24 hrs:   BP Temp Temp src Pulse Resp SpO2   10/19/22 0300 136/72 98.2 °F (36.8 °C) Oral 77 18 94 %   10/18/22 2315 126/68 98 °F (36.7 °C) Oral 78 17 94 %   10/18/22 1957 139/70 98.2 °F (36.8 °C) Oral 74 17 92 %   10/18/22 1948 -- -- -- 74 -- 100 %   10/18/22 1454 113/62 97.5 °F (36.4 °C) Oral 79 20 100 %   10/18/22 1054 131/68 98.4 °F (36.9 °C) Oral 75 20 93 %   10/18/22 0729 135/69 98.4 °F (36.9 °C) Oral 72 18 96 %       Amount and/or Complexity of Data Reviewed and Analyzed:  I reviewed and analyzed all of the unique labs and radiologic studies that are shown below as well as any that are in the HPI, and any that are in the expanded problem list below  *Each unique test, order, or document contributes to the combination of 2 or combination of 3 in Category 1 below. For this visit I also reviewed old records and prior notes.       Recent Labs     11/01/22  0740   WBC 7.7   HGB 12.8   *      K 3.9      CO2 26   BUN 17   ALT 32       Review of most recent CBC  Lab Results   Component Value Date    WBC 7.7 11/01/2022    HGB 12.8 11/01/2022    HCT 39.2 11/01/2022    MCV 96.6 11/01/2022     (H) 11/01/2022       Review of most recent BMP  Lab Results   Component Value Date/Time     11/01/2022 07:40 AM    K 3.9 11/01/2022 07:40 AM     11/01/2022 07:40 AM    CO2 26 11/01/2022 07:40 AM    BUN 17 11/01/2022 07:40 AM    CREATININE 0.70 11/01/2022 07:40 AM    GLUCOSE 92 11/01/2022 07:40 AM    CALCIUM 9.1 11/01/2022 07:40 AM        Review of most recent LFTs (and lipase if done)  Lab Results   Component Value Date    ALT 32 11/01/2022    AST 38 (H) 11/01/2022    ALKPHOS 138 (H) 11/01/2022    BILITOT 0.9 11/01/2022     Lab Results   Component Value Date    LIPASE 434 (H) 10/17/2022       Lab Results   Component Value Date/Time    INR 0.9 11/19/2019 08:55 AM    APTT 30.8 11/19/2019 08:55 AM       Review of most recent HgbA1c  Hemoglobin A1C   Date Value Ref Range Status   04/21/2022 5.2 4.8 - 5.6 % Final     Comment:              Prediabetes: 5.7 - 6.4           Diabetes: >6.4           Glycemic control for adults with diabetes: <7.0         Nutritional assessment screen for wound healing issues:  Lab Results   Component Value Date    LABALBU 3.4 11/01/2022       @lastcovr@    Xray Result (most recent):  XR CHEST STANDARD TWO VW 10/26/2022    Narrative  CHEST X-RAY, 2 views. INDICATION:  Shortness of breath. Evaluation for gallbladder surgery. TECHNIQUE: PA and lateral views. COMPARISON: 4 days prior. FINDINGS:  Lungs: Hyperinflated and clear. There is increased retrosternal airspace and  flattened diaphragms. Costophrenic angles: are sharp. Heart size: is normal.  Pulmonary vasculature: is unremarkable. Aorta: Arch calcifications. Included portion of the upper abdomen: is unremarkable. Bones: No gross bony lesions. Other: None. Impression  Negative for acute abnormality. Hyperinflation suggesting COPD. Aortic atherosclerosis.     CT Result (most recent):  CT CHEST ABDOMEN PELVIS W CONTRAST 10/15/2022    Narrative  EXAMINATION: CT CHEST ABDOMEN PELVIS W CONTRAST 10/15/2022 7:14 PM    ACCESSION NUMBER: [id number]    COMPARISON: CT abdomen/pelvis January 4, 2013    INDICATION: Chest pain, unspecified; Generalized abdominal pain    TECHNIQUE: Multiple contiguous axial CT images of the chest, abdomen, and pelvis  were obtained from the lung apices to the symphysis pubis after the intravenous  administration of 100mL Iso-gia 370. Reformats are provided. Radiation dose reduction techniques were used for this study. Our CT scanners  use one or all of the following: Automated exposure control, adjustment of the  mA and/or kV according to patient size, iterative reconstruction. FINDINGS:  AIRWAYS: The central airways are patent. LUNGS: Dependent changes and scarring within the lung bases. Pleural parenchymal  scarring within the lung apices. No suspicious pulmonary nodules. PLEURA: No pleural effusion or pneumothorax. HEART: The heart is not enlarged. Moderate calcified coronary atherosclerosis. No pericardial effusion. THORACIC AORTA: The aorta is normal in caliber. PULMONARY ARTERY: The main pulmonary artery is normal in caliber. No evidence of  pulmonary embolism. MEDIASTINUM/KEREN: No mediastinal mass or lymphadenopathy. Moderate sized hiatal  hernia. CHEST WALL: No mass or axillary lymphadenopathy. LIVER: The liver contour is normal. No suspicious liver lesion. BILIARY TREE: Gallbladder is distended. There are calcified gallstones  dependently within the gallbladder. Prominent intra and extrahepatic biliary  ductal dilatation with common bile duct measuring up to 1.6 cm diameter. There  is suggestion of rounded hypodense 0.8 cm focus within the distal common bile  duct    SPLEEN: Normal.    PANCREAS: No pancreatic mass or ductal dilation. ADRENALS: Normal.    KIDNEYS/BLADDER: The kidneys are symmetric in size. No renal calculus or  hydronephrosis. Several renal cysts and too small to characterize hypodensities  are present within the kidneys. The urinary bladder is unremarkable. BOWEL: The colon is unremarkable. The small bowel is normal in caliber.  No bowel  wall thickening. Small duodenal diverticulum is present. Sigmoid diverticulosis  without diverticulitis. APPENDIX: Appendix not definitely seen however no inflammatory changes in the  right lower quadrant to suggest appendicitis. PERITONEUM/RETROPERITONEUM: No ascites or free air. No pelvic or retroperitoneal  lymphadenopathy. VESSELS: No abdominal aortic aneurysm. ABDOMINAL WALL: No hernia or mass. REPRODUCTIVE: 3.6 cm cyst within the right adnexa. BONES: No suspicious osseous lesion. Impression  1. Intra and extrahepatic biliary ductal dilatation with distended gallbladder  and suggestion of hyperdense 0.8 cm focus at the distal common bile duct likely  cause of obstruction. Although favor noncalcified gallstone given the presence  of additional gallstones, obstructing mass lesion not excluded. No pancreatic  ductal dilatation. 2.  No evidence of pulmonary embolism or acute abnormality in the chest.  3.  Moderate coronary artery calcification. 4.  Right adnexal 3.6 cm cyst. Recommend nonurgent pelvic ultrasound for further  evaluation. US Result (most recent):  US PELVIS COMPLETE 10/16/2022    Narrative  PELVIC ULTRASOUND 10/16/2022    HISTORY: 68-year-old patient with Right adnexal mass on CT earlier today. TECHNIQUE: Sonographic imaging of the pelvis was performed from a transabdominal  approach. COMPARISON: CT abdomen and pelvis from yesterday. FINDINGS: The uterus is 6 cm in length with an endometrial stripe thickness  measuring 2 mm. The right ovary is 4.9 x 3.9 x 3.9 cm. There is a 3.9 cm right adnexal cyst.  Doppler flow is present in the right ovary. The left ovary is not visible. There  is no free pelvic fluid. The bladder is normal in appearance. Impression  3.9 cm right adnexal cyst. Routine OB/GYN follow-up is recommended.       Admission date (for inpatients): 11/1/2022   Day of Surgery  Procedure(s):  ERCP ENDOSCOPIC RETROGRADE CHOLANGIOPANCREATOGRAPHY Balloon sweep        ASSESSMENT/PLAN:  [unfilled]  Active Problems:    Gallstone pancreatitis  Resolved Problems:    * No resolved hospital problems. *     Patient Active Problem List    Diagnosis Date Noted    Gallstone pancreatitis 11/01/2022     Priority: Medium    Orthopnea 10/22/2022     Priority: Medium    Acute pulmonary edema ([address] 10/22/2022     Priority: Medium    Cyst of right ovary 10/19/2022     Priority: Medium    Elevated liver enzymes 10/19/2022     Priority: Medium    Acute gallstone pancreatitis 10/15/2022     Priority: Medium     11/1/22 s/p lap cholecystectomy;  Dr John Enriquez      Anemia due to folate deficiency 10/15/2022     Priority: Medium    Vitamin D deficiency 10/14/2020    Restless leg syndrome 10/14/2020    Presence of left artificial knee joint 03/08/2020    Contusion of elbow and forearm, left, initial encounter 03/08/2020    Presence of right artificial knee joint 03/08/2020    Arthritis of right knee 12/10/2019    Osteoarthritis 10/01/2019    Arthritis of left knee 02/07/2019    Acquired hypothyroidism 04/19/2016    Gastroesophageal reflux disease with esophagitis 04/19/2016    GERD (gastroesophageal reflux disease)     Allergic rhinitis due to pollen 06/17/2015    Essential hypertension, benign 06/17/2015    Hypercholesterolemia     Thyroid disease     Abdominal pain 01/07/2013    Sigmoid diverticulitis 01/07/2013          Number and Complexity of Problems addressed and   Risks of complications and/or morbidity of management          Shortness of breath with concern for fluid overload in the ER  She reported she was unable to walk short distances without significant shortness of breath.   Dr John Enriquez requested cardiology clearance to proceed with surgery    On 10/28/22 Dr Rajendra Goff noted her labs and echo were normal and she was cleared for surgery        Gallstone pancreatitis  She is here for lap cholecystectomy on 11/1/22      She is s/p ERCP on 10/18/22  LFTs and T Bili improved    We discussed her condition at length. Operative risks were discussed including bleeding, infection, recurrent pancreatitis, injury to bowel or bile duct, the need for open surgery, blood clots, risk of anesthesia, etc.. We had an opening in the operating room schedule for today and I encouraged her to take it to lower the risks of developing recurrent pancreatitis while waiting for another date. Risk of not having surgery was also discussed including recurrent pancreatitis and cholecystitis. All her questions were answered. 3.9cm right adnexal cystic mass on pelvic US (And CT)  Recommended outpatient GYN follow-up              I have personally performed a face-to-face diagnostic evaluation and management  service on this patient. I have independently seen the patient. I have independently obtained the above history from the patient/family. I have independently examined the patient with above findings. I have independently reviewed data/labs for this patient and developed the above plan of care (MDM).       Signed: Kyle Myrick MD  11/1/2022    9:09 AM

## 2022-11-01 NOTE — OP NOTE
[facility] [address], [address] [facility]  [phone]    [address] REPORT    Name: Li Verdin     Date of Surgery: 11/01/22    Med Record Number: [id number]   Age: [de-identified] y.o. Sex: female   Pre-operative Diagnosis:   Gallstone Pancreatitis  Chronic Cholecystitis  Post-operative Diagnosis: same  Procedure: Laparoscopic Cholecystectomy (CPT 97129)  Surgeon: Alen Choi MD  Asistants: none  Anesthesia:  General  Complications: none  Specimen: gallbladder to path  Estimated Blood Loss: <30cc    Procedure Description:   The risks, benefits, potential complications, treatment options, and expected outcomes were discussed with the patient pre-operatively. The patient voiced understanding and gave informed consent preoperatively. The patient was taken to the Operating Room, and the Natividad Villafana time-out protocol checklist was followed. After the induction of adequate anesthesia, the abdomen was prepped and draped in the usual sterile fashion. Preoperative antibiotics were given. A sub umbilical incision was made and a cut down approach was used to place a blunt Geni trochar under direct vision. After adequate pneumoperitioneum, two 5mm static and dynamic retraction ports were placed and an 11mm trochar also placed, all in the usual locations. The gallbladder was retracted and inflammatory adhesions to the inferior aspect of the gallbladder were taken down. Careful and tedious dissection was performed to free up the cystic duct and artery from the surrounding tissues. A clear window was created between the inferior aspect of the gallbladder wall, the cystic duct, and the cystic artery. The cystic duct could clearly be seen to enter the gallbladder and the cystic artery seen to traverse on the gallbladder wall toward the fundus of the gallbladder. The critical view of safety was achieved.    We placed 3 clips on the distal cystic duct (patient side) and 2 clips on the proximal (specimen side) of the cystic duct. We then placed 2 clips on the proximal cystic artery and 2 clips on the distal cystic artery. Both structures were then divided. The cystic artery could be seen to pulsate at its clipped end as usual.  The gallbladder was the removed from its attachments to the liver. Small venous tributaries were clipped as needed as dissection was carried up toward the gallbladder fundus. The gallbladder was retracted out through the umbilical trochar suite with the camera placed temporarily through the epigastric trochar site. The gallbladder fossa was inspected. No bile leaks were seen, the clips on the artery and duct were intact and hemostatsis confirmed. Marcaine was infiltrated into the preperitoneal tissues around each trochar site. The fascia of the umbilical incision was closed with interrupted 0 vicryl suture. Clips were placed to close the skin incisions. The wounds were dressed sterilely with telfa and tegaderm, All sponge, instruments, and needle counts were correct. The patient was taken to recovery in good condition.     Lorna Balderas MD, FACS

## 2022-11-01 NOTE — DISCHARGE INSTRUCTIONS
Dressings/Wound Care  OK to leave your incisional dressings alone until follow-up visit. Try to keep incisions as dry as possible to lower risk of infection. Activity  No heavy lifting (>5lbs) for 6 weeks to reduce risk of developing a hernia in the incisions. No driving until you are off pain meds for 24hrs and have no pain with movements associated with driving. Pain prescription (Norco) electronically sent to your pharmacy    Follow-up with Dr Jung Remy nurse practitioner Apple Esparza in 13 days in the office on a Monday. See Dr Samuel Lopez as needed after that visit. [address], [address]  (Call for an appt time unless one is already made for you by discharge nurse which is preferred -->[phone])    Diet  Resume Regular Diet     ACTIVITY  As tolerated and as directed by your doctor. Bathe or shower as directed by your doctor. DIET  Clear liquids until no nausea or vomiting; then light diet for the first day. Advance to regular diet on second day, unless your doctor orders otherwise. If nausea and vomiting continues, call your doctor. PAIN  Take pain medication as directed by your doctor. Call your doctor if pain is NOT relieved by medication. DO NOT take aspirin of blood thinners unless directed by your doctor. MEDICATION INTERACTION:During your procedure you potentially received a medication or medications which may reduce the effectiveness of oral contraceptives. Please consider other forms of contraception for 1 month following your procedure if you are currently using oral contraceptives as your primary form of birth control.  In addition to this, we recommend continuing your oral contraceptive as prescribed, unless otherwise instructed by your physician, during this time      [address] IF   Excessive bleeding that does not stop after holding pressure over the area  Temperature of 101 degrees F or above  Excessive redness, swelling or bruising, and/ or green or yellow, smelly discharge from incision    After general anesthesia or intravenous sedation, for 24 hours or while taking prescription Narcotics:  Limit your activities  A responsible adult needs to be with you for the next 24 hours  Do not drive and operate hazardous machinery  Do not make important personal or business decisions  Do not drink alcoholic beverages  If you have not urinated within 8 hours after discharge, and you are experiencing discomfort from urinary retention, please go to the nearest ED. If you have sleep apnea and have a CPAP machine, please use it for all naps and sleeping. Please use caution when taking narcotics and any of your home medications that may cause drowsiness. *  Please give a list of your current medications to your Primary Care Provider. *  Please update this list whenever your medications are discontinued, doses are      changed, or new medications (including over-the-counter products) are added. *  Please carry medication information at all times in case of emergency situations. These are general instructions for a healthy lifestyle:  No smoking/ No tobacco products/ Avoid exposure to second hand smoke  Surgeon General's Warning:  Quitting smoking now greatly reduces serious risk to your health. Obesity, smoking, and sedentary lifestyle greatly increases your risk for illness  A healthy diet, regular physical exercise & weight monitoring are important for maintaining a healthy lifestyle    You may be retaining fluid if you have a history of heart failure or if you experience any of the following symptoms:  Weight gain of 3 pounds or more overnight or 5 pounds in a week, increased swelling in our hands or feet or shortness of breath while lying flat in bed. Please call your doctor as soon as you notice any of these symptoms; do not wait until your next office visit.

## 2022-11-11 ENCOUNTER — OFFICE VISIT (OUTPATIENT)
Dept: PRIMARY CARE CLINIC | Facility: CLINIC | Age: 82
End: 2022-11-11
Payer: MEDICARE

## 2022-11-11 VITALS
SYSTOLIC BLOOD PRESSURE: 123 MMHG | BODY MASS INDEX: 26.46 KG/M2 | HEIGHT: 64 IN | TEMPERATURE: 97.2 F | DIASTOLIC BLOOD PRESSURE: 63 MMHG | OXYGEN SATURATION: 97 % | HEART RATE: 80 BPM | WEIGHT: 155 LBS

## 2022-11-11 DIAGNOSIS — J44.9 CHRONIC OBSTRUCTIVE PULMONARY DISEASE, UNSPECIFIED COPD TYPE (HCC): ICD-10-CM

## 2022-11-11 DIAGNOSIS — Z00.00 MEDICARE ANNUAL WELLNESS VISIT, SUBSEQUENT: Primary | ICD-10-CM

## 2022-11-11 DIAGNOSIS — Z96.651 PRESENCE OF RIGHT ARTIFICIAL KNEE JOINT: ICD-10-CM

## 2022-11-11 DIAGNOSIS — K85.10 GALLSTONE PANCREATITIS: ICD-10-CM

## 2022-11-11 DIAGNOSIS — Z96.652 PRESENCE OF LEFT ARTIFICIAL KNEE JOINT: ICD-10-CM

## 2022-11-11 DIAGNOSIS — E03.9 ACQUIRED HYPOTHYROIDISM: ICD-10-CM

## 2022-11-11 DIAGNOSIS — R06.02 SHORTNESS OF BREATH: ICD-10-CM

## 2022-11-11 DIAGNOSIS — D52.9 ANEMIA DUE TO FOLIC ACID DEFICIENCY, UNSPECIFIED DEFICIENCY TYPE: ICD-10-CM

## 2022-11-11 DIAGNOSIS — K21.9 GASTROESOPHAGEAL REFLUX DISEASE WITHOUT ESOPHAGITIS: ICD-10-CM

## 2022-11-11 DIAGNOSIS — G25.81 RESTLESS LEG SYNDROME: ICD-10-CM

## 2022-11-11 DIAGNOSIS — I10 ESSENTIAL HYPERTENSION, BENIGN: ICD-10-CM

## 2022-11-11 PROCEDURE — G8484 FLU IMMUNIZE NO ADMIN: HCPCS | Performed by: FAMILY MEDICINE

## 2022-11-11 PROCEDURE — 1111F DSCHRG MED/CURRENT MED MERGE: CPT | Performed by: FAMILY MEDICINE

## 2022-11-11 PROCEDURE — 3074F SYST BP LT 130 MM HG: CPT | Performed by: FAMILY MEDICINE

## 2022-11-11 PROCEDURE — G8417 CALC BMI ABV UP PARAM F/U: HCPCS | Performed by: FAMILY MEDICINE

## 2022-11-11 PROCEDURE — G8427 DOCREV CUR MEDS BY ELIG CLIN: HCPCS | Performed by: FAMILY MEDICINE

## 2022-11-11 PROCEDURE — G8400 PT W/DXA NO RESULTS DOC: HCPCS | Performed by: FAMILY MEDICINE

## 2022-11-11 PROCEDURE — 99214 OFFICE O/P EST MOD 30 MIN: CPT | Performed by: FAMILY MEDICINE

## 2022-11-11 PROCEDURE — 3078F DIAST BP <80 MM HG: CPT | Performed by: FAMILY MEDICINE

## 2022-11-11 PROCEDURE — G0439 PPPS, SUBSEQ VISIT: HCPCS | Performed by: FAMILY MEDICINE

## 2022-11-11 PROCEDURE — 3023F SPIROM DOC REV: CPT | Performed by: FAMILY MEDICINE

## 2022-11-11 PROCEDURE — 1036F TOBACCO NON-USER: CPT | Performed by: FAMILY MEDICINE

## 2022-11-11 PROCEDURE — 1090F PRES/ABSN URINE INCON ASSESS: CPT | Performed by: FAMILY MEDICINE

## 2022-11-11 PROCEDURE — 1123F ACP DISCUSS/DSCN MKR DOCD: CPT | Performed by: FAMILY MEDICINE

## 2022-11-11 RX ORDER — FOLIC ACID 1 MG/1
1 TABLET ORAL DAILY
Qty: 90 TABLET | Refills: 1 | Status: SHIPPED | OUTPATIENT
Start: 2022-11-11

## 2022-11-11 ASSESSMENT — LIFESTYLE VARIABLES
HOW MANY STANDARD DRINKS CONTAINING ALCOHOL DO YOU HAVE ON A TYPICAL DAY: PATIENT DOES NOT DRINK
HOW OFTEN DO YOU HAVE A DRINK CONTAINING ALCOHOL: NEVER

## 2022-11-11 ASSESSMENT — PATIENT HEALTH QUESTIONNAIRE - PHQ9
SUM OF ALL RESPONSES TO PHQ QUESTIONS 1-9: 0
2. FEELING DOWN, DEPRESSED OR HOPELESS: 0
1. LITTLE INTEREST OR PLEASURE IN DOING THINGS: 0
SUM OF ALL RESPONSES TO PHQ QUESTIONS 1-9: 0
SUM OF ALL RESPONSES TO PHQ9 QUESTIONS 1 & 2: 0

## 2022-11-11 NOTE — PATIENT INSTRUCTIONS
Personalized Preventive Plan for [facility] - 11/11/2022  Medicare offers a range of preventive health benefits. Some of the tests and screenings are paid in full while other may be subject to a deductible, co-insurance, and/or copay. Some of these benefits include a comprehensive review of your medical history including lifestyle, illnesses that may run in your family, and various assessments and screenings as appropriate. After reviewing your medical record and screening and assessments performed today your provider may have ordered immunizations, labs, imaging, and/or referrals for you. A list of these orders (if applicable) as well as your Preventive Care list are included within your After Visit Summary for your review. Other Preventive Recommendations:    A preventive eye exam performed by an eye specialist is recommended every 1-2 years to screen for glaucoma; cataracts, macular degeneration, and other eye disorders. A preventive dental visit is recommended every 6 months. Try to get at least 150 minutes of exercise per week or 10,000 steps per day on a pedometer . Order or download the FREE \"Exercise & Physical Activity: Your Everyday Guide\" from [employer]. Call 9-181.208.4267 or search [employer] online. You need 3307-5029 mg of calcium and 5310-3318 IU of vitamin D per day. It is possible to meet your calcium requirement with diet alone, but a vitamin D supplement is usually necessary to meet this goal.  When exposed to the sun, use a sunscreen that protects against both UVA and UVB radiation with an SPF of 30 or greater. Reapply every 2 to 3 hours or after sweating, drying off with a towel, or swimming. Always wear a seat belt when traveling in a car. Always wear a helmet when riding a bicycle or motorcycle.

## 2022-11-11 NOTE — PROGRESS NOTES
Juanita Singleton MD  [address] [place], [place] [address]  Ph No:  [phone]  Fax:  [phone]:  Chief Complaint   Patient presents with    Medicare AWV     Pt had gallbladder surgery 10 days ago and was in the hospital for 4 days. Referral - General     Pt needs to be referred to a breathing doctor because they found out that she has COPD when she was in the hospital.     Discuss Labs     Pt had lab work done when she was in the hospital 10 days ago, but has questions and wants to know if she may need it again for folic acid levels. HISTORY OF PRESENT ILLNESS:  Ms. Denis Lynn is a [de-identified] y.o. female. Pt presents for Medicare Wellness visit. Pt had gallbladder surgery, on 15th October, to [facility], pain on left upper abdomen, inflamed pancreas, stone in pancreas, discharge Wednesday 19th. Then excessive coughing due to throat problems. After was seen at  - fluid on lungs, antibiotic did not help. Went back to ED, given diuretic to get fluids off lungs, on 22nd October. Pt had been previously been advised there were gallbladder problems, pt scheduled for 25th and was told then to wait until first of November. [address] [place] [address]  Surgical, did xray before surgery, advised pt no fluid on lungs, possibility of copd/emphysemia. .     Due to possibility of copd/emphysema, pt is referred to pulmonary by PCP for evaluation. Pt reports becomes short of breath upon exertion, confirmed by son, also has a chronic cough, consistent with copd or other respiratory concerns. Pt then had gallbladder taken out first of November, staples to be removed on Monday, Nov. 14.    After gallbladder was out, Pt has has been weak from energy. Pt is advised this is a commong problem after major surgery and particular with anesthesia, may be fatigued for weeks. Pt advised her boy is simply trying to get over  surgery.     Pt has another question:  MRI shows cyst on ovary.  Pt had exam recently but not found. Pt advised an ovarian cyst is not a medical emergency unless it becomes very large. Pt advised can see Gyn when she prefers, but she can wait until after first of year to allow her time to enjoy the holidays. Pt will make appt in next 2-3 months for recheck. Pt is concerned about when to take the next covid1 19 booster. Pt advised anytime is a good time, but when she feels ready to do so will be best.     Pt is given folic acid, due to labs indicating folic acid anemia.     RTC in 6 months, cmp, lipid, cbc, tsh, free t4, hgba1c, vit d      HISTORY:  Allergies   Allergen Reactions    Ace Inhibitors Angioedema and Swelling     Pt stated \"ace blockers\" can't remember name for htn  Facial swelling     Past Medical History:   Diagnosis Date    Allergic rhinitis due to pollen     Arthritis     Asthma as a child    Constipation 02/2019    post-op after TKA    Ear problems     Fibrocystic breast     GERD (gastroesophageal reflux disease)     Managed with meds     H/O colonoscopy 2009    Normal (Cadence Sebastian)    Hashimoto's thyroiditis     Hearing reduced     Hypertension     Managed with meds     Macular degeneration     Mixed hyperlipidemia     Daily meds     Osteoporosis 1/2013    GYN Dr. Ghulam Hernandez    Pleural effusion, bilateral 10/22/2022    post ERCP; since resolved after med tx    Restless leg     managed with medication    Unspecified hypothyroidism     Varicella      Past Surgical History:   Procedure Laterality Date    ADENOIDECTOMY  as a child    APPENDECTOMY  1973    CATARACT REMOVAL Bilateral 2007    CATARACT REMOVAL Bilateral     CHOLECYSTECTOMY, LAPAROSCOPIC N/A 11/01/2022    CHOLECYSTECTOMY LAPAROSCOPIC performed by Codi Tran MD at [facility]      Dr. Cadence Sebastian 2009-- no more per pt    ERCP N/A 10/17/2022    ERCP ENDOSCOPIC RETROGRADE CHOLANGIOPANCREATOGRAPHY Balloon sweep performed by Sully Grossman MD at [place] [address] [facility]      ovarian cyst    HERNIA REPAIR  2018    OVARIAN CYST REMOVAL  1973    TONSILLECTOMY  as a child    TOTAL KNEE ARTHROPLASTY Left 2019     Family History   Problem Relation Age of Onset    Coronary Art Dis Mother     COPD Father     Other Father         smoker    Heart Disease Brother     Cancer Maternal Grandmother     Breast Cancer Mother     Cancer Mother         breast, lung    Heart Disease Mother     Heart Attack Mother      Social History     Socioeconomic History    Marital status:      Spouse name: Not on file    Number of children: Not on file    Years of education: Not on file    Highest education level: Not on file   Occupational History    Not on file   Tobacco Use    Smoking status: Former     Packs/day: 0.50     Types: Cigarettes     Quit date: 1975     Years since quittin.8    Smokeless tobacco: Never    Tobacco comments:     Quit smoking: quit age of 30   Vaping Use    Vaping Use: Never used   Substance and Sexual Activity    Alcohol use: Not Currently     Alcohol/week: 1.0 standard drink    Drug use: No    Sexual activity: Not on file   Other Topics Concern    Not on file   Social History Narrative    Not on file     Social Determinants of Health     Financial Resource Strain: Not on file   Food Insecurity: Not on file   Transportation Needs: Not on file   Physical Activity: Inactive    Days of Exercise per Week: 0 days    Minutes of Exercise per Session: 0 min   Stress: Not on file   Social Connections: Not on file   Intimate Partner Violence: Not on file   Housing Stability: Not on file     Current Outpatient Medications   Medication Sig Dispense Refill    folic acid (FOLVITE) 1 MG tablet Take 1 tablet by mouth daily 90 tablet 1    NONFORMULARY Preservision/Lutein oral cap 1 cap bid      Multiple Vitamins-Minerals (THERAPEUTIC MULTIVITAMIN-MINERALS) tablet Take 1 tablet by mouth daily      Omega-3 Fatty Acids (FISH OIL) 1000 MG CPDR Take 1,000 mg by mouth daily      NONFORMULARY Fiber oral tablet 1 capsule daily      Hyoscyamine Sulfate SL (LEVSIN/SL) 0.125 MG SUBL Place 0.25 mg under the tongue 4 times daily as needed (abdominal pain or cramping) 20 each 1    pravastatin (PRAVACHOL) 40 MG tablet TAKE 1 TABLET BY MOUTH AT NIGHT (Patient taking differently: Take 40 mg by mouth at bedtime TAKE 1 TABLET BY MOUTH AT NIGHT) 90 tablet 3    hydroCHLOROthiazide (HYDRODIURIL) 12.5 MG tablet Take 1 tablet by mouth daily TAKE 1 TABLET BY MOUTH DAILY 90 tablet 3    telmisartan (MICARDIS) 80 MG tablet Take 1 tablet by mouth in the morning. 90 tablet 3    levothyroxine (SYNTHROID) 75 MCG tablet Take 1 tablet by mouth in the morning. TAKE 1 TABLET BY MOUTH DAILY BEFORE BREAKFAST. 90 tablet 1    Calcium Polycarbophil (FIBER) 625 MG TABS take 1 qd      coenzyme Q10 100 MG CAPS capsule Take by mouth every morning      aspirin 81 MG EC tablet Take 81 mg by mouth every 12 hours      carbidopa-levodopa (SINEMET)  MG per tablet TAKE 1 TABLET BY MOUTH AT NIGHT      cetirizine (ZYRTEC) 10 MG tablet Take 10 mg by mouth daily      clobetasol (TEMOVATE) 0.05 % cream Apply topically 2 times daily as needed      Glucosamine 500 MG CAPS Take 500 mg by mouth daily      hydrocortisone 2.5 % cream Place rectally 4 times daily      ketoconazole (NIZORAL) 2 % cream Apply topically 2 times daily as needed      prednisoLONE acetate (PRED FORTE) 1 % ophthalmic suspension Apply 1 drop to eye Twice a Week      triamcinolone (KENALOG) 0.1 % cream Apply topically 2 times daily as needed      pantoprazole (PROTONIX) 40 MG tablet TAKE 1 TABLET BY MOUTH DAILY BEFORE DINNER 90 tablet 3     No current facility-administered medications for this visit. REVIEW OF SYSTEMS:  Review of systems is as indicated in HPI otherwise negative.     PHYSICAL EXAM:  Vital Signs - /63   Pulse 80   Temp 97.2 °F (36.2 °C) (Temporal)   Ht 5' 4\" (1.626 m)   Wt 155 lb (70.3 kg)   SpO2 97%   BMI 26.61 kg/m² Physical Exam  Vitals and nursing note reviewed. Constitutional:       General: She is in acute distress. Appearance: Normal appearance. She is normal weight. Comments: See HPI, pt relates saga of recent illnesses, hospitalization, gallbladder surgery and now finding an ovarian cyst on left side. Pt advise to all herself time to pause and recover her energy over holidays and in a few months she should feel a lot better. HENT:      Head: Normocephalic. Nose: Nose normal.      Mouth/Throat:      Mouth: Mucous membranes are moist.      Pharynx: Oropharynx is clear. Eyes:      Extraocular Movements: Extraocular movements intact. Conjunctiva/sclera: Conjunctivae normal.      Pupils: Pupils are equal, round, and reactive to light. Cardiovascular:      Rate and Rhythm: Normal rate and regular rhythm. Pulses: Normal pulses. Heart sounds: Normal heart sounds. Pulmonary:      Effort: Pulmonary effort is normal.      Comments: Coarse breath sounds, but otherwise mostly clear to ascultation bilaterally  Abdominal:      General: Abdomen is flat. Bowel sounds are normal.      Palpations: Abdomen is soft. Genitourinary:     Comments: Ovarian cyst, left   Musculoskeletal:         General: Normal range of motion. Cervical back: Normal range of motion and neck supple. Skin:     General: Skin is warm and dry. Capillary Refill: Capillary refill takes 2 to 3 seconds. Neurological:      General: No focal deficit present. Mental Status: She is alert and oriented to person, place, and time. Psychiatric:         Behavior: Behavior normal.         Thought Content: Thought content normal.         Judgment: Judgment normal.      Comments: Acute and general health anxiety, see HPI           LABS  No results found for this visit on 11/11/22. IMPRESSION/PLAN     Diagnosis Orders   1. Medicare annual wellness visit, subsequent        2.  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 Shortness of breath  [email or web address] [facility]      4. Chronic obstructive pulmonary disease, unspecified COPD type ([facility] [address]  [facility] Pulmonary [facility] Critical Care      5. Essential hypertension, benign        6. Gastroesophageal reflux disease without esophagitis        7. Acquired hypothyroidism        8. Restless leg syndrome        9. Presence of left artificial knee joint        10. Presence of right artificial knee joint        11. Gallstone pancreatitis            No follow-up provider specified. Ally Lerma MD            Dictated using voice recognition software. Proofread, but unrecognized voice recognition errors may exist.      Medicare Annual Wellness Visit    Hemant Land is here for Medicare AWV (Pt had gallbladder surgery 10 days ago and was in the hospital for 4 days. ), Referral - General (Pt needs to be referred to a breathing doctor because they found out that she has COPD when she was in the hospital. 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  Chronic obstructive pulmonary disease, unspecified COPD type ([facility] [address]  -     [facility]monary [facility]  Essential hypertension, benign  Gastroesophageal reflux disease without esophagitis  Acquired hypothyroidism  Restless leg syndrome  Presence of left artificial knee joint  Presence of right artificial knee joint  Gallstone pancreatitis    Recommendations for Preventive Services Due: see orders and patient instructions/AVS.  Recommended screening schedule for the next 5-10 years is provided to the patient in written form: see Patient Instructions/AVS.     Return in 6 months (on 5/11/2023), or cmp, lipid, cbc, tsh, free t4, hgba1c, vit d, for Medicare Annual Wellness Visit in 1 year, for labs one week before appt. Subjective   The following acute and/or chronic problems were also addressed today:  Hospital follow up, referral to pulmonology, folic acid anemia treatment, advice on recovery after surgery. Patient's complete Health Risk Assessment and screening values have been reviewed and are found in Flowsheets. The following problems were reviewed today and where indicated follow up appointments were made and/or referrals ordered. Positive Risk Factor Screenings with Interventions:             General Health and ACP:  General  In general, how would you say your health is?: Good  In the past 7 days, have you experienced any of the following: New or Increased Pain, New or Increased Fatigue, Loneliness, Social Isolation, Stress or Anger?: No  Do you get the social and emotional support that you need?: Yes  Do you have a Living Will?: (!) No    Advance Directives       Power of  Living Will ACP-Advance Directive ACP-Power of     Not on File Not on File Not on File Not on File          General Health Risk Interventions:  Pt is needing rest to recover from recent surgery, advised up to 2 months may be needed to feel \"back to normal.\"    Health Habits/Nutrition:  Physical Activity: Inactive    Days of Exercise per Week: 0 days    Minutes of Exercise per Session: 0 min     Have you lost any weight without trying in the past 3 months?: No  Body mass index: (!) 26.6  Have you seen the dentist within the past year?: Yes  Health Habits/Nutrition Interventions:  Pt is to continue healthy diet and lifestyle, with more green leafy vegetables or multi vit.     Hearing/Vision:  Do you or your family notice any trouble with your hearing that hasn't been managed with hearing aids?: (!) Yes (Pt wear hearing aids)  Do you have difficulty driving, watching TV, or doing any of your daily activities because of your eyesight?: No  Have you had an eye exam within the past year?: Yes  No results found. Hearing/Vision Interventions:  None indicated    Safety:  Do you have working smoke detectors?: Yes  Do you have any tripping hazards - loose or unsecured carpets or rugs?: No  Do you have any tripping hazards - clutter in doorways, halls, or stairs?: No  Do you have either shower bars, grab bars, non-slip mats or non-slip surfaces in your shower or bathtub?: Yes (shower bar)  Do all of your stairways have a railing or banister?: (!) No  Do you always fasten your seatbelt when you are in a car?: Yes  Safety Interventions:  Patient declines any further evaluation/treatment for this issue           Objective   Vitals:    11/11/22 0839   BP: 123/63   Pulse: 80   Temp: 97.2 °F (36.2 °C)   TempSrc: Temporal   SpO2: 97%   Weight: 155 lb (70.3 kg)   Height: 5' 4\" (1.626 m)      Body mass index is 26.61 kg/m². Allergies   Allergen Reactions    Ace Inhibitors Angioedema and Swelling     Pt stated \"ace blockers\" can't remember name for htn  Facial swelling     Prior to Visit Medications    Medication Sig Taking?  Authorizing Provider   folic acid (FOLVITE) 1 MG tablet Take 1 tablet by mouth daily Yes Ed Sigala MD   NONFORMULARY Preservision/Lutein oral cap 1 cap bid Yes Historical Provider, MD   Multiple Vitamins-Minerals (THERAPEUTIC MULTIVITAMIN-MINERALS) tablet Take 1 tablet by mouth daily Yes Historical Provider, MD   Omega-3 Fatty Acids (FISH OIL) 1000 MG CPDR Take 1,000 mg by mouth daily Yes Historical Provider, MD   NONFORMULARY Fiber oral tablet 1 capsule daily Yes Historical Provider, MD   Hyoscyamine Sulfate SL (LEVSIN/SL) 0.125 MG SUBL Place 0.25 mg under the tongue 4 times daily as needed (abdominal pain or cramping) Yes Heraclio Jaiver MD   pravastatin (PRAVACHOL) 40 MG tablet TAKE 1 TABLET BY MOUTH AT NIGHT  Patient taking differently: Take 40 mg by mouth at bedtime TAKE 1 TABLET BY MOUTH AT NIGHT Yes Lani Acevedo MD   hydroCHLOROthiazide (HYDRODIURIL) 12.5 MG tablet Take 1 tablet by mouth daily TAKE 1 TABLET BY MOUTH DAILY Yes Lani Acevedo MD   telmisartan (MICARDIS) 80 MG tablet Take 1 tablet by mouth in the morning. Yes Lani Acevedo MD   levothyroxine (SYNTHROID) 75 MCG tablet Take 1 tablet by mouth in the morning. TAKE 1 TABLET BY MOUTH DAILY BEFORE BREAKFAST.  Yes Lani Acevedo MD   Calcium Polycarbophil (FIBER) 625 MG TABS take 1 qd Yes Historical Provider, MD   coenzyme Q10 100 MG CAPS capsule Take by mouth every morning Yes Historical Provider, MD   aspirin 81 MG EC tablet Take 81 mg by mouth every 12 hours Yes Ar Automatic Reconciliation   carbidopa-levodopa (SINEMET)  MG per tablet TAKE 1 TABLET BY MOUTH AT NIGHT Yes Ar Automatic Reconciliation   cetirizine (ZYRTEC) 10 MG tablet Take 10 mg by mouth daily Yes Ar Automatic Reconciliation   clobetasol (TEMOVATE) 0.05 % cream Apply topically 2 times daily as needed Yes Ar Automatic Reconciliation   Glucosamine 500 MG CAPS Take 500 mg by mouth daily Yes Ar Automatic Reconciliation   hydrocortisone 2.5 % cream Place rectally 4 times daily Yes Ar Automatic Reconciliation   ketoconazole (NIZORAL) 2 % cream Apply topically 2 times daily as needed Yes Ar Automatic Reconciliation   prednisoLONE acetate (PRED FORTE) 1 % ophthalmic suspension Apply 1 drop to eye Twice a Week Yes Ar Automatic Reconciliation   triamcinolone (KENALOG) 0.1 % cream Apply topically 2 times daily as needed Yes Ar Automatic Reconciliation   pantoprazole (PROTONIX) 40 MG tablet TAKE 1 TABLET BY MOUTH DAILY BEFORE DINNER  MD Juanito Wills (Including outside providers/suppliers regularly involved in providing care): Patient Care Team:  Ed Sigala MD as PCP - General  [facility] Cynthia Puente MD as PCP - [facility] Empaneled Provider     Reviewed and updated this visit:  Tobacco  Allergies  Meds  Med Hx  Surg Hx  Soc Hx  Fam Hx

## 2022-11-14 ENCOUNTER — OFFICE VISIT (OUTPATIENT)
Dept: SURGERY | Age: 82
End: 2022-11-14

## 2022-11-14 VITALS — WEIGHT: 155 LBS | BODY MASS INDEX: 26.46 KG/M2 | HEIGHT: 64 IN

## 2022-11-14 DIAGNOSIS — K85.10 ACUTE GALLSTONE PANCREATITIS: Primary | ICD-10-CM

## 2022-11-14 NOTE — PROGRESS NOTES
Removed dressings. Removed staples. Applied steri strips and tegaderm. Copy of path report given. The incisions look good.  Return PRN for Dr Lori Lam

## 2022-11-29 ENCOUNTER — OFFICE VISIT (OUTPATIENT)
Dept: CARDIOLOGY CLINIC | Age: 82
End: 2022-11-29
Payer: MEDICARE

## 2022-11-29 VITALS
DIASTOLIC BLOOD PRESSURE: 60 MMHG | HEIGHT: 64 IN | WEIGHT: 158 LBS | HEART RATE: 82 BPM | BODY MASS INDEX: 26.98 KG/M2 | SYSTOLIC BLOOD PRESSURE: 90 MMHG

## 2022-11-29 DIAGNOSIS — I10 ESSENTIAL HYPERTENSION, BENIGN: Primary | ICD-10-CM

## 2022-11-29 PROCEDURE — G8417 CALC BMI ABV UP PARAM F/U: HCPCS | Performed by: INTERNAL MEDICINE

## 2022-11-29 PROCEDURE — 3078F DIAST BP <80 MM HG: CPT | Performed by: INTERNAL MEDICINE

## 2022-11-29 PROCEDURE — 3074F SYST BP LT 130 MM HG: CPT | Performed by: INTERNAL MEDICINE

## 2022-11-29 PROCEDURE — G8427 DOCREV CUR MEDS BY ELIG CLIN: HCPCS | Performed by: INTERNAL MEDICINE

## 2022-11-29 PROCEDURE — 1090F PRES/ABSN URINE INCON ASSESS: CPT | Performed by: INTERNAL MEDICINE

## 2022-11-29 PROCEDURE — G8400 PT W/DXA NO RESULTS DOC: HCPCS | Performed by: INTERNAL MEDICINE

## 2022-11-29 PROCEDURE — 1036F TOBACCO NON-USER: CPT | Performed by: INTERNAL MEDICINE

## 2022-11-29 PROCEDURE — 99213 OFFICE O/P EST LOW 20 MIN: CPT | Performed by: INTERNAL MEDICINE

## 2022-11-29 PROCEDURE — 1123F ACP DISCUSS/DSCN MKR DOCD: CPT | Performed by: INTERNAL MEDICINE

## 2022-11-29 PROCEDURE — G8484 FLU IMMUNIZE NO ADMIN: HCPCS | Performed by: INTERNAL MEDICINE

## 2022-11-29 RX ORDER — TRAMADOL HYDROCHLORIDE 50 MG/1
50 TABLET ORAL EVERY 6 HOURS PRN
COMMUNITY

## 2022-11-29 ASSESSMENT — ENCOUNTER SYMPTOMS
SHORTNESS OF BREATH: 0
EYE PAIN: 0
PHOTOPHOBIA: 0
ALLERGIC/IMMUNOLOGIC NEGATIVE: 1
ABDOMINAL PAIN: 0
GASTROINTESTINAL NEGATIVE: 1
RESPIRATORY NEGATIVE: 1
EYES NEGATIVE: 1
BACK PAIN: 0
CHEST TIGHTNESS: 0

## 2022-11-29 NOTE — PROGRESS NOTES
[facility] CARDIOLOGY  [address], [address]  PHONE: [phone]      22    NAME:  Maxwell Hoffmann  : 1940  MRN: [id number]         SUBJECTIVE:   Maxwell Hoffmann is a [de-identified] y.o. female seen for follow up of:      Chief Complaint   Patient presents with    Hypertension        Cardiac Hx (Reviewed and summarized by me):  1) Echo - 10/28/22 - LVEF 60-65% RVSP 44 mmHg  2) NT proBNP - 124 (10/28/22)   3) Laparoscopic Cholecystectomy 22 (Dr. Nel Moreland)      HPI:  Successful laparoscopic cholecystectomy was completed earlier this month she is doing exceedingly well. She is on hydrochlorothiazide and telmisartan and her blood pressure is a bit low today. She does not check her blood pressure routinely at home. She denies lightheadedness or syncope. Past Medical History, Past Surgical History, Family history, Social History, and Medications were all reviewed with the patient today and updated as necessary.        Current Outpatient Medications:     traMADol (ULTRAM) 50 MG tablet, Take 50 mg by mouth every 6 hours as needed for Pain., Disp: , Rfl:     folic acid (FOLVITE) 1 MG tablet, Take 1 tablet by mouth daily, Disp: 90 tablet, Rfl: 1    NONFORMULARY, Preservision/Lutein oral cap 1 cap bid, Disp: , Rfl:     Multiple Vitamins-Minerals (THERAPEUTIC MULTIVITAMIN-MINERALS) tablet, Take 1 tablet by mouth daily, Disp: , Rfl:     Omega-3 Fatty Acids (FISH OIL) 1000 MG CPDR, Take 1,000 mg by mouth daily, Disp: , Rfl:     NONFORMULARY, Fiber oral tablet 1 capsule daily, Disp: , Rfl:     pantoprazole (PROTONIX) 40 MG tablet, TAKE 1 TABLET BY MOUTH DAILY BEFORE DINNER, Disp: 90 tablet, Rfl: 3    pravastatin (PRAVACHOL) 40 MG tablet, TAKE 1 TABLET BY MOUTH AT NIGHT (Patient taking differently: Take 40 mg by mouth at bedtime TAKE 1 TABLET BY MOUTH AT NIGHT), Disp: 90 tablet, Rfl: 3    hydroCHLOROthiazide (HYDRODIURIL) 12.5 MG tablet, Take 1 tablet by mouth daily TAKE 1 TABLET BY MOUTH DAILY, Disp: 90 tablet, Rfl: 3    telmisartan (MICARDIS) 80 MG tablet, Take 1 tablet by mouth in the morning., Disp: 90 tablet, Rfl: 3    levothyroxine (SYNTHROID) 75 MCG tablet, Take 1 tablet by mouth in the morning.  TAKE 1 TABLET BY MOUTH DAILY BEFORE BREAKFAST., Disp: 90 tablet, Rfl: 1    Calcium Polycarbophil (FIBER) 625 MG TABS, take 1 qd, Disp: , Rfl:     coenzyme Q10 100 MG CAPS capsule, Take by mouth every morning, Disp: , Rfl:     aspirin 81 MG EC tablet, Take 81 mg by mouth every 12 hours, Disp: , Rfl:     carbidopa-levodopa (SINEMET)  MG per tablet, TAKE 1 TABLET BY MOUTH AT NIGHT, Disp: , Rfl:     cetirizine (ZYRTEC) 10 MG tablet, Take 10 mg by mouth daily, Disp: , Rfl:     clobetasol (TEMOVATE) 0.05 % cream, Apply topically 2 times daily as needed, Disp: , Rfl:     Glucosamine 500 MG CAPS, Take 500 mg by mouth daily, Disp: , Rfl:     hydrocortisone 2.5 % cream, Place rectally 4 times daily, Disp: , Rfl:     ketoconazole (NIZORAL) 2 % cream, Apply topically 2 times daily as needed, Disp: , Rfl:     prednisoLONE acetate (PRED FORTE) 1 % ophthalmic suspension, Apply 1 drop to eye Twice a Week, Disp: , Rfl:     triamcinolone (KENALOG) 0.1 % cream, Apply topically 2 times daily as needed, Disp: , Rfl:     Hyoscyamine Sulfate SL (LEVSIN/SL) 0.125 MG SUBL, Place 0.25 mg under the tongue 4 times daily as needed (abdominal pain or cramping) (Patient not taking: Reported on 11/29/2022), Disp: 20 each, Rfl: 1  Allergies   Allergen Reactions    Ace Inhibitors Angioedema and Swelling     Pt stated \"ace blockers\" can't remember name for htn  Facial swelling     Past Medical History:   Diagnosis Date    Allergic rhinitis due to pollen     Arthritis     Asthma as a child    Constipation 02/2019    post-op after TKA    Ear problems     Fibrocystic breast     GERD (gastroesophageal reflux disease)     Managed with meds     H/O colonoscopy 2009    Normal (Enrique)    Hashimoto's thyroiditis     Hearing reduced Hypertension     Managed with meds     Macular degeneration     Mixed hyperlipidemia     Daily meds     Osteoporosis 2013    GYN Dr. Palmira Mejia    Pleural effusion, bilateral 10/22/2022    post ERCP; since resolved after med tx    Restless leg     managed with medication    Unspecified hypothyroidism     Varicella      Past Surgical History:   Procedure Laterality Date    ADENOIDECTOMY  as a child    APPENDECTOMY  1973    CATARACT REMOVAL Bilateral 2007    CATARACT REMOVAL Bilateral     CHOLECYSTECTOMY, LAPAROSCOPIC N/A 2022    CHOLECYSTECTOMY LAPAROSCOPIC performed by Jenna Bustillo MD at [facility]      Dr. Ash Nunez -- no more per pt    ERCP N/A 10/17/2022    ERCP ENDOSCOPIC RETROGRADE CHOLANGIOPANCREATOGRAPHY Balloon sweep performed by Nohemy Holt MD at [facility] [address]      ovarian cyst    HERNIA REPAIR  2018    OVARIAN CYST REMOVAL  1973    TONSILLECTOMY  as a child    TOTAL KNEE ARTHROPLASTY Left 2019     Family History   Problem Relation Age of Onset    Coronary Art Dis Mother     COPD Father     Other Father         smoker    Heart Disease Brother     Cancer Maternal Grandmother     Breast Cancer Mother     Cancer Mother         breast, lung    Heart Disease Mother     Heart Attack Mother      Social History     Tobacco Use    Smoking status: Former     Packs/day: 0.50     Types: Cigarettes     Quit date: 1975     Years since quittin.8    Smokeless tobacco: Never    Tobacco comments:     Quit smoking: quit age of 30   Substance Use Topics    Alcohol use: Not Currently     Alcohol/week: 1.0 standard drink       ROS:  Review of Systems   Constitutional: Negative. Negative for fever. HENT:  Negative for hearing loss, nosebleeds and tinnitus. Eyes: Negative. Negative for photophobia and pain. Respiratory: Negative. Negative for chest tightness and shortness of breath. Cardiovascular: Negative.   Negative for chest pain, palpitations and leg swelling. Gastrointestinal: Negative. Negative for abdominal pain. Endocrine: Negative. Negative for cold intolerance and heat intolerance. Genitourinary: Negative. Negative for dysuria. Musculoskeletal: Negative. Negative for back pain and joint swelling. Skin: Negative. Negative for rash. Allergic/Immunologic: Negative. Negative for immunocompromised state. Neurological: Negative. Negative for dizziness, syncope and light-headedness. Hematological: Negative. Does not bruise/bleed easily. Psychiatric/Behavioral: Negative. Negative for suicidal ideas. PHYSICAL EXAM:  Physical Exam  Constitutional:       General: She is not in acute distress. Appearance: She is not ill-appearing. HENT:      Head: Normocephalic and atraumatic. Nose: No congestion. Mouth/Throat:      Mouth: Mucous membranes are moist.   Eyes:      Extraocular Movements: Extraocular movements intact. Pupils: Pupils are equal, round, and reactive to light. Cardiovascular:      Rate and Rhythm: Normal rate and regular rhythm. Heart sounds: No murmur heard. No friction rub. No gallop. Pulmonary:      Effort: No respiratory distress. Breath sounds: No wheezing or rhonchi. Musculoskeletal:         General: No swelling. Cervical back: Normal range of motion. Right lower leg: No edema. Left lower leg: No edema. Skin:     General: Skin is warm and dry. Findings: No rash. Neurological:      General: No focal deficit present. Mental Status: She is oriented to person, place, and time.    Psychiatric:         Mood and Affect: Mood normal.         Behavior: Behavior normal.         Judgment: Judgment normal.        BP 90/60   Pulse 82   Ht 5' 4\" (1.626 m)   Wt 158 lb (71.7 kg)   BMI 27.12 kg/m²      Wt Readings from Last 10 Encounters:   11/29/22 158 lb (71.7 kg)   11/14/22 155 lb (70.3 kg)   11/11/22 155 lb (70.3 kg)   11/01/22 160 lb (72.6 kg)   10/28/22 156 lb (70.8 kg)   10/28/22 156 lb (70.8 kg)   10/22/22 161 lb (73 kg)   10/17/22 168 lb (76.2 kg)   09/16/22 163 lb (73.9 kg)   07/21/22 162 lb 12.8 oz (73.8 kg)           Medical problems and test results were reviewed with the patient today. Lab Results   Component Value Date/Time    BUN 17 11/01/2022 07:40 AM     No results found for: BRIAN, CREAPOC, CREA  Lab Results   Component Value Date/Time    K 3.9 11/01/2022 07:40 AM       Lab Results   Component Value Date/Time    CHOL 198 04/21/2022 11:30 AM    HDL 57 04/21/2022 11:30 AM    VLDL 31 04/21/2022 11:30 AM       ASSESSMENT and PLAN    ICD-10-CM    1. Essential hypertension, benign  I10           IMPRESSION:  1) Preop eval showed normal cardiac function  2) HTN - plan to keep BP log x 3 months and we will review BP meds at next visit      ALL ORDERS THIS ENCOUNTER  Orders Placed This Encounter    traMADol (ULTRAM) 50 MG tablet     Sig: Take 50 mg by mouth every 6 hours as needed for Pain. Follow up in 3 months. Thank you for allowing me to participate in this patient's care. Please call or contact me if there are any questions or concerns regarding the above.       Gaye Davey MD  11/29/22  10:03 AM

## 2022-12-08 ENCOUNTER — OFFICE VISIT (OUTPATIENT)
Dept: PULMONOLOGY | Age: 82
End: 2022-12-08
Payer: MEDICARE

## 2022-12-08 VITALS
BODY MASS INDEX: 26.98 KG/M2 | SYSTOLIC BLOOD PRESSURE: 126 MMHG | DIASTOLIC BLOOD PRESSURE: 68 MMHG | HEART RATE: 91 BPM | OXYGEN SATURATION: 96 % | WEIGHT: 158 LBS | TEMPERATURE: 97 F | HEIGHT: 64 IN

## 2022-12-08 DIAGNOSIS — R05.3 CHRONIC COUGH: Primary | ICD-10-CM

## 2022-12-08 DIAGNOSIS — Z87.891 HISTORY OF PRIOR CIGARETTE SMOKING: ICD-10-CM

## 2022-12-08 DIAGNOSIS — J31.0 CHRONIC RHINITIS: ICD-10-CM

## 2022-12-08 PROBLEM — J81.0 ACUTE PULMONARY EDEMA (HCC): Status: RESOLVED | Noted: 2022-10-22 | Resolved: 2022-12-08

## 2022-12-08 LAB
EXPIRATORY TIME: NORMAL
FEF 25-75% %PRED-PRE: NORMAL
FEF 25-75% PRED: NORMAL
FEF 25-75%-PRE: NORMAL
FEV1 %PRED-PRE: 119 %
FEV1 PRED: NORMAL
FEV1/FVC %PRED-PRE: NORMAL
FEV1/FVC PRED: NORMAL
FEV1/FVC: 80 %
FEV1: 2.31 L
FVC %PRED-PRE: 111 %
FVC PRED: NORMAL
FVC: 2.9 L
PEF %PRED-PRE: NORMAL
PEF PRED: NORMAL
PEF-PRE: NORMAL

## 2022-12-08 PROCEDURE — G8484 FLU IMMUNIZE NO ADMIN: HCPCS | Performed by: INTERNAL MEDICINE

## 2022-12-08 PROCEDURE — G8417 CALC BMI ABV UP PARAM F/U: HCPCS | Performed by: INTERNAL MEDICINE

## 2022-12-08 PROCEDURE — 1090F PRES/ABSN URINE INCON ASSESS: CPT | Performed by: INTERNAL MEDICINE

## 2022-12-08 PROCEDURE — G8400 PT W/DXA NO RESULTS DOC: HCPCS | Performed by: INTERNAL MEDICINE

## 2022-12-08 PROCEDURE — 1036F TOBACCO NON-USER: CPT | Performed by: INTERNAL MEDICINE

## 2022-12-08 PROCEDURE — 3074F SYST BP LT 130 MM HG: CPT | Performed by: INTERNAL MEDICINE

## 2022-12-08 PROCEDURE — G8427 DOCREV CUR MEDS BY ELIG CLIN: HCPCS | Performed by: INTERNAL MEDICINE

## 2022-12-08 PROCEDURE — 94010 BREATHING CAPACITY TEST: CPT | Performed by: INTERNAL MEDICINE

## 2022-12-08 PROCEDURE — 99204 OFFICE O/P NEW MOD 45 MIN: CPT | Performed by: INTERNAL MEDICINE

## 2022-12-08 PROCEDURE — 3078F DIAST BP <80 MM HG: CPT | Performed by: INTERNAL MEDICINE

## 2022-12-08 PROCEDURE — 1123F ACP DISCUSS/DSCN MKR DOCD: CPT | Performed by: INTERNAL MEDICINE

## 2022-12-08 RX ORDER — ALBUTEROL SULFATE 90 UG/1
2 AEROSOL, METERED RESPIRATORY (INHALATION) EVERY 6 HOURS PRN
Qty: 1 EACH | Refills: 11 | Status: SHIPPED | OUTPATIENT
Start: 2022-12-08

## 2022-12-08 ASSESSMENT — PULMONARY FUNCTION TESTS
FVC: 2.90
FEV1: 2.31
FVC_PERCENT_PREDICTED_PRE: 111
FEV1/FVC: 80
FEV1_PERCENT_PREDICTED_PRE: 119

## 2022-12-08 NOTE — PROGRESS NOTES
Name:  Roz Srinivasan  YOB: 1940   MRN: [id number]      Office Visit: 12/8/2022        ASSESSMENT AND PLAN:  (Medical Decision Making)    Tata Villa was seen today for new patient and shortness of breath. Diagnoses and all orders for this visit:    Chronic cough: Chronic cough seems to be mostly related to postnasal drip and chronic allergies, but she does have some reported history of asthma and in addition to adding Flonase to her regular Zyrtec I recommend an albuterol inhaler to use at least as needed. She can report back to us in a few months follow-up to see whether the albuterol actually helps any of the symptoms and whether addition of Flonase helped her postnasal drip and cough. She has an essentially unremarkable pulmonary evaluation otherwise including CT scan, exam and spirometry. There is no evidence of significant COPD and she is quit smoking 50 years ago. -     Spirometry Without Bronchodilator  -     albuterol sulfate HFA (PROVENTIL;VENTOLIN;PROAIR) 108 (90 Base) MCG/ACT inhaler; Inhale 2 puffs into the lungs every 6 hours as needed for Wheezing    Chronic rhinitis: Recommend adding Flonase to her Zyrtec daily and will see whether this helps control her mostly morning cough. History of prior cigarette smoking: There is no evidence of significant COPD and she is quit smoking 50 years ago. Orders Placed This Encounter   Medications    albuterol sulfate HFA (PROVENTIL;VENTOLIN;PROAIR) 108 (90 Base) MCG/ACT inhaler     Sig: Inhale 2 puffs into the lungs every 6 hours as needed for Wheezing     Dispense:  1 each     Refill:  11     No orders of the defined types were placed in this encounter. Follow-up and Dispositions    Return in about 3 months (around 3/8/2023) for Dr. Lorri Villa or NP Julien Stahl MD  _________________________________________________________________________    HISTORY OF PRESENT ILLNESS:    Ms. Audrey Callaway is a [de-identified] y.o. female who is seen at [facility] today for  New Patient and Shortness of Breath  Old female with reported history of asthma and allergies as a child as well as remote smoking history smoking for about 10 years and quit 50 years ago. She recently was admitted for gallstone pancreatitis and somewhere on her x-rays was told that she had COPD. She also had after initial discharge a repeat ED evaluation with pulmonary edema and effusions. She was given Lasix and those symptoms resolved quickly. She otherwise has chronic allergies and has a question of asthma, but has only used an inhaler once while she had COVID in October 2021. She only used that for a couple weeks, but felt that her symptoms improved with it or at least with time and has not used it since. She does use Zyrtec for allergies daily, but continues to have postnasal drip and frequent cough at least every morning and some scattered in the afternoon. She denies any fevers, wheezing, chest tightness, shortness of breath, night sweats or weight loss. REVIEW OF SYSTEMS: 10 point review of systems is negative except as reported in HPI. PHYSICAL EXAM: Body mass index is 27.12 kg/m². Vitals:    12/08/22 1326   BP: 126/68   Pulse: 91   Temp: 97 °F (36.1 °C)   TempSrc: Skin   SpO2: 96%   Weight: 158 lb (71.7 kg)   Height: 5' 4\" (1.626 m)         General:   Alert, cooperative, no distress, appears stated age. Eyes:   Conjunctivae/corneas clear. PERRL        Mouth/Throat:  Lips, mucosa, and tongue normal. Teeth and gums normal.        Lungs:   Clear breath sounds bilaterally     Heart:   Regular rate and rhythm, S1, S2 normal, no murmur, click, rub or gallop. Abdomen:    Soft, non-tender. Extremities:  Extremities normal, atraumatic, no cyanosis or edema.      Skin:  Skin color normal. No rashes or lesions     Neurologic:  A&Ox3     DIAGNOSTIC TESTS:                                                                                    LABS:   Lab Results   Component Value Date/Time    WBC 7.7 11/01/2022 07:40 AM    HGB 12.8 11/01/2022 07:40 AM    HCT 39.2 11/01/2022 07:40 AM     11/01/2022 07:40 AM    TSH 0.869 04/21/2022 11:30 AM    NTPROBNP 124 10/28/2022 12:26 PM     Imaging: I performed an independent interpretation of the patient's images. CXR: clear    XR CHEST STANDARD TWO VW 10/26/2022    Narrative  CHEST X-RAY, 2 views. INDICATION:  Shortness of breath. Evaluation for gallbladder surgery. TECHNIQUE: PA and lateral views. COMPARISON: 4 days prior. FINDINGS:  Lungs: Hyperinflated and clear. There is increased retrosternal airspace and  flattened diaphragms. Costophrenic angles: are sharp. Heart size: is normal.  Pulmonary vasculature: is unremarkable. Aorta: Arch calcifications. Included portion of the upper abdomen: is unremarkable. Bones: No gross bony lesions. Other: None. Impression  Negative for acute abnormality. Hyperinflation suggesting COPD. Aortic atherosclerosis. CT Chest: mild basilar atelectasis, otherwise unremarkable    CT CHEST ABDOMEN PELVIS W CONTRAST 10/15/2022    Narrative  EXAMINATION: CT CHEST ABDOMEN PELVIS W CONTRAST 10/15/2022 7:14 PM    ACCESSION NUMBER: [id number]    COMPARISON: CT abdomen/pelvis January 4, 2013    INDICATION: Chest pain, unspecified; Generalized abdominal pain    TECHNIQUE: Multiple contiguous axial CT images of the chest, abdomen, and pelvis  were obtained from the lung apices to the symphysis pubis after the intravenous  administration of 100mL Iso-gia 370. Reformats are provided. Radiation dose reduction techniques were used for this study. Our CT scanners  use one or all of the following: Automated exposure control, adjustment of the  mA and/or kV according to patient size, iterative reconstruction. FINDINGS:  AIRWAYS: The central airways are patent. LUNGS: Dependent changes and scarring within the lung bases. Pleural parenchymal  scarring within the lung apices.  No suspicious pulmonary nodules. PLEURA: No pleural effusion or pneumothorax. HEART: The heart is not enlarged. Moderate calcified coronary atherosclerosis. No pericardial effusion. THORACIC AORTA: The aorta is normal in caliber. PULMONARY ARTERY: The main pulmonary artery is normal in caliber. No evidence of  pulmonary embolism. MEDIASTINUM/KEREN: No mediastinal mass or lymphadenopathy. Moderate sized hiatal  hernia. CHEST WALL: No mass or axillary lymphadenopathy. LIVER: The liver contour is normal. No suspicious liver lesion. BILIARY TREE: Gallbladder is distended. There are calcified gallstones  dependently within the gallbladder. Prominent intra and extrahepatic biliary  ductal dilatation with common bile duct measuring up to 1.6 cm diameter. There  is suggestion of rounded hypodense 0.8 cm focus within the distal common bile  duct    SPLEEN: Normal.    PANCREAS: No pancreatic mass or ductal dilation. ADRENALS: Normal.    KIDNEYS/BLADDER: The kidneys are symmetric in size. No renal calculus or  hydronephrosis. Several renal cysts and too small to characterize hypodensities  are present within the kidneys. The urinary bladder is unremarkable. BOWEL: The colon is unremarkable. The small bowel is normal in caliber. No bowel  wall thickening. Small duodenal diverticulum is present. Sigmoid diverticulosis  without diverticulitis. APPENDIX: Appendix not definitely seen however no inflammatory changes in the  right lower quadrant to suggest appendicitis. PERITONEUM/RETROPERITONEUM: No ascites or free air. No pelvic or retroperitoneal  lymphadenopathy. VESSELS: No abdominal aortic aneurysm. ABDOMINAL WALL: No hernia or mass. REPRODUCTIVE: 3.6 cm cyst within the right adnexa. BONES: No suspicious osseous lesion. Impression  1.   Intra and extrahepatic biliary ductal dilatation with distended gallbladder  and suggestion of hyperdense 0.8 cm focus at the distal common bile duct likely  cause of obstruction. Although favor noncalcified gallstone given the presence  of additional gallstones, obstructing mass lesion not excluded. No pancreatic  ductal dilatation. 2.  No evidence of pulmonary embolism or acute abnormality in the chest.  3.  Moderate coronary artery calcification. 4.  Right adnexal 3.6 cm cyst. Recommend nonurgent pelvic ultrasound for further  evaluation. Nuclear Medicine: No results found for this or any previous visit from the past 3650 days. PFTs:   Office Spirometry Results Latest Ref Rng & Units 12/8/2022   FVC L 2.90   FEV1 L 2.31   FEV1 %PRED-PRE % 119   FVC %PRED-PRE % 111   FEV1/FVC % 80     No results found for this or any previous visit. No results found for this or any previous visit. FeNO: No results found for this or any previous visit. FeNO and Likelihood of Eosinophilic Asthma   Unlikely Intermediate Likely   <25 ppb 25-50 ppb >50ppb   Exercise Oximetry:  Echo:   TRANSTHORACIC ECHOCARDIOGRAM (TTE) COMPLETE (CONTRAST/BUBBLE/3D PRN) 10/28/2022    Interpretation Summary    Left Ventricle: Normal left ventricular systolic function with a visually estimated EF of 60 - 65%. Left ventricle size is normal. Normal wall thickness. Normal wall motion. Aortic Valve: Tricuspid valve. Mild regurgitation with a centrally directed jet. Tricuspid Valve: The estimated RVSP is 44 mmHg. Technical qualifiers: Color flow Doppler was performed and pulse wave and/or continuous wave Doppler was performed.     [facility] Reference Info:                                                                                                                Past Medical History:   Diagnosis Date    Allergic rhinitis due to pollen     Arthritis     Asthma as a child    Constipation 02/2019    post-op after TKA    Ear problems     Fibrocystic breast     GERD (gastroesophageal reflux disease)     Managed with meds     H/O colonoscopy 2009    Normal (Enrique)    Hashimoto's thyroiditis Hearing reduced     Hypertension     Managed with meds     Macular degeneration     Mixed hyperlipidemia     Daily meds     Osteoporosis 2013    GYN Dr. Elmo Aguilar    Pleural effusion, bilateral 10/22/2022    post ERCP; since resolved after med tx    Restless leg     managed with medication    Unspecified hypothyroidism     Varicella         Tobacco Use      Smoking status: Former        Packs/day: 0.50        Years: 12.00        Pack years: 6        Types: Cigarettes        Quit date: 1975        Years since quittin.8      Smokeless tobacco: Never      Tobacco comments: Quit smoking: quit age of 30    Allergies   Allergen Reactions    Ace Inhibitors Angioedema and Swelling     Pt stated \"ace blockers\" can't remember name for htn  Facial swelling     Current Outpatient Medications   Medication Instructions    albuterol sulfate HFA (PROVENTIL;VENTOLIN;PROAIR) 108 (90 Base) MCG/ACT inhaler 2 puffs, Inhalation, EVERY 6 HOURS PRN    aspirin 81 mg, Oral, EVERY 12 HOURS    Calcium Polycarbophil (FIBER) 625 MG TABS take 1 qd    carbidopa-levodopa (SINEMET)  MG per tablet TAKE 1 TABLET BY MOUTH AT NIGHT    cetirizine (ZYRTEC) 10 mg, Oral, DAILY    clobetasol (TEMOVATE) 0.05 % cream Topical, 2 TIMES DAILY PRN    coenzyme Q10 100 MG CAPS capsule Oral, EVERY MORNING    fish oil 1,000 mg, Oral, DAILY    folic acid (FOLVITE) 1 mg, Oral, DAILY    Glucosamine 500 mg, Oral, DAILY    hydroCHLOROthiazide (HYDRODIURIL) 12.5 mg, Oral, DAILY, TAKE 1 TABLET BY MOUTH DAILY    hydrocortisone 2.5 % cream Rectal, 4 TIMES DAILY    ketoconazole (NIZORAL) 2 % cream Topical, 2 TIMES DAILY PRN    levothyroxine (SYNTHROID) 75 mcg, Oral, DAILY, TAKE 1 TABLET BY MOUTH DAILY BEFORE BREAKFAST    Multiple Vitamins-Minerals (THERAPEUTIC MULTIVITAMIN-MINERALS) tablet 1 tablet, Oral, DAILY    NONFORMULARY Preservision/Lutein oral cap 1 cap bid    NONFORMULARY Fiber oral tablet 1 capsule daily    pantoprazole (PROTONIX) 40 MG tablet TAKE 1 TABLET BY MOUTH DAILY BEFORE DINNER    pravastatin (PRAVACHOL) 40 MG tablet TAKE 1 TABLET BY MOUTH AT NIGHT    prednisoLONE acetate (PRED FORTE) 1 % ophthalmic suspension 1 drop, Ophthalmic, TWICE WEEKLY    telmisartan (MICARDIS) 80 mg, Oral, DAILY    traMADol (ULTRAM) 50 mg, Oral, EVERY 6 HOURS PRN    triamcinolone (KENALOG) 0.1 % cream Topical, 2 TIMES DAILY PRN

## 2023-02-08 ENCOUNTER — TELEPHONE (OUTPATIENT)
Dept: PRIMARY CARE CLINIC | Facility: CLINIC | Age: 83
End: 2023-02-08

## 2023-02-08 NOTE — TELEPHONE ENCOUNTER
Pt's  requesting a refill of    Carbidopa-levodapa    Send to [facility]     He states it's usually a year long

## 2023-03-03 ENCOUNTER — OFFICE VISIT (OUTPATIENT)
Dept: CARDIOLOGY CLINIC | Age: 83
End: 2023-03-03

## 2023-03-03 VITALS
HEIGHT: 64 IN | BODY MASS INDEX: 26.12 KG/M2 | DIASTOLIC BLOOD PRESSURE: 60 MMHG | SYSTOLIC BLOOD PRESSURE: 100 MMHG | WEIGHT: 153 LBS | HEART RATE: 84 BPM

## 2023-03-03 DIAGNOSIS — E78.00 HYPERCHOLESTEROLEMIA: ICD-10-CM

## 2023-03-03 DIAGNOSIS — I10 ESSENTIAL HYPERTENSION, BENIGN: Primary | ICD-10-CM

## 2023-03-03 ASSESSMENT — ENCOUNTER SYMPTOMS
PHOTOPHOBIA: 0
CHEST TIGHTNESS: 0
SHORTNESS OF BREATH: 0
EYES NEGATIVE: 1
RESPIRATORY NEGATIVE: 1
GASTROINTESTINAL NEGATIVE: 1
ABDOMINAL PAIN: 0
EYE PAIN: 0
ALLERGIC/IMMUNOLOGIC NEGATIVE: 1
BACK PAIN: 0

## 2023-03-03 NOTE — PROGRESS NOTES
[facility] CARDIOLOGY  [address], [address]  PHONE: [phone]      23    NAME:  Pete Franklin  : 1940  MRN: [id number]         SUBJECTIVE:   Pete Franklin is a 82 y.o. female seen for follow up of:      Chief Complaint   Patient presents with    Hypertension        Cardiac Hx (Reviewed and summarized by me):  1) Echo - 10/28/22 - LVEF 60-65% RVSP 44 mmHg  2) NT proBNP - 124 (10/28/22)   3) Laparoscopic Cholecystectomy 22 (Dr. Evi Ruano)  4) Lipids   22 - HDL 57, , Trig 181  5) HTN      HPI:  Doing well. Brings in a blood pressure log that shows generally controlled blood pressures occasional episodes in the high 130s to 140s but otherwise all within normal range. Blood pressure today looks excellent. She continues on hydrochlorothiazide 12.5 milligrams daily and Micardis 80 mg daily. Past Medical History, Past Surgical History, Family history, Social History, and Medications were all reviewed with the patient today and updated as necessary.        Current Outpatient Medications:     carbidopa-levodopa (SINEMET)  MG per tablet, Take 1 tablet by mouth at bedtime TAKE 1 TABLET BY MOUTH AT NIGHT, Disp: 90 tablet, Rfl: 3    traMADol (ULTRAM) 50 MG tablet, Take 50 mg by mouth every 6 hours as needed for Pain., Disp: , Rfl:     folic acid (FOLVITE) 1 MG tablet, Take 1 tablet by mouth daily, Disp: 90 tablet, Rfl: 1    NONFORMULARY, Preservision/Lutein oral cap 1 cap bid, Disp: , Rfl:     Multiple Vitamins-Minerals (THERAPEUTIC MULTIVITAMIN-MINERALS) tablet, Take 1 tablet by mouth daily, Disp: , Rfl:     Omega-3 Fatty Acids (FISH OIL) 1000 MG CPDR, Take 1,000 mg by mouth daily, Disp: , Rfl:     NONFORMULARY, Fiber oral tablet 1 capsule daily, Disp: , Rfl:     pantoprazole (PROTONIX) 40 MG tablet, TAKE 1 TABLET BY MOUTH DAILY BEFORE DINNER, Disp: 90 tablet, Rfl: 3    pravastatin (PRAVACHOL) 40 MG tablet, TAKE 1 TABLET BY MOUTH AT NIGHT (Patient taking differently: Take 40 mg by mouth at bedtime TAKE 1 TABLET BY MOUTH AT NIGHT), Disp: 90 tablet, Rfl: 3    hydroCHLOROthiazide (HYDRODIURIL) 12.5 MG tablet, Take 1 tablet by mouth daily TAKE 1 TABLET BY MOUTH DAILY, Disp: 90 tablet, Rfl: 3    telmisartan (MICARDIS) 80 MG tablet, Take 1 tablet by mouth in the morning., Disp: 90 tablet, Rfl: 3    levothyroxine (SYNTHROID) 75 MCG tablet, Take 1 tablet by mouth in the morning.  TAKE 1 TABLET BY MOUTH DAILY BEFORE BREAKFAST., Disp: 90 tablet, Rfl: 1    Calcium Polycarbophil (FIBER) 625 MG TABS, take 1 qd, Disp: , Rfl:     coenzyme Q10 100 MG CAPS capsule, Take by mouth every morning, Disp: , Rfl:     aspirin 81 MG EC tablet, Take 81 mg by mouth every 12 hours, Disp: , Rfl:     cetirizine (ZYRTEC) 10 MG tablet, Take 10 mg by mouth daily, Disp: , Rfl:     clobetasol (TEMOVATE) 0.05 % cream, Apply topically 2 times daily as needed, Disp: , Rfl:     Glucosamine 500 MG CAPS, Take 500 mg by mouth daily, Disp: , Rfl:     hydrocortisone 2.5 % cream, Place rectally 4 times daily, Disp: , Rfl:     ketoconazole (NIZORAL) 2 % cream, Apply topically 2 times daily as needed, Disp: , Rfl:     prednisoLONE acetate (PRED FORTE) 1 % ophthalmic suspension, Apply 1 drop to eye Twice a Week, Disp: , Rfl:     triamcinolone (KENALOG) 0.1 % cream, Apply topically 2 times daily as needed, Disp: , Rfl:     albuterol sulfate HFA (PROVENTIL;VENTOLIN;PROAIR) 108 (90 Base) MCG/ACT inhaler, Inhale 2 puffs into the lungs every 6 hours as needed for Wheezing (Patient not taking: Reported on 3/3/2023), Disp: 1 each, Rfl: 11  Allergies   Allergen Reactions    Ace Inhibitors Angioedema and Swelling     Pt stated \"ace blockers\" can't remember name for htn  Facial swelling     Past Medical History:   Diagnosis Date    Allergic rhinitis due to pollen     Arthritis     Asthma as a child    Constipation 02/2019    post-op after TKA    Ear problems     Fibrocystic breast     GERD (gastroesophageal reflux disease) Managed with meds     H/O colonoscopy     Normal (Enrique)    Hashimoto's thyroiditis     Hearing reduced     Hypertension     Managed with meds     Macular degeneration     Mixed hyperlipidemia     Daily meds     Osteoporosis 2013    GYN Dr. Lorri Hayes    Pleural effusion, bilateral 10/22/2022    post ERCP; since resolved after med tx    Restless leg     managed with medication    Unspecified hypothyroidism     Varicella      Past Surgical History:   Procedure Laterality Date    ADENOIDECTOMY  as a child    APPENDECTOMY  1973    CATARACT REMOVAL Bilateral 2007    CATARACT REMOVAL Bilateral     CHOLECYSTECTOMY, LAPAROSCOPIC N/A 2022    CHOLECYSTECTOMY LAPAROSCOPIC performed by Fady Herrmann MD at [facility]      Dr. Shannan Castillo -- no more per pt    ERCP N/A 10/17/2022    ERCP ENDOSCOPIC RETROGRADE CHOLANGIOPANCREATOGRAPHY Balloon sweep performed by Spencer Smith MD at [facility] [address]      ovarian cyst    HERNIA REPAIR  2018    OVARIAN CYST REMOVAL  1973    TONSILLECTOMY  as a child    TOTAL KNEE ARTHROPLASTY Left 2019     Family History   Problem Relation Age of Onset    Coronary Art Dis Mother     COPD Father     Other Father         smoker    Heart Disease Brother     Cancer Maternal Grandmother     Breast Cancer Mother     Cancer Mother         breast, lung    Heart Disease Mother     Heart Attack Mother      Social History     Tobacco Use    Smoking status: Former     Packs/day: 0.50     Years: 12.00     Pack years: 6.00     Types: Cigarettes     Quit date: 1975     Years since quittin.1    Smokeless tobacco: Never    Tobacco comments:     Quit smoking: quit age of 30   Substance Use Topics    Alcohol use: Not Currently     Alcohol/week: 1.0 standard drink       ROS:  Review of Systems   Constitutional: Negative. Negative for fever. HENT:  Negative for hearing loss, nosebleeds and tinnitus. Eyes: Negative.   Negative for photophobia and pain. Respiratory: Negative. Negative for chest tightness and shortness of breath. Cardiovascular: Negative. Negative for chest pain, palpitations and leg swelling. Gastrointestinal: Negative. Negative for abdominal pain. Endocrine: Negative. Negative for cold intolerance and heat intolerance. Genitourinary: Negative. Negative for dysuria. Musculoskeletal: Negative. Negative for back pain and joint swelling. Skin: Negative. Negative for rash. Allergic/Immunologic: Negative. Negative for immunocompromised state. Neurological: Negative. Negative for dizziness, syncope and light-headedness. Hematological: Negative. Does not bruise/bleed easily. Psychiatric/Behavioral: Negative. Negative for suicidal ideas. PHYSICAL EXAM:  Physical Exam  Constitutional:       General: She is not in acute distress. Appearance: She is not ill-appearing. HENT:      Head: Normocephalic and atraumatic. Nose: No congestion. Mouth/Throat:      Mouth: Mucous membranes are moist.   Eyes:      Extraocular Movements: Extraocular movements intact. Pupils: Pupils are equal, round, and reactive to light. Cardiovascular:      Rate and Rhythm: Normal rate and regular rhythm. Heart sounds: No murmur heard. No friction rub. No gallop. Pulmonary:      Effort: No respiratory distress. Breath sounds: No wheezing or rhonchi. Musculoskeletal:         General: No swelling. Cervical back: Normal range of motion. Right lower leg: No edema. Left lower leg: No edema. Skin:     General: Skin is warm and dry. Findings: No rash. Neurological:      General: No focal deficit present. Mental Status: She is oriented to person, place, and time.    Psychiatric:         Mood and Affect: Mood normal.         Behavior: Behavior normal.         Judgment: Judgment normal.        /60   Pulse 84   Ht 5' 4\" (1.626 m)   Wt 153 lb (69.4 kg)   BMI 26.26 kg/m²      Wt Readings from Last 10 Encounters:   03/03/23 153 lb (69.4 kg)   12/08/22 158 lb (71.7 kg)   11/29/22 158 lb (71.7 kg)   11/14/22 155 lb (70.3 kg)   11/11/22 155 lb (70.3 kg)   11/01/22 160 lb (72.6 kg)   10/28/22 156 lb (70.8 kg)   10/28/22 156 lb (70.8 kg)   10/22/22 161 lb (73 kg)   10/17/22 168 lb (76.2 kg)           Medical problems and test results were reviewed with the patient today. Lab Results   Component Value Date/Time    BUN 17 11/01/2022 07:40 AM     No results found for: BRIAN, CREAPOC, CREA  Lab Results   Component Value Date/Time    K 3.9 11/01/2022 07:40 AM       Lab Results   Component Value Date/Time    CHOL 198 04/21/2022 11:30 AM    HDL 57 04/21/2022 11:30 AM    VLDL 31 04/21/2022 11:30 AM       ASSESSMENT and PLAN    ICD-10-CM    1. Essential hypertension, benign  I10       2. Hypercholesterolemia  E78.00           IMPRESSION:  1) above goal for LDL, however do not think she has reached treatment range. I have encouraged low-cholesterol diet and increase activity. 2) HTN -well controlled on hydrochlorothiazide 12.5 mg daily and Micardis 80 mg daily      ALL ORDERS THIS ENCOUNTER  No orders of the defined types were placed in this encounter. Follow up in 12 months. Thank you for allowing me to participate in this patient's care. Please call or contact me if there are any questions or concerns regarding the above.       Jessica Delgadillo MD  03/03/23  9:48 AM

## 2023-03-30 ENCOUNTER — OFFICE VISIT (OUTPATIENT)
Dept: PULMONOLOGY | Age: 83
End: 2023-03-30
Payer: MEDICARE

## 2023-03-30 VITALS
BODY MASS INDEX: 26.17 KG/M2 | HEIGHT: 64 IN | WEIGHT: 153.3 LBS | TEMPERATURE: 97 F | DIASTOLIC BLOOD PRESSURE: 68 MMHG | HEART RATE: 80 BPM | SYSTOLIC BLOOD PRESSURE: 116 MMHG | RESPIRATION RATE: 14 BRPM | OXYGEN SATURATION: 98 %

## 2023-03-30 DIAGNOSIS — R09.82 POST-NASAL DRIP: ICD-10-CM

## 2023-03-30 DIAGNOSIS — J30.89 ENVIRONMENTAL AND SEASONAL ALLERGIES: ICD-10-CM

## 2023-03-30 DIAGNOSIS — R05.3 CHRONIC COUGH: Primary | ICD-10-CM

## 2023-03-30 PROCEDURE — 1123F ACP DISCUSS/DSCN MKR DOCD: CPT | Performed by: NURSE PRACTITIONER

## 2023-03-30 PROCEDURE — G8400 PT W/DXA NO RESULTS DOC: HCPCS | Performed by: NURSE PRACTITIONER

## 2023-03-30 PROCEDURE — 3078F DIAST BP <80 MM HG: CPT | Performed by: NURSE PRACTITIONER

## 2023-03-30 PROCEDURE — G8427 DOCREV CUR MEDS BY ELIG CLIN: HCPCS | Performed by: NURSE PRACTITIONER

## 2023-03-30 PROCEDURE — G8484 FLU IMMUNIZE NO ADMIN: HCPCS | Performed by: NURSE PRACTITIONER

## 2023-03-30 PROCEDURE — G8417 CALC BMI ABV UP PARAM F/U: HCPCS | Performed by: NURSE PRACTITIONER

## 2023-03-30 PROCEDURE — 3074F SYST BP LT 130 MM HG: CPT | Performed by: NURSE PRACTITIONER

## 2023-03-30 PROCEDURE — 99214 OFFICE O/P EST MOD 30 MIN: CPT | Performed by: NURSE PRACTITIONER

## 2023-03-30 PROCEDURE — 1036F TOBACCO NON-USER: CPT | Performed by: NURSE PRACTITIONER

## 2023-03-30 PROCEDURE — 1090F PRES/ABSN URINE INCON ASSESS: CPT | Performed by: NURSE PRACTITIONER

## 2023-03-30 RX ORDER — FLUTICASONE PROPIONATE 50 MCG
1 SPRAY, SUSPENSION (ML) NASAL DAILY
COMMUNITY

## 2023-03-30 NOTE — PROGRESS NOTES
drop, Ophthalmic, TWICE WEEKLY    telmisartan (MICARDIS) 80 mg, Oral, DAILY    traMADol (ULTRAM) 50 mg, Oral, EVERY 6 HOURS PRN    triamcinolone (KENALOG) 0.1 % cream Topical, 2 TIMES DAILY PRN

## 2023-05-11 ENCOUNTER — OFFICE VISIT (OUTPATIENT)
Dept: PRIMARY CARE CLINIC | Facility: CLINIC | Age: 83
End: 2023-05-11
Payer: MEDICARE

## 2023-05-11 VITALS
WEIGHT: 155 LBS | SYSTOLIC BLOOD PRESSURE: 132 MMHG | HEIGHT: 64 IN | DIASTOLIC BLOOD PRESSURE: 65 MMHG | BODY MASS INDEX: 26.46 KG/M2 | TEMPERATURE: 97.3 F | HEART RATE: 67 BPM

## 2023-05-11 DIAGNOSIS — K21.9 GASTROESOPHAGEAL REFLUX DISEASE WITHOUT ESOPHAGITIS: ICD-10-CM

## 2023-05-11 DIAGNOSIS — E55.9 VITAMIN D DEFICIENCY: ICD-10-CM

## 2023-05-11 DIAGNOSIS — E55.9 AVITAMINOSIS D: ICD-10-CM

## 2023-05-11 DIAGNOSIS — E78.00 HYPERCHOLESTEROLEMIA: ICD-10-CM

## 2023-05-11 DIAGNOSIS — R79.9 ABNORMAL BLOOD CHEMISTRY: ICD-10-CM

## 2023-05-11 DIAGNOSIS — I10 ESSENTIAL HYPERTENSION, BENIGN: Primary | ICD-10-CM

## 2023-05-11 DIAGNOSIS — E03.9 ACQUIRED HYPOTHYROIDISM: ICD-10-CM

## 2023-05-11 DIAGNOSIS — R73.09 ELEVATED GLUCOSE: ICD-10-CM

## 2023-05-11 DIAGNOSIS — Z13.6 SCREENING FOR ISCHEMIC HEART DISEASE: ICD-10-CM

## 2023-05-11 DIAGNOSIS — D52.0 DIETARY FOLATE DEFICIENCY ANEMIA: ICD-10-CM

## 2023-05-11 DIAGNOSIS — Z13.1 SCREENING FOR DIABETES MELLITUS: ICD-10-CM

## 2023-05-11 DIAGNOSIS — I10 ESSENTIAL HYPERTENSION, BENIGN: ICD-10-CM

## 2023-05-11 DIAGNOSIS — R53.82 CHRONIC FATIGUE: ICD-10-CM

## 2023-05-11 DIAGNOSIS — E55.9 VITAMIN D DEFICIENCY, UNSPECIFIED: ICD-10-CM

## 2023-05-11 DIAGNOSIS — E53.8 B12 DEFICIENCY: ICD-10-CM

## 2023-05-11 DIAGNOSIS — Z13.228 SCREENING FOR METABOLIC DISORDER: ICD-10-CM

## 2023-05-11 LAB
25(OH)D3 SERPL-MCNC: 60.9 NG/ML (ref 30–100)
ALBUMIN SERPL-MCNC: 4.1 G/DL (ref 3.2–4.6)
ALBUMIN/GLOB SERPL: 1.1 (ref 0.4–1.6)
ALP SERPL-CCNC: 67 U/L (ref 50–136)
ALT SERPL-CCNC: 33 U/L (ref 12–65)
ANION GAP SERPL CALC-SCNC: 4 MMOL/L (ref 2–11)
AST SERPL-CCNC: 27 U/L (ref 15–37)
BASOPHILS # BLD: 0 K/UL (ref 0–0.2)
BASOPHILS NFR BLD: 0 % (ref 0–2)
BILIRUB SERPL-MCNC: 0.7 MG/DL (ref 0.2–1.1)
BUN SERPL-MCNC: 22 MG/DL (ref 8–23)
CALCIUM SERPL-MCNC: 9.1 MG/DL (ref 8.3–10.4)
CHLORIDE SERPL-SCNC: 106 MMOL/L (ref 101–110)
CHOLEST SERPL-MCNC: 193 MG/DL
CO2 SERPL-SCNC: 27 MMOL/L (ref 21–32)
CREAT SERPL-MCNC: 0.7 MG/DL (ref 0.6–1)
DIFFERENTIAL METHOD BLD: NORMAL
EOSINOPHIL # BLD: 0.1 K/UL (ref 0–0.8)
EOSINOPHIL NFR BLD: 2 % (ref 0.5–7.8)
ERYTHROCYTE [DISTWIDTH] IN BLOOD BY AUTOMATED COUNT: 13.7 % (ref 11.9–14.6)
EST. AVERAGE GLUCOSE BLD GHB EST-MCNC: 97 MG/DL
GLOBULIN SER CALC-MCNC: 3.6 G/DL (ref 2.8–4.5)
GLUCOSE SERPL-MCNC: 83 MG/DL (ref 65–100)
HBA1C MFR BLD: 5 % (ref 4.8–5.6)
HCT VFR BLD AUTO: 45.3 % (ref 35.8–46.3)
HDLC SERPL-MCNC: 62 MG/DL (ref 40–60)
HDLC SERPL: 3.1
HGB BLD-MCNC: 14.5 G/DL (ref 11.7–15.4)
IMM GRANULOCYTES # BLD AUTO: 0 K/UL (ref 0–0.5)
IMM GRANULOCYTES NFR BLD AUTO: 0 % (ref 0–5)
LDLC SERPL CALC-MCNC: 92.4 MG/DL
LYMPHOCYTES # BLD: 1.4 K/UL (ref 0.5–4.6)
LYMPHOCYTES NFR BLD: 19 % (ref 13–44)
MCH RBC QN AUTO: 32.2 PG (ref 26.1–32.9)
MCHC RBC AUTO-ENTMCNC: 32 G/DL (ref 31.4–35)
MCV RBC AUTO: 100.4 FL (ref 82–102)
MONOCYTES # BLD: 0.5 K/UL (ref 0.1–1.3)
MONOCYTES NFR BLD: 7 % (ref 4–12)
NEUTS SEG # BLD: 5.5 K/UL (ref 1.7–8.2)
NEUTS SEG NFR BLD: 72 % (ref 43–78)
NRBC # BLD: 0 K/UL (ref 0–0.2)
PLATELET # BLD AUTO: 304 K/UL (ref 150–450)
PMV BLD AUTO: 10 FL (ref 9.4–12.3)
POTASSIUM SERPL-SCNC: 3.8 MMOL/L (ref 3.5–5.1)
PROT SERPL-MCNC: 7.7 G/DL (ref 6.3–8.2)
RBC # BLD AUTO: 4.51 M/UL (ref 4.05–5.2)
SODIUM SERPL-SCNC: 137 MMOL/L (ref 133–143)
T4 FREE SERPL-MCNC: 1.6 NG/DL (ref 0.78–1.46)
TRIGL SERPL-MCNC: 193 MG/DL (ref 35–150)
TSH, 3RD GENERATION: 0.81 UIU/ML (ref 0.36–3.74)
VIT B12 SERPL-MCNC: 397 PG/ML (ref 193–986)
VLDLC SERPL CALC-MCNC: 38.6 MG/DL (ref 6–23)
WBC # BLD AUTO: 7.6 K/UL (ref 4.3–11.1)

## 2023-05-11 PROCEDURE — 1036F TOBACCO NON-USER: CPT | Performed by: FAMILY MEDICINE

## 2023-05-11 PROCEDURE — G8427 DOCREV CUR MEDS BY ELIG CLIN: HCPCS | Performed by: FAMILY MEDICINE

## 2023-05-11 PROCEDURE — 99214 OFFICE O/P EST MOD 30 MIN: CPT | Performed by: FAMILY MEDICINE

## 2023-05-11 PROCEDURE — 3074F SYST BP LT 130 MM HG: CPT | Performed by: FAMILY MEDICINE

## 2023-05-11 PROCEDURE — 3078F DIAST BP <80 MM HG: CPT | Performed by: FAMILY MEDICINE

## 2023-05-11 PROCEDURE — 1123F ACP DISCUSS/DSCN MKR DOCD: CPT | Performed by: FAMILY MEDICINE

## 2023-05-11 PROCEDURE — 1090F PRES/ABSN URINE INCON ASSESS: CPT | Performed by: FAMILY MEDICINE

## 2023-05-11 PROCEDURE — G8400 PT W/DXA NO RESULTS DOC: HCPCS | Performed by: FAMILY MEDICINE

## 2023-05-11 PROCEDURE — G8417 CALC BMI ABV UP PARAM F/U: HCPCS | Performed by: FAMILY MEDICINE

## 2023-05-11 SDOH — ECONOMIC STABILITY: FOOD INSECURITY: WITHIN THE PAST 12 MONTHS, THE FOOD YOU BOUGHT JUST DIDN'T LAST AND YOU DIDN'T HAVE MONEY TO GET MORE.: NEVER TRUE

## 2023-05-11 SDOH — ECONOMIC STABILITY: HOUSING INSECURITY
IN THE LAST 12 MONTHS, WAS THERE A TIME WHEN YOU DID NOT HAVE A STEADY PLACE TO SLEEP OR SLEPT IN A SHELTER (INCLUDING NOW)?: NO

## 2023-05-11 SDOH — ECONOMIC STABILITY: FOOD INSECURITY: WITHIN THE PAST 12 MONTHS, YOU WORRIED THAT YOUR FOOD WOULD RUN OUT BEFORE YOU GOT MONEY TO BUY MORE.: NEVER TRUE

## 2023-05-11 SDOH — ECONOMIC STABILITY: INCOME INSECURITY: HOW HARD IS IT FOR YOU TO PAY FOR THE VERY BASICS LIKE FOOD, HOUSING, MEDICAL CARE, AND HEATING?: NOT HARD AT ALL

## 2023-05-11 ASSESSMENT — PATIENT HEALTH QUESTIONNAIRE - PHQ9
SUM OF ALL RESPONSES TO PHQ9 QUESTIONS 1 & 2: 0
SUM OF ALL RESPONSES TO PHQ QUESTIONS 1-9: 0
2. FEELING DOWN, DEPRESSED OR HOPELESS: 0
1. LITTLE INTEREST OR PLEASURE IN DOING THINGS: 0

## 2023-05-11 NOTE — PROGRESS NOTES
Huber Arevalo MD  [address] [place], [place] [address]  Ph No:  [phone]  Fax:  [phone]    CHIEF COMPLAINT:  Chief Complaint   Patient presents with    Follow-up     Pt in for 6 month follow up. Labs Only     Pt wants to have labs drawn while she is here. HISTORY OF PRESENT ILLNESS:  Ms. Joseph Quintanilla is a 82 y.o. female. Pt presents for 6 months folllow up. Pt presents for follow up on gallbaldder surgery. Pt reports surgery was successful and she is doing well after surgery, with normal healing. Labs are drawn today and these are reviewed with pt as follows:  GFR>60 normal healthy kidney function, excellent for age 81 yo  Fasting glucose 83, hgba1c 5.0% non diabetic  Total cholesterol 193, hdl 62, ldl 92.4, ratio of cardiac risk 3.1, low  Liver enzymes normal alt 33, ast 27  Thyroid slightly elevated free t4 1.6, tsh normal 0.812  (will monitor may need reduction of synthroid from 75mcg to 50mcg). Hgb 14.5, normal, .4 normal (pt may wish to eat more green leafy vegetables, or take vitamin with folic acid). Pt is taking few prescription meds, is up to date on sinemet, albuterol hfa, folvite. Pt  has adequate refills on protonix, pravastatin, hctz, micardis, synthroid. Pt is seeing cardiology for most of her med refills.     Pt will RTC in 6 months, needs Medicare Wellness, cmp, lipid, cbc, tsh, free t4, hgba1c, vit d        HISTORY:  Allergies   Allergen Reactions    Ace Inhibitors Angioedema and Swelling     Pt stated \"ace blockers\" can't remember name for htn  Facial swelling     Past Medical History:   Diagnosis Date    Allergic rhinitis due to pollen     Arthritis     Asthma as a child    Constipation 02/2019    post-op after TKA    Ear problems     Fibrocystic breast     GERD (gastroesophageal reflux disease)     Managed with meds     H/O colonoscopy 2009    Normal (Enrique)    Hashimoto's thyroiditis     Hearing reduced     Hypertension

## 2023-05-21 DIAGNOSIS — E03.9 ACQUIRED HYPOTHYROIDISM: ICD-10-CM

## 2023-05-21 DIAGNOSIS — D52.9 ANEMIA DUE TO FOLIC ACID DEFICIENCY, UNSPECIFIED DEFICIENCY TYPE: ICD-10-CM

## 2023-05-24 RX ORDER — LEVOTHYROXINE SODIUM 0.07 MG/1
TABLET ORAL
Qty: 90 TABLET | Refills: 3 | OUTPATIENT
Start: 2023-05-24

## 2023-05-24 RX ORDER — FOLIC ACID 1 MG/1
1 TABLET ORAL DAILY
Qty: 90 TABLET | Refills: 3 | OUTPATIENT
Start: 2023-05-24

## 2023-06-08 DIAGNOSIS — D52.9 ANEMIA DUE TO FOLIC ACID DEFICIENCY, UNSPECIFIED DEFICIENCY TYPE: ICD-10-CM

## 2023-06-08 DIAGNOSIS — E03.9 ACQUIRED HYPOTHYROIDISM: ICD-10-CM

## 2023-06-08 RX ORDER — LEVOTHYROXINE SODIUM 0.07 MG/1
75 TABLET ORAL DAILY
Qty: 90 TABLET | Refills: 1 | Status: SHIPPED | OUTPATIENT
Start: 2023-06-08

## 2023-06-08 RX ORDER — FOLIC ACID 1 MG/1
1 TABLET ORAL DAILY
Qty: 90 TABLET | Refills: 1 | Status: SHIPPED | OUTPATIENT
Start: 2023-06-08

## 2023-06-08 RX ORDER — FLUTICASONE PROPIONATE 50 MCG
1 SPRAY, SUSPENSION (ML) NASAL DAILY
Qty: 16 G | Refills: 5 | Status: SHIPPED | OUTPATIENT
Start: 2023-06-08

## 2023-06-16 DIAGNOSIS — D52.9 ANEMIA DUE TO FOLIC ACID DEFICIENCY, UNSPECIFIED DEFICIENCY TYPE: ICD-10-CM

## 2023-06-16 DIAGNOSIS — E03.9 ACQUIRED HYPOTHYROIDISM: ICD-10-CM

## 2023-06-27 RX ORDER — FOLIC ACID 1 MG/1
1 TABLET ORAL DAILY
Qty: 90 TABLET | Refills: 3 | OUTPATIENT
Start: 2023-06-27

## 2023-06-27 RX ORDER — LEVOTHYROXINE SODIUM 0.07 MG/1
TABLET ORAL
Qty: 90 TABLET | Refills: 3 | OUTPATIENT
Start: 2023-06-27

## 2023-08-09 DIAGNOSIS — I10 ESSENTIAL HYPERTENSION, BENIGN: ICD-10-CM

## 2023-08-14 RX ORDER — TELMISARTAN 80 MG/1
80 TABLET ORAL DAILY
Qty: 90 TABLET | Refills: 3 | OUTPATIENT
Start: 2023-08-14

## 2023-09-21 DIAGNOSIS — I10 ESSENTIAL HYPERTENSION, BENIGN: ICD-10-CM

## 2023-09-21 RX ORDER — HYDROCHLOROTHIAZIDE 12.5 MG/1
12.5 TABLET ORAL DAILY
Qty: 90 TABLET | Refills: 3 | Status: SHIPPED | OUTPATIENT
Start: 2023-09-21

## 2023-09-27 ENCOUNTER — TELEPHONE (OUTPATIENT)
Dept: PHARMACY | Facility: CLINIC | Age: 83
End: 2023-09-27

## 2023-09-27 NOTE — TELEPHONE ENCOUNTER
[facility] CLINICAL PHARMACY: ADHERENCE REVIEW  Identified care gap per [employer]: fills at [facility]: ACE/ARB and Statin adherence    ASSESSMENT    ACE/ARB ADHERENCE    Insurance Records claims through 23 (Prior Year [address] = not reported; YTD [address] = 100%; Potential Fail Date: 23): Telmisartan 80 mg tab last filled on 23 for 90 day supply. Next refill due: 10/13/23    Prescribed si tablet/capsule daily    Per Reconcile Dispense History:  TELMISARTAN  80 MG TABS 2023 90 90 tablet Wilma Melo MD [facility], [facility]   TELMISARTAN  80 MG TABS 2023 90 90 tablet Wilma Melo MD [facility] [address] [place] [address]. TELMISARTAN  80 MG TABS 2023 90 90 tablet Wilma Melo MD [facility], [facility]   TELMISARTAN  80 MG TABS 2022 90 90 tablet DEENA FLORES [facility], [facility]. TELMISARTAN  80 MG TABS 2022 90 90 tablet [facility] - ... Last Rx 7/21/22 x 90 day supply 3 refills - believe may need refill Rx for next fill    BP Readings from Last 3 Encounters:   23 132/65   23 116/68   23 100/60     Estimated Creatinine Clearance: 61 mL/min (based on SCr of 0.7 mg/dL). Lab Results   Component Value Date    CREATININE 0.70 2023     Lab Results   Component Value Date    K 3.8 2023     STATIN ADHERENCE    Insurance Records claims through 23 (Prior Year [address] = not reported; YTD [address] = FIRST FILL; Potential Fail Date: 10/17/23):   Pravastatin 40 mg tab last filled on 23 for 90 day supply.  Next refill due: 23    Prescribed si tablet/capsule daily    Per Reconcile Dispense History:   PRAVASTATIN SODIUM  40 MG TABS 2023 90 90 tablet Wilma Melo MD [facility], [facility]   PRAVASTATIN SODIUM  40 MG TABS 2022 90 90 tablet [facility]   PRAVASTATIN SODIUM  40 MG TABS 2022 90 90 tablet DEENA FLORES [facility], [facility]

## 2023-10-13 DIAGNOSIS — I10 ESSENTIAL HYPERTENSION, BENIGN: ICD-10-CM

## 2023-10-13 RX ORDER — TELMISARTAN 80 MG/1
80 TABLET ORAL DAILY
Qty: 90 TABLET | Refills: 3 | Status: SHIPPED | OUTPATIENT
Start: 2023-10-13

## 2023-10-16 DIAGNOSIS — E78.00 HYPERCHOLESTEROLEMIA: ICD-10-CM

## 2023-10-16 RX ORDER — PRAVASTATIN SODIUM 40 MG
TABLET ORAL
Qty: 100 TABLET | Refills: 3 | Status: SHIPPED | OUTPATIENT
Start: 2023-10-16

## 2023-10-17 NOTE — TELEPHONE ENCOUNTER
Rx signed by provider - thank you! Letter deferred, recently sent 23. Noted telmisartan Rx also sent to [facility].     For Pharmacy Admin Tracking Only    Program: Nona in place:  No  Recommendation Provided To: Provider: 1 via Note to Provider  Intervention Detail: Adherence Monitorin and Refill(s) Provided  Intervention Accepted By: Provider: 1  Gap Closed?: Yes   Time Spent (min): 10
tablet/capsule daily    Per Reconcile Dispense History:  PRAVASTATIN SODIUM  40 MG TABS 2023 90 90 tablet Tor Side., MD [facility], [facility]   PRAVASTATIN SODIUM  40 MG TABS 2022 90 90 tablet Tor Side., MD [facility], [facility]   PRAVASTATIN SODIUM  40 MG TABS 2022 90 90 tablet DEENA FLORES [facility]   PRAVASTATIN SODIUM  40 MG TABS 2022 90 90 tablet [facility] - . .. PRAVASTATIN SODIUM  40 MG TABS 2022 90 90 tablet [facility] - . .. PRAVASTATIN SODIUM  40 MG TABS 2022 90 90 tablet SERGIO BEEBE [facility], [facility]   Last Rx 22 - ? Lab Results   Component Value Date    CHOL 193 2023    TRIG 193 (H) 2023    HDL 62 (H) 2023    LDLCALC 92.4 2023     ALT   Date Value Ref Range Status   2023 33 12 - 65 U/L Final     AST   Date Value Ref Range Status   2023 27 15 - 37 U/L Final     The ASCVD Risk score ([place] DK, et al., 2019) failed to calculate for the following reasons: The 2019 ASCVD risk score is only valid for ages 36 to 78     PLAN  The following are interventions that have been identified:   Patient overdue refilling pravastatin and active on home medication list.   One fill  Using [facility] home delivery - believe needs refill Rx  Eligible for 100 day supply per [facility]  Of note pt asked for refill of telmisartan to [facility], appears was sent and currently filled at [email or web address] on  [address] in [place], [place] - need to confirm if OK to  from [email or web address] or if needs refill Rx sent to [facility]? Attempting to reach patient to review. Left message asking for return call.     Deena Franz, PharmD, [facility], toll free: [phone], option 2

## 2023-10-31 ENCOUNTER — OFFICE VISIT (OUTPATIENT)
Dept: ORTHOPEDIC SURGERY | Age: 83
End: 2023-10-31

## 2023-10-31 DIAGNOSIS — G56.03 BILATERAL CARPAL TUNNEL SYNDROME: Primary | ICD-10-CM

## 2023-10-31 RX ORDER — MELOXICAM 15 MG/1
15 TABLET ORAL DAILY
Qty: 30 TABLET | Refills: 0 | Status: SHIPPED | OUTPATIENT
Start: 2023-10-31 | End: 2023-11-30

## 2023-10-31 RX ORDER — BETAMETHASONE SODIUM PHOSPHATE AND BETAMETHASONE ACETATE 3; 3 MG/ML; MG/ML
6 INJECTION, SUSPENSION INTRA-ARTICULAR; INTRALESIONAL; INTRAMUSCULAR; SOFT TISSUE ONCE
Status: COMPLETED | OUTPATIENT
Start: 2023-10-31 | End: 2023-10-31

## 2023-10-31 RX ADMIN — BETAMETHASONE SODIUM PHOSPHATE AND BETAMETHASONE ACETATE 6 MG: 3; 3 INJECTION, SUSPENSION INTRA-ARTICULAR; INTRALESIONAL; INTRAMUSCULAR; SOFT TISSUE at 13:23

## 2023-10-31 NOTE — PROGRESS NOTES
Orthopaedic Hand Surgery Note    Name: Lara Avalos  Age: 82 y.o. YOB: 1940  Gender: female  MRN: [id number]    CC: New patient referred for hand numbness    HPI: Patient is a 82 y.o. female right hand dominant with a chief complaint of right hand numbness and tingling in the median nerve distribution. The symptoms have been going on for months. The patient does complain of night wakening and increased symptoms with driving. Evaluation to date has included none. Treatment to date has included none. The current symptoms are rated a 7/10 and interfere with sleep. ROS/Meds/PSH/PMH/FH/SH: I personally reviewed the patients standard intake form. Pertinents are discussed in the HPI    Physical Examination:  General: Awake and alert. HEENT: Normocephalic, atraumatic  CV/Pulm: Breathing even and unlabored  Skin: No obvious rashes noted. Lymphatic: No obvious evidence of lymphedema or lymphadenopathy    Musculoskeletal:     Examination of the right upper extremity demonstrates Normal sensation to light touch in the median distribution, normal sensation in ulnar and radial distribution, Positive carpal tunnel compression testing and Phalen testing, cap refill < 5 seconds in all fingers. Inspection reveals no thenar atrophy. Negative Tinel and elbow flexion compression test of the cubital tunnel, negative Tinel over Guyon's canal. Sensation to light touch in the ulnar 2 digits is normal with no intrinsic atrophy/weakness. No tenderness to palpation or masses noted in the forearm. Imaging / Electrodiagnostic Tests:     none    Assessment:   1. Bilateral carpal tunnel syndrome        CTS-6 Assessment Tool    - Numbness predominantly or exclusively in the median nerve distribution (3.5)  - Nocturnal numbness (4)  -  Positive Phalen's test (5)  -  Positive Tinel sign over the median nerve at the carpal tunnel (4)    Plan:  We discussed the diagnosis and different treatment options.  We discussed

## 2023-10-31 NOTE — PROGRESS NOTES
Patient was fit for a Wrist/Forearm lacer for patients right elbow pain. Patient is instructed the brace should fit nicely with in the palm and right below the knuckles on the dorsal side of the hand. The patient was aware the brace should fit snuggly around the wrist/forearm area. The strap placed through the thumb and first finger should fit comfortably to insure the brace does not slide or shift. Patient read and signed documenting they understand and agree to [facility]'s current DME return policy.

## 2023-11-10 DIAGNOSIS — E55.9 VITAMIN D DEFICIENCY: ICD-10-CM

## 2023-11-10 DIAGNOSIS — E55.9 VITAMIN D DEFICIENCY: Primary | ICD-10-CM

## 2023-11-10 DIAGNOSIS — R73.09 ELEVATED GLUCOSE: ICD-10-CM

## 2023-11-10 DIAGNOSIS — R79.9 ABNORMAL BLOOD CHEMISTRY: ICD-10-CM

## 2023-11-10 DIAGNOSIS — R53.83 FATIGUE, UNSPECIFIED TYPE: ICD-10-CM

## 2023-11-10 DIAGNOSIS — Z13.228 SCREENING FOR METABOLIC DISORDER: ICD-10-CM

## 2023-11-10 LAB
BASOPHILS # BLD: 0.1 K/UL (ref 0–0.2)
BASOPHILS NFR BLD: 1 % (ref 0–2)
DIFFERENTIAL METHOD BLD: ABNORMAL
EOSINOPHIL # BLD: 0.3 K/UL (ref 0–0.8)
EOSINOPHIL NFR BLD: 3 % (ref 0.5–7.8)
ERYTHROCYTE [DISTWIDTH] IN BLOOD BY AUTOMATED COUNT: 14.1 % (ref 11.9–14.6)
EST. AVERAGE GLUCOSE BLD GHB EST-MCNC: 100 MG/DL
HBA1C MFR BLD: 5.1 % (ref 4.8–5.6)
HCT VFR BLD AUTO: 44.2 % (ref 35.8–46.3)
HGB BLD-MCNC: 13.8 G/DL (ref 11.7–15.4)
IMM GRANULOCYTES # BLD AUTO: 0 K/UL (ref 0–0.5)
IMM GRANULOCYTES NFR BLD AUTO: 0 % (ref 0–5)
LYMPHOCYTES # BLD: 2 K/UL (ref 0.5–4.6)
LYMPHOCYTES NFR BLD: 21 % (ref 13–44)
MCH RBC QN AUTO: 31.2 PG (ref 26.1–32.9)
MCHC RBC AUTO-ENTMCNC: 31.2 G/DL (ref 31.4–35)
MCV RBC AUTO: 99.8 FL (ref 82–102)
MONOCYTES # BLD: 0.8 K/UL (ref 0.1–1.3)
MONOCYTES NFR BLD: 8 % (ref 4–12)
NEUTS SEG # BLD: 6.5 K/UL (ref 1.7–8.2)
NEUTS SEG NFR BLD: 67 % (ref 43–78)
NRBC # BLD: 0 K/UL (ref 0–0.2)
PLATELET # BLD AUTO: 311 K/UL (ref 150–450)
PMV BLD AUTO: 10 FL (ref 9.4–12.3)
RBC # BLD AUTO: 4.43 M/UL (ref 4.05–5.2)
WBC # BLD AUTO: 9.6 K/UL (ref 4.3–11.1)

## 2023-11-11 LAB
25(OH)D3 SERPL-MCNC: 80.2 NG/ML (ref 30–100)
ALBUMIN SERPL-MCNC: 3.9 G/DL (ref 3.2–4.6)
ALBUMIN/GLOB SERPL: 1.2 (ref 0.4–1.6)
ALP SERPL-CCNC: 73 U/L (ref 50–136)
ALT SERPL-CCNC: 33 U/L (ref 12–65)
ANION GAP SERPL CALC-SCNC: 7 MMOL/L (ref 2–11)
AST SERPL-CCNC: 26 U/L (ref 15–37)
BILIRUB SERPL-MCNC: 0.7 MG/DL (ref 0.2–1.1)
BUN SERPL-MCNC: 20 MG/DL (ref 8–23)
CALCIUM SERPL-MCNC: 9.6 MG/DL (ref 8.3–10.4)
CHLORIDE SERPL-SCNC: 107 MMOL/L (ref 101–110)
CHOLEST SERPL-MCNC: 186 MG/DL
CO2 SERPL-SCNC: 28 MMOL/L (ref 21–32)
CREAT SERPL-MCNC: 0.8 MG/DL (ref 0.6–1)
GLOBULIN SER CALC-MCNC: 3.2 G/DL (ref 2.8–4.5)
GLUCOSE SERPL-MCNC: 85 MG/DL (ref 65–100)
HDLC SERPL-MCNC: 59 MG/DL (ref 40–60)
HDLC SERPL: 3.2
LDLC SERPL CALC-MCNC: 100 MG/DL
POTASSIUM SERPL-SCNC: 4.1 MMOL/L (ref 3.5–5.1)
PROT SERPL-MCNC: 7.1 G/DL (ref 6.3–8.2)
SODIUM SERPL-SCNC: 142 MMOL/L (ref 133–143)
T4 FREE SERPL-MCNC: 1.5 NG/DL (ref 0.78–1.46)
TRIGL SERPL-MCNC: 135 MG/DL (ref 35–150)
TSH, 3RD GENERATION: 1.78 UIU/ML (ref 0.36–3.74)
VLDLC SERPL CALC-MCNC: 27 MG/DL (ref 6–23)

## 2023-11-13 ENCOUNTER — OFFICE VISIT (OUTPATIENT)
Dept: PRIMARY CARE CLINIC | Facility: CLINIC | Age: 83
End: 2023-11-13

## 2023-11-13 VITALS
DIASTOLIC BLOOD PRESSURE: 66 MMHG | OXYGEN SATURATION: 98 % | BODY MASS INDEX: 26.26 KG/M2 | HEART RATE: 74 BPM | SYSTOLIC BLOOD PRESSURE: 124 MMHG | WEIGHT: 153 LBS | TEMPERATURE: 98 F

## 2023-11-13 DIAGNOSIS — Z00.00 MEDICARE ANNUAL WELLNESS VISIT, SUBSEQUENT: Primary | ICD-10-CM

## 2023-11-13 DIAGNOSIS — I10 ESSENTIAL HYPERTENSION, BENIGN: ICD-10-CM

## 2023-11-13 DIAGNOSIS — E03.9 ACQUIRED HYPOTHYROIDISM: ICD-10-CM

## 2023-11-13 DIAGNOSIS — D52.9 ANEMIA DUE TO FOLIC ACID DEFICIENCY, UNSPECIFIED DEFICIENCY TYPE: ICD-10-CM

## 2023-11-13 DIAGNOSIS — K21.00 GASTROESOPHAGEAL REFLUX DISEASE WITH ESOPHAGITIS WITHOUT HEMORRHAGE: ICD-10-CM

## 2023-11-13 DIAGNOSIS — G25.81 RESTLESS LEG SYNDROME: ICD-10-CM

## 2023-11-13 DIAGNOSIS — E78.00 HYPERCHOLESTEROLEMIA: ICD-10-CM

## 2023-11-13 RX ORDER — TELMISARTAN 80 MG/1
80 TABLET ORAL DAILY
Qty: 90 TABLET | Refills: 3 | Status: SHIPPED | OUTPATIENT
Start: 2023-11-13

## 2023-11-13 RX ORDER — HYDROCHLOROTHIAZIDE 12.5 MG/1
12.5 TABLET ORAL DAILY
Qty: 90 TABLET | Refills: 3 | Status: SHIPPED | OUTPATIENT
Start: 2023-11-13

## 2023-11-13 RX ORDER — LEVOTHYROXINE SODIUM 0.07 MG/1
75 TABLET ORAL DAILY
Qty: 90 TABLET | Refills: 3 | Status: SHIPPED | OUTPATIENT
Start: 2023-11-13

## 2023-11-13 RX ORDER — FOLIC ACID 1 MG/1
1 TABLET ORAL DAILY
Qty: 90 TABLET | Refills: 3 | Status: SHIPPED | OUTPATIENT
Start: 2023-11-13

## 2023-11-13 RX ORDER — PANTOPRAZOLE SODIUM 40 MG/1
TABLET, DELAYED RELEASE ORAL
Qty: 90 TABLET | Refills: 3 | Status: SHIPPED | OUTPATIENT
Start: 2023-11-13

## 2023-11-13 RX ORDER — PRAVASTATIN SODIUM 40 MG
TABLET ORAL
Qty: 100 TABLET | Refills: 3 | Status: SHIPPED | OUTPATIENT
Start: 2023-11-13

## 2023-11-13 ASSESSMENT — PATIENT HEALTH QUESTIONNAIRE - PHQ9
1. LITTLE INTEREST OR PLEASURE IN DOING THINGS: 0
2. FEELING DOWN, DEPRESSED OR HOPELESS: 0
SUM OF ALL RESPONSES TO PHQ QUESTIONS 1-9: 0
SUM OF ALL RESPONSES TO PHQ9 QUESTIONS 1 & 2: 0
SUM OF ALL RESPONSES TO PHQ QUESTIONS 1-9: 0

## 2023-11-13 ASSESSMENT — LIFESTYLE VARIABLES: HOW MANY STANDARD DRINKS CONTAINING ALCOHOL DO YOU HAVE ON A TYPICAL DAY: PATIENT DOES NOT DRINK

## 2023-12-13 PROBLEM — Z00.00 MEDICARE ANNUAL WELLNESS VISIT, SUBSEQUENT: Status: RESOLVED | Noted: 2023-11-13 | Resolved: 2023-12-13

## 2024-01-10 ENCOUNTER — TELEPHONE (OUTPATIENT)
Dept: PRIMARY CARE CLINIC | Facility: CLINIC | Age: 84
End: 2024-01-10

## 2024-01-10 DIAGNOSIS — Z12.31 SCREENING MAMMOGRAM, ENCOUNTER FOR: Primary | ICD-10-CM

## 2024-01-10 DIAGNOSIS — N85.8 UTERINE CYST: ICD-10-CM

## 2024-01-10 NOTE — TELEPHONE ENCOUNTER
Referral given to GYN services for uterine cyst and order for screening mammogram.  Dermatology concern will be discussed at next visit.

## 2024-01-24 ENCOUNTER — TRANSCRIBE ORDERS (OUTPATIENT)
Dept: SCHEDULING | Age: 84
End: 2024-01-24

## 2024-01-24 DIAGNOSIS — Z12.31 SCREENING MAMMOGRAM FOR HIGH-RISK PATIENT: Primary | ICD-10-CM

## 2024-02-19 ENCOUNTER — OFFICE VISIT (OUTPATIENT)
Dept: OBGYN CLINIC | Age: 84
End: 2024-02-19
Payer: MEDICARE

## 2024-02-19 VITALS — BODY MASS INDEX: 25.75 KG/M2 | WEIGHT: 150 LBS | DIASTOLIC BLOOD PRESSURE: 72 MMHG | SYSTOLIC BLOOD PRESSURE: 124 MMHG

## 2024-02-19 DIAGNOSIS — Z01.419 WELL WOMAN EXAM WITH ROUTINE GYNECOLOGICAL EXAM: Primary | ICD-10-CM

## 2024-02-19 DIAGNOSIS — N83.201 RIGHT OVARIAN CYST: ICD-10-CM

## 2024-02-19 PROCEDURE — G0101 CA SCREEN;PELVIC/BREAST EXAM: HCPCS | Performed by: OBSTETRICS & GYNECOLOGY

## 2024-02-19 PROCEDURE — G8417 CALC BMI ABV UP PARAM F/U: HCPCS | Performed by: OBSTETRICS & GYNECOLOGY

## 2024-02-19 PROCEDURE — G8427 DOCREV CUR MEDS BY ELIG CLIN: HCPCS | Performed by: OBSTETRICS & GYNECOLOGY

## 2024-02-19 ASSESSMENT — ENCOUNTER SYMPTOMS
EYES NEGATIVE: 1
COUGH: 0
ALLERGIC/IMMUNOLOGIC NEGATIVE: 1
SHORTNESS OF BREATH: 0
RESPIRATORY NEGATIVE: 1
ABDOMINAL PAIN: 0
GASTROINTESTINAL NEGATIVE: 1
BLOOD IN STOOL: 0

## 2024-02-19 ASSESSMENT — PATIENT HEALTH QUESTIONNAIRE - PHQ9
SUM OF ALL RESPONSES TO PHQ QUESTIONS 1-9: 0
SUM OF ALL RESPONSES TO PHQ9 QUESTIONS 1 & 2: 0
1. LITTLE INTEREST OR PLEASURE IN DOING THINGS: 0
2. FEELING DOWN, DEPRESSED OR HOPELESS: 0
SUM OF ALL RESPONSES TO PHQ QUESTIONS 1-9: 0

## 2024-02-19 NOTE — PROGRESS NOTES
hernia is present. There is no hernia in the left inguinal area or right inguinal area.   Genitourinary:     General: Normal vulva.      Exam position: Lithotomy position.      Pubic Area: No rash.       Labia:         Right: No rash, tenderness or lesion.         Left: No rash, tenderness or lesion.       Vagina: Normal.      Cervix: Normal.      Uterus: Normal. Not enlarged and not tender.       Adnexa: Right adnexa normal and left adnexa normal.        Right: No mass, tenderness or fullness.          Left: No mass, tenderness or fullness.        Comments: No masses felt.    Musculoskeletal:         General: Normal range of motion.      Cervical back: Normal range of motion and neck supple.   Lymphadenopathy:      Upper Body:      Right upper body: No axillary adenopathy.      Left upper body: No axillary adenopathy.   Skin:     General: Skin is warm and dry.   Neurological:      General: No focal deficit present.      Mental Status: She is alert and oriented to person, place, and time.   Psychiatric:         Mood and Affect: Mood normal.         Behavior: Behavior normal.         Thought Content: Thought content normal.         Judgment: Judgment normal.          Assessment/plan: Sallie was seen today for Annual Exam and New Patient (New pt from [facility]. Ovarian cyst found on MRI/US last March (incidental finding with gallbladder issues last year). Last pap 08/2022, WNL per pt. )       Sallie was seen today for annual exam and new patient.    Diagnoses and all orders for this visit:    Well woman exam with routine gynecological exam - advised pap smears not recommended at this age with no h/o dysplasia.  PT reassured.    Right ovarian cyst - size stable on multiple imaging, no masses felt today.  NL Ca-125.  Rec no further f/u needed.         No follow-ups on file.

## 2024-03-07 ENCOUNTER — HOSPITAL ENCOUNTER (OUTPATIENT)
Dept: MAMMOGRAPHY | Age: 84
Discharge: HOME | End: 2024-03-07
Attending: FAMILY MEDICINE
Payer: MEDICARE

## 2024-03-07 DIAGNOSIS — Z12.31 SCREENING MAMMOGRAM, ENCOUNTER FOR: ICD-10-CM

## 2024-03-07 PROCEDURE — 77063 BREAST TOMOSYNTHESIS BI: CPT

## 2024-03-18 DIAGNOSIS — N63.24 MASS OF LOWER INNER QUADRANT OF LEFT BREAST: ICD-10-CM

## 2024-03-18 DIAGNOSIS — N63.0 MASS OF BREAST, UNSPECIFIED LATERALITY: Primary | ICD-10-CM

## 2024-03-18 NOTE — PROGRESS NOTES
Called Pt about result of her mammogram, but no answer. Left voicemail that more imaging is needed and they should give her a call.

## 2024-03-20 ENCOUNTER — TELEPHONE (OUTPATIENT)
Dept: PRIMARY CARE CLINIC | Facility: CLINIC | Age: 84
End: 2024-03-20

## 2024-03-27 NOTE — PERIOP NOTES
Teach back method used in review of Hibiclens usage preop/postop, TB screening, pain management goals, falls precautions and use of Nozin for prevention of staph infections. Incentive spirometer reviewed and pt reached TOTAL TIDAL VOLUME > 1500 ML OBSERVED   in preop holding. 27-Mar-2024 09:49

## 2024-04-23 ENCOUNTER — OFFICE VISIT (OUTPATIENT)
Dept: ORTHOPEDIC SURGERY | Age: 84
End: 2024-04-23
Payer: MEDICARE

## 2024-04-23 DIAGNOSIS — G56.03 BILATERAL CARPAL TUNNEL SYNDROME: Primary | ICD-10-CM

## 2024-04-23 PROCEDURE — G8428 CUR MEDS NOT DOCUMENT: HCPCS | Performed by: ORTHOPAEDIC SURGERY

## 2024-04-23 PROCEDURE — 1090F PRES/ABSN URINE INCON ASSESS: CPT | Performed by: ORTHOPAEDIC SURGERY

## 2024-04-23 PROCEDURE — G8400 PT W/DXA NO RESULTS DOC: HCPCS | Performed by: ORTHOPAEDIC SURGERY

## 2024-04-23 PROCEDURE — 1036F TOBACCO NON-USER: CPT | Performed by: ORTHOPAEDIC SURGERY

## 2024-04-23 PROCEDURE — G8417 CALC BMI ABV UP PARAM F/U: HCPCS | Performed by: ORTHOPAEDIC SURGERY

## 2024-04-23 PROCEDURE — 99214 OFFICE O/P EST MOD 30 MIN: CPT | Performed by: ORTHOPAEDIC SURGERY

## 2024-04-23 PROCEDURE — 1123F ACP DISCUSS/DSCN MKR DOCD: CPT | Performed by: ORTHOPAEDIC SURGERY

## 2024-04-23 RX ORDER — MELOXICAM 15 MG/1
15 TABLET ORAL DAILY
Qty: 30 TABLET | Refills: 1 | Status: SHIPPED | OUTPATIENT
Start: 2024-04-23 | End: 2024-06-22

## 2024-04-23 NOTE — PROGRESS NOTES
Orthopaedic Hand Surgery Note    Name: Sallie Frankel  Age: 84 y.o.  YOB: 1940  Gender: female  MRN: [id number]    CC: Follow up of hand numbness    HPI: Patient is a 84 y.o. female who is here regarding follow up for  hand numbness and tingling.  Patient reports symptoms have not come back, I gave her an injection 6 months ago, this is still providing some relief, she would like to avoid further injections or surgery at this point.    ROS/Meds/PSH/PMH/FH/SH: I personally reviewed the patients standard intake form.  Pertinents are discussed in the HPI    Physical Examination:  General: Awake and alert.  HEENT: Normocephalic, atraumatic  CV/Pulm: Breathing even and unlabored  Skin: No obvious rashes noted.  Lymphatic: No obvious evidence of lymphedema or lymphadenopathy    Musculoskeletal:   Examination of the right upper extremity demonstrates Normal sensation to light touch in the median distribution, normal sensation in ulnar and radial distribution, Positive carpal tunnel compression testing and Phalen testing, cap refill < 5 seconds in all fingers. Inspection reveals no thenar atrophy. Negative Tinel and elbow flexion compression test of the cubital tunnel, negative Tinel over Guyon's canal. Sensation to light touch in the ulnar 2 digits is normal with no intrinsic atrophy/weakness. No tenderness to palpation or masses noted in the forearm.    Imaging / Electrodiagnostic Tests:     none    Assessment:   1. Bilateral carpal tunnel syndrome        Plan:  We discussed the diagnosis and different treatment options. We discussed observation, EMG/NCV, night splinting, cortisone injections and surgical decompression of the carpal canal and the risks and benefits of all were clearly outlined.  We discussed the fact that carpal tunnel syndrome is a progressive disease and the vast majority of patients will eventually require surgery to prevent progression including permanent numbness and weakness that

## 2024-05-07 ENCOUNTER — NURSE ONLY (OUTPATIENT)
Dept: PRIMARY CARE CLINIC | Facility: CLINIC | Age: 84
End: 2024-05-07

## 2024-05-07 DIAGNOSIS — Z13.29 SCREENING FOR THYROID DISORDER: ICD-10-CM

## 2024-05-07 DIAGNOSIS — Z13.0 SCREENING FOR ENDOCRINE, NUTRITIONAL, METABOLIC AND IMMUNITY DISORDER: ICD-10-CM

## 2024-05-07 DIAGNOSIS — Z13.0 SCREENING FOR DEFICIENCY ANEMIA: ICD-10-CM

## 2024-05-07 DIAGNOSIS — E55.9 VITAMIN D DEFICIENCY: ICD-10-CM

## 2024-05-07 DIAGNOSIS — Z13.228 SCREENING FOR ENDOCRINE, NUTRITIONAL, METABOLIC AND IMMUNITY DISORDER: ICD-10-CM

## 2024-05-07 DIAGNOSIS — R79.89 OTHER SPECIFIED ABNORMAL FINDINGS OF BLOOD CHEMISTRY: ICD-10-CM

## 2024-05-07 DIAGNOSIS — Z13.29 SCREENING FOR ENDOCRINE, METABOLIC AND IMMUNITY DISORDER: ICD-10-CM

## 2024-05-07 DIAGNOSIS — Z13.21 SCREENING FOR ENDOCRINE, NUTRITIONAL, METABOLIC AND IMMUNITY DISORDER: ICD-10-CM

## 2024-05-07 DIAGNOSIS — Z13.29 SCREENING FOR ENDOCRINE, NUTRITIONAL, METABOLIC AND IMMUNITY DISORDER: ICD-10-CM

## 2024-05-07 DIAGNOSIS — R53.83 OTHER FATIGUE: ICD-10-CM

## 2024-05-07 DIAGNOSIS — Z13.228 SCREENING FOR METABOLIC DISORDER: ICD-10-CM

## 2024-05-07 DIAGNOSIS — Z13.0 SCREENING FOR ENDOCRINE, METABOLIC AND IMMUNITY DISORDER: ICD-10-CM

## 2024-05-07 DIAGNOSIS — R79.9 ABNORMAL BLOOD CHEMISTRY: ICD-10-CM

## 2024-05-07 DIAGNOSIS — Z13.228 SCREENING FOR ENDOCRINE, METABOLIC AND IMMUNITY DISORDER: ICD-10-CM

## 2024-05-07 DIAGNOSIS — R53.82 CHRONIC FATIGUE: ICD-10-CM

## 2024-05-07 DIAGNOSIS — Z13.1 SCREENING FOR DIABETES MELLITUS: ICD-10-CM

## 2024-05-07 DIAGNOSIS — Z13.220 SCREENING FOR LIPID DISORDERS: ICD-10-CM

## 2024-05-07 DIAGNOSIS — Z13.21 ENCOUNTER FOR VITAMIN DEFICIENCY SCREENING: ICD-10-CM

## 2024-05-07 DIAGNOSIS — Z13.1 SCREENING FOR DIABETES MELLITUS: Primary | ICD-10-CM

## 2024-05-07 LAB
25(OH)D3 SERPL-MCNC: 72.5 NG/ML (ref 30–100)
ALBUMIN SERPL-MCNC: 4 G/DL (ref 3.2–4.6)
ALBUMIN/GLOB SERPL: 1.3 (ref 1–1.9)
ALP SERPL-CCNC: 85 U/L (ref 35–104)
ALT SERPL-CCNC: 22 U/L (ref 12–65)
ANION GAP SERPL CALC-SCNC: 13 MMOL/L (ref 9–18)
AST SERPL-CCNC: 31 U/L (ref 15–37)
BASOPHILS # BLD: 0.1 K/UL (ref 0–0.2)
BASOPHILS NFR BLD: 1 % (ref 0–2)
BILIRUB SERPL-MCNC: 0.7 MG/DL (ref 0–1.2)
BUN SERPL-MCNC: 27 MG/DL (ref 8–23)
CALCIUM SERPL-MCNC: 9.8 MG/DL (ref 8.8–10.2)
CHLORIDE SERPL-SCNC: 102 MMOL/L (ref 98–107)
CHOLEST SERPL-MCNC: 169 MG/DL (ref 0–200)
CO2 SERPL-SCNC: 25 MMOL/L (ref 20–28)
CREAT SERPL-MCNC: 0.69 MG/DL (ref 0.6–1.1)
CREAT UR-MCNC: 73.9 MG/DL (ref 28–217)
DIFFERENTIAL METHOD BLD: NORMAL
EOSINOPHIL # BLD: 0.3 K/UL (ref 0–0.8)
EOSINOPHIL NFR BLD: 3 % (ref 0.5–7.8)
ERYTHROCYTE [DISTWIDTH] IN BLOOD BY AUTOMATED COUNT: 13.8 % (ref 11.9–14.6)
EST. AVERAGE GLUCOSE BLD GHB EST-MCNC: 110 MG/DL
GLOBULIN SER CALC-MCNC: 3.2 G/DL (ref 2.3–3.5)
GLUCOSE SERPL-MCNC: 83 MG/DL (ref 70–99)
HBA1C MFR BLD: 5.5 % (ref 0–5.6)
HCT VFR BLD AUTO: 44 % (ref 35.8–46.3)
HDLC SERPL-MCNC: 58 MG/DL (ref 40–60)
HDLC SERPL: 2.9 (ref 0–5)
HGB BLD-MCNC: 14.2 G/DL (ref 11.7–15.4)
IMM GRANULOCYTES # BLD AUTO: 0 K/UL (ref 0–0.5)
IMM GRANULOCYTES NFR BLD AUTO: 0 % (ref 0–5)
LDLC SERPL CALC-MCNC: 85 MG/DL (ref 0–100)
LYMPHOCYTES # BLD: 1.9 K/UL (ref 0.5–4.6)
LYMPHOCYTES NFR BLD: 21 % (ref 13–44)
MCH RBC QN AUTO: 31.4 PG (ref 26.1–32.9)
MCHC RBC AUTO-ENTMCNC: 32.3 G/DL (ref 31.4–35)
MCV RBC AUTO: 97.3 FL (ref 82–102)
MICROALBUMIN UR-MCNC: 9.6 MG/DL (ref 0–20)
MICROALBUMIN/CREAT UR-RTO: 130 MG/G (ref 0–30)
MONOCYTES # BLD: 0.8 K/UL (ref 0.1–1.3)
MONOCYTES NFR BLD: 9 % (ref 4–12)
NEUTS SEG # BLD: 5.7 K/UL (ref 1.7–8.2)
NEUTS SEG NFR BLD: 66 % (ref 43–78)
NRBC # BLD: 0 K/UL (ref 0–0.2)
PLATELET # BLD AUTO: 307 K/UL (ref 150–450)
PMV BLD AUTO: 10.2 FL (ref 9.4–12.3)
POTASSIUM SERPL-SCNC: 4 MMOL/L (ref 3.5–5.1)
PROT SERPL-MCNC: 7.2 G/DL (ref 6.3–8.2)
RBC # BLD AUTO: 4.52 M/UL (ref 4.05–5.2)
SODIUM SERPL-SCNC: 140 MMOL/L (ref 136–145)
TRIGL SERPL-MCNC: 131 MG/DL (ref 0–150)
TSH W FREE THYROID IF ABNORMAL: 2.23 UIU/ML (ref 0.27–4.2)
VIT B12 SERPL-MCNC: 441 PG/ML (ref 193–986)
VLDLC SERPL CALC-MCNC: 26 MG/DL (ref 6–23)
WBC # BLD AUTO: 8.7 K/UL (ref 4.3–11.1)

## 2024-05-09 NOTE — RESULT ENCOUNTER NOTE
Will discuss labs at next office visit on 5/16/2024  Lipid panel shows no significant abnormalities.  Slightly elevated VLDL cholesterol calculated  CMP shows elevated BUN at 27  Vitamin D is normal at 72.5  TSH is normal at 2.23  Vitamin B12 is normal at 441  A1c is normal at 5.5  CBC is normal

## 2024-05-15 ENCOUNTER — TELEPHONE (OUTPATIENT)
Dept: PHARMACY | Facility: CLINIC | Age: 84
End: 2024-05-15

## 2024-05-15 NOTE — TELEPHONE ENCOUNTER
[facility] CLINICAL PHARMACY: ADHERENCE REVIEW  Identified care gap per [employer]: fills at [facility]: ACE/ARB and Statin adherence    ASSESSMENT    ACE/ARB ADHERENCE    Insurance Records claims through 5/10/2024 (Prior Year PDC = 100% - PASSED ; YTD PDC = FIRST FILL; Potential Fail Date: 24):   Telmisartan 80 mg tab last filled on 24 for 90 day supply. Next refill due: 24 max refill due date 4/10/24 - adjusted refill due date ~24    Prescribed si tablet/capsule daily    Per Reconcile Dispense History:   TELMISARTAN  80 MG TABS 2024 90 90 tablet Apolinar Krishna Jr., MD [facility], [facility]   TELMISARTAN  80 MG TABS 12/15/2023 90 90 tablet Apolinar Krishna Jr., MD [facility], [facility]   TELMISARTAN  80 MG TABS 10/13/2023 90 90 tablet Apolinar Krishna Jr., MD [id number] ...   TELMISARTAN  80 MG TABS 2023 90 90 tablet Apolinar Krishna Jr., MD [facility], [facility]   TELMISARTAN  80 MG TABS 2023 90 90 tablet Apolinar Krishna Jr., MD [facility], [facility].   TELMISARTAN  80 MG TABS 2023 90 90 tablet Apolinar Krishna Jr., MD [facility], [facility]   Last Rx 11/13/23 x 90 day supply 3 refills - believe has refill at pharmacy    BP Readings from Last 3 Encounters:   24 124/72   23 124/66   23 132/65     Estimated Creatinine Clearance: 60 mL/min (based on SCr of 0.69 mg/dL).  Lab Results   Component Value Date    CREATININE 0.69 2024     Lab Results   Component Value Date    K 4.0 2024     STATIN ADHERENCE    Insurance Records claims through 5/10/2024 (Prior Year PDC = 80% - PASSED ; YTD PDC = FIRST FILL; Potential Fail Date: 24):   Pravastatin 40 mg tab last filled on 24 for 100 day supply. Next refill due: 24 max refill due date 24 - adjusted refill due date ~5/3/24    Prescribed si tablet/capsule daily    Per Reconcile Dispense History:  PRAVASTATIN SODIUM  40 MG TABS

## 2024-05-16 ENCOUNTER — OFFICE VISIT (OUTPATIENT)
Dept: PRIMARY CARE CLINIC | Facility: CLINIC | Age: 84
End: 2024-05-16
Payer: MEDICARE

## 2024-05-16 VITALS
WEIGHT: 151 LBS | BODY MASS INDEX: 25.92 KG/M2 | SYSTOLIC BLOOD PRESSURE: 123 MMHG | DIASTOLIC BLOOD PRESSURE: 68 MMHG | OXYGEN SATURATION: 98 % | HEART RATE: 70 BPM | TEMPERATURE: 97.8 F

## 2024-05-16 DIAGNOSIS — L60.0 INGROWN NAIL: Primary | ICD-10-CM

## 2024-05-16 DIAGNOSIS — M79.601 RIGHT ARM PAIN: ICD-10-CM

## 2024-05-16 PROCEDURE — 99213 OFFICE O/P EST LOW 20 MIN: CPT | Performed by: NURSE PRACTITIONER

## 2024-05-16 PROCEDURE — 3078F DIAST BP <80 MM HG: CPT | Performed by: NURSE PRACTITIONER

## 2024-05-16 PROCEDURE — 3074F SYST BP LT 130 MM HG: CPT | Performed by: NURSE PRACTITIONER

## 2024-05-16 PROCEDURE — 1123F ACP DISCUSS/DSCN MKR DOCD: CPT | Performed by: NURSE PRACTITIONER

## 2024-05-16 RX ORDER — LEVOTHYROXINE SODIUM 0.07 MG/1
75 TABLET ORAL DAILY
Qty: 90 TABLET | Refills: 1 | Status: CANCELLED | OUTPATIENT
Start: 2024-05-16

## 2024-05-16 SDOH — ECONOMIC STABILITY: FOOD INSECURITY: WITHIN THE PAST 12 MONTHS, THE FOOD YOU BOUGHT JUST DIDN'T LAST AND YOU DIDN'T HAVE MONEY TO GET MORE.: NEVER TRUE

## 2024-05-16 SDOH — ECONOMIC STABILITY: FOOD INSECURITY: WITHIN THE PAST 12 MONTHS, YOU WORRIED THAT YOUR FOOD WOULD RUN OUT BEFORE YOU GOT MONEY TO BUY MORE.: NEVER TRUE

## 2024-05-16 SDOH — ECONOMIC STABILITY: INCOME INSECURITY: HOW HARD IS IT FOR YOU TO PAY FOR THE VERY BASICS LIKE FOOD, HOUSING, MEDICAL CARE, AND HEATING?: NOT HARD AT ALL

## 2024-05-16 ASSESSMENT — ENCOUNTER SYMPTOMS
CHEST TIGHTNESS: 0
SHORTNESS OF BREATH: 0
VOMITING: 0
ALLERGIC/IMMUNOLOGIC NEGATIVE: 1
NAUSEA: 0
ABDOMINAL PAIN: 0
EYES NEGATIVE: 1
DIARRHEA: 0
CONSTIPATION: 0

## 2024-05-16 NOTE — PROGRESS NOTES
[facility] PHYSICIAN SERVICES  [facility] PRIMARY CARE  [address] [place] [address] DR SHIMON PÉREZ [id number]  Dept: [phone]       Patient: Sallie Frankel  YOB: 1940  Patient Age: 84 y.o.  Patient Sex: female  Medical Record: [id number]  Visit Date: 05/16/2024    Family Practice Clinic Note    Chief Complaint   Patient presents with    Discuss Labs       1. Discuss labs   2. Arm pain- upper arm pain. Mentions that she does have carpal tunnel. Pain now is in arm. X2 weeks.   3. Toe nail problem- left foot, big toe. Requesting referral to podiatry. Nail not growing properly. Causing pain and difficult to wear certain shoes.     Lipid panel shows no significant abnormalities.  Slightly elevated VLDL cholesterol calculated  CMP shows elevated BUN at 27  Vitamin D is normal at 72.5  TSH is normal at 2.23  Vitamin B12 is normal at 441  A1c is normal at 5.5  CBC is normal      Arm Pain   The incident occurred more than 1 week ago. There was no injury mechanism. Pain location: right upper arm. Pertinent negatives include no chest pain or numbness. The symptoms are aggravated by movement.   Toe Pain   The incident occurred more than 1 week ago (10 years). There was no injury mechanism. The pain is present in the left toes. Pertinent negatives include no numbness. She reports no foreign bodies present. The treatment provided mild relief.        Allergies   Allergen Reactions    Ace Inhibitors Angioedema and Swelling     Pt stated \"ace blockers\" can't remember name for htn  Facial swelling  Other reaction(s): Hives/Swelling-Allergy, Hives/Swelling-Allergy       Current Outpatient Medications   Medication Sig Dispense Refill    diclofenac sodium (VOLTAREN) 1 % GEL Apply 4 g topically 4 times daily 100 g 1    meloxicam (MOBIC) 15 MG tablet Take 1 tablet by mouth daily 30 tablet 1    telmisartan (MICARDIS) 80 MG tablet Take 1 tablet by mouth daily 90 tablet 3    pravastatin (PRAVACHOL) 40 MG tablet TAKE 1 TABLET

## 2024-05-16 NOTE — ASSESSMENT & PLAN NOTE
Stable. New Onset  Given Rx for Diclofenac  Recommend heat, ice, rest, etc  Will consider XR, imaging, PT, etc if not improving

## 2024-05-17 NOTE — TELEPHONE ENCOUNTER
Pt had 5/16/24 OV. Per reconcile dispense appears both medications were filled at [facility] home delivery 5/15/24.

## 2024-05-21 NOTE — TELEPHONE ENCOUNTER
Per payer portal confirmed claims for pravastatin 40 mg 5/15/24 x 100 day supply at [facility]; telmisartan 80 mg 5/15/24 x 90 day supply at [facility].    For Pharmacy Admin Tracking Only    Program: [employer]  CPA in place:  No  Gap Closed?: Yes   Time Spent (min): 10

## 2024-09-16 ENCOUNTER — OFFICE VISIT (OUTPATIENT)
Dept: PRIMARY CARE CLINIC | Facility: CLINIC | Age: 84
End: 2024-09-16
Payer: MEDICARE

## 2024-09-16 VITALS
HEIGHT: 64 IN | TEMPERATURE: 97.9 F | SYSTOLIC BLOOD PRESSURE: 130 MMHG | BODY MASS INDEX: 25.78 KG/M2 | DIASTOLIC BLOOD PRESSURE: 70 MMHG | WEIGHT: 151 LBS
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
## 2024-09-16 DIAGNOSIS — S29.9XXA RIB INJURY: Primary | ICD-10-CM

## 2024-09-16 DIAGNOSIS — S09.93XD FACIAL INJURY, SUBSEQUENT ENCOUNTER: ICD-10-CM

## 2024-09-16 PROBLEM — M79.601 RIGHT ARM PAIN: Status: RESOLVED | Noted: 2024-05-16 | Resolved: 2024-09-16

## 2024-09-16 PROBLEM — K85.10 ACUTE GALLSTONE PANCREATITIS: Status: RESOLVED | Noted: 2022-10-15 | Resolved: 2024-09-16

## 2024-09-16 PROBLEM — S50.12XA CONTUSION OF ELBOW AND FOREARM, LEFT, INITIAL ENCOUNTER: Status: RESOLVED | Noted: 2020-03-08 | Resolved: 2024-09-16

## 2024-09-16 PROBLEM — L60.0 INGROWN NAIL: Status: RESOLVED | Noted: 2024-05-16 | Resolved: 2024-09-16

## 2024-09-16 PROBLEM — M17.12 ARTHRITIS OF LEFT KNEE: Status: RESOLVED | Noted: 2019-02-07 | Resolved: 2024-09-16

## 2024-09-16 PROBLEM — M17.11 ARTHRITIS OF RIGHT KNEE: Status: RESOLVED | Noted: 2019-12-10 | Resolved: 2024-09-16

## 2024-09-16 PROCEDURE — 1123F ACP DISCUSS/DSCN MKR DOCD: CPT | Performed by: NURSE PRACTITIONER

## 2024-09-16 PROCEDURE — 3075F SYST BP GE 130 - 139MM HG: CPT | Performed by: NURSE PRACTITIONER

## 2024-09-16 PROCEDURE — 3078F DIAST BP <80 MM HG: CPT | Performed by: NURSE PRACTITIONER

## 2024-09-16 PROCEDURE — 99212 OFFICE O/P EST SF 10 MIN: CPT | Performed by: NURSE PRACTITIONER

## 2024-09-16 ASSESSMENT — ENCOUNTER SYMPTOMS
CONSTIPATION: 0
RESPIRATORY NEGATIVE: 1
NAUSEA: 0
DIARRHEA: 0
ALLERGIC/IMMUNOLOGIC NEGATIVE: 1
SHORTNESS OF BREATH: 0
ABDOMINAL PAIN: 0
CHEST TIGHTNESS: 0
EYES NEGATIVE: 1
VOMITING: 0

## 2024-10-17 ENCOUNTER — OFFICE VISIT (OUTPATIENT)
Dept: PRIMARY CARE CLINIC | Facility: CLINIC | Age: 84
End: 2024-10-17
Payer: MEDICARE

## 2024-10-17 VITALS — SYSTOLIC BLOOD PRESSURE: 126 MMHG | DIASTOLIC BLOOD PRESSURE: 70 MMHG

## 2024-10-17 DIAGNOSIS — K21.00 GASTROESOPHAGEAL REFLUX DISEASE WITH ESOPHAGITIS WITHOUT HEMORRHAGE: ICD-10-CM

## 2024-10-17 DIAGNOSIS — E03.9 ACQUIRED HYPOTHYROIDISM: ICD-10-CM

## 2024-10-17 DIAGNOSIS — I10 ESSENTIAL HYPERTENSION, BENIGN: ICD-10-CM

## 2024-10-17 DIAGNOSIS — E78.00 HYPERCHOLESTEROLEMIA: ICD-10-CM

## 2024-10-17 DIAGNOSIS — D52.9 ANEMIA DUE TO FOLIC ACID DEFICIENCY, UNSPECIFIED DEFICIENCY TYPE: ICD-10-CM

## 2024-10-17 PROCEDURE — 99213 OFFICE O/P EST LOW 20 MIN: CPT | Performed by: NURSE PRACTITIONER

## 2024-10-17 PROCEDURE — 1123F ACP DISCUSS/DSCN MKR DOCD: CPT | Performed by: NURSE PRACTITIONER

## 2024-10-17 PROCEDURE — 3074F SYST BP LT 130 MM HG: CPT | Performed by: NURSE PRACTITIONER

## 2024-10-17 PROCEDURE — 3078F DIAST BP <80 MM HG: CPT | Performed by: NURSE PRACTITIONER

## 2024-10-17 RX ORDER — LEVOTHYROXINE SODIUM 75 UG/1
75 TABLET ORAL DAILY
Qty: 90 TABLET | Refills: 1 | Status: SHIPPED | OUTPATIENT
Start: 2024-10-17

## 2024-10-17 RX ORDER — HYDROCHLOROTHIAZIDE 12.5 MG/1
12.5 TABLET ORAL DAILY
Qty: 90 TABLET | Refills: 1 | Status: SHIPPED | OUTPATIENT
Start: 2024-10-17

## 2024-10-17 RX ORDER — PRAVASTATIN SODIUM 40 MG
40 TABLET ORAL DAILY
Qty: 90 TABLET | Refills: 1 | Status: SHIPPED | OUTPATIENT
Start: 2024-10-17 | End: 2025-04-15

## 2024-10-17 RX ORDER — TELMISARTAN 80 MG/1
80 TABLET ORAL DAILY
Qty: 90 TABLET | Refills: 1 | Status: SHIPPED | OUTPATIENT
Start: 2024-10-17

## 2024-10-17 RX ORDER — CARBIDOPA AND LEVODOPA 25; 100 MG/1; MG/1
1 TABLET ORAL NIGHTLY
Qty: 90 TABLET | Refills: 1 | Status: SHIPPED | OUTPATIENT
Start: 2024-10-17

## 2024-10-17 RX ORDER — FOLIC ACID 1 MG/1
1 TABLET ORAL DAILY
Qty: 90 TABLET | Refills: 1 | Status: SHIPPED | OUTPATIENT
Start: 2024-10-17

## 2024-10-17 RX ORDER — PANTOPRAZOLE SODIUM 40 MG/1
40 TABLET, DELAYED RELEASE ORAL DAILY
Qty: 90 TABLET | Refills: 1 | Status: SHIPPED | OUTPATIENT
Start: 2024-10-17

## 2024-10-17 ASSESSMENT — ENCOUNTER SYMPTOMS
CHEST TIGHTNESS: 0
EYES NEGATIVE: 1
DIARRHEA: 0
ABDOMINAL PAIN: 0
ALLERGIC/IMMUNOLOGIC NEGATIVE: 1
NAUSEA: 0
CONSTIPATION: 0
VOMITING: 0
RESPIRATORY NEGATIVE: 1
SHORTNESS OF BREATH: 0

## 2024-10-17 NOTE — PROGRESS NOTES
[facility] PHYSICIAN SERVICES  [facility] PRIMARY CARE  [address] [place] [address] DR SHIMON PÉREZ [id number]  Dept: [phone]       Patient: Sallie Frankel  YOB: 1940  Patient Age: 84 y.o.  Patient Sex: female  Medical Record: [id number]  Visit Date: 10/17/2024    Family Practice Clinic Note    Chief Complaint   Patient presents with    Follow-up       1. 1 month follow up from her fall. Reports that she is feeling better and able to sleep on left side now.   2. HTN- chronic. Stable. Refills   3. GERD- refills pantoprazole   4. Hypothyroid- refills levothyroxine   5. Anemia- refills folate   6. Hyperlipidemia- refills statin    All refills to [facility]     ------------  The patient presents with the followin. Fall Incident:  - Experienced a fall approximately one month ago.  - Initially had discomfort in the area of the fall, preventing her from rolling over while sleeping.  - Pain has improved over the past week, and she can now sleep on the affected side.  - Concerned about a possible fracture versus a strain due to prolonged discomfort.    2. Facial Injury:  - Has a tender area on her face, believed to be from the fall.  - The glue from a previous treatment fell off last week, and the area remains tender when sleeping with her face on the pillow.  - Denies any impact on vision or mobility.  - Suspects a small chip in the affected area, but it is not visible.    3. Upcoming Appointments:  - Due for a wellness check.  - Received emails from her ophthalmologist regarding a new prescription.  - Prefers to address all medical needs in one visit.    4. Medication Management:  - Currently taking esomeprazole, levothyroxine, folate, HCTZ, Sinemet, pravastatin, and telmisartan.  - Reports no issues with her current medication regimen.  - No longer needs a prescription nasal spray as it is now available over the counter.    5. Dental Work:  - Recently had dental work done to repair a broken partial.  -

## 2025-01-09 ENCOUNTER — HOSPITAL ENCOUNTER (OUTPATIENT)
Dept: CT IMAGING | Age: 85
Discharge: HOME | End: 2025-01-12
Payer: MEDICARE

## 2025-01-09 ENCOUNTER — OFFICE VISIT (OUTPATIENT)
Dept: PRIMARY CARE CLINIC | Facility: CLINIC | Age: 85
End: 2025-01-09
Payer: MEDICARE

## 2025-01-09 VITALS
DIASTOLIC BLOOD PRESSURE: 83 MMHG | HEIGHT: 64 IN | SYSTOLIC BLOOD PRESSURE: 139 MMHG | TEMPERATURE: 98 F | OXYGEN SATURATION: 90 % | BODY MASS INDEX: 25.74 KG/M2 | HEART RATE: 84 BPM | WEIGHT: 150.8 LBS

## 2025-01-09 DIAGNOSIS — N39.0 URINARY TRACT INFECTION WITH HEMATURIA, SITE UNSPECIFIED: ICD-10-CM

## 2025-01-09 DIAGNOSIS — R31.9 URINARY TRACT INFECTION WITH HEMATURIA, SITE UNSPECIFIED: ICD-10-CM

## 2025-01-09 DIAGNOSIS — R31.0 GROSS HEMATURIA: ICD-10-CM

## 2025-01-09 DIAGNOSIS — Z12.31 BREAST CANCER SCREENING BY MAMMOGRAM: ICD-10-CM

## 2025-01-09 DIAGNOSIS — R10.2 PELVIC PAIN: ICD-10-CM

## 2025-01-09 DIAGNOSIS — R31.0 GROSS HEMATURIA: Primary | ICD-10-CM

## 2025-01-09 DIAGNOSIS — R31.9 HEMATURIA, UNSPECIFIED TYPE: ICD-10-CM

## 2025-01-09 LAB
BILIRUBIN, URINE, POC: NEGATIVE
BLOOD URINE, POC: ABNORMAL
GLUCOSE URINE, POC: NEGATIVE
KETONES, URINE, POC: NEGATIVE
LEUKOCYTE ESTERASE, URINE, POC: ABNORMAL
NITRITE, URINE, POC: NEGATIVE
PH, URINE, POC: 5.5 (ref 4.6–8)
PROTEIN,URINE, POC: NEGATIVE
SPECIFIC GRAVITY, URINE, POC: 1.02 (ref 1–1.03)
URINALYSIS CLARITY, POC: ABNORMAL
URINALYSIS COLOR, POC: ABNORMAL
UROBILINOGEN, POC: ABNORMAL

## 2025-01-09 PROCEDURE — 1159F MED LIST DOCD IN RCRD: CPT | Performed by: NURSE PRACTITIONER

## 2025-01-09 PROCEDURE — 1160F RVW MEDS BY RX/DR IN RCRD: CPT | Performed by: NURSE PRACTITIONER

## 2025-01-09 PROCEDURE — 74176 CT ABD & PELVIS W/O CONTRAST: CPT

## 2025-01-09 PROCEDURE — G8400 PT W/DXA NO RESULTS DOC: HCPCS | Performed by: NURSE PRACTITIONER

## 2025-01-09 PROCEDURE — 3079F DIAST BP 80-89 MM HG: CPT | Performed by: NURSE PRACTITIONER

## 2025-01-09 PROCEDURE — 81003 URINALYSIS AUTO W/O SCOPE: CPT | Performed by: NURSE PRACTITIONER

## 2025-01-09 PROCEDURE — 1090F PRES/ABSN URINE INCON ASSESS: CPT | Performed by: NURSE PRACTITIONER

## 2025-01-09 PROCEDURE — 1123F ACP DISCUSS/DSCN MKR DOCD: CPT | Performed by: NURSE PRACTITIONER

## 2025-01-09 PROCEDURE — 1036F TOBACCO NON-USER: CPT | Performed by: NURSE PRACTITIONER

## 2025-01-09 PROCEDURE — G8417 CALC BMI ABV UP PARAM F/U: HCPCS | Performed by: NURSE PRACTITIONER

## 2025-01-09 PROCEDURE — G8427 DOCREV CUR MEDS BY ELIG CLIN: HCPCS | Performed by: NURSE PRACTITIONER

## 2025-01-09 PROCEDURE — 3075F SYST BP GE 130 - 139MM HG: CPT | Performed by: NURSE PRACTITIONER

## 2025-01-09 PROCEDURE — 99214 OFFICE O/P EST MOD 30 MIN: CPT | Performed by: NURSE PRACTITIONER

## 2025-01-09 RX ORDER — NITROFURANTOIN 25; 75 MG/1; MG/1
100 CAPSULE ORAL 2 TIMES DAILY
Qty: 20 CAPSULE | Refills: 0 | Status: SHIPPED | OUTPATIENT
Start: 2025-01-09 | End: 2025-01-19

## 2025-01-09 ASSESSMENT — ENCOUNTER SYMPTOMS
DIARRHEA: 0
ABDOMINAL PAIN: 0
NAUSEA: 0
SHORTNESS OF BREATH: 0
VOMITING: 0
ALLERGIC/IMMUNOLOGIC NEGATIVE: 1
CHEST TIGHTNESS: 0
EYES NEGATIVE: 1
RESPIRATORY NEGATIVE: 1
CONSTIPATION: 0

## 2025-01-09 NOTE — PROGRESS NOTES
content and to ensure that the note contains appropriate information for your continuing care.    The patient (or guardian, if applicable) and other individuals in attendance with the patient were advised that Artificial Intelligence will be utilized during this visit to record, process the conversation to generate a clinical note, and support improvement of the AI technology. The patient (or guardian, if applicable) and other individuals in attendance at the appointment consented to the use of AI, including the recording.      Attestation

## 2025-01-09 NOTE — RESULT ENCOUNTER NOTE
Please let the patient know:    Summary of CT Abdomen and Pelvis Findings:    Key Findings:  1. Chest:  º Lung bases and heart are normal.    2. Abdomen/Pelvis:  º Liver, Pancreas, Spleen, Adrenals, Stomach, Duodenum, and Biliary System: Normal.  º Kidneys:  § Left upper pole low-density lesion likely a proteinaceous cyst (not simple fluid), measuring 3.0 x 1.9 cm.  § Stable left upper pole cyst (simple) measuring 3.1 x 2.8 cm.  § No nephrolithiasis (kidney stones).  º Vascular System: Atherosclerosis of the aorta without aneurysm.  º Lymphatics: No abnormalities noted.  º Small and Large Bowel: Normal.  º Bladder: Normal.  º Reproductive Organs:  § Anteverted uterus.  § Stable right ovarian cyst, now measuring 3.8 x 3.8 cm (previously 3.6 cm).    3. Abdominal Wall:  º Stable small fat-containing right inguinal hernia.    4. Bones/Soft Tissue:  º Stable Grade 1 anterolisthesis of L4 on L5.  º Stable mild degenerative changes in the spine.  º Healing deformities of partially imaged left rib fractures.    Impression:  1. Likely proteinaceous cyst in the left kidney (posterior upper pole).  2. Stable ovarian cyst on the right ovary.  3. No significant findings to suggest acute pathology.    [employer] message sent as well    Explanatory note: Be assured that the information provided to create your medical record comes from your provider. The written transcription portion of this note is prepared electronically by voice-recognition software. At times, there may be some errors in capitalization, punctuation, tense, or context that are inherent in the system, but your provider reviews the note for content and to ensure that the note contains appropriate information for your continuing care.

## 2025-01-11 LAB
BACTERIA SPEC CULT: ABNORMAL
BACTERIA SPEC CULT: ABNORMAL
SERVICE CMNT-IMP: ABNORMAL

## 2025-01-13 NOTE — RESULT ENCOUNTER NOTE
Please let the patient know:    Urine culture is positive for E. Coli.    The antibiotic, macrobid, does cover this bacteria.    [employer] message sent as well    Explanatory note: Be assured that the information provided to create your medical record comes from your provider. The written transcription portion of this note is prepared electronically by voice-recognition software. At times, there may be some errors in capitalization, punctuation, tense, or context that are inherent in the system, but your provider reviews the note for content and to ensure that the note contains appropriate information for your continuing care.

## 2025-01-22 ENCOUNTER — TRANSCRIBE ORDERS (OUTPATIENT)
Dept: SCHEDULING | Age: 85
End: 2025-01-22

## 2025-01-22 DIAGNOSIS — Z12.31 VISIT FOR SCREENING MAMMOGRAM: Primary | ICD-10-CM

## 2025-02-03 ENCOUNTER — OFFICE VISIT (OUTPATIENT)
Dept: UROLOGY | Age: 85
End: 2025-02-03
Payer: MEDICARE

## 2025-02-03 ENCOUNTER — TELEPHONE (OUTPATIENT)
Dept: UROLOGY | Age: 85
End: 2025-02-03

## 2025-02-03 DIAGNOSIS — R31.0 GROSS HEMATURIA: Primary | ICD-10-CM

## 2025-02-03 DIAGNOSIS — N28.1 KIDNEY CYSTS: ICD-10-CM

## 2025-02-03 LAB
BILIRUBIN, URINE, POC: NEGATIVE
BLOOD URINE, POC: NORMAL
GLUCOSE URINE, POC: NEGATIVE MG/DL
KETONES, URINE, POC: NEGATIVE MG/DL
LEUKOCYTE ESTERASE, URINE, POC: NORMAL
NITRITE, URINE, POC: NEGATIVE
PH, URINE, POC: 5.5 (ref 4.6–8)
PROTEIN,URINE, POC: NEGATIVE MG/DL
SPECIFIC GRAVITY, URINE, POC: 1.02 (ref 1–1.03)
URINALYSIS CLARITY, POC: NORMAL
URINALYSIS COLOR, POC: NORMAL
UROBILINOGEN, POC: NORMAL MG/DL

## 2025-02-03 PROCEDURE — 81003 URINALYSIS AUTO W/O SCOPE: CPT | Performed by: UROLOGY

## 2025-02-03 PROCEDURE — 1123F ACP DISCUSS/DSCN MKR DOCD: CPT | Performed by: UROLOGY

## 2025-02-03 PROCEDURE — 99204 OFFICE O/P NEW MOD 45 MIN: CPT | Performed by: UROLOGY

## 2025-02-03 PROCEDURE — 1159F MED LIST DOCD IN RCRD: CPT | Performed by: UROLOGY

## 2025-02-04 ASSESSMENT — ENCOUNTER SYMPTOMS
CONSTIPATION: 0
COUGH: 0
DIARRHEA: 0
BACK PAIN: 0
EYE DISCHARGE: 0
SKIN LESIONS: 0
BLOOD IN STOOL: 0
INDIGESTION: 0
ABDOMINAL PAIN: 0
VOMITING: 0
HEARTBURN: 0
WHEEZING: 0
EYE PAIN: 0
NAUSEA: 0

## 2025-02-05 NOTE — PROGRESS NOTES
[facility]  [address]   [address]  [place] [address]  [phone]    Sallie Frankel  : 1940    Chief Complaint   Patient presents with    New Patient    Hematuria     Kidney cyst          HPI     Sallie Frankel is a 84 y.o. female    History of Present Illness  The patient is an 84-year-old female referred for gross hematuria and an indeterminate 3 cm kidney lesion.    She experienced a sudden onset of gross hematuria episode in 2025, about 3 weeks prior to this appointment. The first episode occurred on a Friday night, characterized by significant blood volume and the presence of clots. The hematuria gradually subsided over the course of the night and had completely resolved by morning. A similar episode occurred the following night, persisting into the next day. Despite these episodes, she reports feeling well overall and has not experienced any associated pain or other urinary symptoms. Urine today shows trace of blood, trace of leukocytes.    A subsequent CT A/P without contrast scan revealed the presence of a proteinaceous cyst that was indeterminate but did not show an obvious source of hematuria in upper urinary tract.    No personal or FH of  cancer.  Does have history of tobacco use.     Past Medical History:   Diagnosis Date    Allergic rhinitis due to pollen     Arthritis     Asthma as a child    Constipation 2019    post-op after TKA    Ear problems     Fibrocystic breast     GERD (gastroesophageal reflux disease)     Managed with meds     H/O colonoscopy     Normal ([facility])    Hashimoto's thyroiditis     Hearing reduced     Hypertension     Managed with meds     Macular degeneration     Mixed hyperlipidemia     Daily meds     Osteoporosis 2013    GYN Dr. June    Pleural effusion, bilateral 10/22/2022    post ERCP; since resolved after med tx    Restless leg     managed with medication    Unspecified hypothyroidism     Varicella        Past

## 2025-02-10 ENCOUNTER — HOSPITAL ENCOUNTER (OUTPATIENT)
Dept: CT IMAGING | Age: 85
Discharge: HOME | End: 2025-02-13
Attending: UROLOGY
Payer: MEDICARE

## 2025-02-10 DIAGNOSIS — R31.0 GROSS HEMATURIA: ICD-10-CM

## 2025-02-10 LAB — CREAT BLD-MCNC: 0.8 MG/DL (ref 0.8–1.5)

## 2025-02-10 PROCEDURE — 82565 ASSAY OF CREATININE: CPT

## 2025-02-10 PROCEDURE — 6360000004 HC RX CONTRAST MEDICATION: Performed by: UROLOGY

## 2025-02-10 PROCEDURE — 74178 CT ABD&PLV WO CNTR FLWD CNTR: CPT

## 2025-02-10 RX ORDER — IOPAMIDOL 755 MG/ML
100 INJECTION, SOLUTION INTRAVASCULAR
Status: COMPLETED | OUTPATIENT
Start: 2025-02-10 | End: 2025-02-10

## 2025-02-10 RX ADMIN — IOPAMIDOL 100 ML: 755 INJECTION, SOLUTION INTRAVENOUS at 15:48

## 2025-02-24 ENCOUNTER — TELEPHONE (OUTPATIENT)
Dept: OBGYN CLINIC | Age: 85
End: 2025-02-24

## 2025-02-24 DIAGNOSIS — M81.0 OSTEOPOROSIS, UNSPECIFIED OSTEOPOROSIS TYPE, UNSPECIFIED PATHOLOGICAL FRACTURE PRESENCE: Primary | ICD-10-CM

## 2025-02-24 NOTE — TELEPHONE ENCOUNTER
Dr Shaw, I spoke with pt and she said that that was fine she will come in tomorrow if you thinks she does not need to however she states that she thinks that she needs to have a bone density done she has not had one done in a couple of years and she wants to know what you think about her having one done? Thanks Dawood

## 2025-02-26 ENCOUNTER — PROCEDURE VISIT (OUTPATIENT)
Dept: UROLOGY | Age: 85
End: 2025-02-26

## 2025-02-26 DIAGNOSIS — N28.1 ACQUIRED COMPLEX CYST OF KIDNEY: ICD-10-CM

## 2025-02-26 DIAGNOSIS — R31.0 GROSS HEMATURIA: Primary | ICD-10-CM

## 2025-02-26 LAB
BILIRUBIN, URINE, POC: NEGATIVE
BLOOD URINE, POC: NEGATIVE
GLUCOSE URINE, POC: NEGATIVE MG/DL
KETONES, URINE, POC: NEGATIVE MG/DL
LEUKOCYTE ESTERASE, URINE, POC: NORMAL
NITRITE, URINE, POC: NEGATIVE
PH, URINE, POC: 7 (ref 4.6–8)
PROTEIN,URINE, POC: NEGATIVE MG/DL
SPECIFIC GRAVITY, URINE, POC: 1.02 (ref 1–1.03)
URINALYSIS CLARITY, POC: NORMAL
URINALYSIS COLOR, POC: NORMAL
UROBILINOGEN, POC: NORMAL MG/DL

## 2025-02-26 NOTE — PROGRESS NOTES
[facility]  [address]   [address]  [place] [address]  [phone]    Sallie Frankel  : 1940         HPI   85 y.o., female who presents for cystoscopy for gross hematuria evaluation.     Referred for gross hematuria and an indeterminate 3 cm kidney lesion.     She experienced a sudden onset of gross hematuria episode in 2025, about 3 weeks prior to this appointment. The first episode occurred on a Friday night, characterized by significant blood volume and the presence of clots. The hematuria gradually subsided over the course of the night and had completely resolved by morning. A similar episode occurred the following night, persisting into the next day. Despite these episodes, she reports feeling well overall and has not experienced any associated pain or other urinary symptoms. Urine today shows trace of blood, trace of leukocytes.     A subsequent CT A/P without contrast scan revealed the presence of a proteinaceous cyst that was indeterminate but did not show an obvious source of hematuria in upper urinary tract.    CT hematuria 2025 showed benign L proteinaceous bosniak 2 benign kidney cyst. Imaging reviewed by me today.      No personal or FH of  cancer.  Does have history of tobacco use.       Past Medical History:   Diagnosis Date    Allergic rhinitis due to pollen     Arthritis     Asthma as a child    Constipation 2019    post-op after TKA    Ear problems     Fibrocystic breast     GERD (gastroesophageal reflux disease)     Managed with meds     H/O colonoscopy     Normal ([place])    Hashimoto's thyroiditis     Hearing reduced     Hypertension     Managed with meds     Macular degeneration     Mixed hyperlipidemia     Daily meds     Osteoporosis 2013    GYN Dr. June    Pleural effusion, bilateral 10/22/2022    post ERCP; since resolved after med tx    Restless leg     managed with medication    Unspecified hypothyroidism     Varicella      Past

## 2025-03-10 ENCOUNTER — HOSPITAL ENCOUNTER (OUTPATIENT)
Dept: MAMMOGRAPHY | Age: 85
Discharge: HOME | End: 2025-03-13
Payer: MEDICARE

## 2025-03-10 DIAGNOSIS — Z12.31 BREAST CANCER SCREENING BY MAMMOGRAM: ICD-10-CM

## 2025-03-10 PROCEDURE — 77063 BREAST TOMOSYNTHESIS BI: CPT

## 2025-03-12 ENCOUNTER — RESULTS FOLLOW-UP (OUTPATIENT)
Dept: MAMMOGRAPHY | Age: 85
End: 2025-03-12

## 2025-03-12 NOTE — RESULT ENCOUNTER NOTE
Please let the patient know:    Mammogram Summary  · Risk Assessment: Your estimated lifetime risk of breast cancer is 0.57% (low risk) based on the Tyrer-Cuzick model.  · Breast Density: Your breast tissue is almost entirely fatty.  · Findings: No suspicious masses, calcifications, or areas of distortion. There are stable scattered densities and vascular calcifications that are not concerning.  · Overall Assessment: Benign findings (BI-RADS Category 2)-no signs of breast cancer.  · Recommendation: Routine screening mammogram in 1-2 years.  You will receive a reminder letter for your next mammogram. Annual mammograms are generally recommended for women over 40. Let us know if you have any questions.      [employer] message sent as well    Explanatory note: Be assured that the information provided to create your medical record comes from your provider. The written transcription portion of this note is prepared electronically by voice-recognition software. At times, there may be some errors in capitalization, punctuation, tense, or context that are inherent in the system, but your provider reviews the note for content and to ensure that the note contains appropriate information for your continuing care.

## 2025-03-13 ENCOUNTER — RESULTS FOLLOW-UP (OUTPATIENT)
Dept: OBGYN CLINIC | Age: 85
End: 2025-03-13

## 2025-03-18 DIAGNOSIS — I10 ESSENTIAL HYPERTENSION, BENIGN: ICD-10-CM

## 2025-03-18 DIAGNOSIS — K21.00 GASTROESOPHAGEAL REFLUX DISEASE WITH ESOPHAGITIS WITHOUT HEMORRHAGE: ICD-10-CM

## 2025-03-18 DIAGNOSIS — D52.9 ANEMIA DUE TO FOLIC ACID DEFICIENCY, UNSPECIFIED DEFICIENCY TYPE: ICD-10-CM

## 2025-03-18 DIAGNOSIS — E03.9 ACQUIRED HYPOTHYROIDISM: ICD-10-CM

## 2025-03-18 RX ORDER — HYDROCHLOROTHIAZIDE 12.5 MG/1
12.5 TABLET ORAL DAILY
Qty: 90 TABLET | Refills: 1 | Status: SHIPPED | OUTPATIENT
Start: 2025-03-18

## 2025-03-18 RX ORDER — PANTOPRAZOLE SODIUM 40 MG/1
TABLET, DELAYED RELEASE ORAL
Qty: 90 TABLET | Refills: 1 | Status: SHIPPED | OUTPATIENT
Start: 2025-03-18

## 2025-03-18 RX ORDER — TELMISARTAN 80 MG/1
80 TABLET ORAL DAILY
Qty: 90 TABLET | Refills: 1 | Status: SHIPPED | OUTPATIENT
Start: 2025-03-18

## 2025-03-18 RX ORDER — CARBIDOPA AND LEVODOPA 25; 100 MG/1; MG/1
1 TABLET ORAL NIGHTLY
Qty: 90 TABLET | Refills: 1 | Status: SHIPPED | OUTPATIENT
Start: 2025-03-18

## 2025-03-18 RX ORDER — FOLIC ACID 1 MG/1
1 TABLET ORAL DAILY
Qty: 90 TABLET | Refills: 1 | Status: SHIPPED | OUTPATIENT
Start: 2025-03-18

## 2025-03-18 RX ORDER — LEVOTHYROXINE SODIUM 75 UG/1
75 TABLET ORAL DAILY
Qty: 90 TABLET | Refills: 1 | Status: SHIPPED | OUTPATIENT
Start: 2025-03-18

## 2025-03-26 DIAGNOSIS — E78.00 HYPERCHOLESTEROLEMIA: ICD-10-CM

## 2025-03-26 RX ORDER — PRAVASTATIN SODIUM 40 MG
40 TABLET ORAL DAILY
Qty: 90 TABLET | Refills: 1 | Status: SHIPPED | OUTPATIENT
Start: 2025-03-26 | End: 2025-09-22

## 2025-04-10 ENCOUNTER — LAB (OUTPATIENT)
Dept: PRIMARY CARE CLINIC | Facility: CLINIC | Age: 85
End: 2025-04-10

## 2025-04-10 DIAGNOSIS — K21.9 GASTROESOPHAGEAL REFLUX DISEASE WITHOUT ESOPHAGITIS: ICD-10-CM

## 2025-04-10 DIAGNOSIS — Z13.220 SCREENING FOR LIPID DISORDERS: ICD-10-CM

## 2025-04-10 DIAGNOSIS — Z13.0 SCREENING FOR ENDOCRINE, METABOLIC AND IMMUNITY DISORDER: ICD-10-CM

## 2025-04-10 DIAGNOSIS — Z13.0 SCREENING FOR DEFICIENCY ANEMIA: ICD-10-CM

## 2025-04-10 DIAGNOSIS — R53.81 MALAISE AND FATIGUE: ICD-10-CM

## 2025-04-10 DIAGNOSIS — Z79.899 ENCOUNTER FOR LONG-TERM (CURRENT) USE OF MEDICATIONS: ICD-10-CM

## 2025-04-10 DIAGNOSIS — Z13.29 SCREENING FOR ENDOCRINE, NUTRITIONAL, METABOLIC AND IMMUNITY DISORDER: ICD-10-CM

## 2025-04-10 DIAGNOSIS — R79.89 OTHER SPECIFIED ABNORMAL FINDINGS OF BLOOD CHEMISTRY: ICD-10-CM

## 2025-04-10 DIAGNOSIS — E55.9 VITAMIN D DEFICIENCY: ICD-10-CM

## 2025-04-10 DIAGNOSIS — Z13.21 ENCOUNTER FOR VITAMIN DEFICIENCY SCREENING: ICD-10-CM

## 2025-04-10 DIAGNOSIS — R31.0 GROSS HEMATURIA: ICD-10-CM

## 2025-04-10 DIAGNOSIS — R53.81 MALAISE: ICD-10-CM

## 2025-04-10 DIAGNOSIS — R06.01 ORTHOPNEA: ICD-10-CM

## 2025-04-10 DIAGNOSIS — R79.9 ABNORMAL BLOOD CHEMISTRY: ICD-10-CM

## 2025-04-10 DIAGNOSIS — I10 ESSENTIAL HYPERTENSION, BENIGN: ICD-10-CM

## 2025-04-10 DIAGNOSIS — Z13.29 SCREENING FOR ENDOCRINE, METABOLIC AND IMMUNITY DISORDER: ICD-10-CM

## 2025-04-10 DIAGNOSIS — Z13.0 SCREENING FOR IRON DEFICIENCY ANEMIA: ICD-10-CM

## 2025-04-10 DIAGNOSIS — R74.8 ELEVATED LIVER ENZYMES: ICD-10-CM

## 2025-04-10 DIAGNOSIS — E03.9 ACQUIRED HYPOTHYROIDISM: ICD-10-CM

## 2025-04-10 DIAGNOSIS — Z13.1 SCREENING FOR DIABETES MELLITUS: Primary | ICD-10-CM

## 2025-04-10 DIAGNOSIS — N83.201 CYST OF RIGHT OVARY: ICD-10-CM

## 2025-04-10 DIAGNOSIS — G25.81 RESTLESS LEG SYNDROME: ICD-10-CM

## 2025-04-10 DIAGNOSIS — Z87.891 HISTORY OF PRIOR CIGARETTE SMOKING: ICD-10-CM

## 2025-04-10 DIAGNOSIS — R53.83 OTHER FATIGUE: ICD-10-CM

## 2025-04-10 DIAGNOSIS — D52.9 ANEMIA DUE TO FOLIC ACID DEFICIENCY, UNSPECIFIED DEFICIENCY TYPE: ICD-10-CM

## 2025-04-10 DIAGNOSIS — R73.09 OTHER ABNORMAL GLUCOSE: ICD-10-CM

## 2025-04-10 DIAGNOSIS — Z13.228 SCREENING FOR ENDOCRINE, NUTRITIONAL, METABOLIC AND IMMUNITY DISORDER: ICD-10-CM

## 2025-04-10 DIAGNOSIS — E78.00 HYPERCHOLESTEROLEMIA: ICD-10-CM

## 2025-04-10 DIAGNOSIS — Z13.228 SCREENING FOR METABOLIC DISORDER: ICD-10-CM

## 2025-04-10 DIAGNOSIS — Z12.5 SCREENING FOR PROSTATE CANCER: ICD-10-CM

## 2025-04-10 DIAGNOSIS — Z13.1 SCREENING FOR DIABETES MELLITUS: ICD-10-CM

## 2025-04-10 DIAGNOSIS — Z13.21 SCREENING FOR ENDOCRINE, NUTRITIONAL, METABOLIC AND IMMUNITY DISORDER: ICD-10-CM

## 2025-04-10 DIAGNOSIS — Z13.29 SCREENING FOR THYROID DISORDER: ICD-10-CM

## 2025-04-10 DIAGNOSIS — R53.83 MALAISE AND FATIGUE: ICD-10-CM

## 2025-04-10 DIAGNOSIS — Z96.651 PRESENCE OF RIGHT ARTIFICIAL KNEE JOINT: ICD-10-CM

## 2025-04-10 DIAGNOSIS — R53.82 CHRONIC FATIGUE: ICD-10-CM

## 2025-04-10 DIAGNOSIS — Z13.0 SCREENING FOR ENDOCRINE, NUTRITIONAL, METABOLIC AND IMMUNITY DISORDER: ICD-10-CM

## 2025-04-10 DIAGNOSIS — Z13.228 SCREENING FOR ENDOCRINE, METABOLIC AND IMMUNITY DISORDER: ICD-10-CM

## 2025-04-10 DIAGNOSIS — Z96.652 PRESENCE OF LEFT ARTIFICIAL KNEE JOINT: ICD-10-CM

## 2025-04-10 DIAGNOSIS — J30.1 SEASONAL ALLERGIC RHINITIS DUE TO POLLEN: ICD-10-CM

## 2025-04-10 DIAGNOSIS — K85.10 GALLSTONE PANCREATITIS: ICD-10-CM

## 2025-04-10 DIAGNOSIS — K57.32 SIGMOID DIVERTICULITIS: ICD-10-CM

## 2025-04-10 DIAGNOSIS — M19.90 OSTEOARTHRITIS, UNSPECIFIED OSTEOARTHRITIS TYPE, UNSPECIFIED SITE: ICD-10-CM

## 2025-04-10 DIAGNOSIS — R79.0 LOW MAGNESIUM LEVEL: ICD-10-CM

## 2025-04-10 LAB
25(OH)D3 SERPL-MCNC: 52.2 NG/ML (ref 30–100)
ALBUMIN SERPL-MCNC: 3.7 G/DL (ref 3.2–4.6)
ALBUMIN/GLOB SERPL: 1.2 (ref 1–1.9)
ALP SERPL-CCNC: 74 U/L (ref 35–104)
ALT SERPL-CCNC: 25 U/L (ref 8–45)
ANION GAP SERPL CALC-SCNC: 10 MMOL/L (ref 7–16)
AST SERPL-CCNC: 30 U/L (ref 15–37)
BASOPHILS # BLD: 0.04 K/UL (ref 0–0.2)
BASOPHILS NFR BLD: 0.5 % (ref 0–2)
BILIRUB SERPL-MCNC: 0.4 MG/DL (ref 0–1.2)
BUN SERPL-MCNC: 31 MG/DL (ref 8–23)
CALCIUM SERPL-MCNC: 9.6 MG/DL (ref 8.8–10.2)
CHLORIDE SERPL-SCNC: 105 MMOL/L (ref 98–107)
CHOLEST SERPL-MCNC: 171 MG/DL (ref 0–200)
CO2 SERPL-SCNC: 26 MMOL/L (ref 20–29)
CREAT SERPL-MCNC: 0.77 MG/DL (ref 0.6–1.1)
DIFFERENTIAL METHOD BLD: NORMAL
EOSINOPHIL # BLD: 0.3 K/UL (ref 0–0.8)
EOSINOPHIL NFR BLD: 4 % (ref 0.5–7.8)
ERYTHROCYTE [DISTWIDTH] IN BLOOD BY AUTOMATED COUNT: 14 % (ref 11.9–14.6)
EST. AVERAGE GLUCOSE BLD GHB EST-MCNC: 103 MG/DL
FERRITIN SERPL-MCNC: 74 NG/ML (ref 8–388)
FOLATE SERPL-MCNC: 28.7 NG/ML (ref 3.1–17.5)
GLOBULIN SER CALC-MCNC: 3.1 G/DL (ref 2.3–3.5)
GLUCOSE SERPL-MCNC: 81 MG/DL (ref 70–99)
HBA1C MFR BLD: 5.2 % (ref 0–5.6)
HCT VFR BLD AUTO: 43 % (ref 35.8–46.3)
HDLC SERPL-MCNC: 59 MG/DL (ref 40–60)
HDLC SERPL: 2.9 (ref 0–5)
HGB BLD-MCNC: 13.8 G/DL (ref 11.7–15.4)
IMM GRANULOCYTES # BLD AUTO: 0.02 K/UL (ref 0–0.5)
IMM GRANULOCYTES NFR BLD AUTO: 0.3 % (ref 0–5)
IRON SATN MFR SERPL: 29 % (ref 20–50)
IRON SERPL-MCNC: 84 UG/DL (ref 35–100)
LDLC SERPL CALC-MCNC: 90 MG/DL (ref 0–100)
LYMPHOCYTES # BLD: 1.64 K/UL (ref 0.5–4.6)
LYMPHOCYTES NFR BLD: 21.8 % (ref 13–44)
MAGNESIUM SERPL-MCNC: 2.3 MG/DL (ref 1.8–2.4)
MCH RBC QN AUTO: 31.5 PG (ref 26.1–32.9)
MCHC RBC AUTO-ENTMCNC: 32.1 G/DL (ref 31.4–35)
MCV RBC AUTO: 98.2 FL (ref 82–102)
MONOCYTES # BLD: 0.75 K/UL (ref 0.1–1.3)
MONOCYTES NFR BLD: 10 % (ref 4–12)
NEUTS SEG # BLD: 4.78 K/UL (ref 1.7–8.2)
NEUTS SEG NFR BLD: 63.4 % (ref 43–78)
NRBC # BLD: 0 K/UL (ref 0–0.2)
PLATELET # BLD AUTO: 285 K/UL (ref 150–450)
PMV BLD AUTO: 10.5 FL (ref 9.4–12.3)
POTASSIUM SERPL-SCNC: 4.3 MMOL/L (ref 3.5–5.1)
PROT SERPL-MCNC: 6.8 G/DL (ref 6.3–8.2)
RBC # BLD AUTO: 4.38 M/UL (ref 4.05–5.2)
SODIUM SERPL-SCNC: 140 MMOL/L (ref 136–145)
TIBC SERPL-MCNC: 286 UG/DL (ref 240–450)
TRIGL SERPL-MCNC: 108 MG/DL (ref 0–150)
TSH W FREE THYROID IF ABNORMAL: 1.64 UIU/ML (ref 0.27–4.2)
UIBC SERPL-MCNC: 202 UG/DL (ref 112–347)
VIT B12 SERPL-MCNC: 423 PG/ML (ref 193–986)
VLDLC SERPL CALC-MCNC: 22 MG/DL (ref 6–23)
WBC # BLD AUTO: 7.5 K/UL (ref 4.3–11.1)

## 2025-04-11 ENCOUNTER — RESULTS FOLLOW-UP (OUTPATIENT)
Dept: PRIMARY CARE CLINIC | Facility: CLINIC | Age: 85
End: 2025-04-11

## 2025-04-11 DIAGNOSIS — E55.9 VITAMIN D DEFICIENCY: ICD-10-CM

## 2025-04-11 DIAGNOSIS — E78.00 HYPERCHOLESTEROLEMIA: ICD-10-CM

## 2025-04-11 DIAGNOSIS — D52.9 ANEMIA DUE TO FOLIC ACID DEFICIENCY, UNSPECIFIED DEFICIENCY TYPE: Primary | ICD-10-CM

## 2025-04-11 NOTE — ASSESSMENT & PLAN NOTE
--------------------------------  04/10/2025 Labs:    Iron and Vitamin Levels  Iron: 84 (Normal)  Ferritin: 74 (Normal iron stores)  Folate: 28.7 (High) ? Normal is 3.1-17.5  ?? Due to folic acid supplementation  Vitamin B12: 423 (Normal)  Vitamin D: 52.2 (Good level)  Magnesium: 2.3 (Normal)  ? Iron levels and anemia well-managed on folic acid and multivitamin.  ? Vitamin D level appropriate on supplementation.  ?? Continue folic acid 1 mg daily, cholecalciferol (Vitamin D), and multivitamin.  ------------------

## 2025-04-11 NOTE — ASSESSMENT & PLAN NOTE
--------------------------------  04/10/2025 Labs:    Cholesterol & Lipids: Well-controlled  Total Cholesterol: 171  LDL (\"bad\" cholesterol): 90  HDL (\"good\" cholesterol): 59  Triglycerides: 108  Chol/HDL Ratio: 2.9  ? Excellent cholesterol profile, likely helped by pravastatin 40 mg daily and omega-3 supplements.  ?? Continue current statin therapy.  ------------------

## 2025-04-18 ENCOUNTER — TELEPHONE (OUTPATIENT)
Dept: PRIMARY CARE CLINIC | Facility: CLINIC | Age: 85
End: 2025-04-18

## 2025-04-18 DIAGNOSIS — E78.00 HYPERCHOLESTEROLEMIA: ICD-10-CM

## 2025-04-18 DIAGNOSIS — I10 ESSENTIAL HYPERTENSION, BENIGN: ICD-10-CM

## 2025-04-18 DIAGNOSIS — K21.00 GASTROESOPHAGEAL REFLUX DISEASE WITH ESOPHAGITIS WITHOUT HEMORRHAGE: ICD-10-CM

## 2025-04-18 DIAGNOSIS — D52.9 ANEMIA DUE TO FOLIC ACID DEFICIENCY, UNSPECIFIED DEFICIENCY TYPE: ICD-10-CM

## 2025-04-18 DIAGNOSIS — E03.9 ACQUIRED HYPOTHYROIDISM: ICD-10-CM

## 2025-04-18 NOTE — TELEPHONE ENCOUNTER
Pt called for a refill of the following medications: levothyroxine, hydrochlorothiazide, folic acid, pravastartin, telmisartan, pantoprazole, carbidopa-loevodopa to the [facility]

## 2025-04-21 ENCOUNTER — OFFICE VISIT (OUTPATIENT)
Dept: PRIMARY CARE CLINIC | Facility: CLINIC | Age: 85
End: 2025-04-21
Payer: MEDICARE

## 2025-04-21 VITALS
SYSTOLIC BLOOD PRESSURE: 106 MMHG | HEART RATE: 68 BPM | DIASTOLIC BLOOD PRESSURE: 52 MMHG | OXYGEN SATURATION: 98 % | TEMPERATURE: 98.2 F | WEIGHT: 149 LBS | BODY MASS INDEX: 25.44 KG/M2 | HEIGHT: 64 IN

## 2025-04-21 DIAGNOSIS — E55.9 VITAMIN D DEFICIENCY: ICD-10-CM

## 2025-04-21 DIAGNOSIS — E03.9 ACQUIRED HYPOTHYROIDISM: ICD-10-CM

## 2025-04-21 DIAGNOSIS — J30.1 SEASONAL ALLERGIC RHINITIS DUE TO POLLEN: ICD-10-CM

## 2025-04-21 DIAGNOSIS — Z00.00 MEDICARE ANNUAL WELLNESS VISIT, SUBSEQUENT: ICD-10-CM

## 2025-04-21 DIAGNOSIS — I10 ESSENTIAL HYPERTENSION, BENIGN: ICD-10-CM

## 2025-04-21 DIAGNOSIS — E78.00 HYPERCHOLESTEROLEMIA: ICD-10-CM

## 2025-04-21 DIAGNOSIS — D52.9 ANEMIA DUE TO FOLIC ACID DEFICIENCY, UNSPECIFIED DEFICIENCY TYPE: ICD-10-CM

## 2025-04-21 DIAGNOSIS — K21.9 GASTROESOPHAGEAL REFLUX DISEASE WITHOUT ESOPHAGITIS: ICD-10-CM

## 2025-04-21 DIAGNOSIS — M19.90 OSTEOARTHRITIS, UNSPECIFIED OSTEOARTHRITIS TYPE, UNSPECIFIED SITE: ICD-10-CM

## 2025-04-21 DIAGNOSIS — R25.8: ICD-10-CM

## 2025-04-21 DIAGNOSIS — R31.0 GROSS HEMATURIA: ICD-10-CM

## 2025-04-21 DIAGNOSIS — G25.81 RESTLESS LEG SYNDROME: ICD-10-CM

## 2025-04-21 DIAGNOSIS — N83.201 CYST OF RIGHT OVARY: ICD-10-CM

## 2025-04-21 DIAGNOSIS — M85.80 OSTEOPENIA, UNSPECIFIED LOCATION: Primary | ICD-10-CM

## 2025-04-21 PROBLEM — R74.8 ELEVATED LIVER ENZYMES: Status: RESOLVED | Noted: 2022-10-19 | Resolved: 2025-04-21

## 2025-04-21 PROBLEM — R06.01 ORTHOPNEA: Status: RESOLVED | Noted: 2022-10-22 | Resolved: 2025-04-21

## 2025-04-21 PROBLEM — K85.10 GALLSTONE PANCREATITIS: Status: RESOLVED | Noted: 2022-11-01 | Resolved: 2025-04-21

## 2025-04-21 PROCEDURE — 99214 OFFICE O/P EST MOD 30 MIN: CPT | Performed by: NURSE PRACTITIONER

## 2025-04-21 PROCEDURE — 99497 ADVNCD CARE PLAN 30 MIN: CPT | Performed by: NURSE PRACTITIONER

## 2025-04-21 PROCEDURE — 1160F RVW MEDS BY RX/DR IN RCRD: CPT | Performed by: NURSE PRACTITIONER

## 2025-04-21 PROCEDURE — 3074F SYST BP LT 130 MM HG: CPT | Performed by: NURSE PRACTITIONER

## 2025-04-21 PROCEDURE — 1159F MED LIST DOCD IN RCRD: CPT | Performed by: NURSE PRACTITIONER

## 2025-04-21 PROCEDURE — G0439 PPPS, SUBSEQ VISIT: HCPCS | Performed by: NURSE PRACTITIONER

## 2025-04-21 PROCEDURE — G2211 COMPLEX E/M VISIT ADD ON: HCPCS | Performed by: NURSE PRACTITIONER

## 2025-04-21 PROCEDURE — 1123F ACP DISCUSS/DSCN MKR DOCD: CPT | Performed by: NURSE PRACTITIONER

## 2025-04-21 PROCEDURE — 3078F DIAST BP <80 MM HG: CPT | Performed by: NURSE PRACTITIONER

## 2025-04-21 SDOH — ECONOMIC STABILITY: FOOD INSECURITY: WITHIN THE PAST 12 MONTHS, YOU WORRIED THAT YOUR FOOD WOULD RUN OUT BEFORE YOU GOT MONEY TO BUY MORE.: NEVER TRUE

## 2025-04-21 SDOH — ECONOMIC STABILITY: FOOD INSECURITY: WITHIN THE PAST 12 MONTHS, THE FOOD YOU BOUGHT JUST DIDN'T LAST AND YOU DIDN'T HAVE MONEY TO GET MORE.: NEVER TRUE

## 2025-04-21 ASSESSMENT — ENCOUNTER SYMPTOMS
ABDOMINAL PAIN: 0
CONSTIPATION: 0
SHORTNESS OF BREATH: 0
EYES NEGATIVE: 1
NAUSEA: 0
VOMITING: 0
RESPIRATORY NEGATIVE: 1
CHEST TIGHTNESS: 0
DIARRHEA: 0
ALLERGIC/IMMUNOLOGIC NEGATIVE: 1

## 2025-04-21 ASSESSMENT — LIFESTYLE VARIABLES
HOW OFTEN DO YOU HAVE A DRINK CONTAINING ALCOHOL: NEVER
HOW MANY STANDARD DRINKS CONTAINING ALCOHOL DO YOU HAVE ON A TYPICAL DAY: PATIENT DOES NOT DRINK

## 2025-04-21 ASSESSMENT — PATIENT HEALTH QUESTIONNAIRE - PHQ9
SUM OF ALL RESPONSES TO PHQ QUESTIONS 1-9: 0
SUM OF ALL RESPONSES TO PHQ QUESTIONS 1-9: 0
2. FEELING DOWN, DEPRESSED OR HOPELESS: NOT AT ALL
1. LITTLE INTEREST OR PLEASURE IN DOING THINGS: NOT AT ALL
SUM OF ALL RESPONSES TO PHQ QUESTIONS 1-9: 0
SUM OF ALL RESPONSES TO PHQ QUESTIONS 1-9: 0

## 2025-04-21 NOTE — PROGRESS NOTES
BEFORE DINNER 90 tablet 1    diclofenac sodium (VOLTAREN) 1 % GEL Apply 4 g topically 4 times daily 100 g 1    Cholecalciferol 10 MCG (400 UNIT) CHEW Take by mouth      traMADol (ULTRAM) 50 MG tablet Take 1 tablet by mouth every 6 hours as needed for Pain.      Multiple Vitamins-Minerals (THERAPEUTIC MULTIVITAMIN-MINERALS) tablet Take 1 tablet by mouth daily      Omega-3 Fatty Acids (FISH OIL) 1000 MG CPDR Take 1 capsule by mouth daily      Calcium Polycarbophil (FIBER) 625 MG TABS take 1 qd      coenzyme Q10 100 MG CAPS capsule Take by mouth every morning      aspirin 81 MG EC tablet Take 1 tablet by mouth in the morning and 1 tablet in the evening.      cetirizine (ZYRTEC) 10 MG tablet Take 1 tablet by mouth daily      clobetasol (TEMOVATE) 0.05 % cream Apply topically 2 times daily as needed      Glucosamine 500 MG CAPS Take 500 mg by mouth daily      hydrocortisone 2.5 % cream Place rectally 4 times daily      ketoconazole (NIZORAL) 2 % cream Apply topically 2 times daily as needed      prednisoLONE acetate (PRED FORTE) 1 % ophthalmic suspension Apply 1 drop to eye Twice a Week      triamcinolone (KENALOG) 0.1 % cream Apply topically 2 times daily as needed       No current facility-administered medications for this visit.        Patient Active Problem List   Diagnosis    Allergic rhinitis due to pollen    Acquired hypothyroidism    Vitamin D deficiency    Hypercholesterolemia    Presence of left artificial knee joint    GERD (gastroesophageal reflux disease)    Essential hypertension, benign    Osteoarthritis    Presence of right artificial knee joint    Restless leg syndrome    Anemia due to folate deficiency    Cyst of right ovary    History of prior cigarette smoking    Gross hematuria    Osteopenia    Slow involuntary movements       Family History   Problem Relation Age of Onset    Coronary Art Dis Mother     Breast Cancer Mother 75    Cancer Mother         breast, lung    Heart Disease Mother     Heart

## 2025-04-21 NOTE — ASSESSMENT & PLAN NOTE
On pravastatin 40 mill grams p.o. daily  On telmisartan 80 mg p.o. daily  On hydrochlorothiazide 12.5 mg p.o. daily  On omega-3 fatty acids  On aspirin 81 mg p.o. daily     Does not see cardiology     Stable.  Chronic.  Continue current treatment plan       --------------------------------  04/10/2025 Labs:     Cholesterol & Lipids: Well-controlled  Total Cholesterol: 171  LDL (\"bad\" cholesterol): 90  HDL (\"good\" cholesterol): 59  Triglycerides: 108  Chol/HDL Ratio: 2.9  ? Excellent cholesterol profile, likely helped by pravastatin 40 mg daily and omega-3 supplements.  ?? Continue current statin therapy.  ------------------

## 2025-04-21 NOTE — ASSESSMENT & PLAN NOTE
On carbidopa-levodopa (SINEMET)  MG per tablet  Does not see specialist    Stable. Chronic  Continue current tx plan

## 2025-04-21 NOTE — ASSESSMENT & PLAN NOTE
On pravastatin 40 mill grams p.o. daily  On telmisartan 80 mg p.o. daily  On hydrochlorothiazide 12.5 mg p.o. daily  On omega-3 fatty acids  On aspirin 81 mg p.o. daily    Does not see cardiology    Stable.  Chronic.  Continue current treatment plan

## 2025-04-21 NOTE — PATIENT INSTRUCTIONS
at work or school.  What can you do to manage stress?  You can try these things to help manage stress:   Do something active. Exercise or activity can help reduce stress. Walking is a great way to get started. Even everyday activities such as housecleaning or yard work can help.  Try yoga or mary chi. These techniques combine exercise and meditation. You may need some training at first to learn them.  Do something you enjoy. For example, listen to music or go to a movie. Practice your hobby or do volunteer work.  Meditate. This can help you relax, because you are not worrying about what happened before or what may happen in the future.  Do guided imagery. Imagine yourself in any setting that helps you feel calm. You can use online videos, books, or a teacher to guide you.  Do breathing exercises. For example:  From a standing position, bend forward from the waist with your knees slightly bent. Let your arms dangle close to the floor.  Breathe in slowly and deeply as you return to a standing position. Roll up slowly and lift your head last.  Hold your breath for just a few seconds in the standing position.  Breathe out slowly and bend forward from the waist.  Let your feelings out. Talk, laugh, cry, and express anger when you need to. Talking with supportive friends or family, a counselor, or a susan leader about your feelings is a healthy way to relieve stress. Avoid discussing your feelings with people who make you feel worse.  Write. It may help to write about things that are bothering you. This helps you find out how much stress you feel and what is causing it. When you know this, you can find better ways to cope.  What can you do to prevent stress?  You might try some of these things to help prevent stress:  Manage your time. This helps you find time to do the things you want and need to do.  Get enough sleep. Your body recovers from the stresses of the day while you are sleeping.  Get support. Your family,

## 2025-04-21 NOTE — ASSESSMENT & PLAN NOTE
Could not afford bisphosphonate in the past  Not interested in additional medication  Does take over-the-counter supplementation  Stable.  Chronic.  Continue current treatment plan  ---------------------------------------------    Last DEXA Scan - 03/2025:    Summary of Bone Density Scan (DEXA):  You had a bone density test to assess your risk for osteoporosis and fractures.  What the Test Looked At:  Your lower spine and both hips were scanned to measure bone strength.  The results are given as T-scores (compared to healthy young adults) and Z-scores (compared to people your age).    Results:  Spine: Normal bone density  (T-score: -0.1)  Hips: Low bone density (called osteopenia)  (T-scores ranged from -1.7 to -2.0)    What This Means:  Your spine looks healthy.  Your hip bones show signs of osteopenia, which means your bones are weaker than normal but not yet in the osteoporosis range.  According to guidelines and your calculated FRAX score (a tool that estimates fracture risk), treatment is recommended to reduce the risk of future fractures.    Fracture Risk (Over the Next 10 Years):  16% chance of a major bone fracture (like spine, forearm, hip, or shoulder)  4.8% chance of a hip fracture    Next Steps:  Treatment for bone health is advised.  Repeat bone density testing is recommended in about 2 years to monitor changes.

## 2025-04-21 NOTE — ASSESSMENT & PLAN NOTE
On folic acid 1 mg p.o. daily    Stable. Chronic  Continue current tx plan    --------------------------------  04/10/2025 Labs:     Iron and Vitamin Levels  Iron: 84 (Normal)  Ferritin: 74 (Normal iron stores)  Folate: 28.7 (High) ? Normal is 3.1-17.5  ?? Due to folic acid supplementation  Vitamin B12: 423 (Normal)  Vitamin D: 52.2 (Good level)  Magnesium: 2.3 (Normal)  ? Iron levels and anemia well-managed on folic acid and multivitamin.  ? Vitamin D level appropriate on supplementation.  ?? Continue folic acid 1 mg daily, cholecalciferol (Vitamin D), and multivitamin.  ------------------

## 2025-04-21 NOTE — ASSESSMENT & PLAN NOTE
On Vit D supplementation, daily    Stable. Chronic  Continue current tx plan    --------------------------------  04/10/2025 Labs:     Iron and Vitamin Levels  Iron: 84 (Normal)  Ferritin: 74 (Normal iron stores)  Folate: 28.7 (High) ? Normal is 3.1-17.5  ?? Due to folic acid supplementation  Vitamin B12: 423 (Normal)  Vitamin D: 52.2 (Good level)  Magnesium: 2.3 (Normal)  ? Iron levels and anemia well-managed on folic acid and multivitamin.  ? Vitamin D level appropriate on supplementation.  ?? Continue folic acid 1 mg daily, cholecalciferol (Vitamin D), and multivitamin.  ------------------

## 2025-08-13 ENCOUNTER — HOSPITAL ENCOUNTER (EMERGENCY)
Age: 85
Discharge: HOME | End: 2025-08-13
Attending: EMERGENCY MEDICINE
Payer: MEDICARE

## 2025-08-13 ENCOUNTER — APPOINTMENT (OUTPATIENT)
Dept: GENERAL RADIOLOGY | Age: 85
End: 2025-08-13
Payer: MEDICARE

## 2025-08-13 ENCOUNTER — APPOINTMENT (OUTPATIENT)
Dept: CT IMAGING | Age: 85
End: 2025-08-13
Payer: MEDICARE

## 2025-08-13 VITALS
HEART RATE: 64 BPM | RESPIRATION RATE: 16 BRPM | SYSTOLIC BLOOD PRESSURE: 111 MMHG | DIASTOLIC BLOOD PRESSURE: 55 MMHG | WEIGHT: 150 LBS | HEIGHT: 64 IN | OXYGEN SATURATION: 96 % | TEMPERATURE: 97.9 F | BODY MASS INDEX: 25.61 KG/M2
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
## 2025-08-13 DIAGNOSIS — S42.294A OTHER CLOSED NONDISPLACED FRACTURE OF PROXIMAL END OF RIGHT HUMERUS, INITIAL ENCOUNTER: ICD-10-CM

## 2025-08-13 DIAGNOSIS — S01.81XA FACIAL LACERATION, INITIAL ENCOUNTER: Primary | ICD-10-CM

## 2025-08-13 PROCEDURE — 12011 RPR F/E/E/N/L/M 2.5 CM/<: CPT

## 2025-08-13 PROCEDURE — 99284 EMERGENCY DEPT VISIT MOD MDM: CPT

## 2025-08-13 PROCEDURE — 6370000000 HC RX 637 (ALT 250 FOR IP): Performed by: EMERGENCY MEDICINE

## 2025-08-13 PROCEDURE — 73030 X-RAY EXAM OF SHOULDER: CPT

## 2025-08-13 PROCEDURE — 70450 CT HEAD/BRAIN W/O DYE: CPT

## 2025-08-13 RX ORDER — HYDROCODONE BITARTRATE AND ACETAMINOPHEN 5; 325 MG/1; MG/1
1 TABLET ORAL EVERY 4 HOURS PRN
Qty: 15 TABLET | Refills: 0 | Status: SHIPPED | OUTPATIENT
Start: 2025-08-13 | End: 2025-08-16

## 2025-08-13 RX ORDER — HYDROCODONE BITARTRATE AND ACETAMINOPHEN 5; 325 MG/1; MG/1
1 TABLET ORAL
Refills: 0 | Status: COMPLETED | OUTPATIENT
Start: 2025-08-13 | End: 2025-08-13

## 2025-08-13 RX ADMIN — HYDROCODONE BITARTRATE AND ACETAMINOPHEN 1 TABLET: 5; 325 TABLET ORAL at 16:25

## 2025-08-13 ASSESSMENT — PAIN - FUNCTIONAL ASSESSMENT
PAIN_FUNCTIONAL_ASSESSMENT: 0-10

## 2025-08-13 ASSESSMENT — PAIN DESCRIPTION - ORIENTATION: ORIENTATION: RIGHT

## 2025-08-13 ASSESSMENT — PAIN SCALES - GENERAL
PAINLEVEL_OUTOF10: 6
PAINLEVEL_OUTOF10: 8
PAINLEVEL_OUTOF10: 10
PAINLEVEL_OUTOF10: 6

## 2025-08-13 ASSESSMENT — PAIN DESCRIPTION - LOCATION: LOCATION: SHOULDER

## 2025-08-13 ASSESSMENT — PAIN DESCRIPTION - PAIN TYPE: TYPE: ACUTE PAIN

## 2025-08-14 ENCOUNTER — TELEPHONE (OUTPATIENT)
Dept: ORTHOPEDIC SURGERY | Age: 85
End: 2025-08-14

## 2025-08-14 ENCOUNTER — OFFICE VISIT (OUTPATIENT)
Dept: ORTHOPEDIC SURGERY | Age: 85
End: 2025-08-14

## 2025-08-14 DIAGNOSIS — S42.201A CLOSED TRAUMATIC NONDISPLACED FRACTURE OF PROXIMAL END OF RIGHT HUMERUS, INITIAL ENCOUNTER: Primary | ICD-10-CM

## 2025-08-19 ENCOUNTER — OFFICE VISIT (OUTPATIENT)
Dept: PRIMARY CARE CLINIC | Facility: CLINIC | Age: 85
End: 2025-08-19

## 2025-08-19 VITALS
BODY MASS INDEX: 26.12 KG/M2 | SYSTOLIC BLOOD PRESSURE: 116 MMHG | DIASTOLIC BLOOD PRESSURE: 65 MMHG | TEMPERATURE: 97.7 F | HEIGHT: 64 IN | WEIGHT: 153 LBS | OXYGEN SATURATION: 99 % | HEART RATE: 77 BPM

## 2025-08-19 DIAGNOSIS — S49.91XD RIGHT SHOULDER INJURY, SUBSEQUENT ENCOUNTER: ICD-10-CM

## 2025-08-19 DIAGNOSIS — S05.31XD: ICD-10-CM
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
## 2025-08-19 PROCEDURE — 99024 POSTOP FOLLOW-UP VISIT: CPT | Performed by: NURSE PRACTITIONER

## 2025-08-19 ASSESSMENT — ENCOUNTER SYMPTOMS
CHEST TIGHTNESS: 0
ABDOMINAL PAIN: 0
VOMITING: 0
ALLERGIC/IMMUNOLOGIC NEGATIVE: 1
NAUSEA: 0
RESPIRATORY NEGATIVE: 1
CONSTIPATION: 0
EYES NEGATIVE: 1
DIARRHEA: 0
SHORTNESS OF BREATH: 0

## 2025-08-21 ENCOUNTER — TELEPHONE (OUTPATIENT)
Dept: ORTHOPEDIC SURGERY | Age: 85
End: 2025-08-21

## 2025-08-21 DIAGNOSIS — S42.294A OTHER CLOSED NONDISPLACED FRACTURE OF PROXIMAL END OF RIGHT HUMERUS, INITIAL ENCOUNTER: ICD-10-CM

## 2025-08-21 RX ORDER — HYDROCODONE BITARTRATE AND ACETAMINOPHEN 5; 325 MG/1; MG/1
1 TABLET ORAL EVERY 4 HOURS PRN
Qty: 18 TABLET | Refills: 0 | Status: SHIPPED | OUTPATIENT
Start: 2025-08-21 | End: 2025-08-24

## 2025-08-28 ENCOUNTER — OFFICE VISIT (OUTPATIENT)
Dept: ORTHOPEDIC SURGERY | Age: 85
End: 2025-08-28

## 2025-08-28 DIAGNOSIS — S42.201D CLOSED TRAUMATIC NONDISPLACED FRACTURE OF PROXIMAL END OF RIGHT HUMERUS WITH ROUTINE HEALING, SUBSEQUENT ENCOUNTER: Primary | ICD-10-CM

## (undated) DEVICE — DRAPE,TOP,102X53,STERILE: Brand: MEDLINE

## (undated) DEVICE — STRYKER PERFORMANCE SERIES SAGITTAL BLADE: Brand: STRYKER PERFORMANCE SERIES

## (undated) DEVICE — REM POLYHESIVE ADULT PATIENT RETURN ELECTRODE: Brand: VALLEYLAB

## (undated) DEVICE — BIPOLAR SEALER 23-112-1 AQM 6.0: Brand: AQUAMANTYS ®

## (undated) DEVICE — AIRLIFE™ OXYGEN TUBING 7 FEET (2.1 M) CRUSH RESISTANT OXYGEN TUBING, VINYL TIPPED: Brand: AIRLIFE™

## (undated) DEVICE — SUTURE PDS II SZ 1 L96IN ABSRB VLT TP-1 L65MM 1/2 CIR Z880G

## (undated) DEVICE — BUTTON SWITCH PENCIL BLADE ELECTRODE, HOLSTER: Brand: EDGE

## (undated) DEVICE — 3M™ TEGADERM™ TRANSPARENT FILM DRESSING FRAME STYLE, 1624W, 2-3/8 IN X 2-3/4 IN (6 CM X 7 CM), 100/CT 4CT/CASE: Brand: 3M™ TEGADERM™

## (undated) DEVICE — T4 HOOD

## (undated) DEVICE — GLOVE SURG SZ 7 L12IN FNGR THK79MIL GRN LTX FREE

## (undated) DEVICE — STOCKINETTE TUBE 9X48 -- MEDICHOICE

## (undated) DEVICE — TROCAR: Brand: KII FIOS FIRST ENTRY

## (undated) DEVICE — SLIM BODY SKIN STAPLER: Brand: APPOSE ULC

## (undated) DEVICE — LUBE JELLY FOIL PACK 1.4 OZ: Brand: MEDLINE INDUSTRIES, INC.

## (undated) DEVICE — Z DISCONTINUED USE 2744636  DRESSING AQUACEL 14 IN ALG W3.5XL14IN POLYUR FLM CVR W/ HYDRCOLL

## (undated) DEVICE — SOLUTION IRRIG 3000ML 0.9% SOD CHL FLX CONT 0797208] ICU MEDICAL INC]

## (undated) DEVICE — SYRINGE MED 10ML LUERLOCK TIP W/O SFTY DISP

## (undated) DEVICE — SOLUTION IV 500ML 0.9% SOD CHL FLX CONT

## (undated) DEVICE — (D)PREP SKN CHLRAPRP APPL 26ML -- CONVERT TO ITEM 371833

## (undated) DEVICE — TOTAL KNEE DR KAVOLUS: Brand: MEDLINE INDUSTRIES, INC.

## (undated) DEVICE — SPHINCTEROTOME: Brand: JAGTOME RX 44

## (undated) DEVICE — NEEDLE HYPO 21GA L1.5IN INTRAMUSCULAR S STL LATCH BVL UP

## (undated) DEVICE — SINGLE PORT MANIFOLD: Brand: NEPTUNE 2

## (undated) DEVICE — SYR 50ML LR LCK 1ML GRAD NSAF --

## (undated) DEVICE — SUTURE MCRYL SZ 2-0 L27IN ABSRB UD CP-1 1 L36MM 1/2 CIR REV Y266H

## (undated) DEVICE — TRAY PREP DRY W/ PREM GLV 2 APPL 6 SPNG 2 UNDPD 1 OVERWRAP

## (undated) DEVICE — CONNECTOR TBNG OD5-7MM O2 END DISP

## (undated) DEVICE — CURETTE BNE CEM 10IN DISP --

## (undated) DEVICE — BANDAGE COMPR SELF ADH 5 YDX4 IN TAN STRL PREMIERPRO LF

## (undated) DEVICE — NEEDLE SYR 18GA L1.5IN RED PLAS HUB S STL BLNT FILL W/O

## (undated) DEVICE — GARMENT,MEDLINE,DVT,INT,CALF,MED, GEN2: Brand: MEDLINE

## (undated) DEVICE — CANNULA NSL ORAL AD FOR CAPNOFLEX CO2 O2 AIRLFE

## (undated) DEVICE — SUTURE VCRL SZ 1 L27IN ABSRB UD L36MM CP-1 1/2 CIR REV CUT J268H

## (undated) DEVICE — PREMIUM WET SKIN PREP TRAY: Brand: MEDLINE INDUSTRIES, INC.

## (undated) DEVICE — YANKAUER,BULB TIP,W/O VENT,RIGID,STERILE: Brand: MEDLINE

## (undated) DEVICE — SPONGE LAP 18X18IN STRL -- 5/PK

## (undated) DEVICE — ELECTRODE PT RET AD L9FT HI MOIST COND ADH HYDRGEL CORDED

## (undated) DEVICE — LOGICUT SCISSOR LENGTH 320MM: Brand: LOGI - LAPAROSCOPIC INSTRUMENT SYSTEM

## (undated) DEVICE — MEDI-VAC YANKAUER SUCTION HANDLE W/BULBOUS TIP: Brand: CARDINAL HEALTH

## (undated) DEVICE — TROCAR: Brand: KII® SLEEVE

## (undated) DEVICE — GOWN,REINF,POLY,ECL,PP SLV,XL: Brand: MEDLINE

## (undated) DEVICE — TROCARS: Brand: KII® BLUNT TIP ACCESS SYSTEM

## (undated) DEVICE — BLOCK BITE AD 60FR W/ VELC STRP ADDRESSES MOST PT AND

## (undated) DEVICE — 2000CC GUARDIAN II: Brand: GUARDIAN

## (undated) DEVICE — 3M™ STERI-DRAPE™ INSTRUMENT POUCH 1018: Brand: STERI-DRAPE™

## (undated) DEVICE — Device

## (undated) DEVICE — HANDPIECE SET WITH COAXIAL HIGH FLOW TIP AND SUCTION TUBE: Brand: INTERPULSE

## (undated) DEVICE — GLOVE SURG SZ 65 CRM LTX FREE POLYISOPRENE POLYMER BEAD ANTI

## (undated) DEVICE — SYR LR LCK 1ML GRAD NSAF 30ML --

## (undated) DEVICE — PAD,NON-ADHERENT,3X8,STERILE,LF,1/PK: Brand: MEDLINE

## (undated) DEVICE — UTILITY MARKER,BLACK WITH LABELS: Brand: DEVON

## (undated) DEVICE — GENERAL LAPAROSCOPY: Brand: MEDLINE INDUSTRIES, INC.

## (undated) DEVICE — SOLUTION IV 1000ML 0.9% SOD CHL

## (undated) DEVICE — BAG SPEC REM 224ML W4XL6IN DIA10MM 1 HND GYN DISP ENDOPCH

## (undated) DEVICE — GLOVE SURG SZ 65 THK91MIL LTX FREE SYN POLYISOPRENE

## (undated) DEVICE — SYRINGE MED 3ML CLR PLAS STD N CTRL LUERLOCK TIP DISP

## (undated) DEVICE — APPLIER CLP M/L SHFT DIA5MM 15 LIG LIGAMAX 5

## (undated) DEVICE — Z DISCONTINUED PER MEDLINE USE 2741944 DRESSING AQUACEL 12 IN SURG W9XL30CM SIL CVR WTRPRF VIR BACT BARR ANTIMIC

## (undated) DEVICE — SUTURE SZ 0 27IN 5/8 CIR UR-6  TAPER PT VIOLET ABSRB VICRYL J603H

## (undated) DEVICE — TUBING INSUFFLATION SMK EVAC HI FLO SET PNEUMOCLEAR

## (undated) DEVICE — 3M™ TEGADERM™ TRANSPARENT FILM DRESSING FRAME STYLE, 1626W, 4 IN X 4-3/4 IN (10 CM X 12 CM), 50/CT 4CT/CASE: Brand: 3M™ TEGADERM™

## (undated) DEVICE — SUTURE VCRL SZ 1 L36IN ABSRB UD CTX L48MM 1/2 CIR J977H

## (undated) DEVICE — PACK PROCEDURE SURG TOT KNEE

## (undated) DEVICE — INTENDED FOR TISSUE SEPARATION, AND OTHER PROCEDURES THAT REQUIRE A SHARP SURGICAL BLADE TO PUNCTURE OR CUT.: Brand: BARD-PARKER ® STAINLESS STEEL BLADES

## (undated) DEVICE — APPLIER LIG CLP L13IN 10MM PSTL GRP CONTAIN 15 TI L CLP

## (undated) DEVICE — SYRINGE, LUER SLIP, STERILE, 60ML: Brand: MEDLINE

## (undated) DEVICE — 3000CC GUARDIAN II: Brand: GUARDIAN

## (undated) DEVICE — GAUZE,SPONGE,4"X4",12PLY,WOVEN,NS,LF: Brand: MEDLINE

## (undated) DEVICE — TRAY CATH 16F DRN BG LTX -- CONVERT TO ITEM 363158

## (undated) DEVICE — 1200 GUARD II KIT W/5MM TUBE W/O VAC TUBE: Brand: GUARDIAN

## (undated) DEVICE — RETRIEVAL BALLOON CATHETER: Brand: EXTRACTOR™ PRO RX

## (undated) DEVICE — KENDALL RADIOLUCENT FOAM MONITORING ELECTRODE RECTANGULAR SHAPE: Brand: KENDALL